# Patient Record
Sex: MALE | Race: WHITE | Employment: OTHER | URBAN - METROPOLITAN AREA
[De-identification: names, ages, dates, MRNs, and addresses within clinical notes are randomized per-mention and may not be internally consistent; named-entity substitution may affect disease eponyms.]

---

## 2019-11-18 ENCOUNTER — APPOINTMENT (OUTPATIENT)
Dept: GENERAL RADIOLOGY | Age: 59
DRG: 291 | End: 2019-11-18

## 2019-11-18 ENCOUNTER — HOSPITAL ENCOUNTER (INPATIENT)
Age: 59
LOS: 4 days | Discharge: HOME | DRG: 291 | End: 2019-11-22
Attending: EMERGENCY MEDICINE | Admitting: INTERNAL MEDICINE

## 2019-11-18 DIAGNOSIS — I50.9 CONGESTIVE HEART FAILURE, UNSPECIFIED HF CHRONICITY, UNSPECIFIED HEART FAILURE TYPE (HCC): Primary | ICD-10-CM

## 2019-11-18 DIAGNOSIS — R06.00 DYSPNEA AND RESPIRATORY ABNORMALITIES: ICD-10-CM

## 2019-11-18 DIAGNOSIS — R06.89 DYSPNEA AND RESPIRATORY ABNORMALITIES: ICD-10-CM

## 2019-11-18 DIAGNOSIS — I48.91 ATRIAL FIBRILLATION WITH RVR (HCC): ICD-10-CM

## 2019-11-18 DIAGNOSIS — I50.21 ACUTE SYSTOLIC CONGESTIVE HEART FAILURE (HCC): ICD-10-CM

## 2019-11-18 LAB
ADENOVIRUS BY PCR: NOT DETECTED
ALBUMIN SERPL-MCNC: 3.9 G/DL (ref 3.5–5.2)
ALP BLD-CCNC: 109 U/L (ref 40–129)
ALT SERPL-CCNC: 28 U/L (ref 0–40)
ANION GAP SERPL CALCULATED.3IONS-SCNC: 12 MMOL/L (ref 7–16)
AST SERPL-CCNC: 18 U/L (ref 0–39)
BASOPHILS ABSOLUTE: 0.06 E9/L (ref 0–0.2)
BASOPHILS RELATIVE PERCENT: 0.5 % (ref 0–2)
BILIRUB SERPL-MCNC: 0.5 MG/DL (ref 0–1.2)
BILIRUBIN DIRECT: <0.2 MG/DL (ref 0–0.3)
BILIRUBIN, INDIRECT: NORMAL MG/DL (ref 0–1)
BORDETELLA PARAPERTUSSIS BY PCR: NOT DETECTED
BORDETELLA PERTUSSIS BY PCR: NOT DETECTED
BUN BLDV-MCNC: 23 MG/DL (ref 8–23)
CALCIUM SERPL-MCNC: 9.4 MG/DL (ref 8.6–10.2)
CHLAMYDOPHILIA PNEUMONIAE BY PCR: NOT DETECTED
CHLORIDE BLD-SCNC: 107 MMOL/L (ref 98–107)
CO2: 26 MMOL/L (ref 22–29)
CORONAVIRUS 229E BY PCR: NOT DETECTED
CORONAVIRUS HKU1 BY PCR: NOT DETECTED
CORONAVIRUS NL63 BY PCR: NOT DETECTED
CORONAVIRUS OC43 BY PCR: NOT DETECTED
CREAT SERPL-MCNC: 1.9 MG/DL (ref 0.7–1.2)
EKG ATRIAL RATE: 144 BPM
EKG Q-T INTERVAL: 358 MS
EKG QRS DURATION: 90 MS
EKG QTC CALCULATION (BAZETT): 552 MS
EKG R AXIS: 71 DEGREES
EKG T AXIS: -19 DEGREES
EKG VENTRICULAR RATE: 143 BPM
EOSINOPHILS ABSOLUTE: 0.14 E9/L (ref 0.05–0.5)
EOSINOPHILS RELATIVE PERCENT: 1.2 % (ref 0–6)
GFR AFRICAN AMERICAN: 44
GFR NON-AFRICAN AMERICAN: 36 ML/MIN/1.73
GLUCOSE BLD-MCNC: 141 MG/DL (ref 74–99)
HCT VFR BLD CALC: 38.3 % (ref 37–54)
HEMOGLOBIN: 12.1 G/DL (ref 12.5–16.5)
HUMAN METAPNEUMOVIRUS BY PCR: NOT DETECTED
HUMAN RHINOVIRUS/ENTEROVIRUS BY PCR: NOT DETECTED
IMMATURE GRANULOCYTES #: 0.04 E9/L
IMMATURE GRANULOCYTES %: 0.4 % (ref 0–5)
INFLUENZA A BY PCR: NOT DETECTED
INFLUENZA B BY PCR: NOT DETECTED
LYMPHOCYTES ABSOLUTE: 1.78 E9/L (ref 1.5–4)
LYMPHOCYTES RELATIVE PERCENT: 15.7 % (ref 20–42)
MCH RBC QN AUTO: 30.7 PG (ref 26–35)
MCHC RBC AUTO-ENTMCNC: 31.6 % (ref 32–34.5)
MCV RBC AUTO: 97.2 FL (ref 80–99.9)
MONOCYTES ABSOLUTE: 0.82 E9/L (ref 0.1–0.95)
MONOCYTES RELATIVE PERCENT: 7.2 % (ref 2–12)
MYCOPLASMA PNEUMONIAE BY PCR: NOT DETECTED
NEUTROPHILS ABSOLUTE: 8.53 E9/L (ref 1.8–7.3)
NEUTROPHILS RELATIVE PERCENT: 75 % (ref 43–80)
PARAINFLUENZA VIRUS 1 BY PCR: NOT DETECTED
PARAINFLUENZA VIRUS 2 BY PCR: NOT DETECTED
PARAINFLUENZA VIRUS 3 BY PCR: NOT DETECTED
PARAINFLUENZA VIRUS 4 BY PCR: NOT DETECTED
PDW BLD-RTO: 14.8 FL (ref 11.5–15)
PLATELET # BLD: 258 E9/L (ref 130–450)
PMV BLD AUTO: 10.9 FL (ref 7–12)
POTASSIUM REFLEX MAGNESIUM: 4.9 MMOL/L (ref 3.5–5)
PRO-BNP: 8054 PG/ML (ref 0–125)
RBC # BLD: 3.94 E12/L (ref 3.8–5.8)
RESPIRATORY SYNCYTIAL VIRUS BY PCR: NOT DETECTED
SODIUM BLD-SCNC: 145 MMOL/L (ref 132–146)
T3 FREE: 2.6 PG/ML (ref 2–4.4)
T4 FREE: 1.27 NG/DL (ref 0.93–1.7)
TOTAL PROTEIN: 6.7 G/DL (ref 6.4–8.3)
TROPONIN: <0.01 NG/ML (ref 0–0.03)
TSH SERPL DL<=0.05 MIU/L-ACNC: 4.46 UIU/ML (ref 0.27–4.2)
WBC # BLD: 11.4 E9/L (ref 4.5–11.5)

## 2019-11-18 PROCEDURE — 84443 ASSAY THYROID STIM HORMONE: CPT

## 2019-11-18 PROCEDURE — 83880 ASSAY OF NATRIURETIC PEPTIDE: CPT

## 2019-11-18 PROCEDURE — 2060000000 HC ICU INTERMEDIATE R&B

## 2019-11-18 PROCEDURE — 84439 ASSAY OF FREE THYROXINE: CPT

## 2019-11-18 PROCEDURE — 94760 N-INVAS EAR/PLS OXIMETRY 1: CPT

## 2019-11-18 PROCEDURE — 2500000003 HC RX 250 WO HCPCS: Performed by: EMERGENCY MEDICINE

## 2019-11-18 PROCEDURE — 71046 X-RAY EXAM CHEST 2 VIEWS: CPT

## 2019-11-18 PROCEDURE — 96365 THER/PROPH/DIAG IV INF INIT: CPT

## 2019-11-18 PROCEDURE — 99222 1ST HOSP IP/OBS MODERATE 55: CPT | Performed by: INTERNAL MEDICINE

## 2019-11-18 PROCEDURE — 6360000002 HC RX W HCPCS: Performed by: EMERGENCY MEDICINE

## 2019-11-18 PROCEDURE — 2580000003 HC RX 258: Performed by: PHYSICIAN ASSISTANT

## 2019-11-18 PROCEDURE — 93005 ELECTROCARDIOGRAM TRACING: CPT | Performed by: EMERGENCY MEDICINE

## 2019-11-18 PROCEDURE — 6360000002 HC RX W HCPCS: Performed by: INTERNAL MEDICINE

## 2019-11-18 PROCEDURE — 96375 TX/PRO/DX INJ NEW DRUG ADDON: CPT

## 2019-11-18 PROCEDURE — 96376 TX/PRO/DX INJ SAME DRUG ADON: CPT

## 2019-11-18 PROCEDURE — 84484 ASSAY OF TROPONIN QUANT: CPT

## 2019-11-18 PROCEDURE — 84481 FREE ASSAY (FT-3): CPT

## 2019-11-18 PROCEDURE — 93010 ELECTROCARDIOGRAM REPORT: CPT | Performed by: INTERNAL MEDICINE

## 2019-11-18 PROCEDURE — 2500000003 HC RX 250 WO HCPCS: Performed by: INTERNAL MEDICINE

## 2019-11-18 PROCEDURE — 6370000000 HC RX 637 (ALT 250 FOR IP): Performed by: INTERNAL MEDICINE

## 2019-11-18 PROCEDURE — 80076 HEPATIC FUNCTION PANEL: CPT

## 2019-11-18 PROCEDURE — 2500000003 HC RX 250 WO HCPCS

## 2019-11-18 PROCEDURE — 2580000003 HC RX 258: Performed by: EMERGENCY MEDICINE

## 2019-11-18 PROCEDURE — 85025 COMPLETE CBC W/AUTO DIFF WBC: CPT

## 2019-11-18 PROCEDURE — 2580000003 HC RX 258: Performed by: INTERNAL MEDICINE

## 2019-11-18 PROCEDURE — APPSS45 APP SPLIT SHARED TIME 31-45 MINUTES: Performed by: PHYSICIAN ASSISTANT

## 2019-11-18 PROCEDURE — 99285 EMERGENCY DEPT VISIT HI MDM: CPT

## 2019-11-18 PROCEDURE — 0100U HC RESPIRPTHGN MULT REV TRANS & AMP PRB TECH 21 TRGT: CPT

## 2019-11-18 PROCEDURE — 80048 BASIC METABOLIC PNL TOTAL CA: CPT

## 2019-11-18 PROCEDURE — 96366 THER/PROPH/DIAG IV INF ADDON: CPT

## 2019-11-18 RX ORDER — SODIUM CHLORIDE 0.9 % (FLUSH) 0.9 %
10 SYRINGE (ML) INJECTION EVERY 12 HOURS SCHEDULED
Status: DISCONTINUED | OUTPATIENT
Start: 2019-11-18 | End: 2019-11-22 | Stop reason: HOSPADM

## 2019-11-18 RX ORDER — ACETAMINOPHEN 325 MG/1
650 TABLET ORAL EVERY 4 HOURS PRN
Status: DISCONTINUED | OUTPATIENT
Start: 2019-11-18 | End: 2019-11-22 | Stop reason: HOSPADM

## 2019-11-18 RX ORDER — METOPROLOL TARTRATE 5 MG/5ML
5 INJECTION INTRAVENOUS ONCE
Status: COMPLETED | OUTPATIENT
Start: 2019-11-18 | End: 2019-11-18

## 2019-11-18 RX ORDER — FUROSEMIDE 10 MG/ML
40 INJECTION INTRAMUSCULAR; INTRAVENOUS ONCE
Status: COMPLETED | OUTPATIENT
Start: 2019-11-18 | End: 2019-11-18

## 2019-11-18 RX ORDER — FUROSEMIDE 10 MG/ML
20 INJECTION INTRAMUSCULAR; INTRAVENOUS 2 TIMES DAILY
Status: DISCONTINUED | OUTPATIENT
Start: 2019-11-19 | End: 2019-11-19

## 2019-11-18 RX ORDER — 0.9 % SODIUM CHLORIDE 0.9 %
500 INTRAVENOUS SOLUTION INTRAVENOUS ONCE
Status: COMPLETED | OUTPATIENT
Start: 2019-11-18 | End: 2019-11-18

## 2019-11-18 RX ORDER — SODIUM CHLORIDE 0.9 % (FLUSH) 0.9 %
10 SYRINGE (ML) INJECTION PRN
Status: DISCONTINUED | OUTPATIENT
Start: 2019-11-18 | End: 2019-11-22 | Stop reason: HOSPADM

## 2019-11-18 RX ORDER — DIGOXIN 0.25 MG/ML
250 INJECTION INTRAMUSCULAR; INTRAVENOUS ONCE
Status: COMPLETED | OUTPATIENT
Start: 2019-11-18 | End: 2019-11-18

## 2019-11-18 RX ORDER — EZETIMIBE 10 MG/1
10 TABLET ORAL
Status: ON HOLD | COMMUNITY
End: 2019-11-21 | Stop reason: HOSPADM

## 2019-11-18 RX ORDER — DILTIAZEM HYDROCHLORIDE 5 MG/ML
25 INJECTION INTRAVENOUS ONCE
Status: COMPLETED | OUTPATIENT
Start: 2019-11-18 | End: 2019-11-18

## 2019-11-18 RX ORDER — ATORVASTATIN CALCIUM 40 MG/1
40 TABLET, FILM COATED ORAL DAILY
Status: DISCONTINUED | OUTPATIENT
Start: 2019-11-18 | End: 2019-11-22 | Stop reason: HOSPADM

## 2019-11-18 RX ORDER — DILTIAZEM HYDROCHLORIDE 5 MG/ML
INJECTION INTRAVENOUS
Status: COMPLETED
Start: 2019-11-18 | End: 2019-11-18

## 2019-11-18 RX ORDER — EZETIMIBE 10 MG/1
10 TABLET ORAL NIGHTLY
Status: DISCONTINUED | OUTPATIENT
Start: 2019-11-18 | End: 2019-11-18 | Stop reason: CLARIF

## 2019-11-18 RX ORDER — GLIPIZIDE 5 MG/1
2.5 TABLET ORAL
Status: ON HOLD | COMMUNITY
End: 2019-11-21 | Stop reason: SDUPTHER

## 2019-11-18 RX ORDER — DOXAZOSIN MESYLATE 1 MG/1
1 TABLET ORAL DAILY
Status: DISCONTINUED | OUTPATIENT
Start: 2019-11-18 | End: 2019-11-22 | Stop reason: HOSPADM

## 2019-11-18 RX ORDER — DIGOXIN 0.25 MG/ML
500 INJECTION INTRAMUSCULAR; INTRAVENOUS ONCE
Status: COMPLETED | OUTPATIENT
Start: 2019-11-18 | End: 2019-11-18

## 2019-11-18 RX ORDER — POTASSIUM CHLORIDE 750 MG/1
10 CAPSULE, EXTENDED RELEASE ORAL
Status: ON HOLD | COMMUNITY
End: 2019-11-21 | Stop reason: HOSPADM

## 2019-11-18 RX ORDER — ONDANSETRON 2 MG/ML
4 INJECTION INTRAMUSCULAR; INTRAVENOUS EVERY 6 HOURS PRN
Status: DISCONTINUED | OUTPATIENT
Start: 2019-11-18 | End: 2019-11-22 | Stop reason: HOSPADM

## 2019-11-18 RX ORDER — 0.9 % SODIUM CHLORIDE 0.9 %
1000 INTRAVENOUS SOLUTION INTRAVENOUS ONCE
Status: DISCONTINUED | OUTPATIENT
Start: 2019-11-18 | End: 2019-11-18

## 2019-11-18 RX ORDER — CARVEDILOL 25 MG/1
25 TABLET ORAL 2 TIMES DAILY WITH MEALS
Status: DISCONTINUED | OUTPATIENT
Start: 2019-11-18 | End: 2019-11-19

## 2019-11-18 RX ADMIN — SODIUM CHLORIDE 500 ML: 9 INJECTION, SOLUTION INTRAVENOUS at 11:03

## 2019-11-18 RX ADMIN — FUROSEMIDE 40 MG: 10 INJECTION, SOLUTION INTRAMUSCULAR; INTRAVENOUS at 12:12

## 2019-11-18 RX ADMIN — DEXTROSE MONOHYDRATE 15 MG/HR: 50 INJECTION, SOLUTION INTRAVENOUS at 19:18

## 2019-11-18 RX ADMIN — Medication 10 ML: at 20:34

## 2019-11-18 RX ADMIN — DIGOXIN 500 MCG: 0.25 INJECTION INTRAMUSCULAR; INTRAVENOUS at 14:50

## 2019-11-18 RX ADMIN — DILTIAZEM HYDROCHLORIDE 25 MG: 5 INJECTION INTRAVENOUS at 13:23

## 2019-11-18 RX ADMIN — APIXABAN 5 MG: 5 TABLET, FILM COATED ORAL at 20:34

## 2019-11-18 RX ADMIN — DIGOXIN 250 MCG: 0.25 INJECTION INTRAMUSCULAR; INTRAVENOUS at 17:33

## 2019-11-18 RX ADMIN — CARVEDILOL 25 MG: 25 TABLET, FILM COATED ORAL at 16:16

## 2019-11-18 RX ADMIN — DEXTROSE MONOHYDRATE 10 MG/HR: 50 INJECTION, SOLUTION INTRAVENOUS at 11:09

## 2019-11-18 RX ADMIN — METOPROLOL TARTRATE 5 MG: 5 INJECTION, SOLUTION INTRAVENOUS at 17:33

## 2019-11-18 RX ADMIN — DILTIAZEM HYDROCHLORIDE 25 MG: 5 INJECTION INTRAVENOUS at 11:04

## 2019-11-18 SDOH — HEALTH STABILITY: MENTAL HEALTH: HOW OFTEN DO YOU HAVE A DRINK CONTAINING ALCOHOL?: NEVER

## 2019-11-18 ASSESSMENT — ENCOUNTER SYMPTOMS
VOMITING: 0
EYE PAIN: 0
SINUS PRESSURE: 0
SORE THROAT: 0
BACK PAIN: 0
ABDOMINAL PAIN: 0
EYE DISCHARGE: 0
SHORTNESS OF BREATH: 1
EYE REDNESS: 0
COUGH: 0
WHEEZING: 0
DIARRHEA: 0
NAUSEA: 0

## 2019-11-18 ASSESSMENT — PAIN SCALES - GENERAL
PAINLEVEL_OUTOF10: 0

## 2019-11-19 LAB
ANION GAP SERPL CALCULATED.3IONS-SCNC: 12 MMOL/L (ref 7–16)
BASOPHILS ABSOLUTE: 0.07 E9/L (ref 0–0.2)
BASOPHILS RELATIVE PERCENT: 0.8 % (ref 0–2)
BUN BLDV-MCNC: 22 MG/DL (ref 8–23)
CALCIUM SERPL-MCNC: 8.6 MG/DL (ref 8.6–10.2)
CHLORIDE BLD-SCNC: 105 MMOL/L (ref 98–107)
CO2: 26 MMOL/L (ref 22–29)
CREAT SERPL-MCNC: 1.7 MG/DL (ref 0.7–1.2)
EOSINOPHILS ABSOLUTE: 0.19 E9/L (ref 0.05–0.5)
EOSINOPHILS RELATIVE PERCENT: 2.1 % (ref 0–6)
GFR AFRICAN AMERICAN: 50
GFR NON-AFRICAN AMERICAN: 41 ML/MIN/1.73
GLUCOSE BLD-MCNC: 233 MG/DL (ref 74–99)
HBA1C MFR BLD: 7.2 % (ref 4–5.6)
HCT VFR BLD CALC: 36.8 % (ref 37–54)
HEMOGLOBIN: 11.5 G/DL (ref 12.5–16.5)
IMMATURE GRANULOCYTES #: 0.03 E9/L
IMMATURE GRANULOCYTES %: 0.3 % (ref 0–5)
INR BLD: 1.7
LV EF: 30 %
LVEF MODALITY: NORMAL
LYMPHOCYTES ABSOLUTE: 1.65 E9/L (ref 1.5–4)
LYMPHOCYTES RELATIVE PERCENT: 18.1 % (ref 20–42)
MCH RBC QN AUTO: 29.9 PG (ref 26–35)
MCHC RBC AUTO-ENTMCNC: 31.3 % (ref 32–34.5)
MCV RBC AUTO: 95.6 FL (ref 80–99.9)
METER GLUCOSE: 152 MG/DL (ref 74–99)
METER GLUCOSE: 192 MG/DL (ref 74–99)
MONOCYTES ABSOLUTE: 0.63 E9/L (ref 0.1–0.95)
MONOCYTES RELATIVE PERCENT: 6.9 % (ref 2–12)
NEUTROPHILS ABSOLUTE: 6.57 E9/L (ref 1.8–7.3)
NEUTROPHILS RELATIVE PERCENT: 71.8 % (ref 43–80)
PDW BLD-RTO: 14.5 FL (ref 11.5–15)
PLATELET # BLD: 239 E9/L (ref 130–450)
PMV BLD AUTO: 11.2 FL (ref 7–12)
POTASSIUM REFLEX MAGNESIUM: 4.9 MMOL/L (ref 3.5–5)
PRO-BNP: 7685 PG/ML (ref 0–125)
PROTHROMBIN TIME: 20.2 SEC (ref 9.3–12.4)
RBC # BLD: 3.85 E12/L (ref 3.8–5.8)
SODIUM BLD-SCNC: 143 MMOL/L (ref 132–146)
WBC # BLD: 9.1 E9/L (ref 4.5–11.5)

## 2019-11-19 PROCEDURE — 6360000002 HC RX W HCPCS: Performed by: PHYSICIAN ASSISTANT

## 2019-11-19 PROCEDURE — 85610 PROTHROMBIN TIME: CPT

## 2019-11-19 PROCEDURE — 80048 BASIC METABOLIC PNL TOTAL CA: CPT

## 2019-11-19 PROCEDURE — 6360000004 HC RX CONTRAST MEDICATION: Performed by: INTERNAL MEDICINE

## 2019-11-19 PROCEDURE — 6360000002 HC RX W HCPCS: Performed by: INTERNAL MEDICINE

## 2019-11-19 PROCEDURE — 36415 COLL VENOUS BLD VENIPUNCTURE: CPT

## 2019-11-19 PROCEDURE — 6370000000 HC RX 637 (ALT 250 FOR IP): Performed by: INTERNAL MEDICINE

## 2019-11-19 PROCEDURE — 83036 HEMOGLOBIN GLYCOSYLATED A1C: CPT

## 2019-11-19 PROCEDURE — 82962 GLUCOSE BLOOD TEST: CPT

## 2019-11-19 PROCEDURE — 2580000003 HC RX 258: Performed by: INTERNAL MEDICINE

## 2019-11-19 PROCEDURE — 2700000000 HC OXYGEN THERAPY PER DAY

## 2019-11-19 PROCEDURE — 94660 CPAP INITIATION&MGMT: CPT

## 2019-11-19 PROCEDURE — 93306 TTE W/DOPPLER COMPLETE: CPT

## 2019-11-19 PROCEDURE — 99254 IP/OBS CNSLTJ NEW/EST MOD 60: CPT | Performed by: INTERNAL MEDICINE

## 2019-11-19 PROCEDURE — 2060000000 HC ICU INTERMEDIATE R&B

## 2019-11-19 PROCEDURE — 6370000000 HC RX 637 (ALT 250 FOR IP): Performed by: PHYSICIAN ASSISTANT

## 2019-11-19 PROCEDURE — 99233 SBSQ HOSP IP/OBS HIGH 50: CPT | Performed by: INTERNAL MEDICINE

## 2019-11-19 PROCEDURE — 2580000003 HC RX 258: Performed by: PHYSICIAN ASSISTANT

## 2019-11-19 PROCEDURE — 85025 COMPLETE CBC W/AUTO DIFF WBC: CPT

## 2019-11-19 PROCEDURE — APPSS30 APP SPLIT SHARED TIME 16-30 MINUTES: Performed by: PHYSICIAN ASSISTANT

## 2019-11-19 PROCEDURE — 83735 ASSAY OF MAGNESIUM: CPT

## 2019-11-19 PROCEDURE — 83880 ASSAY OF NATRIURETIC PEPTIDE: CPT

## 2019-11-19 PROCEDURE — 2500000003 HC RX 250 WO HCPCS: Performed by: EMERGENCY MEDICINE

## 2019-11-19 RX ORDER — METOPROLOL SUCCINATE 50 MG/1
50 TABLET, EXTENDED RELEASE ORAL 2 TIMES DAILY
Status: DISCONTINUED | OUTPATIENT
Start: 2019-11-19 | End: 2019-11-21

## 2019-11-19 RX ORDER — DEXTROSE MONOHYDRATE 50 MG/ML
100 INJECTION, SOLUTION INTRAVENOUS PRN
Status: DISCONTINUED | OUTPATIENT
Start: 2019-11-19 | End: 2019-11-22 | Stop reason: HOSPADM

## 2019-11-19 RX ORDER — NICOTINE POLACRILEX 4 MG
15 LOZENGE BUCCAL PRN
Status: DISCONTINUED | OUTPATIENT
Start: 2019-11-19 | End: 2019-11-22 | Stop reason: HOSPADM

## 2019-11-19 RX ORDER — FUROSEMIDE 10 MG/ML
20 INJECTION INTRAMUSCULAR; INTRAVENOUS ONCE
Status: COMPLETED | OUTPATIENT
Start: 2019-11-19 | End: 2019-11-19

## 2019-11-19 RX ORDER — FUROSEMIDE 10 MG/ML
40 INJECTION INTRAMUSCULAR; INTRAVENOUS 2 TIMES DAILY
Status: DISCONTINUED | OUTPATIENT
Start: 2019-11-20 | End: 2019-11-22 | Stop reason: HOSPADM

## 2019-11-19 RX ORDER — DEXTROSE MONOHYDRATE 25 G/50ML
12.5 INJECTION, SOLUTION INTRAVENOUS PRN
Status: DISCONTINUED | OUTPATIENT
Start: 2019-11-19 | End: 2019-11-22 | Stop reason: HOSPADM

## 2019-11-19 RX ADMIN — APIXABAN 5 MG: 5 TABLET, FILM COATED ORAL at 09:55

## 2019-11-19 RX ADMIN — INSULIN LISPRO 1 UNITS: 100 INJECTION, SOLUTION INTRAVENOUS; SUBCUTANEOUS at 20:21

## 2019-11-19 RX ADMIN — Medication 10 ML: at 09:55

## 2019-11-19 RX ADMIN — Medication 10 ML: at 20:22

## 2019-11-19 RX ADMIN — FUROSEMIDE 20 MG: 10 INJECTION, SOLUTION INTRAMUSCULAR; INTRAVENOUS at 18:48

## 2019-11-19 RX ADMIN — DEXTROSE MONOHYDRATE 7.5 MG/HR: 50 INJECTION, SOLUTION INTRAVENOUS at 18:51

## 2019-11-19 RX ADMIN — FUROSEMIDE 20 MG: 10 INJECTION, SOLUTION INTRAVENOUS at 09:55

## 2019-11-19 RX ADMIN — ATORVASTATIN CALCIUM 40 MG: 40 TABLET, FILM COATED ORAL at 09:55

## 2019-11-19 RX ADMIN — PERFLUTREN 1.65 MG: 6.52 INJECTION, SUSPENSION INTRAVENOUS at 13:37

## 2019-11-19 RX ADMIN — APIXABAN 5 MG: 5 TABLET, FILM COATED ORAL at 20:21

## 2019-11-19 RX ADMIN — METOPROLOL SUCCINATE 50 MG: 50 TABLET, FILM COATED, EXTENDED RELEASE ORAL at 20:21

## 2019-11-19 RX ADMIN — CARVEDILOL 25 MG: 25 TABLET, FILM COATED ORAL at 09:55

## 2019-11-19 RX ADMIN — DOXAZOSIN 1 MG: 1 TABLET ORAL at 09:55

## 2019-11-19 RX ADMIN — METOPROLOL SUCCINATE 50 MG: 50 TABLET, FILM COATED, EXTENDED RELEASE ORAL at 12:10

## 2019-11-19 RX ADMIN — FUROSEMIDE 20 MG: 10 INJECTION, SOLUTION INTRAVENOUS at 17:00

## 2019-11-19 RX ADMIN — DEXTROSE MONOHYDRATE 7.5 MG/HR: 50 INJECTION, SOLUTION INTRAVENOUS at 04:08

## 2019-11-19 ASSESSMENT — PAIN SCALES - GENERAL
PAINLEVEL_OUTOF10: 0

## 2019-11-20 ENCOUNTER — APPOINTMENT (OUTPATIENT)
Dept: INPATIENT UNIT | Age: 59
DRG: 291 | End: 2019-11-20

## 2019-11-20 ENCOUNTER — ANESTHESIA EVENT (OUTPATIENT)
Dept: INPATIENT UNIT | Age: 59
DRG: 291 | End: 2019-11-20

## 2019-11-20 ENCOUNTER — ANESTHESIA (OUTPATIENT)
Dept: INPATIENT UNIT | Age: 59
DRG: 291 | End: 2019-11-20

## 2019-11-20 VITALS
RESPIRATION RATE: 16 BRPM | DIASTOLIC BLOOD PRESSURE: 84 MMHG | SYSTOLIC BLOOD PRESSURE: 119 MMHG | OXYGEN SATURATION: 93 %

## 2019-11-20 LAB
ANION GAP SERPL CALCULATED.3IONS-SCNC: 14 MMOL/L (ref 7–16)
BUN BLDV-MCNC: 23 MG/DL (ref 8–23)
CALCIUM SERPL-MCNC: 8.8 MG/DL (ref 8.6–10.2)
CHLORIDE BLD-SCNC: 103 MMOL/L (ref 98–107)
CO2: 24 MMOL/L (ref 22–29)
CREAT SERPL-MCNC: 1.6 MG/DL (ref 0.7–1.2)
GFR AFRICAN AMERICAN: 53
GFR NON-AFRICAN AMERICAN: 44 ML/MIN/1.73
GLUCOSE BLD-MCNC: 145 MG/DL (ref 74–99)
MAGNESIUM: 2.5 MG/DL (ref 1.6–2.6)
METER GLUCOSE: 124 MG/DL (ref 74–99)
METER GLUCOSE: 135 MG/DL (ref 74–99)
METER GLUCOSE: 143 MG/DL (ref 74–99)
METER GLUCOSE: 157 MG/DL (ref 74–99)
POTASSIUM SERPL-SCNC: 4.2 MMOL/L (ref 3.5–5)
SODIUM BLD-SCNC: 141 MMOL/L (ref 132–146)

## 2019-11-20 PROCEDURE — 2060000000 HC ICU INTERMEDIATE R&B

## 2019-11-20 PROCEDURE — 2580000003 HC RX 258

## 2019-11-20 PROCEDURE — 6360000002 HC RX W HCPCS

## 2019-11-20 PROCEDURE — 7100000000 HC PACU RECOVERY - FIRST 15 MIN

## 2019-11-20 PROCEDURE — APPSS30 APP SPLIT SHARED TIME 16-30 MINUTES: Performed by: NURSE PRACTITIONER

## 2019-11-20 PROCEDURE — 6370000000 HC RX 637 (ALT 250 FOR IP): Performed by: INTERNAL MEDICINE

## 2019-11-20 PROCEDURE — 92960 CARDIOVERSION ELECTRIC EXT: CPT

## 2019-11-20 PROCEDURE — 5A2204Z RESTORATION OF CARDIAC RHYTHM, SINGLE: ICD-10-PCS | Performed by: INTERNAL MEDICINE

## 2019-11-20 PROCEDURE — 2580000003 HC RX 258: Performed by: INTERNAL MEDICINE

## 2019-11-20 PROCEDURE — 3700000000 HC ANESTHESIA ATTENDED CARE

## 2019-11-20 PROCEDURE — 6370000000 HC RX 637 (ALT 250 FOR IP): Performed by: PHYSICIAN ASSISTANT

## 2019-11-20 PROCEDURE — 82962 GLUCOSE BLOOD TEST: CPT

## 2019-11-20 PROCEDURE — 80048 BASIC METABOLIC PNL TOTAL CA: CPT

## 2019-11-20 PROCEDURE — 92960 CARDIOVERSION ELECTRIC EXT: CPT | Performed by: INTERNAL MEDICINE

## 2019-11-20 PROCEDURE — 99233 SBSQ HOSP IP/OBS HIGH 50: CPT | Performed by: INTERNAL MEDICINE

## 2019-11-20 PROCEDURE — 7100000001 HC PACU RECOVERY - ADDTL 15 MIN

## 2019-11-20 PROCEDURE — 94660 CPAP INITIATION&MGMT: CPT

## 2019-11-20 PROCEDURE — 2580000003 HC RX 258: Performed by: PHYSICIAN ASSISTANT

## 2019-11-20 PROCEDURE — 6360000002 HC RX W HCPCS: Performed by: INTERNAL MEDICINE

## 2019-11-20 PROCEDURE — 2500000003 HC RX 250 WO HCPCS: Performed by: INTERNAL MEDICINE

## 2019-11-20 PROCEDURE — 36415 COLL VENOUS BLD VENIPUNCTURE: CPT

## 2019-11-20 RX ORDER — PROPOFOL 10 MG/ML
INJECTION, EMULSION INTRAVENOUS PRN
Status: DISCONTINUED | OUTPATIENT
Start: 2019-11-20 | End: 2019-11-20 | Stop reason: SDUPTHER

## 2019-11-20 RX ORDER — SODIUM CHLORIDE 9 MG/ML
INJECTION, SOLUTION INTRAVENOUS CONTINUOUS PRN
Status: DISCONTINUED | OUTPATIENT
Start: 2019-11-20 | End: 2019-11-20 | Stop reason: SDUPTHER

## 2019-11-20 RX ADMIN — FUROSEMIDE 40 MG: 10 INJECTION, SOLUTION INTRAMUSCULAR; INTRAVENOUS at 09:07

## 2019-11-20 RX ADMIN — DOXAZOSIN 1 MG: 1 TABLET ORAL at 09:07

## 2019-11-20 RX ADMIN — INSULIN LISPRO 1 UNITS: 100 INJECTION, SOLUTION INTRAVENOUS; SUBCUTANEOUS at 20:23

## 2019-11-20 RX ADMIN — METOPROLOL SUCCINATE 50 MG: 50 TABLET, FILM COATED, EXTENDED RELEASE ORAL at 20:21

## 2019-11-20 RX ADMIN — Medication 10 ML: at 20:21

## 2019-11-20 RX ADMIN — SILVER SULFADIAZINE: 10 CREAM TOPICAL at 17:58

## 2019-11-20 RX ADMIN — FUROSEMIDE 40 MG: 10 INJECTION, SOLUTION INTRAMUSCULAR; INTRAVENOUS at 17:31

## 2019-11-20 RX ADMIN — APIXABAN 5 MG: 5 TABLET, FILM COATED ORAL at 09:07

## 2019-11-20 RX ADMIN — Medication 10 ML: at 09:07

## 2019-11-20 RX ADMIN — METOPROLOL SUCCINATE 50 MG: 50 TABLET, FILM COATED, EXTENDED RELEASE ORAL at 09:07

## 2019-11-20 RX ADMIN — APIXABAN 5 MG: 5 TABLET, FILM COATED ORAL at 20:20

## 2019-11-20 RX ADMIN — SODIUM CHLORIDE: 9 INJECTION, SOLUTION INTRAVENOUS at 14:03

## 2019-11-20 RX ADMIN — ATORVASTATIN CALCIUM 40 MG: 40 TABLET, FILM COATED ORAL at 09:07

## 2019-11-20 RX ADMIN — PROPOFOL 150 MG: 10 INJECTION, EMULSION INTRAVENOUS at 14:05

## 2019-11-20 RX ADMIN — Medication 10 ML: at 09:08

## 2019-11-20 ASSESSMENT — LIFESTYLE VARIABLES: SMOKING_STATUS: 0

## 2019-11-20 ASSESSMENT — ENCOUNTER SYMPTOMS: SHORTNESS OF BREATH: 1

## 2019-11-20 ASSESSMENT — PAIN SCALES - GENERAL
PAINLEVEL_OUTOF10: 0

## 2019-11-21 LAB
ANION GAP SERPL CALCULATED.3IONS-SCNC: 12 MMOL/L (ref 7–16)
BUN BLDV-MCNC: 25 MG/DL (ref 8–23)
CALCIUM SERPL-MCNC: 9 MG/DL (ref 8.6–10.2)
CHLORIDE BLD-SCNC: 107 MMOL/L (ref 98–107)
CO2: 26 MMOL/L (ref 22–29)
CREAT SERPL-MCNC: 1.7 MG/DL (ref 0.7–1.2)
GFR AFRICAN AMERICAN: 50
GFR NON-AFRICAN AMERICAN: 41 ML/MIN/1.73
GLUCOSE BLD-MCNC: 171 MG/DL (ref 74–99)
HCT VFR BLD CALC: 38 % (ref 37–54)
HEMOGLOBIN: 12.2 G/DL (ref 12.5–16.5)
MCH RBC QN AUTO: 30.6 PG (ref 26–35)
MCHC RBC AUTO-ENTMCNC: 32.1 % (ref 32–34.5)
MCV RBC AUTO: 95.2 FL (ref 80–99.9)
METER GLUCOSE: 127 MG/DL (ref 74–99)
METER GLUCOSE: 148 MG/DL (ref 74–99)
METER GLUCOSE: 150 MG/DL (ref 74–99)
METER GLUCOSE: 163 MG/DL (ref 74–99)
PDW BLD-RTO: 14.6 FL (ref 11.5–15)
PLATELET # BLD: 229 E9/L (ref 130–450)
PMV BLD AUTO: 11 FL (ref 7–12)
POTASSIUM SERPL-SCNC: 5.1 MMOL/L (ref 3.5–5)
RBC # BLD: 3.99 E12/L (ref 3.8–5.8)
SODIUM BLD-SCNC: 145 MMOL/L (ref 132–146)
WBC # BLD: 8.1 E9/L (ref 4.5–11.5)

## 2019-11-21 PROCEDURE — 6370000000 HC RX 637 (ALT 250 FOR IP): Performed by: INTERNAL MEDICINE

## 2019-11-21 PROCEDURE — 6370000000 HC RX 637 (ALT 250 FOR IP): Performed by: PHYSICIAN ASSISTANT

## 2019-11-21 PROCEDURE — 2060000000 HC ICU INTERMEDIATE R&B

## 2019-11-21 PROCEDURE — 99233 SBSQ HOSP IP/OBS HIGH 50: CPT | Performed by: INTERNAL MEDICINE

## 2019-11-21 PROCEDURE — 2580000003 HC RX 258: Performed by: INTERNAL MEDICINE

## 2019-11-21 PROCEDURE — 80048 BASIC METABOLIC PNL TOTAL CA: CPT

## 2019-11-21 PROCEDURE — 82962 GLUCOSE BLOOD TEST: CPT

## 2019-11-21 PROCEDURE — 6360000002 HC RX W HCPCS: Performed by: INTERNAL MEDICINE

## 2019-11-21 PROCEDURE — 94660 CPAP INITIATION&MGMT: CPT

## 2019-11-21 PROCEDURE — 2700000000 HC OXYGEN THERAPY PER DAY

## 2019-11-21 PROCEDURE — 85027 COMPLETE CBC AUTOMATED: CPT

## 2019-11-21 PROCEDURE — 36415 COLL VENOUS BLD VENIPUNCTURE: CPT

## 2019-11-21 PROCEDURE — 2580000003 HC RX 258: Performed by: PHYSICIAN ASSISTANT

## 2019-11-21 RX ORDER — FUROSEMIDE 40 MG/1
40 TABLET ORAL DAILY
Qty: 30 TABLET | Refills: 0 | Status: ON HOLD | OUTPATIENT
Start: 2019-11-21 | End: 2020-03-01 | Stop reason: HOSPADM

## 2019-11-21 RX ORDER — DIGOXIN 0.25 MG/ML
500 INJECTION INTRAMUSCULAR; INTRAVENOUS DAILY
Status: DISCONTINUED | OUTPATIENT
Start: 2019-11-21 | End: 2019-11-21

## 2019-11-21 RX ORDER — DIGOXIN 125 MCG
125 TABLET ORAL DAILY
Qty: 30 TABLET | Refills: 0 | Status: SHIPPED | OUTPATIENT
Start: 2019-11-22 | End: 2022-06-28

## 2019-11-21 RX ORDER — HYDRALAZINE HYDROCHLORIDE 10 MG/1
10 TABLET, FILM COATED ORAL EVERY 8 HOURS SCHEDULED
Qty: 90 TABLET | Refills: 0 | Status: ON HOLD | OUTPATIENT
Start: 2019-11-21 | End: 2020-03-01 | Stop reason: HOSPADM

## 2019-11-21 RX ORDER — HYDRALAZINE HYDROCHLORIDE 10 MG/1
10 TABLET, FILM COATED ORAL EVERY 8 HOURS SCHEDULED
Status: DISCONTINUED | OUTPATIENT
Start: 2019-11-21 | End: 2019-11-22 | Stop reason: HOSPADM

## 2019-11-21 RX ORDER — GLIPIZIDE 5 MG/1
2.5 TABLET ORAL
Qty: 15 TABLET | Refills: 0 | Status: SHIPPED | OUTPATIENT
Start: 2019-11-21 | End: 2022-06-28

## 2019-11-21 RX ORDER — METOPROLOL SUCCINATE 100 MG/1
100 TABLET, EXTENDED RELEASE ORAL 2 TIMES DAILY
Status: DISCONTINUED | OUTPATIENT
Start: 2019-11-21 | End: 2019-11-22 | Stop reason: HOSPADM

## 2019-11-21 RX ORDER — METOPROLOL SUCCINATE 100 MG/1
100 TABLET, EXTENDED RELEASE ORAL 2 TIMES DAILY
Qty: 60 TABLET | Refills: 0 | Status: SHIPPED | OUTPATIENT
Start: 2019-11-21 | End: 2022-05-26 | Stop reason: SDUPTHER

## 2019-11-21 RX ORDER — ISOSORBIDE MONONITRATE 30 MG/1
30 TABLET, EXTENDED RELEASE ORAL DAILY
Qty: 30 TABLET | Refills: 0 | Status: ON HOLD | OUTPATIENT
Start: 2019-11-22 | End: 2020-03-01 | Stop reason: HOSPADM

## 2019-11-21 RX ORDER — ISOSORBIDE MONONITRATE 30 MG/1
30 TABLET, EXTENDED RELEASE ORAL DAILY
Status: DISCONTINUED | OUTPATIENT
Start: 2019-11-21 | End: 2019-11-22 | Stop reason: HOSPADM

## 2019-11-21 RX ORDER — DIGOXIN 125 MCG
125 TABLET ORAL DAILY
Status: DISCONTINUED | OUTPATIENT
Start: 2019-11-22 | End: 2019-11-22 | Stop reason: HOSPADM

## 2019-11-21 RX ADMIN — ATORVASTATIN CALCIUM 40 MG: 40 TABLET, FILM COATED ORAL at 07:59

## 2019-11-21 RX ADMIN — SILVER SULFADIAZINE: 10 CREAM TOPICAL at 08:00

## 2019-11-21 RX ADMIN — METOPROLOL SUCCINATE 50 MG: 50 TABLET, FILM COATED, EXTENDED RELEASE ORAL at 07:59

## 2019-11-21 RX ADMIN — FUROSEMIDE 40 MG: 10 INJECTION, SOLUTION INTRAMUSCULAR; INTRAVENOUS at 16:51

## 2019-11-21 RX ADMIN — Medication 10 ML: at 08:02

## 2019-11-21 RX ADMIN — Medication 10 ML: at 21:46

## 2019-11-21 RX ADMIN — INSULIN LISPRO 1 UNITS: 100 INJECTION, SOLUTION INTRAVENOUS; SUBCUTANEOUS at 11:33

## 2019-11-21 RX ADMIN — INSULIN LISPRO 1 UNITS: 100 INJECTION, SOLUTION INTRAVENOUS; SUBCUTANEOUS at 21:42

## 2019-11-21 RX ADMIN — APIXABAN 5 MG: 5 TABLET, FILM COATED ORAL at 21:42

## 2019-11-21 RX ADMIN — HYDRALAZINE HYDROCHLORIDE 10 MG: 10 TABLET, FILM COATED ORAL at 15:21

## 2019-11-21 RX ADMIN — DIGOXIN 500 MCG: 0.25 INJECTION INTRAMUSCULAR; INTRAVENOUS at 10:43

## 2019-11-21 RX ADMIN — ISOSORBIDE MONONITRATE 30 MG: 30 TABLET, EXTENDED RELEASE ORAL at 10:43

## 2019-11-21 RX ADMIN — METOPROLOL SUCCINATE 100 MG: 100 TABLET, EXTENDED RELEASE ORAL at 21:47

## 2019-11-21 RX ADMIN — Medication 10 ML: at 08:00

## 2019-11-21 RX ADMIN — FUROSEMIDE 40 MG: 10 INJECTION, SOLUTION INTRAMUSCULAR; INTRAVENOUS at 07:59

## 2019-11-21 RX ADMIN — APIXABAN 5 MG: 5 TABLET, FILM COATED ORAL at 07:59

## 2019-11-21 RX ADMIN — HYDRALAZINE HYDROCHLORIDE 10 MG: 10 TABLET, FILM COATED ORAL at 21:47

## 2019-11-21 RX ADMIN — HYDRALAZINE HYDROCHLORIDE 10 MG: 10 TABLET, FILM COATED ORAL at 10:43

## 2019-11-21 RX ADMIN — INSULIN LISPRO 1 UNITS: 100 INJECTION, SOLUTION INTRAVENOUS; SUBCUTANEOUS at 06:25

## 2019-11-21 RX ADMIN — DOXAZOSIN 1 MG: 1 TABLET ORAL at 07:59

## 2019-11-21 ASSESSMENT — PAIN SCALES - GENERAL
PAINLEVEL_OUTOF10: 0

## 2019-11-22 VITALS
DIASTOLIC BLOOD PRESSURE: 77 MMHG | SYSTOLIC BLOOD PRESSURE: 114 MMHG | WEIGHT: 250.2 LBS | BODY MASS INDEX: 37.06 KG/M2 | OXYGEN SATURATION: 95 % | HEART RATE: 90 BPM | RESPIRATION RATE: 16 BRPM | TEMPERATURE: 98.4 F | HEIGHT: 69 IN

## 2019-11-22 LAB
ANION GAP SERPL CALCULATED.3IONS-SCNC: 13 MMOL/L (ref 7–16)
BUN BLDV-MCNC: 25 MG/DL (ref 8–23)
CALCIUM SERPL-MCNC: 8.7 MG/DL (ref 8.6–10.2)
CHLORIDE BLD-SCNC: 105 MMOL/L (ref 98–107)
CO2: 26 MMOL/L (ref 22–29)
CREAT SERPL-MCNC: 1.9 MG/DL (ref 0.7–1.2)
GFR AFRICAN AMERICAN: 44
GFR NON-AFRICAN AMERICAN: 36 ML/MIN/1.73
GLUCOSE BLD-MCNC: 127 MG/DL (ref 74–99)
METER GLUCOSE: 107 MG/DL (ref 74–99)
METER GLUCOSE: 262 MG/DL (ref 74–99)
POTASSIUM SERPL-SCNC: 4.6 MMOL/L (ref 3.5–5)
SODIUM BLD-SCNC: 144 MMOL/L (ref 132–146)

## 2019-11-22 PROCEDURE — 80048 BASIC METABOLIC PNL TOTAL CA: CPT

## 2019-11-22 PROCEDURE — 99239 HOSP IP/OBS DSCHRG MGMT >30: CPT | Performed by: INTERNAL MEDICINE

## 2019-11-22 PROCEDURE — 94660 CPAP INITIATION&MGMT: CPT

## 2019-11-22 PROCEDURE — 6370000000 HC RX 637 (ALT 250 FOR IP): Performed by: PHYSICIAN ASSISTANT

## 2019-11-22 PROCEDURE — 6370000000 HC RX 637 (ALT 250 FOR IP): Performed by: INTERNAL MEDICINE

## 2019-11-22 PROCEDURE — 6370000000 HC RX 637 (ALT 250 FOR IP): Performed by: NURSE PRACTITIONER

## 2019-11-22 PROCEDURE — 2580000003 HC RX 258: Performed by: INTERNAL MEDICINE

## 2019-11-22 PROCEDURE — 6360000002 HC RX W HCPCS: Performed by: INTERNAL MEDICINE

## 2019-11-22 PROCEDURE — 99232 SBSQ HOSP IP/OBS MODERATE 35: CPT | Performed by: INTERNAL MEDICINE

## 2019-11-22 PROCEDURE — 82962 GLUCOSE BLOOD TEST: CPT

## 2019-11-22 PROCEDURE — 36415 COLL VENOUS BLD VENIPUNCTURE: CPT

## 2019-11-22 RX ADMIN — ISOSORBIDE MONONITRATE 30 MG: 30 TABLET, EXTENDED RELEASE ORAL at 09:01

## 2019-11-22 RX ADMIN — APIXABAN 5 MG: 5 TABLET, FILM COATED ORAL at 09:02

## 2019-11-22 RX ADMIN — ATORVASTATIN CALCIUM 40 MG: 40 TABLET, FILM COATED ORAL at 09:02

## 2019-11-22 RX ADMIN — FUROSEMIDE 40 MG: 10 INJECTION, SOLUTION INTRAMUSCULAR; INTRAVENOUS at 09:01

## 2019-11-22 RX ADMIN — DIGOXIN 125 MCG: 125 TABLET ORAL at 09:02

## 2019-11-22 RX ADMIN — Medication 10 ML: at 09:02

## 2019-11-22 RX ADMIN — HYDRALAZINE HYDROCHLORIDE 10 MG: 10 TABLET, FILM COATED ORAL at 05:38

## 2019-11-22 RX ADMIN — DOXAZOSIN 1 MG: 1 TABLET ORAL at 09:02

## 2019-11-22 RX ADMIN — METOPROLOL SUCCINATE 100 MG: 100 TABLET, EXTENDED RELEASE ORAL at 09:02

## 2019-11-22 RX ADMIN — INSULIN LISPRO 3 UNITS: 100 INJECTION, SOLUTION INTRAVENOUS; SUBCUTANEOUS at 09:03

## 2019-11-22 ASSESSMENT — PAIN SCALES - GENERAL: PAINLEVEL_OUTOF10: 0

## 2020-02-10 ENCOUNTER — TELEPHONE (OUTPATIENT)
Dept: CARDIOLOGY CLINIC | Age: 60
End: 2020-02-10

## 2020-02-22 ENCOUNTER — APPOINTMENT (OUTPATIENT)
Dept: GENERAL RADIOLOGY | Age: 60
DRG: 291 | End: 2020-02-22

## 2020-02-22 ENCOUNTER — HOSPITAL ENCOUNTER (INPATIENT)
Age: 60
LOS: 8 days | Discharge: HOME HEALTH | DRG: 291 | End: 2020-03-01
Attending: EMERGENCY MEDICINE | Admitting: INTERNAL MEDICINE

## 2020-02-22 PROBLEM — N40.0 BENIGN PROSTATIC HYPERPLASIA WITHOUT LOWER URINARY TRACT SYMPTOMS: Chronic | Status: ACTIVE | Noted: 2020-02-22

## 2020-02-22 PROBLEM — I50.9 ACUTE DECOMPENSATED HEART FAILURE (HCC): Status: ACTIVE | Noted: 2020-02-22

## 2020-02-22 PROBLEM — I48.91: Chronic | Status: ACTIVE | Noted: 2019-11-18

## 2020-02-22 PROBLEM — I42.8 NON-ISCHEMIC CARDIOMYOPATHY (HCC): Chronic | Status: ACTIVE | Noted: 2020-02-22

## 2020-02-22 LAB
ALBUMIN SERPL-MCNC: 3.7 G/DL (ref 3.5–5.2)
ALP BLD-CCNC: 113 U/L (ref 40–129)
ALT SERPL-CCNC: 29 U/L (ref 0–40)
ANION GAP SERPL CALCULATED.3IONS-SCNC: 10 MMOL/L (ref 7–16)
AST SERPL-CCNC: 24 U/L (ref 0–39)
BASOPHILS ABSOLUTE: 0.07 E9/L (ref 0–0.2)
BASOPHILS RELATIVE PERCENT: 0.7 % (ref 0–2)
BILIRUB SERPL-MCNC: 0.6 MG/DL (ref 0–1.2)
BUN BLDV-MCNC: 42 MG/DL (ref 8–23)
CALCIUM SERPL-MCNC: 8.9 MG/DL (ref 8.6–10.2)
CHLORIDE BLD-SCNC: 101 MMOL/L (ref 98–107)
CO2: 27 MMOL/L (ref 22–29)
CREAT SERPL-MCNC: 2 MG/DL (ref 0.7–1.2)
EKG ATRIAL RATE: 87 BPM
EKG Q-T INTERVAL: 328 MS
EKG QRS DURATION: 90 MS
EKG QTC CALCULATION (BAZETT): 416 MS
EKG R AXIS: 70 DEGREES
EKG T AXIS: -114 DEGREES
EKG VENTRICULAR RATE: 97 BPM
EOSINOPHILS ABSOLUTE: 0.3 E9/L (ref 0.05–0.5)
EOSINOPHILS RELATIVE PERCENT: 3.2 % (ref 0–6)
GFR AFRICAN AMERICAN: 41
GFR NON-AFRICAN AMERICAN: 34 ML/MIN/1.73
GLUCOSE BLD-MCNC: 136 MG/DL (ref 74–99)
HCT VFR BLD CALC: 38 % (ref 37–54)
HEMOGLOBIN: 11.6 G/DL (ref 12.5–16.5)
IMMATURE GRANULOCYTES #: 0.03 E9/L
IMMATURE GRANULOCYTES %: 0.3 % (ref 0–5)
LYMPHOCYTES ABSOLUTE: 1.72 E9/L (ref 1.5–4)
LYMPHOCYTES RELATIVE PERCENT: 18.1 % (ref 20–42)
MCH RBC QN AUTO: 29.8 PG (ref 26–35)
MCHC RBC AUTO-ENTMCNC: 30.5 % (ref 32–34.5)
MCV RBC AUTO: 97.7 FL (ref 80–99.9)
METER GLUCOSE: 119 MG/DL (ref 74–99)
MONOCYTES ABSOLUTE: 0.5 E9/L (ref 0.1–0.95)
MONOCYTES RELATIVE PERCENT: 5.3 % (ref 2–12)
NEUTROPHILS ABSOLUTE: 6.9 E9/L (ref 1.8–7.3)
NEUTROPHILS RELATIVE PERCENT: 72.4 % (ref 43–80)
PDW BLD-RTO: 15.9 FL (ref 11.5–15)
PLATELET # BLD: 230 E9/L (ref 130–450)
PMV BLD AUTO: 10.9 FL (ref 7–12)
POTASSIUM REFLEX MAGNESIUM: 5 MMOL/L (ref 3.5–5)
PRO-BNP: 7478 PG/ML (ref 0–125)
RBC # BLD: 3.89 E12/L (ref 3.8–5.8)
SODIUM BLD-SCNC: 138 MMOL/L (ref 132–146)
TOTAL PROTEIN: 6.4 G/DL (ref 6.4–8.3)
TROPONIN: <0.01 NG/ML (ref 0–0.03)
WBC # BLD: 9.5 E9/L (ref 4.5–11.5)

## 2020-02-22 PROCEDURE — 99223 1ST HOSP IP/OBS HIGH 75: CPT | Performed by: INTERNAL MEDICINE

## 2020-02-22 PROCEDURE — 2060000000 HC ICU INTERMEDIATE R&B

## 2020-02-22 PROCEDURE — 82962 GLUCOSE BLOOD TEST: CPT

## 2020-02-22 PROCEDURE — 93010 ELECTROCARDIOGRAM REPORT: CPT | Performed by: INTERNAL MEDICINE

## 2020-02-22 PROCEDURE — 93005 ELECTROCARDIOGRAM TRACING: CPT | Performed by: STUDENT IN AN ORGANIZED HEALTH CARE EDUCATION/TRAINING PROGRAM

## 2020-02-22 PROCEDURE — 99285 EMERGENCY DEPT VISIT HI MDM: CPT

## 2020-02-22 PROCEDURE — 83880 ASSAY OF NATRIURETIC PEPTIDE: CPT

## 2020-02-22 PROCEDURE — 6360000002 HC RX W HCPCS: Performed by: STUDENT IN AN ORGANIZED HEALTH CARE EDUCATION/TRAINING PROGRAM

## 2020-02-22 PROCEDURE — 96374 THER/PROPH/DIAG INJ IV PUSH: CPT

## 2020-02-22 PROCEDURE — 6370000000 HC RX 637 (ALT 250 FOR IP): Performed by: INTERNAL MEDICINE

## 2020-02-22 PROCEDURE — 80053 COMPREHEN METABOLIC PANEL: CPT

## 2020-02-22 PROCEDURE — 71045 X-RAY EXAM CHEST 1 VIEW: CPT

## 2020-02-22 PROCEDURE — 85025 COMPLETE CBC W/AUTO DIFF WBC: CPT

## 2020-02-22 PROCEDURE — 84484 ASSAY OF TROPONIN QUANT: CPT

## 2020-02-22 PROCEDURE — 6360000002 HC RX W HCPCS: Performed by: INTERNAL MEDICINE

## 2020-02-22 PROCEDURE — 2580000003 HC RX 258: Performed by: INTERNAL MEDICINE

## 2020-02-22 RX ORDER — MAGNESIUM SULFATE 1 G/100ML
1 INJECTION INTRAVENOUS PRN
Status: DISCONTINUED | OUTPATIENT
Start: 2020-02-22 | End: 2020-03-01 | Stop reason: HOSPADM

## 2020-02-22 RX ORDER — DOXAZOSIN MESYLATE 1 MG/1
1 TABLET ORAL DAILY
Status: DISCONTINUED | OUTPATIENT
Start: 2020-02-22 | End: 2020-02-22

## 2020-02-22 RX ORDER — POTASSIUM CHLORIDE 20 MEQ/1
40 TABLET, EXTENDED RELEASE ORAL PRN
Status: DISCONTINUED | OUTPATIENT
Start: 2020-02-22 | End: 2020-02-23 | Stop reason: SINTOL

## 2020-02-22 RX ORDER — FUROSEMIDE 10 MG/ML
40 INJECTION INTRAMUSCULAR; INTRAVENOUS 2 TIMES DAILY
Status: DISCONTINUED | OUTPATIENT
Start: 2020-02-22 | End: 2020-02-23

## 2020-02-22 RX ORDER — ISOSORBIDE MONONITRATE 30 MG/1
30 TABLET, EXTENDED RELEASE ORAL DAILY
Status: DISCONTINUED | OUTPATIENT
Start: 2020-02-22 | End: 2020-02-23

## 2020-02-22 RX ORDER — POTASSIUM CHLORIDE 7.45 MG/ML
10 INJECTION INTRAVENOUS PRN
Status: DISCONTINUED | OUTPATIENT
Start: 2020-02-22 | End: 2020-02-23 | Stop reason: SINTOL

## 2020-02-22 RX ORDER — DEXTROSE MONOHYDRATE 25 G/50ML
12.5 INJECTION, SOLUTION INTRAVENOUS PRN
Status: DISCONTINUED | OUTPATIENT
Start: 2020-02-22 | End: 2020-03-01 | Stop reason: HOSPADM

## 2020-02-22 RX ORDER — FUROSEMIDE 10 MG/ML
40 INJECTION INTRAMUSCULAR; INTRAVENOUS ONCE
Status: COMPLETED | OUTPATIENT
Start: 2020-02-22 | End: 2020-02-22

## 2020-02-22 RX ORDER — SODIUM CHLORIDE 0.9 % (FLUSH) 0.9 %
10 SYRINGE (ML) INJECTION EVERY 12 HOURS SCHEDULED
Status: DISCONTINUED | OUTPATIENT
Start: 2020-02-22 | End: 2020-03-01 | Stop reason: HOSPADM

## 2020-02-22 RX ORDER — ACETAMINOPHEN 325 MG/1
650 TABLET ORAL EVERY 6 HOURS PRN
Status: DISCONTINUED | OUTPATIENT
Start: 2020-02-22 | End: 2020-03-01 | Stop reason: HOSPADM

## 2020-02-22 RX ORDER — ACETAMINOPHEN 650 MG/1
650 SUPPOSITORY RECTAL EVERY 6 HOURS PRN
Status: DISCONTINUED | OUTPATIENT
Start: 2020-02-22 | End: 2020-03-01 | Stop reason: HOSPADM

## 2020-02-22 RX ORDER — DIGOXIN 125 MCG
125 TABLET ORAL DAILY
Status: DISCONTINUED | OUTPATIENT
Start: 2020-02-23 | End: 2020-03-01 | Stop reason: HOSPADM

## 2020-02-22 RX ORDER — HYDRALAZINE HYDROCHLORIDE 10 MG/1
10 TABLET, FILM COATED ORAL EVERY 8 HOURS SCHEDULED
Status: DISCONTINUED | OUTPATIENT
Start: 2020-02-22 | End: 2020-02-23

## 2020-02-22 RX ORDER — DEXTROSE MONOHYDRATE 50 MG/ML
100 INJECTION, SOLUTION INTRAVENOUS PRN
Status: DISCONTINUED | OUTPATIENT
Start: 2020-02-22 | End: 2020-03-01 | Stop reason: HOSPADM

## 2020-02-22 RX ORDER — ATORVASTATIN CALCIUM 40 MG/1
40 TABLET, FILM COATED ORAL DAILY
Status: DISCONTINUED | OUTPATIENT
Start: 2020-02-23 | End: 2020-03-01 | Stop reason: HOSPADM

## 2020-02-22 RX ORDER — PROMETHAZINE HYDROCHLORIDE 25 MG/1
12.5 TABLET ORAL EVERY 6 HOURS PRN
Status: DISCONTINUED | OUTPATIENT
Start: 2020-02-22 | End: 2020-03-01 | Stop reason: HOSPADM

## 2020-02-22 RX ORDER — SODIUM CHLORIDE 0.9 % (FLUSH) 0.9 %
10 SYRINGE (ML) INJECTION PRN
Status: DISCONTINUED | OUTPATIENT
Start: 2020-02-22 | End: 2020-03-01 | Stop reason: HOSPADM

## 2020-02-22 RX ORDER — ONDANSETRON 2 MG/ML
4 INJECTION INTRAMUSCULAR; INTRAVENOUS EVERY 6 HOURS PRN
Status: DISCONTINUED | OUTPATIENT
Start: 2020-02-22 | End: 2020-03-01 | Stop reason: HOSPADM

## 2020-02-22 RX ORDER — NICOTINE POLACRILEX 4 MG
15 LOZENGE BUCCAL PRN
Status: DISCONTINUED | OUTPATIENT
Start: 2020-02-22 | End: 2020-03-01 | Stop reason: HOSPADM

## 2020-02-22 RX ORDER — METOPROLOL SUCCINATE 100 MG/1
100 TABLET, EXTENDED RELEASE ORAL 2 TIMES DAILY
Status: DISCONTINUED | OUTPATIENT
Start: 2020-02-22 | End: 2020-03-01 | Stop reason: HOSPADM

## 2020-02-22 RX ADMIN — Medication 10 ML: at 21:49

## 2020-02-22 RX ADMIN — METOPROLOL SUCCINATE 100 MG: 100 TABLET, EXTENDED RELEASE ORAL at 21:47

## 2020-02-22 RX ADMIN — HYDRALAZINE HYDROCHLORIDE 10 MG: 10 TABLET, FILM COATED ORAL at 21:48

## 2020-02-22 RX ADMIN — FUROSEMIDE 40 MG: 10 INJECTION, SOLUTION INTRAMUSCULAR; INTRAVENOUS at 21:48

## 2020-02-22 RX ADMIN — APIXABAN 5 MG: 5 TABLET, FILM COATED ORAL at 21:47

## 2020-02-22 RX ADMIN — FUROSEMIDE 40 MG: 10 INJECTION, SOLUTION INTRAMUSCULAR; INTRAVENOUS at 18:18

## 2020-02-22 ASSESSMENT — ENCOUNTER SYMPTOMS
COUGH: 0
VOMITING: 0
BLOOD IN STOOL: 0
WHEEZING: 0
CHEST TIGHTNESS: 1
NAUSEA: 0
SHORTNESS OF BREATH: 1
CONSTIPATION: 0
BACK PAIN: 0
ABDOMINAL PAIN: 0
SORE THROAT: 0
ABDOMINAL DISTENTION: 1
SINUS PAIN: 0
DIARRHEA: 0

## 2020-02-22 ASSESSMENT — PAIN SCALES - GENERAL
PAINLEVEL_OUTOF10: 0
PAINLEVEL_OUTOF10: 0

## 2020-02-22 NOTE — ED PROVIDER NOTES
Rhythm: Normal rate. Rhythm irregular. Heart sounds: Normal heart sounds. Pulmonary:      Effort: Pulmonary effort is normal. No respiratory distress. Breath sounds: Rales (in lower lung bases bilaterally) present. No wheezing. Abdominal:      General: Bowel sounds are normal. There is distension (pitting edema present). Palpations: Abdomen is soft. Tenderness: There is no abdominal tenderness. Musculoskeletal:         General: No tenderness. Right lower leg: Edema (3+) present. Left lower leg: Edema (3+) present. Skin:     General: Skin is warm and dry. Neurological:      Mental Status: He is alert and oriented to person, place, and time. Cranial Nerves: No cranial nerve deficit. Psychiatric:         Mood and Affect: Mood normal.         Behavior: Behavior normal.          Procedures     MDM  Number of Diagnoses or Management Options  Acute on chronic systolic congestive heart failure [facility]):   Diagnosis management comments: 64-year-old male with shortness of breath likely secondary to congestive heart failure  Patient's vitals are stable and his oxygen saturation is appropriate at this time  Heart rate is irregular patient likely in A. fib  Cardiac work-up showed elevated proBNP  Chest x-ray showed developing congestive heart failure  Lab work showed elevated creatinine  Patient was given 40 mg of IV Lasix  Spoke with the hospitalist they said they would come evaluate and admit the patient                    --------------------------------------------- PAST HISTORY ---------------------------------------------  Past Medical History:  has a past medical history of CHF (congestive heart failure) ([facility] [address] DM2 (diabetes mellitus, type 2) ([facility] [address] [facility] HTN (hypertension), benign, Nonischemic cardiomyopathy [facility] and PE (pulmonary embolism). Past Surgical History:  has no past surgical history on file.     Social History:  reports that he quit smoking about 21 months ago. His smoking use included cigarettes. He started smoking about 34 years ago. He smoked 1.50 packs per day. He has never used smokeless tobacco. He reports that he does not drink alcohol or use drugs. Family History: family history includes Cancer in his sister; Diabetes in his mother; Heart Disease in his mother; High Blood Pressure in his father. The patients home medications have been reviewed.     Allergies: Penicillins    -------------------------------------------------- RESULTS -------------------------------------------------    LABS:  Results for orders placed or performed during the hospital encounter of 02/22/20   CBC Auto Differential   Result Value Ref Range    WBC 9.5 4.5 - 11.5 E9/L    RBC 3.89 3.80 - 5.80 E12/L    Hemoglobin 11.6 (L) 12.5 - 16.5 g/dL    Hematocrit 38.0 37.0 - 54.0 %    MCV 97.7 80.0 - 99.9 fL    MCH 29.8 26.0 - 35.0 pg    MCHC 30.5 (L) 32.0 - 34.5 %    RDW 15.9 (H) 11.5 - 15.0 fL    Platelets 982 057 - 034 E9/L    MPV 10.9 7.0 - 12.0 fL    Neutrophils % 72.4 43.0 - 80.0 %    Immature Granulocytes % 0.3 0.0 - 5.0 %    Lymphocytes % 18.1 (L) 20.0 - 42.0 %    Monocytes % 5.3 2.0 - 12.0 %    Eosinophils % 3.2 0.0 - 6.0 %    Basophils % 0.7 0.0 - 2.0 %    Neutrophils Absolute 6.90 1.80 - 7.30 E9/L    Immature Granulocytes # 0.03 E9/L    Lymphocytes Absolute 1.72 1.50 - 4.00 E9/L    Monocytes Absolute 0.50 0.10 - 0.95 E9/L    Eosinophils Absolute 0.30 0.05 - 0.50 E9/L    Basophils Absolute 0.07 0.00 - 0.20 E9/L   Comprehensive Metabolic Panel w/ Reflex to MG   Result Value Ref Range    Sodium 138 132 - 146 mmol/L    Potassium reflex Magnesium 5.0 3.5 - 5.0 mmol/L    Chloride 101 98 - 107 mmol/L    CO2 27 22 - 29 mmol/L    Anion Gap 10 7 - 16 mmol/L    Glucose 136 (H) 74 - 99 mg/dL    BUN 42 (H) 8 - 23 mg/dL    CREATININE 2.0 (H) 0.7 - 1.2 mg/dL    GFR Non-African American 34 >=60 mL/min/1.73    GFR African American 41     Calcium 8.9 8.6 - 10.2 mg/dL personal history and physicial examination and IV medications    This patient has remained hemodynamically stable during their ED course. Diagnosis:  1. Acute on chronic systolic congestive heart failure (HCC)        Disposition:  Patient's disposition: Admit to telemetry  Patient's condition is stable.          Ashish Alfaro DO  Resident  02/22/20 [id number]

## 2020-02-23 LAB
ALBUMIN SERPL-MCNC: 3.7 G/DL (ref 3.5–5.2)
ALP BLD-CCNC: 101 U/L (ref 40–129)
ALT SERPL-CCNC: 31 U/L (ref 0–40)
ANION GAP SERPL CALCULATED.3IONS-SCNC: 11 MMOL/L (ref 7–16)
ANION GAP SERPL CALCULATED.3IONS-SCNC: 12 MMOL/L (ref 7–16)
AST SERPL-CCNC: 29 U/L (ref 0–39)
BASOPHILS ABSOLUTE: 0.06 E9/L (ref 0–0.2)
BASOPHILS RELATIVE PERCENT: 0.6 % (ref 0–2)
BILIRUB SERPL-MCNC: 0.9 MG/DL (ref 0–1.2)
BILIRUBIN DIRECT: <0.2 MG/DL (ref 0–0.3)
BILIRUBIN, INDIRECT: NORMAL MG/DL (ref 0–1)
BUN BLDV-MCNC: 41 MG/DL (ref 8–23)
BUN BLDV-MCNC: 42 MG/DL (ref 8–23)
CALCIUM SERPL-MCNC: 8.6 MG/DL (ref 8.6–10.2)
CALCIUM SERPL-MCNC: 9 MG/DL (ref 8.6–10.2)
CHLORIDE BLD-SCNC: 103 MMOL/L (ref 98–107)
CHLORIDE BLD-SCNC: 104 MMOL/L (ref 98–107)
CO2: 27 MMOL/L (ref 22–29)
CO2: 28 MMOL/L (ref 22–29)
CREAT SERPL-MCNC: 2 MG/DL (ref 0.7–1.2)
CREAT SERPL-MCNC: 2 MG/DL (ref 0.7–1.2)
DIGOXIN LEVEL: 0.9 NG/ML (ref 0.8–2)
EOSINOPHILS ABSOLUTE: 0.29 E9/L (ref 0.05–0.5)
EOSINOPHILS RELATIVE PERCENT: 3.1 % (ref 0–6)
GFR AFRICAN AMERICAN: 41
GFR AFRICAN AMERICAN: 41
GFR NON-AFRICAN AMERICAN: 34 ML/MIN/1.73
GFR NON-AFRICAN AMERICAN: 34 ML/MIN/1.73
GLUCOSE BLD-MCNC: 131 MG/DL (ref 74–99)
GLUCOSE BLD-MCNC: 138 MG/DL (ref 74–99)
HBA1C MFR BLD: 7.6 % (ref 4–5.6)
HCT VFR BLD CALC: 39.6 % (ref 37–54)
HEMOGLOBIN: 12.2 G/DL (ref 12.5–16.5)
IMMATURE GRANULOCYTES #: 0.04 E9/L
IMMATURE GRANULOCYTES %: 0.4 % (ref 0–5)
LYMPHOCYTES ABSOLUTE: 1.71 E9/L (ref 1.5–4)
LYMPHOCYTES RELATIVE PERCENT: 18.2 % (ref 20–42)
MCH RBC QN AUTO: 29.8 PG (ref 26–35)
MCHC RBC AUTO-ENTMCNC: 30.8 % (ref 32–34.5)
MCV RBC AUTO: 96.8 FL (ref 80–99.9)
METER GLUCOSE: 121 MG/DL (ref 74–99)
METER GLUCOSE: 125 MG/DL (ref 74–99)
METER GLUCOSE: 183 MG/DL (ref 74–99)
METER GLUCOSE: 189 MG/DL (ref 74–99)
MONOCYTES ABSOLUTE: 0.63 E9/L (ref 0.1–0.95)
MONOCYTES RELATIVE PERCENT: 6.7 % (ref 2–12)
NEUTROPHILS ABSOLUTE: 6.64 E9/L (ref 1.8–7.3)
NEUTROPHILS RELATIVE PERCENT: 71 % (ref 43–80)
PDW BLD-RTO: 15.8 FL (ref 11.5–15)
PLATELET # BLD: 232 E9/L (ref 130–450)
PMV BLD AUTO: 10.8 FL (ref 7–12)
POTASSIUM REFLEX MAGNESIUM: 5.7 MMOL/L (ref 3.5–5)
POTASSIUM SERPL-SCNC: 4.5 MMOL/L (ref 3.5–5)
RBC # BLD: 4.09 E12/L (ref 3.8–5.8)
SODIUM BLD-SCNC: 141 MMOL/L (ref 132–146)
SODIUM BLD-SCNC: 144 MMOL/L (ref 132–146)
TOTAL PROTEIN: 6.6 G/DL (ref 6.4–8.3)
WBC # BLD: 9.4 E9/L (ref 4.5–11.5)

## 2020-02-23 PROCEDURE — 2060000000 HC ICU INTERMEDIATE R&B

## 2020-02-23 PROCEDURE — APPSS45 APP SPLIT SHARED TIME 31-45 MINUTES: Performed by: PHYSICIAN ASSISTANT

## 2020-02-23 PROCEDURE — 6370000000 HC RX 637 (ALT 250 FOR IP): Performed by: INTERNAL MEDICINE

## 2020-02-23 PROCEDURE — 2580000003 HC RX 258: Performed by: INTERNAL MEDICINE

## 2020-02-23 PROCEDURE — 6360000002 HC RX W HCPCS: Performed by: INTERNAL MEDICINE

## 2020-02-23 PROCEDURE — 85025 COMPLETE CBC W/AUTO DIFF WBC: CPT

## 2020-02-23 PROCEDURE — 82962 GLUCOSE BLOOD TEST: CPT

## 2020-02-23 PROCEDURE — 2500000003 HC RX 250 WO HCPCS: Performed by: INTERNAL MEDICINE

## 2020-02-23 PROCEDURE — 80048 BASIC METABOLIC PNL TOTAL CA: CPT

## 2020-02-23 PROCEDURE — 80076 HEPATIC FUNCTION PANEL: CPT

## 2020-02-23 PROCEDURE — 83036 HEMOGLOBIN GLYCOSYLATED A1C: CPT

## 2020-02-23 PROCEDURE — 36415 COLL VENOUS BLD VENIPUNCTURE: CPT

## 2020-02-23 PROCEDURE — 99254 IP/OBS CNSLTJ NEW/EST MOD 60: CPT | Performed by: INTERNAL MEDICINE

## 2020-02-23 PROCEDURE — 99233 SBSQ HOSP IP/OBS HIGH 50: CPT | Performed by: INTERNAL MEDICINE

## 2020-02-23 PROCEDURE — 80162 ASSAY OF DIGOXIN TOTAL: CPT

## 2020-02-23 RX ORDER — BUMETANIDE 0.25 MG/ML
1 INJECTION, SOLUTION INTRAMUSCULAR; INTRAVENOUS 2 TIMES DAILY
Status: DISCONTINUED | OUTPATIENT
Start: 2020-02-23 | End: 2020-03-01

## 2020-02-23 RX ORDER — SODIUM POLYSTYRENE SULFONATE 15 G/60ML
15 SUSPENSION ORAL; RECTAL ONCE
Status: COMPLETED | OUTPATIENT
Start: 2020-02-23 | End: 2020-02-23

## 2020-02-23 RX ORDER — ISOSORBIDE DINITRATE 10 MG/1
10 TABLET ORAL 3 TIMES DAILY
Status: DISCONTINUED | OUTPATIENT
Start: 2020-02-24 | End: 2020-03-01 | Stop reason: HOSPADM

## 2020-02-23 RX ORDER — HYDRALAZINE HYDROCHLORIDE 25 MG/1
25 TABLET, FILM COATED ORAL EVERY 8 HOURS SCHEDULED
Status: DISCONTINUED | OUTPATIENT
Start: 2020-02-23 | End: 2020-03-01 | Stop reason: HOSPADM

## 2020-02-23 RX ADMIN — ATORVASTATIN CALCIUM 40 MG: 40 TABLET, FILM COATED ORAL at 07:44

## 2020-02-23 RX ADMIN — HYDRALAZINE HYDROCHLORIDE 25 MG: 25 TABLET, FILM COATED ORAL at 14:16

## 2020-02-23 RX ADMIN — APIXABAN 5 MG: 5 TABLET, FILM COATED ORAL at 20:48

## 2020-02-23 RX ADMIN — DIGOXIN 125 MCG: 125 TABLET ORAL at 07:44

## 2020-02-23 RX ADMIN — INSULIN LISPRO 2 UNITS: 100 INJECTION, SOLUTION INTRAVENOUS; SUBCUTANEOUS at 11:27

## 2020-02-23 RX ADMIN — SODIUM POLYSTYRENE SULFONATE 15 G: 15 SUSPENSION ORAL; RECTAL at 08:45

## 2020-02-23 RX ADMIN — Medication 10 ML: at 20:49

## 2020-02-23 RX ADMIN — HYDRALAZINE HYDROCHLORIDE 10 MG: 10 TABLET, FILM COATED ORAL at 06:06

## 2020-02-23 RX ADMIN — APIXABAN 5 MG: 5 TABLET, FILM COATED ORAL at 07:44

## 2020-02-23 RX ADMIN — Medication 10 ML: at 07:45

## 2020-02-23 RX ADMIN — HYDRALAZINE HYDROCHLORIDE 25 MG: 25 TABLET, FILM COATED ORAL at 20:48

## 2020-02-23 RX ADMIN — METOPROLOL SUCCINATE 100 MG: 100 TABLET, EXTENDED RELEASE ORAL at 07:44

## 2020-02-23 RX ADMIN — FUROSEMIDE 40 MG: 10 INJECTION, SOLUTION INTRAMUSCULAR; INTRAVENOUS at 07:44

## 2020-02-23 RX ADMIN — ISOSORBIDE MONONITRATE 30 MG: 30 TABLET, EXTENDED RELEASE ORAL at 07:44

## 2020-02-23 RX ADMIN — INSULIN LISPRO 1 UNITS: 100 INJECTION, SOLUTION INTRAVENOUS; SUBCUTANEOUS at 20:53

## 2020-02-23 RX ADMIN — METOPROLOL SUCCINATE 100 MG: 100 TABLET, EXTENDED RELEASE ORAL at 20:48

## 2020-02-23 RX ADMIN — BUMETANIDE 1 MG: 0.25 INJECTION INTRAMUSCULAR; INTRAVENOUS at 16:41

## 2020-02-23 ASSESSMENT — PAIN SCALES - GENERAL
PAINLEVEL_OUTOF10: 0

## 2020-02-23 NOTE — PROGRESS NOTES
Sugey Squires Hospitalist   Progress Note    Admitting Date and Time: 2/22/2020  5:36 PM  Admit Dx: Acute decompensated heart failure ([facility] [address] [I50.9]  Acute decompensated heart failure ([facility] [address] [I50.9]    Subjective: Patient admitted in the evening of 22nd, came with shortness of breath, known history of A. fib, EF of 30%, patient with known diabetes, known hypertension, as well as prior DVT and PE. Patient seen by cardiology, remains on IV Bumex, hydralazine is increased. Patient was admitted with Acute decompensated heart failure ([facility] [address] [I50.9]  Acute decompensated heart failure ([facility] [address] [I50.9]. Patient feels comfortable, at this time awake, alert, resting in bed, does communicate well, denies any complaints. Does inform that he was diagnosed with diabetes in 2008. Per RN: No new complaints. ROS: denies fever, chills, cp, sob, n/v, HA unless stated above.      bumetanide  1 mg Intravenous BID    hydrALAZINE  25 mg Oral 3 times per day    [START ON 2/24/2020] isosorbide dinitrate  10 mg Oral TID    atorvastatin  40 mg Oral Daily    digoxin  125 mcg Oral Daily    metoprolol succinate  100 mg Oral BID    sodium chloride flush  10 mL Intravenous 2 times per day    apixaban  5 mg Oral BID    insulin lispro  0-12 Units Subcutaneous TID WC    insulin lispro  0-6 Units Subcutaneous Nightly     sodium chloride flush, 10 mL, PRN  magnesium sulfate, 1 g, PRN  acetaminophen, 650 mg, Q6H PRN  acetaminophen, 650 mg, Q6H PRN  promethazine, 12.5 mg, Q6H PRN  ondansetron, 4 mg, Q6H PRN  glucose, 15 g, PRN  dextrose, 12.5 g, PRN  glucagon (rDNA), 1 mg, PRN  dextrose, 100 mL/hr, PRN         Objective:    /84   Pulse 79   Temp 97.4 °F (36.3 °C) (Oral)   Resp 20   Ht 5' 9\" (1.753 m)   Wt 258 lb (117 kg)   SpO2 98%   BMI 38.10 kg/m²   General Appearance: alert and oriented to person, place and time, well-developed and well-nourished, in no acute distress  Skin: warm and dry, no rash or erythema  Head: normocephalic and atraumatic  Eyes: pupils equal, round, and reactive to light, extraocular eye movements intact, conjunctivae normal  ENT: tympanic membrane, external ear and ear canal normal bilaterally, oropharynx clear and moist with normal mucous membranes  Neck: neck supple and non tender without mass, no thyromegaly or thyroid nodules, no cervical lymphadenopathy   Pulmonary/Chest: clear to auscultation bilaterally- no wheezes, rales or rhonchi, normal air movement, no respiratory distress  Cardiovascular: normal rate, normal S1 and S2, no gallops, intact distal pulses and no carotid bruits  Abdomen: soft, non-tender, non-distended, normal bowel sounds, no masses or organomegaly  Edema 3+    Recent Labs     02/22/20  1801 02/23/20  0345 02/23/20  1458    141 144   K 5.0 5.7* 4.5    103 104   CO2 27 27 28   BUN 42* 41* 42*   CREATININE 2.0* 2.0* 2.0*   GLUCOSE 136* 138* 131*   CALCIUM 8.9 9.0 8.6       Recent Labs     02/22/20  1801 02/23/20  0345   WBC 9.5 9.4   RBC 3.89 4.09   HGB 11.6* 12.2*   HCT 38.0 39.6   MCV 97.7 96.8   MCH 29.8 29.8   MCHC 30.5* 30.8*   RDW 15.9* 15.8*    232   MPV 10.9 10.8     Creatinine 2.0  BNP 7478  Hemoglobin A1c 7.6    Radiology:   XR CHEST PORTABLE   Final Result      Cardiomegaly with mild early interstitial pulmonary edema             Assessment:    Principal Problem:    Acute decompensated heart failure (HCC)  Active Problems:    Diabetes mellitus type 2 in obese (HCC)    Hyperlipidemia    Chronic anticoagulation    Rate controlled atrial fibrillation (HCC)    Congestive heart failure (HCC)    Essential hypertension    DERIC (obstructive sleep apnea)    Non-ischemic cardiomyopathy (HCC)    Benign prostatic hyperplasia without lower urinary tract symptoms  Resolved Problems:    * No resolved hospital problems. *      Plan:  1.  Patient with decompensated systolic heart failure, acute on chronic, seen by cardiology, remains on IV

## 2020-02-23 NOTE — H&P
[address]: had concerns including Shortness of Breath (x 4-5 days; hx of CHF/COPD - patient feels he may be in Afib; denies cough ); Leg Swelling (bilateral ); and Irregular Heart Beat (hx afib, on eliquis). History of Present Illness:    Informant(s) for H&P:Pt and chart   Qian Pino is a 62 y.o. male with PMH of (HFrEF and AF) presented to the ER in [facility] with 1 week history of worsening SOB, leg swelling and weight gain, he is very compliant on his salt intake and lasix. Left side chest pain, started yetsreday, feels in the muscles , over the heart area, dull few seconds goes away then comes back after 15 minutes , stays in the same spot,    Denied any fever, nausea, vomiting, diarrhea, abdominal pain, blood in stool or black stool.  Denied any no recent lightheadedness or syncope   Echo Nov 2019  Left Ventricle  Ejection fraction is visually estimated at 30%. Overall ejection fraction   moderate-to-severely decreased. Moderate left ventricular concentric  hypertrophy noted. Left ventricle size is normal. Indeterminate diastolic function.     CXR  Impression:     Cardiomegaly with mild early interstitial pulmonary edema     In ER:    Vitals /89   Pulse 91   Temp 97.6 °F (36.4 °C) (Oral)   Resp 16   Ht 5' 9\" (1.753 m)   Wt 250 lb (113.4 kg)   SpO2 99%   BMI 36.92 kg/m²   WBC, neutrophil %, neutrophil absolute count   Lab Results   Component Value Date    WBC 9.5 02/22/2020    NEUTOPHILPCT 72.4 02/22/2020    NEUTROABS 6.90 02/22/2020     Lactic acid No results found for: LACTSEPSIS  Cr   Lab Results   Component Value Date    CREATININE 2.0 (H) 02/22/2020       REVIEW OF SYSTEMS:  A comprehensive review of systems was negative except for: what is in the HPI    PMH:  Past Medical History:   Diagnosis Date    CHF (congestive heart failure) ([facility] [address]     DM2 (diabetes mellitus, type 2) ([facility] [address]     DVT (deep venous thrombosis) ([facility] [address] 2008    HTN BMI 36.92 kg/m²   General Appearance: AOx3 and NAD  Skin: Warm and dry  Head: Normocephalic and atraumatic, mucous membranes well hydrated   Eyes: Pupils equal, round, and reactive to light  Neck: Supple and non tender without mass   Pulmonary/Chest: no wheezes, + bibasilar wet crackle, not in respiratory distress  Cardiovascular: AF, normal S1 and S2 and no carotid bruits +S3 gallop   Abdomen: Soft, non-tender, non-distended, no rebound, no rigidity, + bowel sounds  Extremities: No cyanosis, no clubbing and +3 pitting edema in both legs  Neurologic: No neurologic focal deficits, speech is normal, coherent     LABS:  CBC  Recent Labs     02/22/20  1801   WBC 9.5   HGB 11.6*   HCT 38.0   MCV 97.7        BMP  Recent Labs     02/22/20  1801      K 5.0      CO2 27   BUN 42*   CREATININE 2.0*   LABGLOM 34   GLUCOSE 136*   CALCIUM 8.9     Lab Results   Component Value Date    MG 2.5 11/19/2019     LFT  Recent Labs     02/22/20  1801   ALT 29   AST 24   ALKPHOS 113   BILITOT 0.6     Albumin  Lab Results   Component Value Date    LABALBU 3.7 02/22/2020    LABALBU 3.9 11/18/2019    LABALBU 3.9 12/14/2015     Lactic acid   No results for input(s): LACTSEPSIS in the last 72 hours.   Cardiac  Troponin   Lab Results   Component Value Date    TROPONINI <0.01 02/22/2020    TROPONINI <0.01 11/18/2019     Pro-BNP   Lab Results   Component Value Date    PROBNP 7,478 (H) 02/22/2020    PROBNP 7,685 (H) 11/19/2019    PROBNP 8,054 (H) 11/18/2019     Iron studies  No results found for: FERRITIN, IRON, TIBC    EKG:  Atrial Fib, rate of 80s , ST and T wave abnormalities are chronic    ASSESSMENT and PLAN:      Problem   Acute Decompensated Heart Failure (Hcc)   Non-Ischemic Cardiomyopathy (Hcc)   Rate Controlled Atrial Fibrillation (Hcc)   Congestive Heart Failure (Hcc)   Mo (Obstructive Sleep Apnea)   Chronic Anticoagulation   Benign Prostatic Hyperplasia Without Lower Urinary Tract Symptoms   Essential Hypertension Diabetes Mellitus Type 2 in Obese (Hcc)   Hyperlipidemia     Acute decompensated heart failure  Nonischemic cardiomyopathy  · Patient never had any heart attack his heart failure is likely due to chronic A. fib as per his last echo in November, LVEF was estimated 30%, but his not on complete GDMT, thus we will consult cardiology, to make sure he is getting GDMT for 6 months a repeat another echo. His chronic kidney disease is likely limiting from putting him on ACEIs, or Entresto, or aldosterone antagonists,   · Continue with Lasix IV 40 mg twice daily,   · Continue hydralazine 10 mg every 8 hours  · Continue Imdur 30 mg daily  · Continue Toprol- mg twice daily (extremities are warm)  · Salt restriction  · Fluid restriction    Musculoskeletal chest pain  · Will observe this is likely musculoskeletal    Rate control atrial fibrillation  · Continue Toprol-XL  · Continue digoxin  · Continue apixaban    Diabetes type 2  · Hold oral anti-diabetes medications  · Start medium sliding scale  · Check A1c in the morning    Chronic kidney disease  · His GFR and creatinine around baseline    Obstructive sleep apnea  · Continue CPAP at night    BPH: Continue terazosin     Code Status: Full   DVT prophylaxis: Apixaban   Diet: Cardiac diabetic       PLEASE NOTE:    This report was transcribed using typing and voice recognition software. Every effort was made to ensure accuracy; however, inadvertent computerized transcription errors may be present.  More than 50 minutes have been spent in evaluating the patient, reviewing records and results, discussing with staff / family and other treating physicians.     Deyvi De La Torre MD, MSc   [facility]

## 2020-02-23 NOTE — CONSULTS
Daily  digoxin (LANOXIN) tablet 125 mcg, 125 mcg, Oral, Daily  hydrALAZINE (APRESOLINE) tablet 10 mg, 10 mg, Oral, 3 times per day  isosorbide mononitrate (IMDUR) extended release tablet 30 mg, 30 mg, Oral, Daily  metoprolol succinate (TOPROL XL) extended release tablet 100 mg, 100 mg, Oral, BID  sodium chloride flush 0.9 % injection 10 mL, 10 mL, Intravenous, 2 times per day  sodium chloride flush 0.9 % injection 10 mL, 10 mL, Intravenous, PRN  magnesium sulfate 1 g in dextrose 5% 100 mL IVPB, 1 g, Intravenous, PRN  acetaminophen (TYLENOL) tablet 650 mg, 650 mg, Oral, Q6H PRN  acetaminophen (TYLENOL) suppository 650 mg, 650 mg, Rectal, Q6H PRN  promethazine (PHENERGAN) tablet 12.5 mg, 12.5 mg, Oral, Q6H PRN  ondansetron (ZOFRAN) injection 4 mg, 4 mg, Intravenous, Q6H PRN  apixaban (ELIQUIS) tablet 5 mg, 5 mg, Oral, BID  furosemide (LASIX) injection 40 mg, 40 mg, Intravenous, BID  insulin lispro (HUMALOG) injection vial 0-12 Units, 0-12 Units, Subcutaneous, TID WC  insulin lispro (HUMALOG) injection vial 0-6 Units, 0-6 Units, Subcutaneous, Nightly  glucose (GLUTOSE) 40 % oral gel 15 g, 15 g, Oral, PRN  dextrose 50 % IV solution, 12.5 g, Intravenous, PRN  glucagon (rDNA) injection 1 mg, 1 mg, Intramuscular, PRN  dextrose 5 % solution, 100 mL/hr, Intravenous, PRN    Allergies:  Penicillins    Social History:    Social History     Socioeconomic History    Marital status:      Spouse name: Not on file    Number of children: Not on file    Years of education: Not on file    Highest education level: Not on file   Occupational History    Not on file   Social Needs    Financial resource strain: Not on file    Food insecurity:     Worry: Not on file     Inability: Not on file    Transportation needs:     Medical: Not on file     Non-medical: Not on file   Tobacco Use    Smoking status: Former Smoker     Packs/day: 1.50     Types: Cigarettes     Start date: 11/18/1985     Last attempt to quit: 5/1/2018 Years since quittin.8    Smokeless tobacco: Never Used   Substance and Sexual Activity    Alcohol use: Never     Alcohol/week: 0.0 standard drinks     Frequency: Never    Drug use: Never    Sexual activity: Not on file   Lifestyle    Physical activity:     Days per week: Not on file     Minutes per session: Not on file    Stress: Not on file   Relationships    Social connections:     Talks on phone: Not on file     Gets together: Not on file     Attends Faith service: Not on file     Active member of club or organization: Not on file     Attends meetings of clubs or organizations: Not on file     Relationship status: Not on file    Intimate partner violence:     Fear of current or ex partner: Not on file     Emotionally abused: Not on file     Physically abused: Not on file     Forced sexual activity: Not on file   Other Topics Concern    Not on file   Social History Narrative    Not on file       Family History:   Family History   Problem Relation Age of Onset    Diabetes Mother     Heart Disease Mother     High Blood Pressure Father     Cancer Sister    Mother- CAD (76s)    REVIEW OF SYSTEMS:     · Constitutional: +  Fatigue, denies fevers, chills or night sweats  · Eyes: Denies visual changes or drainage  · ENT: Denies headaches or hearing loss. No mouth sores or sore throat. No epistaxis   · Cardiovascular: Denies chest pain, pressure + palpitations. + lower extremity swelling. · Respiratory:+ LOBO, cough, orthopnea and PND. No hemoptysis   · Gastrointestinal: Denies hematemesis or anorexia. No hematochezia or melena    · Genitourinary: Denies urgency, dysuria or hematuria. · Musculoskeletal: Denies gait disturbance, + weakness and joint complaints  · Integumentary: Denies rash, hives or pruritis   · Neurological: Denies dizziness, headaches or seizures. No numbness or tingling  · Psychiatric: Denies anxiety or depression. · Endocrine: Denies temperature intolerance.  +recent weight seen. There  is no evidence of airspace consolidation. The bony thorax demonstrate  no gross abnormality.      Impression:         Cardiomegaly with mild early interstitial pulmonary edema             Intake/Output Summary (Last 24 hours) at 2/23/2020 1034  Last data filed at 2/23/2020 0858  Gross per 24 hour   Intake 0 ml   Output 575 ml   Net -575 ml       Labs:   CBC:   Recent Labs     02/22/20  1801 02/23/20  0345   WBC 9.5 9.4   HGB 11.6* 12.2*   HCT 38.0 39.6    232     BMP:   Recent Labs     02/22/20  1801 02/23/20  0345    141   K 5.0 5.7*   CO2 27 27   BUN 42* 41*   CREATININE 2.0* 2.0*   LABGLOM 34 34   CALCIUM 8.9 9.0     Mag: No results for input(s): MG in the last 72 hours. Phos: No results for input(s): PHOS in the last 72 hours. TSH: No results for input(s): TSH in the last 72 hours. HgA1c:   Lab Results   Component Value Date    LABA1C 7.6 (H) 02/23/2020     No results found for: EAG  proBNP:   Recent Labs     02/22/20  1801   PROBNP 7,478*     PT/INR: No results for input(s): PROTIME, INR in the last 72 hours. APTT:No results for input(s): APTT in the last 72 hours. CARDIAC ENZYMES:  Recent Labs     02/22/20  1801   TROPONINI <0.01     FASTING LIPID PANEL:  Lab Results   Component Value Date    CHOL 198 04/06/2016    HDL 33 04/06/2016    LDLCALC - 04/06/2016    TRIG 433 04/06/2016     LIVER PROFILE:  Recent Labs     02/22/20  1801 02/23/20  0345   AST 24 29   ALT 29 31   LABALBU 3.7 3.7     A & P are to follow as per [facility]    Electronically signed by ANN Thomas on 2/23/2020 at 10:34 AM

## 2020-02-24 LAB
ALBUMIN SERPL-MCNC: 3.5 G/DL (ref 3.5–5.2)
ALP BLD-CCNC: 95 U/L (ref 40–129)
ALT SERPL-CCNC: 26 U/L (ref 0–40)
ANION GAP SERPL CALCULATED.3IONS-SCNC: 12 MMOL/L (ref 7–16)
AST SERPL-CCNC: 20 U/L (ref 0–39)
BASOPHILS ABSOLUTE: 0.06 E9/L (ref 0–0.2)
BASOPHILS RELATIVE PERCENT: 0.7 % (ref 0–2)
BILIRUB SERPL-MCNC: 0.8 MG/DL (ref 0–1.2)
BILIRUBIN DIRECT: 0.3 MG/DL (ref 0–0.3)
BILIRUBIN, INDIRECT: 0.5 MG/DL (ref 0–1)
BUN BLDV-MCNC: 38 MG/DL (ref 8–23)
CALCIUM SERPL-MCNC: 8.4 MG/DL (ref 8.6–10.2)
CHLORIDE BLD-SCNC: 105 MMOL/L (ref 98–107)
CO2: 25 MMOL/L (ref 22–29)
CREAT SERPL-MCNC: 1.9 MG/DL (ref 0.7–1.2)
EOSINOPHILS ABSOLUTE: 0.29 E9/L (ref 0.05–0.5)
EOSINOPHILS RELATIVE PERCENT: 3.5 % (ref 0–6)
GFR AFRICAN AMERICAN: 44
GFR NON-AFRICAN AMERICAN: 36 ML/MIN/1.73
GLUCOSE BLD-MCNC: 125 MG/DL (ref 74–99)
HCT VFR BLD CALC: 38.6 % (ref 37–54)
HEMOGLOBIN: 11.8 G/DL (ref 12.5–16.5)
IMMATURE GRANULOCYTES #: 0.04 E9/L
IMMATURE GRANULOCYTES %: 0.5 % (ref 0–5)
LYMPHOCYTES ABSOLUTE: 1.86 E9/L (ref 1.5–4)
LYMPHOCYTES RELATIVE PERCENT: 22.5 % (ref 20–42)
MCH RBC QN AUTO: 29.6 PG (ref 26–35)
MCHC RBC AUTO-ENTMCNC: 30.6 % (ref 32–34.5)
MCV RBC AUTO: 96.7 FL (ref 80–99.9)
METER GLUCOSE: 117 MG/DL (ref 74–99)
METER GLUCOSE: 126 MG/DL (ref 74–99)
METER GLUCOSE: 159 MG/DL (ref 74–99)
METER GLUCOSE: 187 MG/DL (ref 74–99)
MONOCYTES ABSOLUTE: 0.53 E9/L (ref 0.1–0.95)
MONOCYTES RELATIVE PERCENT: 6.4 % (ref 2–12)
NEUTROPHILS ABSOLUTE: 5.47 E9/L (ref 1.8–7.3)
NEUTROPHILS RELATIVE PERCENT: 66.4 % (ref 43–80)
PDW BLD-RTO: 15.8 FL (ref 11.5–15)
PLATELET # BLD: 216 E9/L (ref 130–450)
PMV BLD AUTO: 10.9 FL (ref 7–12)
POTASSIUM REFLEX MAGNESIUM: 3.6 MMOL/L (ref 3.5–5)
POTASSIUM SERPL-SCNC: 3.6 MMOL/L (ref 3.5–5)
RBC # BLD: 3.99 E12/L (ref 3.8–5.8)
SODIUM BLD-SCNC: 142 MMOL/L (ref 132–146)
TOTAL PROTEIN: 6.2 G/DL (ref 6.4–8.3)
WBC # BLD: 8.3 E9/L (ref 4.5–11.5)

## 2020-02-24 PROCEDURE — 85025 COMPLETE CBC W/AUTO DIFF WBC: CPT

## 2020-02-24 PROCEDURE — 99232 SBSQ HOSP IP/OBS MODERATE 35: CPT | Performed by: INTERNAL MEDICINE

## 2020-02-24 PROCEDURE — 2060000000 HC ICU INTERMEDIATE R&B

## 2020-02-24 PROCEDURE — 80048 BASIC METABOLIC PNL TOTAL CA: CPT

## 2020-02-24 PROCEDURE — 99233 SBSQ HOSP IP/OBS HIGH 50: CPT | Performed by: INTERNAL MEDICINE

## 2020-02-24 PROCEDURE — 87070 CULTURE OTHR SPECIMN AEROBIC: CPT

## 2020-02-24 PROCEDURE — 36415 COLL VENOUS BLD VENIPUNCTURE: CPT

## 2020-02-24 PROCEDURE — 2580000003 HC RX 258: Performed by: INTERNAL MEDICINE

## 2020-02-24 PROCEDURE — 2500000003 HC RX 250 WO HCPCS: Performed by: INTERNAL MEDICINE

## 2020-02-24 PROCEDURE — 87147 CULTURE TYPE IMMUNOLOGIC: CPT

## 2020-02-24 PROCEDURE — 6370000000 HC RX 637 (ALT 250 FOR IP): Performed by: INTERNAL MEDICINE

## 2020-02-24 PROCEDURE — 82962 GLUCOSE BLOOD TEST: CPT

## 2020-02-24 PROCEDURE — 80076 HEPATIC FUNCTION PANEL: CPT

## 2020-02-24 PROCEDURE — 6360000002 HC RX W HCPCS: Performed by: INTERNAL MEDICINE

## 2020-02-24 RX ADMIN — ISOSORBIDE DINITRATE 10 MG: 10 TABLET ORAL at 13:09

## 2020-02-24 RX ADMIN — HYDRALAZINE HYDROCHLORIDE 25 MG: 25 TABLET, FILM COATED ORAL at 13:09

## 2020-02-24 RX ADMIN — Medication 10 ML: at 20:10

## 2020-02-24 RX ADMIN — APIXABAN 5 MG: 5 TABLET, FILM COATED ORAL at 20:09

## 2020-02-24 RX ADMIN — CEFAZOLIN 1 G: 1 INJECTION, POWDER, FOR SOLUTION INTRAMUSCULAR; INTRAVENOUS at 11:38

## 2020-02-24 RX ADMIN — HYDRALAZINE HYDROCHLORIDE 25 MG: 25 TABLET, FILM COATED ORAL at 22:50

## 2020-02-24 RX ADMIN — BUMETANIDE 1 MG: 0.25 INJECTION INTRAMUSCULAR; INTRAVENOUS at 20:09

## 2020-02-24 RX ADMIN — ISOSORBIDE DINITRATE 10 MG: 10 TABLET ORAL at 08:28

## 2020-02-24 RX ADMIN — METOPROLOL SUCCINATE 100 MG: 100 TABLET, EXTENDED RELEASE ORAL at 20:09

## 2020-02-24 RX ADMIN — ATORVASTATIN CALCIUM 40 MG: 40 TABLET, FILM COATED ORAL at 08:28

## 2020-02-24 RX ADMIN — CEFAZOLIN 1 G: 1 INJECTION, POWDER, FOR SOLUTION INTRAMUSCULAR; INTRAVENOUS at 20:09

## 2020-02-24 RX ADMIN — BUMETANIDE 1 MG: 0.25 INJECTION INTRAMUSCULAR; INTRAVENOUS at 08:28

## 2020-02-24 RX ADMIN — APIXABAN 5 MG: 5 TABLET, FILM COATED ORAL at 08:30

## 2020-02-24 RX ADMIN — DIGOXIN 125 MCG: 125 TABLET ORAL at 08:28

## 2020-02-24 RX ADMIN — Medication 10 ML: at 08:28

## 2020-02-24 RX ADMIN — METOPROLOL SUCCINATE 100 MG: 100 TABLET, EXTENDED RELEASE ORAL at 08:28

## 2020-02-24 RX ADMIN — INSULIN LISPRO 2 UNITS: 100 INJECTION, SOLUTION INTRAVENOUS; SUBCUTANEOUS at 11:38

## 2020-02-24 RX ADMIN — ISOSORBIDE DINITRATE 10 MG: 10 TABLET ORAL at 20:09

## 2020-02-24 RX ADMIN — HYDRALAZINE HYDROCHLORIDE 25 MG: 25 TABLET, FILM COATED ORAL at 06:04

## 2020-02-24 ASSESSMENT — PAIN SCALES - GENERAL
PAINLEVEL_OUTOF10: 0

## 2020-02-24 NOTE — PROGRESS NOTES
mg  650 mg Oral Q6H PRN Baron Amadeo MD        acetaminophen (TYLENOL) suppository 650 mg  650 mg Rectal Q6H PRN Baron Amadeo MD        promethazine (PHENERGAN) tablet 12.5 mg  12.5 mg Oral Q6H PRN Bri Gregory MD        ondansetron (ZOFRAN) injection 4 mg  4 mg Intravenous Q6H PRN Baron Amadeo MD        apixaban (ELIQUIS) tablet 5 mg  5 mg Oral BID Baron Amadeo MD   5 mg at 02/23/20 2048    insulin lispro (HUMALOG) injection vial 0-12 Units  0-12 Units Subcutaneous TID WC Baron Amadeo MD   2 Units at 02/23/20 1127    insulin lispro (HUMALOG) injection vial 0-6 Units  0-6 Units Subcutaneous Nightly Bri Gregory MD   1 Units at 02/23/20 2053    glucose (GLUTOSE) 40 % oral gel 15 g  15 g Oral PRN Bri Gregory MD        dextrose 50 % IV solution  12.5 g Intravenous PRN Baron Amadeo MD        glucagon (rDNA) injection 1 mg  1 mg Intramuscular PRN Baron Amadeo MD        dextrose 5 % solution  100 mL/hr Intravenous PRN Baron Amadeo MD           Review of systems:   Heart: as above   Lungs: as above   Eyes: denies changes in vision or discharge. Ears: denies changes in hearing or pain. Nose: denies epistaxis or masses   Throat: denies sore throat or trouble swallowing. Neuro: denies numbness, tingling, tremors. Skin: denies rashes or itching. : denies hematuria, dysuria   GI: denies vomiting, diarrhea   Psych: denies mood changed, anxiety, depression. Physical Exam   /69   Pulse 84   Temp 97.3 °F (36.3 °C) (Oral)   Resp 16   Ht 5' 9\" (1.753 m)   Wt 250 lb 3.2 oz (113.5 kg)   SpO2 99%   BMI 36.95 kg/m²   Constitutional: Oriented to person, place, and time. No distress. Well developed. Head: Normocephalic and atraumatic. Neck: Neck supple. No hepatojugular reflux. No JVD present. Carotid bruit is not present. No tracheal deviation present. No thyromegaly present. Cardiovascular: Normal rate, regular rhythm, normal heart sounds. intact distal pulses.   No gallop and no friction rub.  No murmur heard. Pulmonary: Breath sounds normal. No respiratory distress. No wheezes. No rales. Chest: Effort normal. No tenderness. Abdominal: Soft. Bowel sounds are normal. No distension or mass. No tenderness, rebound or guarding. Musculoskeletal: . No tenderness. No clubbing or cyanosis. Extremitites: Intact distal pulses. No edema  Neurological: Alert and oriented to person, place, and time. Skin: Skin is warm and dry. No rash noted. Not diaphoretic. No erythema. Psychiatric: Normal mood and affect. Behavior is normal.   Lymphadenopathy: No cervical adenopathy. No groin adenopathy. CBC:   Lab Results   Component Value Date    WBC 8.3 02/24/2020    RBC 3.99 02/24/2020    HGB 11.8 02/24/2020    HCT 38.6 02/24/2020    MCV 96.7 02/24/2020    MCH 29.6 02/24/2020    MCHC 30.6 02/24/2020    RDW 15.8 02/24/2020     02/24/2020    MPV 10.9 02/24/2020     BMP:   Lab Results   Component Value Date     02/24/2020    K 3.6 02/24/2020    K 3.6 02/24/2020     02/24/2020    CO2 25 02/24/2020    BUN 38 02/24/2020    LABALBU 3.5 02/24/2020    CREATININE 1.9 02/24/2020    CALCIUM 8.4 02/24/2020    GFRAA 44 02/24/2020    LABGLOM 36 02/24/2020     Magnesium:    Lab Results   Component Value Date    MG 2.5 11/19/2019     Cardiac Enzymes:   Lab Results   Component Value Date    TROPONINI <0.01 02/22/2020    TROPONINI <0.01 11/18/2019      PT/INR:    Lab Results   Component Value Date    PROTIME 20.2 11/19/2019    PROTIME 19.6 04/21/2016    INR 1.7 11/19/2019     TSH:    Lab Results   Component Value Date    TSH 4.460 11/18/2019     Rhythm Strip: atrial fibrillation. TTE-11/18/2019: LVEF 30%, moderate to severely decreased LV systolic function, moderate concentric hypertrophy, moderate MR, unable to estimate PA systolic pressure.      ASSESSMENT & PLAN:    Patient Active Problem List   Diagnosis    HTN (hypertension), benign    History of DVT (deep vein thrombosis)    History of pulmonary

## 2020-02-24 NOTE — CARE COORDINATION
2/24/2020  Social Work Discharge Planning: This worker met with Pt to discuss  role and transition of care/discharge planning. Pt was sitting on the couch,room air and independent with no needs at discharge. Pharmacy is [facility]. Electronically signed by ELIAZAR Lipscomb on 2/24/2020 at 10:56 AM

## 2020-02-24 NOTE — CARE COORDINATION
Met w/ patient. Explained role of  and plan of care. Lives w/ fiancee. Independent PTA. Does not have insurance. Has CPAP from an agency in [place]. PCP is Dr. Kb Burns and pharmacy is [facility]. Heart failure diagnosis discussed. Explained importance compliance w/  diet, fluid restriction, medication, daily weights.  Currently on iv diuretic and iv abx.- new wound on L lower leg- states does not want [address] - Will continue to follow for discharge needs Anali Hernandez     Per Vani Ek @ [facility], pt is not HCAP eligible and not eligible for help with meds on discharge, over income for Medicaid Anali Hernandez

## 2020-02-24 NOTE — PROGRESS NOTES
Sugey Squires Hospitalist   Progress Note    Admitting Date and Time: 2/22/2020  5:36 PM  Admit Dx: Acute decompensated heart failure ([facility] [I50.9]  Acute decompensated [facility] failure ([facility] [I50.9]    Subjective: Patient admitted in the evening of 22nd, came with shortness of breath, known history of A. fib, EF of 30%, patient with known diabetes, known hypertension, as well as prior DVT and PE. Patient seen by cardiology, remains on IV Bumex, hydralazine is increased. Cardiology plans to switch continue with current diuresis. Patient was admitted with Acute decompensated heart failure ([facility] [I50.9]  Acute decompensated heart failure ([facility] [I50.9]. Patient feels comfortable, at this time awake, alert, resting in bed, does communicate well, denies any complaints. Do have left leg area of erythema, new, also at this time with some drainage, not serous but pus like. Per RN: The I and O's measured accurately. ROS: denies fever, chills, cp, sob, n/v, HA unless stated above.      bumetanide  1 mg Intravenous BID    hydrALAZINE  25 mg Oral 3 times per day    isosorbide dinitrate  10 mg Oral TID    atorvastatin  40 mg Oral Daily    digoxin  125 mcg Oral Daily    metoprolol succinate  100 mg Oral BID    sodium chloride flush  10 mL Intravenous 2 times per day    apixaban  5 mg Oral BID    insulin lispro  0-12 Units Subcutaneous TID WC    insulin lispro  0-6 Units Subcutaneous Nightly     sodium chloride flush, 10 mL, PRN  magnesium sulfate, 1 g, PRN  acetaminophen, 650 mg, Q6H PRN  acetaminophen, 650 mg, Q6H PRN  promethazine, 12.5 mg, Q6H PRN  ondansetron, 4 mg, Q6H PRN  glucose, 15 g, PRN  dextrose, 12.5 g, PRN  glucagon (rDNA), 1 mg, PRN  dextrose, 100 mL/hr, PRN         Objective:    BP (!) 146/83   Pulse 95   Temp 98 °F (36.7 °C) (Oral)   Resp 16   Ht 5' 9\" (1.753 m)   Wt 250 lb 3.2 oz (113.5 kg)   SpO2 94%   BMI 36.95 kg/m²   General Appearance: alert and oriented to person, place and time, well-developed and well-nourished, in no acute distress  Skin: warm and dry, no rash or erythema  Head: normocephalic and atraumatic  Eyes: pupils equal, round, and reactive to light, extraocular eye movements intact, conjunctivae normal  ENT: tympanic membrane, external ear and ear canal normal bilaterally, oropharynx clear and moist with normal mucous membranes  Neck: neck supple and non tender without mass, no thyromegaly or thyroid nodules, no cervical lymphadenopathy   Pulmonary/Chest: clear to auscultation bilaterally- no wheezes, rales or rhonchi, normal air movement, no respiratory distress  Cardiovascular: normal rate, normal S1 and S2, no gallops, intact distal pulses and no carotid bruits  Abdomen: soft, non-tender, non-distended, normal bowel sounds, no masses or organomegaly  Edema 3+    Recent Labs     02/23/20  0345 02/23/20  1458 02/24/20  0301    144 142   K 5.7* 4.5 3.6  3.6    104 105   CO2 27 28 25   BUN 41* 42* 38*   CREATININE 2.0* 2.0* 1.9*   GLUCOSE 138* 131* 125*   CALCIUM 9.0 8.6 8.4*       Recent Labs     02/22/20  1801 02/23/20  0345 02/24/20  0301   WBC 9.5 9.4 8.3   RBC 3.89 4.09 3.99   HGB 11.6* 12.2* 11.8*   HCT 38.0 39.6 38.6   MCV 97.7 96.8 96.7   MCH 29.8 29.8 29.6   MCHC 30.5* 30.8* 30.6*   RDW 15.9* 15.8* 15.8*    232 216   MPV 10.9 10.8 10.9     Creatinine 1.9  BNP 7478  Hemoglobin A1c 7.6  In negative balance of 795    Radiology:   XR CHEST PORTABLE   Final Result      Cardiomegaly with mild early interstitial pulmonary edema             Assessment:    Principal Problem:    Acute decompensated heart failure (HCC)  Active Problems:    Diabetes mellitus type 2 in obese (HCC)    Hyperlipidemia    Chronic anticoagulation    Rate controlled atrial fibrillation (HCC)    Congestive heart failure (HCC)    Essential hypertension    DERIC (obstructive sleep apnea)    Non-ischemic cardiomyopathy (HCC)    Benign prostatic hyperplasia without lower urinary

## 2020-02-24 NOTE — PLAN OF CARE
Problem: Falls - Risk of:  Goal: Will remain free from falls  Description  Will remain free from falls  Outcome: Met This Shift  Goal: Absence of physical injury  Description  Absence of physical injury  Outcome: Met This Shift     Problem: Pain:  Goal: Pain level will decrease  Description  Pain level will decrease  Outcome: Met This Shift  Goal: Control of acute pain  Description  Control of acute pain  Outcome: Met This Shift  Goal: Control of chronic pain  Description  Control of chronic pain  Outcome: Ongoing

## 2020-02-25 ENCOUNTER — APPOINTMENT (OUTPATIENT)
Dept: NUCLEAR MEDICINE | Age: 60
DRG: 291 | End: 2020-02-25

## 2020-02-25 ENCOUNTER — APPOINTMENT (OUTPATIENT)
Dept: NON INVASIVE DIAGNOSTICS | Age: 60
DRG: 291 | End: 2020-02-25

## 2020-02-25 LAB
ALBUMIN SERPL-MCNC: 3.2 G/DL (ref 3.5–5.2)
ALP BLD-CCNC: 92 U/L (ref 40–129)
ALT SERPL-CCNC: 24 U/L (ref 0–40)
ANION GAP SERPL CALCULATED.3IONS-SCNC: 12 MMOL/L (ref 7–16)
AST SERPL-CCNC: 21 U/L (ref 0–39)
BASOPHILS ABSOLUTE: 0.06 E9/L (ref 0–0.2)
BASOPHILS RELATIVE PERCENT: 0.8 % (ref 0–2)
BILIRUB SERPL-MCNC: 0.8 MG/DL (ref 0–1.2)
BILIRUBIN DIRECT: 0.2 MG/DL (ref 0–0.3)
BILIRUBIN, INDIRECT: 0.6 MG/DL (ref 0–1)
BUN BLDV-MCNC: 36 MG/DL (ref 8–23)
CALCIUM SERPL-MCNC: 8.4 MG/DL (ref 8.6–10.2)
CHLORIDE BLD-SCNC: 104 MMOL/L (ref 98–107)
CO2: 26 MMOL/L (ref 22–29)
CREAT SERPL-MCNC: 1.9 MG/DL (ref 0.7–1.2)
EOSINOPHILS ABSOLUTE: 0.3 E9/L (ref 0.05–0.5)
EOSINOPHILS RELATIVE PERCENT: 3.8 % (ref 0–6)
GFR AFRICAN AMERICAN: 44
GFR NON-AFRICAN AMERICAN: 36 ML/MIN/1.73
GLUCOSE BLD-MCNC: 139 MG/DL (ref 74–99)
HCT VFR BLD CALC: 37.9 % (ref 37–54)
HEMOGLOBIN: 11.8 G/DL (ref 12.5–16.5)
IMMATURE GRANULOCYTES #: 0.02 E9/L
IMMATURE GRANULOCYTES %: 0.3 % (ref 0–5)
LV EF: 26 %
LVEF MODALITY: NORMAL
LYMPHOCYTES ABSOLUTE: 1.84 E9/L (ref 1.5–4)
LYMPHOCYTES RELATIVE PERCENT: 23 % (ref 20–42)
MCH RBC QN AUTO: 29.9 PG (ref 26–35)
MCHC RBC AUTO-ENTMCNC: 31.1 % (ref 32–34.5)
MCV RBC AUTO: 96.2 FL (ref 80–99.9)
METER GLUCOSE: 130 MG/DL (ref 74–99)
METER GLUCOSE: 186 MG/DL (ref 74–99)
METER GLUCOSE: 225 MG/DL (ref 74–99)
METER GLUCOSE: 226 MG/DL (ref 74–99)
MONOCYTES ABSOLUTE: 0.54 E9/L (ref 0.1–0.95)
MONOCYTES RELATIVE PERCENT: 6.8 % (ref 2–12)
NEUTROPHILS ABSOLUTE: 5.23 E9/L (ref 1.8–7.3)
NEUTROPHILS RELATIVE PERCENT: 65.3 % (ref 43–80)
PDW BLD-RTO: 15.8 FL (ref 11.5–15)
PLATELET # BLD: 208 E9/L (ref 130–450)
PMV BLD AUTO: 11 FL (ref 7–12)
POTASSIUM REFLEX MAGNESIUM: 3.6 MMOL/L (ref 3.5–5)
RBC # BLD: 3.94 E12/L (ref 3.8–5.8)
SODIUM BLD-SCNC: 142 MMOL/L (ref 132–146)
TOTAL PROTEIN: 6 G/DL (ref 6.4–8.3)
WBC # BLD: 8 E9/L (ref 4.5–11.5)

## 2020-02-25 PROCEDURE — 93018 CV STRESS TEST I&R ONLY: CPT | Performed by: INTERNAL MEDICINE

## 2020-02-25 PROCEDURE — 82962 GLUCOSE BLOOD TEST: CPT

## 2020-02-25 PROCEDURE — 6360000002 HC RX W HCPCS: Performed by: INTERNAL MEDICINE

## 2020-02-25 PROCEDURE — A9500 TC99M SESTAMIBI: HCPCS | Performed by: RADIOLOGY

## 2020-02-25 PROCEDURE — 85025 COMPLETE CBC W/AUTO DIFF WBC: CPT

## 2020-02-25 PROCEDURE — 3430000000 HC RX DIAGNOSTIC RADIOPHARMACEUTICAL: Performed by: RADIOLOGY

## 2020-02-25 PROCEDURE — 2580000003 HC RX 258: Performed by: INTERNAL MEDICINE

## 2020-02-25 PROCEDURE — 2060000000 HC ICU INTERMEDIATE R&B

## 2020-02-25 PROCEDURE — 80048 BASIC METABOLIC PNL TOTAL CA: CPT

## 2020-02-25 PROCEDURE — 99233 SBSQ HOSP IP/OBS HIGH 50: CPT | Performed by: INTERNAL MEDICINE

## 2020-02-25 PROCEDURE — 6370000000 HC RX 637 (ALT 250 FOR IP): Performed by: INTERNAL MEDICINE

## 2020-02-25 PROCEDURE — 78452 HT MUSCLE IMAGE SPECT MULT: CPT

## 2020-02-25 PROCEDURE — 80076 HEPATIC FUNCTION PANEL: CPT

## 2020-02-25 PROCEDURE — 93016 CV STRESS TEST SUPVJ ONLY: CPT | Performed by: INTERNAL MEDICINE

## 2020-02-25 PROCEDURE — 36415 COLL VENOUS BLD VENIPUNCTURE: CPT

## 2020-02-25 PROCEDURE — 2500000003 HC RX 250 WO HCPCS: Performed by: INTERNAL MEDICINE

## 2020-02-25 PROCEDURE — 93017 CV STRESS TEST TRACING ONLY: CPT

## 2020-02-25 RX ADMIN — Medication 10 ML: at 10:13

## 2020-02-25 RX ADMIN — ISOSORBIDE DINITRATE 10 MG: 10 TABLET ORAL at 10:54

## 2020-02-25 RX ADMIN — INSULIN LISPRO 4 UNITS: 100 INJECTION, SOLUTION INTRAVENOUS; SUBCUTANEOUS at 16:28

## 2020-02-25 RX ADMIN — ATORVASTATIN CALCIUM 40 MG: 40 TABLET, FILM COATED ORAL at 10:50

## 2020-02-25 RX ADMIN — Medication 30 MILLICURIE: at 07:53

## 2020-02-25 RX ADMIN — REGADENOSON 0.4 MG: 0.08 INJECTION, SOLUTION INTRAVENOUS at 08:33

## 2020-02-25 RX ADMIN — APIXABAN 5 MG: 5 TABLET, FILM COATED ORAL at 20:09

## 2020-02-25 RX ADMIN — BUMETANIDE 1 MG: 0.25 INJECTION INTRAMUSCULAR; INTRAVENOUS at 10:13

## 2020-02-25 RX ADMIN — ATORVASTATIN CALCIUM 40 MG: 40 TABLET, FILM COATED ORAL at 20:09

## 2020-02-25 RX ADMIN — INSULIN LISPRO 2 UNITS: 100 INJECTION, SOLUTION INTRAVENOUS; SUBCUTANEOUS at 10:48

## 2020-02-25 RX ADMIN — CEFAZOLIN 1 G: 1 INJECTION, POWDER, FOR SOLUTION INTRAMUSCULAR; INTRAVENOUS at 20:10

## 2020-02-25 RX ADMIN — HYDRALAZINE HYDROCHLORIDE 25 MG: 25 TABLET, FILM COATED ORAL at 06:26

## 2020-02-25 RX ADMIN — METOPROLOL SUCCINATE 100 MG: 100 TABLET, EXTENDED RELEASE ORAL at 20:10

## 2020-02-25 RX ADMIN — ISOSORBIDE DINITRATE 10 MG: 10 TABLET ORAL at 23:51

## 2020-02-25 RX ADMIN — Medication 10 MILLICURIE: at 07:53

## 2020-02-25 RX ADMIN — METOPROLOL SUCCINATE 100 MG: 100 TABLET, EXTENDED RELEASE ORAL at 10:51

## 2020-02-25 RX ADMIN — CEFAZOLIN 1 G: 1 INJECTION, POWDER, FOR SOLUTION INTRAMUSCULAR; INTRAVENOUS at 04:02

## 2020-02-25 RX ADMIN — DIGOXIN 125 MCG: 125 TABLET ORAL at 10:51

## 2020-02-25 RX ADMIN — INSULIN LISPRO 2 UNITS: 100 INJECTION, SOLUTION INTRAVENOUS; SUBCUTANEOUS at 23:51

## 2020-02-25 RX ADMIN — CEFAZOLIN 1 G: 1 INJECTION, POWDER, FOR SOLUTION INTRAMUSCULAR; INTRAVENOUS at 11:30

## 2020-02-25 RX ADMIN — BUMETANIDE 1 MG: 0.25 INJECTION INTRAMUSCULAR; INTRAVENOUS at 20:10

## 2020-02-25 RX ADMIN — Medication 10 ML: at 21:00

## 2020-02-25 RX ADMIN — HYDRALAZINE HYDROCHLORIDE 25 MG: 25 TABLET, FILM COATED ORAL at 14:09

## 2020-02-25 RX ADMIN — APIXABAN 5 MG: 5 TABLET, FILM COATED ORAL at 10:51

## 2020-02-25 RX ADMIN — HYDRALAZINE HYDROCHLORIDE 25 MG: 25 TABLET, FILM COATED ORAL at 20:10

## 2020-02-25 ASSESSMENT — PAIN SCALES - GENERAL
PAINLEVEL_OUTOF10: 0
PAINLEVEL_OUTOF10: 0

## 2020-02-25 NOTE — PROCEDURES
Lexiscan Stress EKG Report:    Dx CP    Baseline EKG: nonspecific ST and T waves changes, atrial fibrillation. Stress EKG: No ST-T changes. Arrhythmias: None. Symptoms: None. Summary:  Unremarkable lexiscan stress EKG. See separate report for stress perfusion results. Cindy Solis D.O.   Cardiologist  Cardiology, [id number]

## 2020-02-25 NOTE — PROGRESS NOTES
°F (36 °C) (Oral)   Resp 16   Ht 5' 9\" (1.753 m)   Wt 252 lb 14.4 oz (114.7 kg)   SpO2 98%   BMI 37.35 kg/m²   General Appearance: alert and oriented to person, place and time, well-developed and well-nourished, in no acute distress  Skin: warm and dry, no rash or erythema  Head: normocephalic and atraumatic  Eyes: pupils equal, round, and reactive to light, extraocular eye movements intact, conjunctivae normal  ENT: tympanic membrane, external ear and ear canal normal bilaterally, oropharynx clear and moist with normal mucous membranes  Neck: neck supple and non tender without mass, no thyromegaly or thyroid nodules, no cervical lymphadenopathy   Pulmonary/Chest: clear to auscultation bilaterally- no wheezes, rales or rhonchi, normal air movement, no respiratory distress  Cardiovascular: normal rate, normal S1 and S2, no gallops, intact distal pulses and no carotid bruits  Abdomen: soft, non-tender, non-distended, normal bowel sounds, no masses or organomegaly  Edema 3+    Recent Labs     02/23/20  1458 02/24/20  0301 02/25/20  0325    142 142   K 4.5 3.6  3.6 3.6    105 104   CO2 28 25 26   BUN 42* 38* 36*   CREATININE 2.0* 1.9* 1.9*   GLUCOSE 131* 125* 139*   CALCIUM 8.6 8.4* 8.4*       Recent Labs     02/23/20  0345 02/24/20  0301 02/25/20  0325   WBC 9.4 8.3 8.0   RBC 4.09 3.99 3.94   HGB 12.2* 11.8* 11.8*   HCT 39.6 38.6 37.9   MCV 96.8 96.7 96.2   MCH 29.8 29.6 29.9   MCHC 30.8* 30.6* 31.1*   RDW 15.8* 15.8* 15.8*    216 208   MPV 10.8 10.9 11.0     Creatinine 1.9  BNP 7478  Hemoglobin A1c 7.6  In negative balance of 2.9 L  Wound culture with gram-positive organism, sensitivity pending. Radiology:   NM Cardiac Stress Test Nuclear Imaging   Final Result   1. No reversible perfusion defect   2. Ejection fraction is 26 %. 3. Global hypokinesis. LV is dilated.          XR CHEST PORTABLE   Final Result      Cardiomegaly with mild early interstitial pulmonary edema Assessment:    Principal Problem:    Acute decompensated heart failure (HCC)  Active Problems:    Diabetes mellitus type 2 in obese (HCC)    Hyperlipidemia    Chronic anticoagulation    Rate controlled atrial fibrillation (HCC)    Congestive heart failure (HCC)    Essential hypertension    DERIC (obstructive sleep apnea)    Non-ischemic cardiomyopathy (HCC)    Benign prostatic hyperplasia without lower urinary tract symptoms  Resolved Problems:    * No resolved hospital problems. *      Plan:  1. Patient with decompensated systolic heart failure, acute on chronic, seen by cardiology, remains on IV diuresis. Responding well  2. Diabetes, on oral hypoglycemics, A1c 7.6, no change  3. Hypertension, at this time controlled. 4. Obesity, close watch on weight explained especially with heart failure. 5. Known atrial fibrillation, patient is on anticoagulation as well as rate control. 6. Known CKD, creatinine is stable. 7. New wound, left leg, erythema, drainage, did speak to nursing, culture sensitivity sent, will start cefazolin, patient improving. 8. DC planning, patient need still somewhat more symptomatic improvement, cardiology on board, sensitivity pending, likely in a day or 2 on the oral dose of diuretics adjusted from cardiology side as well as p.o. antibiotics.         Electronically signed by Amarilys Oleary MD on 2/25/2020 at 12:45 PM

## 2020-02-25 NOTE — PROGRESS NOTES
[facility] Quality Flow/Interdisciplinary Rounds Progress Note        Quality Flow Rounds held on February 25, 2020    Disciplines Attending:  Bedside Nurse, ,  and Nursing Unit Leadership    Denzel Leigh was admitted on 2/22/2020  5:36 PM    Anticipated Discharge Date:  Expected Discharge Date: 02/24/20    Disposition:    Rahul Score:  Rahul Scale Score: 21    Readmission Risk              Risk of Unplanned Readmission:        17           Discussed patient goal for the day, patient clinical progression, and barriers to discharge.   The following Goal(s) of the Day/Commitment(s) have been identified:  Stress      Elin Eduardo  February 25, 2020

## 2020-02-25 NOTE — PROGRESS NOTES
mcg at 02/24/20 1755    metoprolol succinate (TOPROL XL) extended release tablet 100 mg  100 mg Oral BID Deyvi De La Torre MD   100 mg at 02/24/20 2009    sodium chloride flush 0.9 % injection 10 mL  10 mL Intravenous 2 times per day Deyvi De La Torre MD   10 mL at 02/24/20 2010    sodium chloride flush 0.9 % injection 10 mL  10 mL Intravenous PRN Deyvi De La Torre MD        magnesium sulfate 1 g in dextrose 5% 100 mL IVPB  1 g Intravenous PRN Deyvi De La Torre MD        acetaminophen (TYLENOL) tablet 650 mg  650 mg Oral Q6H PRN Deyvi De La Torre MD        acetaminophen (TYLENOL) suppository 650 mg  650 mg Rectal Q6H PRN Deyvi De La Torre MD        promethazine (PHENERGAN) tablet 12.5 mg  12.5 mg Oral Q6H PRN Bri Gregory MD        ondansetron (ZOFRAN) injection 4 mg  4 mg Intravenous Q6H PRN Deyvi De La Torre MD        apixaban (ELIQUIS) tablet 5 mg  5 mg Oral BID Deyvi De La Torre MD   5 mg at 02/24/20 2009    insulin lispro (HUMALOG) injection vial 0-12 Units  0-12 Units Subcutaneous TID WC Deyvi De La Torre MD   2 Units at 02/24/20 1138    insulin lispro (HUMALOG) injection vial 0-6 Units  0-6 Units Subcutaneous Nightly Bri Gregory MD   1 Units at 02/23/20 2053    glucose (GLUTOSE) 40 % oral gel 15 g  15 g Oral PRN Bri Gregory MD        dextrose 50 % IV solution  12.5 g Intravenous PRN Deyvi De La Torre MD        glucagon (rDNA) injection 1 mg  1 mg Intramuscular PRN Deyvi De La Torre MD        dextrose 5 % solution  100 mL/hr Intravenous PRN Deyvi De La Torre MD           Review of systems:   Heart: as above   Lungs: as above   Eyes: denies changes in vision or discharge. Ears: denies changes in hearing or pain. Nose: denies epistaxis or masses   Throat: denies sore throat or trouble swallowing. Neuro: denies numbness, tingling, tremors. Skin: denies rashes or itching. : denies hematuria, dysuria   GI: denies vomiting, diarrhea   Psych: denies mood changed, anxiety, depression.     Physical Exam   BP (!) 146/87   Pulse 90   Temp 97.7 °F 19.6 04/21/2016    INR 1.7 11/19/2019     TSH:    Lab Results   Component Value Date    TSH 4.460 11/18/2019     Rhythm Strip: atrial fibrillation. TTE-11/18/2019: LVEF 30%, moderate to severely decreased LV systolic function, moderate concentric hypertrophy, moderate MR, unable to estimate PA systolic pressure. ASSESSMENT & PLAN:    Patient Active Problem List   Diagnosis    HTN (hypertension), benign    History of DVT (deep vein thrombosis)    History of pulmonary embolism    Diabetes mellitus type 2 in obese ([address]    Hyperlipidemia    Non morbid obesity due to excess calories    Chronic anticoagulation    Rate controlled atrial fibrillation (HCC)    Congestive heart failure (HCC)    Dyspnea and respiratory abnormalities    Essential hypertension    DERIC (obstructive sleep apnea)    Acute decompensated heart failure (HCC)    Non-ischemic cardiomyopathy (HCC)    Benign prostatic hyperplasia without lower urinary tract symptoms     1. Acute on chronic systolic CHF/Hx of CMP:     Echo with severe LV dysfunction.      Toprol/hydralazine/nitrates/Bumex. No ACEI/ARB/ARNI/aldactone due to renal issues for now.      Diurese. -3 liters. Ischemia evaluation today by pharm stress.     2. Afib: Rate control. Eliquis.       3. Hx of PE/DVT: Eliquis.      4. HTN: Observe.     5. Lipids: Statin.     6. DM: Per primary service. 7. CKD: Follow labs.      Shirin Garcia D.O.   Cardiologist  Cardiology, [facility]

## 2020-02-26 LAB
ANION GAP SERPL CALCULATED.3IONS-SCNC: 10 MMOL/L (ref 7–16)
BUN BLDV-MCNC: 31 MG/DL (ref 8–23)
CALCIUM SERPL-MCNC: 8.5 MG/DL (ref 8.6–10.2)
CHLORIDE BLD-SCNC: 101 MMOL/L (ref 98–107)
CO2: 27 MMOL/L (ref 22–29)
CREAT SERPL-MCNC: 1.9 MG/DL (ref 0.7–1.2)
GFR AFRICAN AMERICAN: 44
GFR NON-AFRICAN AMERICAN: 36 ML/MIN/1.73
GLUCOSE BLD-MCNC: 261 MG/DL (ref 74–99)
METER GLUCOSE: 122 MG/DL (ref 74–99)
METER GLUCOSE: 135 MG/DL (ref 74–99)
METER GLUCOSE: 152 MG/DL (ref 74–99)
METER GLUCOSE: 228 MG/DL (ref 74–99)
POTASSIUM SERPL-SCNC: 3.5 MMOL/L (ref 3.5–5)
SODIUM BLD-SCNC: 138 MMOL/L (ref 132–146)
WOUND/ABSCESS: NORMAL

## 2020-02-26 PROCEDURE — 2580000003 HC RX 258: Performed by: INTERNAL MEDICINE

## 2020-02-26 PROCEDURE — 6360000002 HC RX W HCPCS: Performed by: INTERNAL MEDICINE

## 2020-02-26 PROCEDURE — 2500000003 HC RX 250 WO HCPCS: Performed by: INTERNAL MEDICINE

## 2020-02-26 PROCEDURE — 99232 SBSQ HOSP IP/OBS MODERATE 35: CPT | Performed by: INTERNAL MEDICINE

## 2020-02-26 PROCEDURE — 36415 COLL VENOUS BLD VENIPUNCTURE: CPT

## 2020-02-26 PROCEDURE — 6370000000 HC RX 637 (ALT 250 FOR IP): Performed by: INTERNAL MEDICINE

## 2020-02-26 PROCEDURE — 82962 GLUCOSE BLOOD TEST: CPT

## 2020-02-26 PROCEDURE — 80048 BASIC METABOLIC PNL TOTAL CA: CPT

## 2020-02-26 PROCEDURE — 99233 SBSQ HOSP IP/OBS HIGH 50: CPT | Performed by: INTERNAL MEDICINE

## 2020-02-26 PROCEDURE — APPSS30 APP SPLIT SHARED TIME 16-30 MINUTES: Performed by: CLINICAL NURSE SPECIALIST

## 2020-02-26 PROCEDURE — 2060000000 HC ICU INTERMEDIATE R&B

## 2020-02-26 RX ADMIN — Medication 10 ML: at 08:02

## 2020-02-26 RX ADMIN — ISOSORBIDE DINITRATE 10 MG: 10 TABLET ORAL at 14:18

## 2020-02-26 RX ADMIN — BUMETANIDE 1 MG: 0.25 INJECTION INTRAMUSCULAR; INTRAVENOUS at 08:02

## 2020-02-26 RX ADMIN — ISOSORBIDE DINITRATE 10 MG: 10 TABLET ORAL at 08:02

## 2020-02-26 RX ADMIN — ATORVASTATIN CALCIUM 40 MG: 40 TABLET, FILM COATED ORAL at 08:02

## 2020-02-26 RX ADMIN — APIXABAN 5 MG: 5 TABLET, FILM COATED ORAL at 21:23

## 2020-02-26 RX ADMIN — BUMETANIDE 1 MG: 0.25 INJECTION INTRAMUSCULAR; INTRAVENOUS at 21:24

## 2020-02-26 RX ADMIN — CEFAZOLIN 1 G: 1 INJECTION, POWDER, FOR SOLUTION INTRAMUSCULAR; INTRAVENOUS at 21:24

## 2020-02-26 RX ADMIN — CEFAZOLIN 1 G: 1 INJECTION, POWDER, FOR SOLUTION INTRAMUSCULAR; INTRAVENOUS at 11:43

## 2020-02-26 RX ADMIN — APIXABAN 5 MG: 5 TABLET, FILM COATED ORAL at 08:02

## 2020-02-26 RX ADMIN — HYDRALAZINE HYDROCHLORIDE 25 MG: 25 TABLET, FILM COATED ORAL at 21:24

## 2020-02-26 RX ADMIN — DIGOXIN 125 MCG: 125 TABLET ORAL at 08:02

## 2020-02-26 RX ADMIN — METOPROLOL SUCCINATE 100 MG: 100 TABLET, EXTENDED RELEASE ORAL at 08:02

## 2020-02-26 RX ADMIN — Medication 10 ML: at 22:00

## 2020-02-26 RX ADMIN — ISOSORBIDE DINITRATE 10 MG: 10 TABLET ORAL at 21:24

## 2020-02-26 RX ADMIN — METOPROLOL SUCCINATE 100 MG: 100 TABLET, EXTENDED RELEASE ORAL at 21:23

## 2020-02-26 RX ADMIN — INSULIN LISPRO 1 UNITS: 100 INJECTION, SOLUTION INTRAVENOUS; SUBCUTANEOUS at 21:24

## 2020-02-26 RX ADMIN — HYDRALAZINE HYDROCHLORIDE 25 MG: 25 TABLET, FILM COATED ORAL at 06:11

## 2020-02-26 RX ADMIN — HYDRALAZINE HYDROCHLORIDE 25 MG: 25 TABLET, FILM COATED ORAL at 14:19

## 2020-02-26 RX ADMIN — CEFAZOLIN 1 G: 1 INJECTION, POWDER, FOR SOLUTION INTRAMUSCULAR; INTRAVENOUS at 06:10

## 2020-02-26 RX ADMIN — INSULIN LISPRO 4 UNITS: 100 INJECTION, SOLUTION INTRAVENOUS; SUBCUTANEOUS at 11:43

## 2020-02-26 ASSESSMENT — PAIN SCALES - GENERAL
PAINLEVEL_OUTOF10: 0

## 2020-02-26 NOTE — CARE COORDINATION
2/26/2020  Social Work Discharge Planning:  Pt is not eligible for help with meds-has no ins. and is over income for medicaid. Pt is declining [facility] needs.  Electronically signed by ELIAZAR Cramer on 2/26/2020 at 9:55 AM

## 2020-02-26 NOTE — PROGRESS NOTES
[facility] Progress Note    Admitting Date and Time: 2/22/2020  5:36 PM  Admit Dx: Acute decompensated heart failure ([facility] [address] [I50.9]  Acute decompensated heart failure ([facility] [facility] [I50.9]    Subjective:  Patient is being followed for Acute decompensated heart failure ([facility] [address] [I50.9]  Acute decompensated heart failure ([facility] [I50.9]   Pt feels weak and tired he stated. Little bit sleepy now. No noted nausea, vomiting or diarrhea. No shortness of breath. Room air at this point. Sitting in the bed comfortably. Per RN: Reported no adverse reactions    ROS: denies fever, chills, cp, sob, n/v, HA unless stated above.  ceFAZolin  1 g Intravenous Q8H    bumetanide  1 mg Intravenous BID    hydrALAZINE  25 mg Oral 3 times per day    isosorbide dinitrate  10 mg Oral TID    atorvastatin  40 mg Oral Daily    digoxin  125 mcg Oral Daily    metoprolol succinate  100 mg Oral BID    sodium chloride flush  10 mL Intravenous 2 times per day    apixaban  5 mg Oral BID    insulin lispro  0-12 Units Subcutaneous TID WC    insulin lispro  0-6 Units Subcutaneous Nightly     sodium chloride flush, 10 mL, PRN  magnesium sulfate, 1 g, PRN  acetaminophen, 650 mg, Q6H PRN  acetaminophen, 650 mg, Q6H PRN  promethazine, 12.5 mg, Q6H PRN  ondansetron, 4 mg, Q6H PRN  glucose, 15 g, PRN  dextrose, 12.5 g, PRN  glucagon (rDNA), 1 mg, PRN  dextrose, 100 mL/hr, PRN         Objective:    /82   Pulse 86   Temp 97.8 °F (36.6 °C) (Oral)   Resp 16   Ht 5' 9\" (1.753 m)   Wt 250 lb 8 oz (113.6 kg)   SpO2 98%   BMI 36.99 kg/m²     General Appearance: alert and oriented to person, place and time and in no acute distress. Resting in bed no distress. Skin: warm and dry. Left leg anterior shin venous stasis discoloration and mild erythema. Broken skin and abrasion.   Head: normocephalic and atraumatic  Eyes: pupils equal, round, and reactive to light, extraocular eye movements intact, conjunctivae normal  Neck: neck supple and non tender without mass   Pulmonary/Chest: clear to auscultation bilaterally- no wheezes, rales or rhonchi, normal air movement, no respiratory distress  Cardiovascular: normal rate, normal S1 and S2 and no carotid bruits  Abdomen: soft, non-tender, non-distended, normal bowel sounds, no masses or organomegaly  Extremities: no cyanosis, no clubbing. 3+ bilateral lower extremity lymphedema. Neurologic: no cranial nerve deficit and speech normal        Recent Labs     02/24/20  0301 02/25/20  0325 02/26/20  1034    142 138   K 3.6  3.6 3.6 3.5    104 101   CO2 25 26 27   BUN 38* 36* 31*   CREATININE 1.9* 1.9* 1.9*   GLUCOSE 125* 139* 261*   CALCIUM 8.4* 8.4* 8.5*       Recent Labs     02/24/20  0301 02/25/20  0325   WBC 8.3 8.0   RBC 3.99 3.94   HGB 11.8* 11.8*   HCT 38.6 37.9   MCV 96.7 96.2   MCH 29.6 29.9   MCHC 30.6* 31.1*   RDW 15.8* 15.8*    208   MPV 10.9 11.0     Specimen: Leg Updated: 02/26/20 1055      WOUND/ABSCESS --    Mixed nick isolated. Further workup and sensitivity testing   is not routinely indicated and will not be performed. Mixed nick isolated includes:   Group B Beta hemolytic Strep species   Mixed Coagulase negative Staph species   Corynebacteria   Gram positive cocci       Stress test   FINDINGS:  The LV is dilated. Perfusion images demonstrate no reversible perfusion defect. Wall motion is globally hypokinetic. The end diastolic volume is 610 ml. The end systolic volume is 509 ml. The estimated ejection fraction is 26 %.        Impression:       1. No reversible perfusion defect  2. Ejection fraction is 26 %. 3. Global hypokinesis. LV is dilated.        cxr 2/22    Cardiomegaly with mild early interstitial pulmonary edema  Radiology:     Assessment:    Principal Problem:    Acute decompensated heart failure (HCC)  Active Problems:    Diabetes mellitus type 2 in obese (HCC)    Hyperlipidemia    Chronic anticoagulation    Rate controlled atrial

## 2020-02-26 NOTE — PROGRESS NOTES
[facility] Cardiology Inpatient Progress Note    Patient is a 62 y.o. male of Ava Harmon DO seen in hospital follow up. Chief complaint: CHF    HPI: Improving SOB. No CP.      Patient Active Problem List   Diagnosis    HTN (hypertension), benign    History of DVT (deep vein thrombosis)    History of pulmonary embolism    Diabetes mellitus type 2 in obese ([facility]    Hyperlipidemia    Non morbid obesity due to excess calories    Chronic anticoagulation    Rate controlled atrial fibrillation (HCC)    Congestive heart failure (HCC)    Dyspnea and respiratory abnormalities    Essential hypertension    DERIC (obstructive sleep apnea)    Acute decompensated heart failure ([facility]    Non-ischemic cardiomyopathy (HCC)    Benign prostatic hyperplasia without lower urinary tract symptoms       Allergies   Allergen Reactions    Penicillins Rash       Current Facility-Administered Medications   Medication Dose Route Frequency Provider Last Rate Last Dose    ceFAZolin (ANCEF) 1 g in sterile water 10 mL IV syringe  1 g Intravenous Q8H Mary Cooley MD   1 g at 02/26/20 0610    bumetanide (BUMEX) injection 1 mg  1 mg Intravenous BID Susie Pathak MD   1 mg at 02/26/20 0802    hydrALAZINE (APRESOLINE) tablet 25 mg  25 mg Oral 3 times per day Susie Pathak MD   25 mg at 02/26/20 6654    isosorbide dinitrate (ISORDIL) tablet 10 mg  10 mg Oral TID Susie Pathak MD   10 mg at 02/26/20 0802    atorvastatin (LIPITOR) tablet 40 mg  40 mg Oral Daily Bri Gregory MD   40 mg at 02/26/20 0802    digoxin (LANOXIN) tablet 125 mcg  125 mcg Oral Daily Bri Gregory MD   125 mcg at 02/26/20 0802    metoprolol succinate (TOPROL XL) extended release tablet 100 mg  100 mg Oral BID Baron Amadeo MD   100 mg at 02/26/20 0802    sodium chloride flush 0.9 % injection 10 mL  10 mL Intravenous 2 times per day Baron Amadeo MD   10 mL at 02/26/20 0802    sodium chloride flush 0.9 % injection 10 mL  10 mL tracheal deviation present. No thyromegaly present. Cardiovascular: Normal rate, regular rhythm, normal heart sounds. intact distal pulses. No gallop and no friction rub. No murmur heard. Pulmonary: Breath sounds normal. No respiratory distress. No wheezes. No rales. Chest: Effort normal. No tenderness. Abdominal: Soft. Bowel sounds are normal. No distension or mass. No tenderness, rebound or guarding. Musculoskeletal: . No tenderness. No clubbing or cyanosis. Extremitites: Intact distal pulses. No edema  Neurological: Alert and oriented to person, place, and time. Skin: Skin is warm and dry. No rash noted. Not diaphoretic. No erythema. Psychiatric: Normal mood and affect. Behavior is normal.   Lymphadenopathy: No cervical adenopathy. No groin adenopathy. CBC:   Lab Results   Component Value Date    WBC 8.0 02/25/2020    RBC 3.94 02/25/2020    HGB 11.8 02/25/2020    HCT 37.9 02/25/2020    MCV 96.2 02/25/2020    MCH 29.9 02/25/2020    MCHC 31.1 02/25/2020    RDW 15.8 02/25/2020     02/25/2020    MPV 11.0 02/25/2020     BMP:   Lab Results   Component Value Date     02/25/2020    K 3.6 02/25/2020     02/25/2020    CO2 26 02/25/2020    BUN 36 02/25/2020    LABALBU 3.2 02/25/2020    CREATININE 1.9 02/25/2020    CALCIUM 8.4 02/25/2020    GFRAA 44 02/25/2020    LABGLOM 36 02/25/2020     Magnesium:    Lab Results   Component Value Date    MG 2.5 11/19/2019     Cardiac Enzymes:   Lab Results   Component Value Date    TROPONINI <0.01 02/22/2020    TROPONINI <0.01 11/18/2019      PT/INR:    Lab Results   Component Value Date    PROTIME 20.2 11/19/2019    PROTIME 19.6 04/21/2016    INR 1.7 11/19/2019     TSH:    Lab Results   Component Value Date    TSH 4.460 11/18/2019     Rhythm Strip: atrial fibrillation. TTE-11/18/2019: LVEF 30%, moderate to severely decreased LV systolic function, moderate concentric hypertrophy, moderate MR, unable to estimate PA systolic pressure.      ASSESSMENT &

## 2020-02-27 LAB
ANION GAP SERPL CALCULATED.3IONS-SCNC: 14 MMOL/L (ref 7–16)
BUN BLDV-MCNC: 29 MG/DL (ref 8–23)
CALCIUM SERPL-MCNC: 8.9 MG/DL (ref 8.6–10.2)
CHLORIDE BLD-SCNC: 105 MMOL/L (ref 98–107)
CO2: 26 MMOL/L (ref 22–29)
CREAT SERPL-MCNC: 1.6 MG/DL (ref 0.7–1.2)
GFR AFRICAN AMERICAN: 53
GFR NON-AFRICAN AMERICAN: 44 ML/MIN/1.73
GLUCOSE BLD-MCNC: 150 MG/DL (ref 74–99)
METER GLUCOSE: 111 MG/DL (ref 74–99)
METER GLUCOSE: 140 MG/DL (ref 74–99)
METER GLUCOSE: 141 MG/DL (ref 74–99)
METER GLUCOSE: 153 MG/DL (ref 74–99)
POTASSIUM SERPL-SCNC: 4 MMOL/L (ref 3.5–5)
SODIUM BLD-SCNC: 145 MMOL/L (ref 132–146)

## 2020-02-27 PROCEDURE — 2060000000 HC ICU INTERMEDIATE R&B

## 2020-02-27 PROCEDURE — 36415 COLL VENOUS BLD VENIPUNCTURE: CPT

## 2020-02-27 PROCEDURE — 82962 GLUCOSE BLOOD TEST: CPT

## 2020-02-27 PROCEDURE — 6370000000 HC RX 637 (ALT 250 FOR IP): Performed by: INTERNAL MEDICINE

## 2020-02-27 PROCEDURE — 80048 BASIC METABOLIC PNL TOTAL CA: CPT

## 2020-02-27 PROCEDURE — 99232 SBSQ HOSP IP/OBS MODERATE 35: CPT | Performed by: INTERNAL MEDICINE

## 2020-02-27 PROCEDURE — 99233 SBSQ HOSP IP/OBS HIGH 50: CPT | Performed by: INTERNAL MEDICINE

## 2020-02-27 PROCEDURE — 2580000003 HC RX 258: Performed by: INTERNAL MEDICINE

## 2020-02-27 PROCEDURE — 6360000002 HC RX W HCPCS: Performed by: INTERNAL MEDICINE

## 2020-02-27 PROCEDURE — APPSS30 APP SPLIT SHARED TIME 16-30 MINUTES: Performed by: CLINICAL NURSE SPECIALIST

## 2020-02-27 PROCEDURE — 2500000003 HC RX 250 WO HCPCS: Performed by: INTERNAL MEDICINE

## 2020-02-27 RX ADMIN — DIGOXIN 125 MCG: 125 TABLET ORAL at 08:35

## 2020-02-27 RX ADMIN — INSULIN LISPRO 2 UNITS: 100 INJECTION, SOLUTION INTRAVENOUS; SUBCUTANEOUS at 16:03

## 2020-02-27 RX ADMIN — CEFAZOLIN 1 G: 1 INJECTION, POWDER, FOR SOLUTION INTRAMUSCULAR; INTRAVENOUS at 06:20

## 2020-02-27 RX ADMIN — HYDRALAZINE HYDROCHLORIDE 25 MG: 25 TABLET, FILM COATED ORAL at 06:20

## 2020-02-27 RX ADMIN — Medication 10 ML: at 20:39

## 2020-02-27 RX ADMIN — APIXABAN 5 MG: 5 TABLET, FILM COATED ORAL at 08:35

## 2020-02-27 RX ADMIN — ISOSORBIDE DINITRATE 10 MG: 10 TABLET ORAL at 08:35

## 2020-02-27 RX ADMIN — ISOSORBIDE DINITRATE 10 MG: 10 TABLET ORAL at 20:36

## 2020-02-27 RX ADMIN — INSULIN LISPRO 1 UNITS: 100 INJECTION, SOLUTION INTRAVENOUS; SUBCUTANEOUS at 20:42

## 2020-02-27 RX ADMIN — CEFAZOLIN 1 G: 1 INJECTION, POWDER, FOR SOLUTION INTRAMUSCULAR; INTRAVENOUS at 20:36

## 2020-02-27 RX ADMIN — ISOSORBIDE DINITRATE 10 MG: 10 TABLET ORAL at 13:00

## 2020-02-27 RX ADMIN — ATORVASTATIN CALCIUM 40 MG: 40 TABLET, FILM COATED ORAL at 08:35

## 2020-02-27 RX ADMIN — APIXABAN 5 MG: 5 TABLET, FILM COATED ORAL at 20:36

## 2020-02-27 RX ADMIN — INSULIN LISPRO 2 UNITS: 100 INJECTION, SOLUTION INTRAVENOUS; SUBCUTANEOUS at 11:30

## 2020-02-27 RX ADMIN — Medication 10 ML: at 08:35

## 2020-02-27 RX ADMIN — BUMETANIDE 1 MG: 0.25 INJECTION INTRAMUSCULAR; INTRAVENOUS at 08:35

## 2020-02-27 RX ADMIN — METOPROLOL SUCCINATE 100 MG: 100 TABLET, EXTENDED RELEASE ORAL at 20:36

## 2020-02-27 RX ADMIN — METOPROLOL SUCCINATE 100 MG: 100 TABLET, EXTENDED RELEASE ORAL at 08:35

## 2020-02-27 RX ADMIN — BUMETANIDE 1 MG: 0.25 INJECTION INTRAMUSCULAR; INTRAVENOUS at 20:36

## 2020-02-27 RX ADMIN — HYDRALAZINE HYDROCHLORIDE 25 MG: 25 TABLET, FILM COATED ORAL at 13:00

## 2020-02-27 RX ADMIN — CEFAZOLIN 1 G: 1 INJECTION, POWDER, FOR SOLUTION INTRAMUSCULAR; INTRAVENOUS at 11:30

## 2020-02-27 RX ADMIN — HYDRALAZINE HYDROCHLORIDE 25 MG: 25 TABLET, FILM COATED ORAL at 20:35

## 2020-02-27 ASSESSMENT — PAIN SCALES - GENERAL
PAINLEVEL_OUTOF10: 0

## 2020-02-27 NOTE — PROGRESS NOTES
P Quality Flow/Interdisciplinary Rounds Progress Note        Quality Flow Rounds held on February 27, 2020    Disciplines Attending:  Bedside Nurse, ,  and Nursing Unit Leadership    Qian Pino was admitted on 2/22/2020  5:36 PM    Anticipated Discharge Date:  Expected Discharge Date: 02/24/20    Disposition:    Rahul Score:  Rahul Scale Score: 22    Readmission Risk              Risk of Unplanned Readmission:        17           Discussed patient goal for the day, patient clinical progression, and barriers to discharge.   The following Goal(s) of the Day/Commitment(s) have been identified:  Other  dc planning       Charles Vazquez  February 27, 2020

## 2020-02-27 NOTE — PROGRESS NOTES
pressure. ASSESSMENT & PLAN:    Patient Active Problem List   Diagnosis    HTN (hypertension), benign    History of DVT (deep vein thrombosis)    History of pulmonary embolism    Diabetes mellitus type 2 in obese ([address]    Hyperlipidemia    Non morbid obesity due to excess calories    Chronic anticoagulation    Rate controlled atrial fibrillation (HCC)    Congestive heart failure (HCC)    Dyspnea and respiratory abnormalities    Essential hypertension    DERIC (obstructive sleep apnea)    Acute decompensated heart failure (HCC)    Non-ischemic cardiomyopathy (HCC)    Benign prostatic hyperplasia without lower urinary tract symptoms     1. Acute on chronic systolic CHF/Hx of CMP:     Echo with severe LV dysfunction.      Toprol/hydralazine/nitrates/Bumex. No ACEI/ARB/ARNI/aldactone due to renal issues for now. BMP.     Diurese. -5.8 liters. Pharm stress negative for ischemia.     2. Afib: Rate control. Eliquis.       3. Hx of PE/DVT: Eliquis.      4. HTN: Observe.     5. Lipids: Statin.     6. DM: Per primary service. 7. CKD: Follow labs.      Brandi Astorga D.O.   Cardiologist  Cardiology, [facility]

## 2020-02-27 NOTE — PROGRESS NOTES
[facility] Progress Note    Admitting Date and Time: 2/22/2020  5:36 PM  Admit Dx: Acute decompensated heart failure ([facility] [address] [I50.9]  Acute decompensated heart failure ([facility] [address] [I50.9]    Subjective:  Patient is being followed for Acute decompensated heart failure ([facility] [address] [I50.9]  Acute decompensated heart failure ([facility] [address] [I50.9]   Patient feels a little bit better today seen at the bedside sitting up. Awaiting medications, antibiotics. No nausea vomiting diarrhea. Per RN: Reported no adverse reactions    ROS: denies fever, chills, cp, sob, n/v, HA unless stated above.  ceFAZolin  1 g Intravenous Q8H    bumetanide  1 mg Intravenous BID    hydrALAZINE  25 mg Oral 3 times per day    isosorbide dinitrate  10 mg Oral TID    atorvastatin  40 mg Oral Daily    digoxin  125 mcg Oral Daily    metoprolol succinate  100 mg Oral BID    sodium chloride flush  10 mL Intravenous 2 times per day    apixaban  5 mg Oral BID    insulin lispro  0-12 Units Subcutaneous TID WC    insulin lispro  0-6 Units Subcutaneous Nightly     sodium chloride flush, 10 mL, PRN  magnesium sulfate, 1 g, PRN  acetaminophen, 650 mg, Q6H PRN  acetaminophen, 650 mg, Q6H PRN  promethazine, 12.5 mg, Q6H PRN  ondansetron, 4 mg, Q6H PRN  glucose, 15 g, PRN  dextrose, 12.5 g, PRN  glucagon (rDNA), 1 mg, PRN  dextrose, 100 mL/hr, PRN         Objective:    BP (!) 146/77   Pulse 78   Temp 98.2 °F (36.8 °C) (Oral)   Resp 18   Ht 5' 9\" (1.753 m)   Wt 250 lb 8 oz (113.6 kg)   SpO2 95%   BMI 36.99 kg/m²     General Appearance: alert and oriented to person, place and time and in no acute distress. Resting in bed no distress. Skin: warm and dry. Left leg anterior shin venous stasis discoloration and mild erythema. Broken skin and abrasion.   Head: normocephalic and atraumatic  Eyes: pupils equal, round, and reactive to light, extraocular eye movements intact, conjunctivae normal  Neck: neck supple and non tender without mass Pulmonary/Chest: clear to auscultation bilaterally- no wheezes, rales or rhonchi, normal air movement, no respiratory distress  Cardiovascular: normal rate, normal S1 and S2 and no carotid bruits  Abdomen: soft, non-tender, non-distended, normal bowel sounds, no masses or organomegaly  Extremities: no cyanosis, no clubbing. 3+ bilateral lower extremity lymphedema. Little better Tubigrip on  Neurologic: no cranial nerve deficit and speech normal        Recent Labs     02/25/20  0325 02/26/20  1034    138   K 3.6 3.5    101   CO2 26 27   BUN 36* 31*   CREATININE 1.9* 1.9*   GLUCOSE 139* 261*   CALCIUM 8.4* 8.5*       Recent Labs     02/25/20  0325   WBC 8.0   RBC 3.94   HGB 11.8*   HCT 37.9   MCV 96.2   MCH 29.9   MCHC 31.1*   RDW 15.8*      MPV 11.0     Specimen: Leg Updated: 02/26/20 1055      WOUND/ABSCESS --    Mixed nick isolated. Further workup and sensitivity testing   is not routinely indicated and will not be performed. Mixed nick isolated includes:   Group B Beta hemolytic Strep species   Mixed Coagulase negative Staph species   Corynebacteria   Gram positive cocci       Stress test   FINDINGS:  The LV is dilated. Perfusion images demonstrate no reversible perfusion defect. Wall motion is globally hypokinetic. The end diastolic volume is 617 ml. The end systolic volume is 518 ml. The estimated ejection fraction is 26 %.        Impression:       1. No reversible perfusion defect  2. Ejection fraction is 26 %. 3. Global hypokinesis. LV is dilated.        cxr 2/22    Cardiomegaly with mild early interstitial pulmonary edema  Radiology:     Assessment:    Principal Problem:    Acute decompensated heart failure (HCC)  Active Problems:    Diabetes mellitus type 2 in obese [facility])    Hyperlipidemia    Chronic anticoagulation    Rate controlled atrial fibrillation (HCC)    Congestive heart failure (HCC)    Essential hypertension    DERIC (obstructive sleep apnea)    Non-ischemic cardiomyopathy ([facility] [address]    Benign prostatic hyperplasia without lower urinary tract symptoms  Resolved Problems:    * No resolved hospital problems. *      Plan:  1. Acute on chronic systolic heart failure, decompensated. cardiology input noted. Toprolol, hydralazine, bumex, isordil, nitrate for now  -We will continue on with IV diuresis. -Nuclear medicine stress test showing ejection fraction of 26%, no reversible perfusion defect. Defer back to cardiology    2. Diabetes mellitus type 2. A1c previously 7.6. Continue oral hypoglycemics.  -Sliding scale    3. Hypertension. Controlled will monitor. Atrial fibrillation. Continue Toprol, Digoxon and Eliquis. 4.  Morbid obesity. Monitor weight, also monitor fluid status in the setting of heart failure. 5.  Hyperlipidemia continue with statin. 6.  Known chronic kidney disease. Creatinine 1.9 range will monitor. 7.  Left leg wound concern for cellulitis. Continue ancef and day 4, monitor white blood cell count. Culture sent and pending-mixed organisms. --Possibly switch to oral antibiotic Keflex today or tomorrow. 8.  Bilateral lower leg lymphedema secondary to systolic heart failure. Volume.  + lower leg compression. Continue Bumex BID      Follow along with cardiology and medical team for discharge planning. NOTE: This report was transcribed using voice recognition software. Every effort was made to ensure accuracy; however, inadvertent computerized transcription errors may be present.   Electronically signed by ARMAND Rodriguez on 2/27/2020 at 10:13 AM

## 2020-02-28 LAB
ALBUMIN SERPL-MCNC: 3.7 G/DL (ref 3.5–5.2)
ALP BLD-CCNC: 90 U/L (ref 40–129)
ALT SERPL-CCNC: 12 U/L (ref 0–40)
ANION GAP SERPL CALCULATED.3IONS-SCNC: 11 MMOL/L (ref 7–16)
ANION GAP SERPL CALCULATED.3IONS-SCNC: 12 MMOL/L (ref 7–16)
AST SERPL-CCNC: 31 U/L (ref 0–39)
BASOPHILS ABSOLUTE: 0.09 E9/L (ref 0–0.2)
BASOPHILS RELATIVE PERCENT: 1.1 % (ref 0–2)
BILIRUB SERPL-MCNC: 0.7 MG/DL (ref 0–1.2)
BUN BLDV-MCNC: 27 MG/DL (ref 8–23)
BUN BLDV-MCNC: 29 MG/DL (ref 8–23)
CALCIUM SERPL-MCNC: 8.8 MG/DL (ref 8.6–10.2)
CALCIUM SERPL-MCNC: 8.9 MG/DL (ref 8.6–10.2)
CHLORIDE BLD-SCNC: 100 MMOL/L (ref 98–107)
CHLORIDE BLD-SCNC: 102 MMOL/L (ref 98–107)
CO2: 28 MMOL/L (ref 22–29)
CO2: 29 MMOL/L (ref 22–29)
CREAT SERPL-MCNC: 1.7 MG/DL (ref 0.7–1.2)
CREAT SERPL-MCNC: 1.8 MG/DL (ref 0.7–1.2)
EOSINOPHILS ABSOLUTE: 0.36 E9/L (ref 0.05–0.5)
EOSINOPHILS RELATIVE PERCENT: 4.3 % (ref 0–6)
GFR AFRICAN AMERICAN: 47
GFR AFRICAN AMERICAN: 50
GFR NON-AFRICAN AMERICAN: 39 ML/MIN/1.73
GFR NON-AFRICAN AMERICAN: 41 ML/MIN/1.73
GLUCOSE BLD-MCNC: 118 MG/DL (ref 74–99)
GLUCOSE BLD-MCNC: 174 MG/DL (ref 74–99)
HCT VFR BLD CALC: 40.5 % (ref 37–54)
HEMOGLOBIN: 12.4 G/DL (ref 12.5–16.5)
IMMATURE GRANULOCYTES #: 0.03 E9/L
IMMATURE GRANULOCYTES %: 0.4 % (ref 0–5)
LYMPHOCYTES ABSOLUTE: 1.81 E9/L (ref 1.5–4)
LYMPHOCYTES RELATIVE PERCENT: 21.4 % (ref 20–42)
MCH RBC QN AUTO: 29.7 PG (ref 26–35)
MCHC RBC AUTO-ENTMCNC: 30.6 % (ref 32–34.5)
MCV RBC AUTO: 97.1 FL (ref 80–99.9)
METER GLUCOSE: 145 MG/DL (ref 74–99)
METER GLUCOSE: 150 MG/DL (ref 74–99)
METER GLUCOSE: 160 MG/DL (ref 74–99)
METER GLUCOSE: 281 MG/DL (ref 74–99)
MONOCYTES ABSOLUTE: 0.65 E9/L (ref 0.1–0.95)
MONOCYTES RELATIVE PERCENT: 7.7 % (ref 2–12)
NEUTROPHILS ABSOLUTE: 5.5 E9/L (ref 1.8–7.3)
NEUTROPHILS RELATIVE PERCENT: 65.1 % (ref 43–80)
PDW BLD-RTO: 16.2 FL (ref 11.5–15)
PLATELET # BLD: 203 E9/L (ref 130–450)
PMV BLD AUTO: 11.2 FL (ref 7–12)
POTASSIUM SERPL-SCNC: 3.6 MMOL/L (ref 3.5–5)
POTASSIUM SERPL-SCNC: 4 MMOL/L (ref 3.5–5)
RBC # BLD: 4.17 E12/L (ref 3.8–5.8)
SODIUM BLD-SCNC: 140 MMOL/L (ref 132–146)
SODIUM BLD-SCNC: 142 MMOL/L (ref 132–146)
TOTAL PROTEIN: 6.6 G/DL (ref 6.4–8.3)
WBC # BLD: 8.4 E9/L (ref 4.5–11.5)

## 2020-02-28 PROCEDURE — 2500000003 HC RX 250 WO HCPCS: Performed by: INTERNAL MEDICINE

## 2020-02-28 PROCEDURE — 6370000000 HC RX 637 (ALT 250 FOR IP): Performed by: CLINICAL NURSE SPECIALIST

## 2020-02-28 PROCEDURE — 80053 COMPREHEN METABOLIC PANEL: CPT

## 2020-02-28 PROCEDURE — 6370000000 HC RX 637 (ALT 250 FOR IP): Performed by: INTERNAL MEDICINE

## 2020-02-28 PROCEDURE — 99233 SBSQ HOSP IP/OBS HIGH 50: CPT | Performed by: INTERNAL MEDICINE

## 2020-02-28 PROCEDURE — 6360000002 HC RX W HCPCS: Performed by: INTERNAL MEDICINE

## 2020-02-28 PROCEDURE — 85025 COMPLETE CBC W/AUTO DIFF WBC: CPT

## 2020-02-28 PROCEDURE — 2580000003 HC RX 258: Performed by: INTERNAL MEDICINE

## 2020-02-28 PROCEDURE — 80048 BASIC METABOLIC PNL TOTAL CA: CPT

## 2020-02-28 PROCEDURE — 36415 COLL VENOUS BLD VENIPUNCTURE: CPT

## 2020-02-28 PROCEDURE — 2060000000 HC ICU INTERMEDIATE R&B

## 2020-02-28 PROCEDURE — 82962 GLUCOSE BLOOD TEST: CPT

## 2020-02-28 PROCEDURE — APPSS30 APP SPLIT SHARED TIME 16-30 MINUTES: Performed by: CLINICAL NURSE SPECIALIST

## 2020-02-28 RX ORDER — BUMETANIDE 0.25 MG/ML
1 INJECTION, SOLUTION INTRAMUSCULAR; INTRAVENOUS ONCE
Status: COMPLETED | OUTPATIENT
Start: 2020-02-28 | End: 2020-02-28

## 2020-02-28 RX ORDER — CEPHALEXIN 500 MG/1
500 CAPSULE ORAL EVERY 12 HOURS SCHEDULED
Status: DISCONTINUED | OUTPATIENT
Start: 2020-02-28 | End: 2020-03-01 | Stop reason: HOSPADM

## 2020-02-28 RX ORDER — CEPHALEXIN 500 MG/1
500 CAPSULE ORAL EVERY 8 HOURS SCHEDULED
Status: DISCONTINUED | OUTPATIENT
Start: 2020-02-28 | End: 2020-02-28

## 2020-02-28 RX ADMIN — CEPHALEXIN 500 MG: 500 CAPSULE ORAL at 21:25

## 2020-02-28 RX ADMIN — BUMETANIDE 1 MG: 0.25 INJECTION INTRAMUSCULAR; INTRAVENOUS at 21:25

## 2020-02-28 RX ADMIN — ISOSORBIDE DINITRATE 10 MG: 10 TABLET ORAL at 08:18

## 2020-02-28 RX ADMIN — HYDRALAZINE HYDROCHLORIDE 25 MG: 25 TABLET, FILM COATED ORAL at 21:24

## 2020-02-28 RX ADMIN — APIXABAN 5 MG: 5 TABLET, FILM COATED ORAL at 21:25

## 2020-02-28 RX ADMIN — INSULIN LISPRO 2 UNITS: 100 INJECTION, SOLUTION INTRAVENOUS; SUBCUTANEOUS at 08:26

## 2020-02-28 RX ADMIN — ISOSORBIDE DINITRATE 10 MG: 10 TABLET ORAL at 14:41

## 2020-02-28 RX ADMIN — Medication 10 ML: at 21:31

## 2020-02-28 RX ADMIN — METOPROLOL SUCCINATE 100 MG: 100 TABLET, EXTENDED RELEASE ORAL at 21:24

## 2020-02-28 RX ADMIN — CEFAZOLIN 1 G: 1 INJECTION, POWDER, FOR SOLUTION INTRAMUSCULAR; INTRAVENOUS at 04:03

## 2020-02-28 RX ADMIN — Medication 10 ML: at 08:19

## 2020-02-28 RX ADMIN — APIXABAN 5 MG: 5 TABLET, FILM COATED ORAL at 08:19

## 2020-02-28 RX ADMIN — ATORVASTATIN CALCIUM 40 MG: 40 TABLET, FILM COATED ORAL at 08:18

## 2020-02-28 RX ADMIN — DIGOXIN 125 MCG: 125 TABLET ORAL at 08:18

## 2020-02-28 RX ADMIN — INSULIN LISPRO 1 UNITS: 100 INJECTION, SOLUTION INTRAVENOUS; SUBCUTANEOUS at 21:26

## 2020-02-28 RX ADMIN — HYDRALAZINE HYDROCHLORIDE 25 MG: 25 TABLET, FILM COATED ORAL at 05:52

## 2020-02-28 RX ADMIN — INSULIN LISPRO 6 UNITS: 100 INJECTION, SOLUTION INTRAVENOUS; SUBCUTANEOUS at 12:58

## 2020-02-28 RX ADMIN — BUMETANIDE 1 MG: 0.25 INJECTION, SOLUTION INTRAMUSCULAR; INTRAVENOUS at 13:00

## 2020-02-28 RX ADMIN — ISOSORBIDE DINITRATE 10 MG: 10 TABLET ORAL at 23:59

## 2020-02-28 RX ADMIN — METOPROLOL SUCCINATE 100 MG: 100 TABLET, EXTENDED RELEASE ORAL at 08:19

## 2020-02-28 RX ADMIN — BUMETANIDE 1 MG: 0.25 INJECTION INTRAMUSCULAR; INTRAVENOUS at 08:18

## 2020-02-28 RX ADMIN — HYDRALAZINE HYDROCHLORIDE 25 MG: 25 TABLET, FILM COATED ORAL at 14:41

## 2020-02-28 RX ADMIN — INSULIN LISPRO 2 UNITS: 100 INJECTION, SOLUTION INTRAVENOUS; SUBCUTANEOUS at 17:41

## 2020-02-28 RX ADMIN — CEPHALEXIN 500 MG: 500 CAPSULE ORAL at 14:41

## 2020-02-28 ASSESSMENT — PAIN SCALES - GENERAL
PAINLEVEL_OUTOF10: 0
PAINLEVEL_OUTOF10: 0

## 2020-02-28 NOTE — CARE COORDINATION
Pt continues to require IV diuresis, cardiology managing. Plan remains for pt to transition home upon discharge. Pt does not have insurance and Per TUNDE Daniels public , pt is not HCAP eligible and not eligible for help with meds on discharge. Pt is over income for Medicaid.

## 2020-02-28 NOTE — PROGRESS NOTES
[facility] Inpatient Progress Note    Patient is a 62 y.o. male of Florencia Bella DO seen in hospital follow up. Chief complaint: CHF    HPI: Improving SOB. No CP.      Patient Active Problem List   Diagnosis    HTN (hypertension), benign    History of DVT (deep vein thrombosis)    History of pulmonary embolism    Diabetes mellitus type 2 in obese ([facility] [address]    Hyperlipidemia    Non morbid obesity due to excess calories    Chronic anticoagulation    Rate controlled atrial fibrillation (HCC)    Congestive heart failure (HCC)    Dyspnea and respiratory abnormalities    Essential hypertension    DERIC (obstructive sleep apnea)    Acute decompensated heart failure ([facility] [address]    Non-ischemic cardiomyopathy (HCC)    Benign prostatic hyperplasia without lower urinary tract symptoms       Allergies   Allergen Reactions    Penicillins Rash       Current Facility-Administered Medications   Medication Dose Route Frequency Provider Last Rate Last Dose    ceFAZolin (ANCEF) 1 g in sterile water 10 mL IV syringe  1 g Intravenous Q8H Mary Russell MD   1 g at 02/28/20 0403    bumetanide (BUMEX) injection 1 mg  1 mg Intravenous BID Danette Olivas MD   1 mg at 02/28/20 0818    hydrALAZINE (APRESOLINE) tablet 25 mg  25 mg Oral 3 times per day Danette Olivas MD   25 mg at 02/28/20 0552    isosorbide dinitrate (ISORDIL) tablet 10 mg  10 mg Oral TID Danette Olivas MD   10 mg at 02/28/20 0818    atorvastatin (LIPITOR) tablet 40 mg  40 mg Oral Daily Bri Gregory MD   40 mg at 02/28/20 0818    digoxin (LANOXIN) tablet 125 mcg  125 mcg Oral Daily Bri Gregory MD   125 mcg at 02/28/20 0818    metoprolol succinate (TOPROL XL) extended release tablet 100 mg  100 mg Oral BID Alda Gordon MD   100 mg at 02/28/20 0819    sodium chloride flush 0.9 % injection 10 mL  10 mL Intravenous 2 times per day Alda Gordon MD   10 mL at 02/28/20 0819    sodium chloride flush 0.9 % injection 10 mL  10 mL Intravenous PRN Kristen Kaur MD        magnesium sulfate 1 g in dextrose 5% 100 mL IVPB  1 g Intravenous PRN Kristen Kaur MD        acetaminophen (TYLENOL) tablet 650 mg  650 mg Oral Q6H PRN Kristen Kaur MD        acetaminophen (TYLENOL) suppository 650 mg  650 mg Rectal Q6H PRN Kristen Kaur MD        promethazine (PHENERGAN) tablet 12.5 mg  12.5 mg Oral Q6H PRN Bri Gregory MD        ondansetron (ZOFRAN) injection 4 mg  4 mg Intravenous Q6H PRN Kristen Kaur MD        apixaban (ELIQUIS) tablet 5 mg  5 mg Oral BID Kristen Kaur MD   5 mg at 02/28/20 0819    insulin lispro (HUMALOG) injection vial 0-12 Units  0-12 Units Subcutaneous TID WC Kristen Kaur MD   2 Units at 02/27/20 1603    insulin lispro (HUMALOG) injection vial 0-6 Units  0-6 Units Subcutaneous Nightly Kristen Kaur MD   1 Units at 02/27/20 2042    glucose (GLUTOSE) 40 % oral gel 15 g  15 g Oral PRN Bri Gregory MD        dextrose 50 % IV solution  12.5 g Intravenous PRN Kristen Kaur MD        glucagon (rDNA) injection 1 mg  1 mg Intramuscular PRN Kristen Kaur MD        dextrose 5 % solution  100 mL/hr Intravenous PRN Kristen Kaur MD           Review of systems:   Heart: as above   Lungs: as above   Eyes: denies changes in vision or discharge. Ears: denies changes in hearing or pain. Nose: denies epistaxis or masses   Throat: denies sore throat or trouble swallowing. Neuro: denies numbness, tingling, tremors. Skin: denies rashes or itching. : denies hematuria, dysuria   GI: denies vomiting, diarrhea   Psych: denies mood changed, anxiety, depression. Physical Exam   /81   Pulse 67   Temp 97.9 °F (36.6 °C) (Oral)   Resp 18   Ht 5' 9\" (1.753 m)   Wt 245 lb 1.6 oz (111.2 kg)   SpO2 96%   BMI 36.19 kg/m²   Constitutional: Oriented to person, place, and time. No distress. Well developed. Head: Normocephalic and atraumatic. Neck: Neck supple. No hepatojugular reflux. No JVD present. Carotid bruit is not present.  No

## 2020-02-28 NOTE — CARE COORDINATION
2/28/2020  Social Work Discharge Planning:Pt is not eligible for help with meds-has no ins. and is over income for medicaid. Pt is declining [facility] needs.  Electronically signed by ELIAZAR Farooq on 2/28/2020 at 9:40 AM

## 2020-02-28 NOTE — PROGRESS NOTES
to light, extraocular eye movements intact, conjunctivae normal  Neck: neck supple and non tender without mass   Pulmonary/Chest: clear to auscultation bilaterally- no wheezes, rales or rhonchi, normal air movement, no respiratory distress  Cardiovascular: normal rate, normal S1 and S2 and no carotid bruits  Abdomen: soft, non-tender, non-distended, normal bowel sounds, no masses or organomegaly  Extremities: no cyanosis, no clubbing. 3+ bilateral lower extremity lymphedema. Little better Tubigrip on. No cellulitis barrier dressing over left shin abrasion. Neurologic: no cranial nerve deficit and speech normal  H/L        Recent Labs     02/26/20  1034 02/27/20  1129 02/28/20  0325    145 140   K 3.5 4.0 4.0    105 100   CO2 27 26 29   BUN 31* 29* 29*   CREATININE 1.9* 1.6* 1.8*   GLUCOSE 261* 150* 118*   CALCIUM 8.5* 8.9 8.8       No results for input(s): WBC, RBC, HGB, HCT, MCV, MCH, MCHC, RDW, PLT, MPV in the last 72 hours. Specimen: Leg Updated: 02/26/20 1055      WOUND/ABSCESS --    Mixed nick isolated. Further workup and sensitivity testing   is not routinely indicated and will not be performed. Mixed nick isolated includes:   Group B Beta hemolytic Strep species   Mixed Coagulase negative Staph species   Corynebacteria   Gram positive cocci       Stress test   FINDINGS:  The LV is dilated. Perfusion images demonstrate no reversible perfusion defect. Wall motion is globally hypokinetic. The end diastolic volume is 462 ml. The end systolic volume is 833 ml. The estimated ejection fraction is 26 %.        Impression:       1. No reversible perfusion defect  2. Ejection fraction is 26 %. 3. Global hypokinesis. LV is dilated.        cxr 2/22    Cardiomegaly with mild early interstitial pulmonary edema  Radiology:     Assessment:    Principal Problem:    Acute decompensated heart failure ([address]  Active Problems:    Diabetes mellitus type 2 in obese (HCC)    Hyperlipidemia    Chronic

## 2020-02-29 LAB
ANION GAP SERPL CALCULATED.3IONS-SCNC: 14 MMOL/L (ref 7–16)
BUN BLDV-MCNC: 26 MG/DL (ref 8–23)
CALCIUM SERPL-MCNC: 9 MG/DL (ref 8.6–10.2)
CHLORIDE BLD-SCNC: 103 MMOL/L (ref 98–107)
CO2: 27 MMOL/L (ref 22–29)
CREAT SERPL-MCNC: 1.6 MG/DL (ref 0.7–1.2)
GFR AFRICAN AMERICAN: 53
GFR NON-AFRICAN AMERICAN: 44 ML/MIN/1.73
GLUCOSE BLD-MCNC: 113 MG/DL (ref 74–99)
METER GLUCOSE: 123 MG/DL (ref 74–99)
METER GLUCOSE: 135 MG/DL (ref 74–99)
METER GLUCOSE: 162 MG/DL (ref 74–99)
METER GLUCOSE: 171 MG/DL (ref 74–99)
POTASSIUM SERPL-SCNC: 3.4 MMOL/L (ref 3.5–5)
SODIUM BLD-SCNC: 144 MMOL/L (ref 132–146)

## 2020-02-29 PROCEDURE — 2060000000 HC ICU INTERMEDIATE R&B

## 2020-02-29 PROCEDURE — 6370000000 HC RX 637 (ALT 250 FOR IP): Performed by: INTERNAL MEDICINE

## 2020-02-29 PROCEDURE — 99232 SBSQ HOSP IP/OBS MODERATE 35: CPT | Performed by: INTERNAL MEDICINE

## 2020-02-29 PROCEDURE — APPSS30 APP SPLIT SHARED TIME 16-30 MINUTES: Performed by: CLINICAL NURSE SPECIALIST

## 2020-02-29 PROCEDURE — 2580000003 HC RX 258: Performed by: INTERNAL MEDICINE

## 2020-02-29 PROCEDURE — 36415 COLL VENOUS BLD VENIPUNCTURE: CPT

## 2020-02-29 PROCEDURE — 99233 SBSQ HOSP IP/OBS HIGH 50: CPT | Performed by: INTERNAL MEDICINE

## 2020-02-29 PROCEDURE — 2500000003 HC RX 250 WO HCPCS: Performed by: INTERNAL MEDICINE

## 2020-02-29 PROCEDURE — 82962 GLUCOSE BLOOD TEST: CPT

## 2020-02-29 PROCEDURE — 6370000000 HC RX 637 (ALT 250 FOR IP): Performed by: CLINICAL NURSE SPECIALIST

## 2020-02-29 PROCEDURE — 80048 BASIC METABOLIC PNL TOTAL CA: CPT

## 2020-02-29 RX ORDER — POTASSIUM CHLORIDE 20 MEQ/1
40 TABLET, EXTENDED RELEASE ORAL ONCE
Status: COMPLETED | OUTPATIENT
Start: 2020-02-29 | End: 2020-02-29

## 2020-02-29 RX ORDER — BUMETANIDE 0.25 MG/ML
1 INJECTION, SOLUTION INTRAMUSCULAR; INTRAVENOUS ONCE
Status: COMPLETED | OUTPATIENT
Start: 2020-02-29 | End: 2020-02-29

## 2020-02-29 RX ADMIN — BUMETANIDE 1 MG: 0.25 INJECTION INTRAMUSCULAR; INTRAVENOUS at 13:11

## 2020-02-29 RX ADMIN — HYDRALAZINE HYDROCHLORIDE 25 MG: 25 TABLET, FILM COATED ORAL at 20:53

## 2020-02-29 RX ADMIN — APIXABAN 5 MG: 5 TABLET, FILM COATED ORAL at 20:53

## 2020-02-29 RX ADMIN — ISOSORBIDE DINITRATE 10 MG: 10 TABLET ORAL at 13:10

## 2020-02-29 RX ADMIN — HYDRALAZINE HYDROCHLORIDE 25 MG: 25 TABLET, FILM COATED ORAL at 13:10

## 2020-02-29 RX ADMIN — SODIUM CHLORIDE, PRESERVATIVE FREE 10 ML: 5 INJECTION INTRAVENOUS at 10:01

## 2020-02-29 RX ADMIN — ISOSORBIDE DINITRATE 10 MG: 10 TABLET ORAL at 22:00

## 2020-02-29 RX ADMIN — ATORVASTATIN CALCIUM 40 MG: 40 TABLET, FILM COATED ORAL at 08:17

## 2020-02-29 RX ADMIN — HYDRALAZINE HYDROCHLORIDE 25 MG: 25 TABLET, FILM COATED ORAL at 05:28

## 2020-02-29 RX ADMIN — METOPROLOL SUCCINATE 100 MG: 100 TABLET, EXTENDED RELEASE ORAL at 08:17

## 2020-02-29 RX ADMIN — METOPROLOL SUCCINATE 100 MG: 100 TABLET, EXTENDED RELEASE ORAL at 20:53

## 2020-02-29 RX ADMIN — INSULIN LISPRO 2 UNITS: 100 INJECTION, SOLUTION INTRAVENOUS; SUBCUTANEOUS at 16:41

## 2020-02-29 RX ADMIN — Medication 10 ML: at 08:18

## 2020-02-29 RX ADMIN — POTASSIUM CHLORIDE 40 MEQ: 20 TABLET, EXTENDED RELEASE ORAL at 13:10

## 2020-02-29 RX ADMIN — ISOSORBIDE DINITRATE 10 MG: 10 TABLET ORAL at 08:17

## 2020-02-29 RX ADMIN — BUMETANIDE 1 MG: 0.25 INJECTION INTRAMUSCULAR; INTRAVENOUS at 08:17

## 2020-02-29 RX ADMIN — CEPHALEXIN 500 MG: 500 CAPSULE ORAL at 08:17

## 2020-02-29 RX ADMIN — DIGOXIN 125 MCG: 125 TABLET ORAL at 08:17

## 2020-02-29 RX ADMIN — Medication 10 ML: at 21:00

## 2020-02-29 RX ADMIN — CEPHALEXIN 500 MG: 500 CAPSULE ORAL at 20:53

## 2020-02-29 RX ADMIN — APIXABAN 5 MG: 5 TABLET, FILM COATED ORAL at 08:17

## 2020-02-29 RX ADMIN — INSULIN LISPRO 1 UNITS: 100 INJECTION, SOLUTION INTRAVENOUS; SUBCUTANEOUS at 20:53

## 2020-02-29 RX ADMIN — BUMETANIDE 1 MG: 0.25 INJECTION INTRAMUSCULAR; INTRAVENOUS at 20:53

## 2020-02-29 ASSESSMENT — PAIN SCALES - GENERAL
PAINLEVEL_OUTOF10: 0
PAINLEVEL_OUTOF10: 0

## 2020-02-29 NOTE — PROGRESS NOTES
[facility] Progress Note    Admitting Date and Time: 2/22/2020  5:36 PM  Admit Dx: Acute decompensated heart failure ([facility] [address] [I50.9]  Acute decompensated heart failure ([facility] [address] [I50.9]    Subjective:  Patient is being followed for Acute decompensated heart failure ([facility] [address] [I50.9]  Acute decompensated heart failure ([facility] [address] [I50.9]   Again patient feeling a little bit better. No nausea vomiting or diarrhea. He sitting on the couch wearing low compression tolerating well. No significant pain, no shortness of breath. He spoke with cardiology earlier. Continue with diuresis. Per RN: Reported no adverse reactions    ROS: denies fever, chills, cp, sob, n/v, HA unless stated above.  cephALEXin  500 mg Oral 2 times per day    bumetanide  1 mg Intravenous BID    hydrALAZINE  25 mg Oral 3 times per day    isosorbide dinitrate  10 mg Oral TID    atorvastatin  40 mg Oral Daily    digoxin  125 mcg Oral Daily    metoprolol succinate  100 mg Oral BID    sodium chloride flush  10 mL Intravenous 2 times per day    apixaban  5 mg Oral BID    insulin lispro  0-12 Units Subcutaneous TID WC    insulin lispro  0-6 Units Subcutaneous Nightly     sodium chloride flush, 10 mL, PRN  magnesium sulfate, 1 g, PRN  acetaminophen, 650 mg, Q6H PRN  acetaminophen, 650 mg, Q6H PRN  promethazine, 12.5 mg, Q6H PRN  ondansetron, 4 mg, Q6H PRN  glucose, 15 g, PRN  dextrose, 12.5 g, PRN  glucagon (rDNA), 1 mg, PRN  dextrose, 100 mL/hr, PRN         Objective:    /84   Pulse 97   Temp 98.3 °F (36.8 °C) (Oral)   Resp 18   Ht 5' 9\" (1.753 m)   Wt 245 lb 1.6 oz (111.2 kg)   SpO2 98%   BMI 36.19 kg/m²     General Appearance: alert and oriented to person, place and time and in no acute distress. Resting in bed no distress. Skin: warm and dry. Left leg anterior shin venous stasis discoloration and mild erythema- better. Broken skin and abrasion.   Head: normocephalic and atraumatic  Eyes: pupils equal, round, and reactive to light, extraocular eye movements intact, conjunctivae normal  Neck: neck supple and non tender without mass   Pulmonary/Chest: clear to auscultation bilaterally- no wheezes, rales or rhonchi, normal air movement, no respiratory distress  Cardiovascular: normal rate, normal S1 and S2 and no carotid bruits  Abdomen: soft, non-tender, non-distended, normal bowel sounds, no masses or organomegaly  Extremities: no cyanosis, no clubbing. 3+ bilateral lower extremity lymphedema. Little better Tubigrip on. No cellulitis barrier dressing over left shin abrasion. Neurologic: no cranial nerve deficit and speech normal  H/L        Recent Labs     02/27/20  1129 02/28/20  0325 02/28/20  1225    140 142   K 4.0 4.0 3.6    100 102   CO2 26 29 28   BUN 29* 29* 27*   CREATININE 1.6* 1.8* 1.7*   GLUCOSE 150* 118* 174*   CALCIUM 8.9 8.8 8.9       Recent Labs     02/28/20  1225   WBC 8.4   RBC 4.17   HGB 12.4*   HCT 40.5   MCV 97.1   MCH 29.7   MCHC 30.6*   RDW 16.2*      MPV 11.2     Specimen: Leg Updated: 02/26/20 1055      WOUND/ABSCESS --    Mixed nick isolated. Further workup and sensitivity testing   is not routinely indicated and will not be performed. Mixed nick isolated includes:   Group B Beta hemolytic Strep species   Mixed Coagulase negative Staph species   Corynebacteria   Gram positive cocci       Stress test   FINDINGS:  The LV is dilated. Perfusion images demonstrate no reversible perfusion defect. Wall motion is globally hypokinetic. The end diastolic volume is 324 ml. The end systolic volume is 776 ml. The estimated ejection fraction is 26 %.        Impression:       1. No reversible perfusion defect  2. Ejection fraction is 26 %. 3. Global hypokinesis. LV is dilated.        cxr 2/22    Cardiomegaly with mild early interstitial pulmonary edema  Radiology:     Assessment:    Principal Problem:    Acute decompensated heart failure ([address]  Active Problems:    Diabetes mellitus type 2 in obese (HCC)    Hyperlipidemia    Chronic anticoagulation    Rate controlled atrial fibrillation (HCC)    Congestive heart failure (HCC)    Essential hypertension    DERIC (obstructive sleep apnea)    Non-ischemic cardiomyopathy (HCC)    Benign prostatic hyperplasia without lower urinary tract symptoms  Resolved Problems:    * No resolved hospital problems. *      Plan:  1. Acute on chronic systolic heart failure, decompensated. cardiology input noted. Toprolol, hydralazine, bumex, isordil, nitrate for now. NO ACE for now DT renal function   -We will continue on with IV diuresis. -Nuclear medicine stress test showing ejection fraction of 26%, no reversible perfusion defect. Defer back to cardiology    2. Diabetes mellitus type 2. A1c previously 7.6. Continue oral hypoglycemics.  -Sliding scale    3. Hypertension. Controlled will monitor. Atrial fibrillation. Continue Toprol, Digoxon and Eliquis. 4.  Morbid obesity. Monitor weight, also monitor fluid status in the setting of heart failure. 5.  Hyperlipidemia continue with statin. 6.  Known chronic kidney disease stage 3. Creatinine 1.6 range will monitor. Little bit better    7. Left leg wound concern for cellulitis. Ancef day 4 we will switch over to oral Keflex. Overall improved. Wound culture mixed. 8.  Bilateral lower leg lymphedema secondary to systolic heart failure. Volume.  + lower leg compression. Continue Bumex IB BID- appreciate cardiology input    9. Hypokalemia we will supplement today with potassium 40 mEq    Discussed cardiology they would like to diurese him another day. We will follow   I told the patient and wife that I will be away from herbal remedies and supplements in regards to reducing swelling. NOTE: This report was transcribed using voice recognition software. Every effort was made to ensure accuracy; however, inadvertent computerized transcription errors may be present.   Electronically

## 2020-02-29 NOTE — PROGRESS NOTES
Avtar Hurtado MD   10 mL at 02/29/20 1001    magnesium sulfate 1 g in dextrose 5% 100 mL IVPB  1 g Intravenous PRN Zane Arteaga MD        acetaminophen (TYLENOL) tablet 650 mg  650 mg Oral Q6H PRN Zane Arteaga MD        acetaminophen (TYLENOL) suppository 650 mg  650 mg Rectal Q6H PRN Zane Arteaga MD        promethazine (PHENERGAN) tablet 12.5 mg  12.5 mg Oral Q6H PRN Bri Gregory MD        ondansetron (ZOFRAN) injection 4 mg  4 mg Intravenous Q6H PRN Zane Arteaga MD        apixaban (ELIQUIS) tablet 5 mg  5 mg Oral BID Zane Arteaga MD   5 mg at 02/29/20 0817    insulin lispro (HUMALOG) injection vial 0-12 Units  0-12 Units Subcutaneous TID WC Zane Arteaga MD   2 Units at 02/28/20 1741    insulin lispro (HUMALOG) injection vial 0-6 Units  0-6 Units Subcutaneous Nightly Zane Arteaga MD   1 Units at 02/28/20 2126    glucose (GLUTOSE) 40 % oral gel 15 g  15 g Oral PRN Bri Gregory MD        dextrose 50 % IV solution  12.5 g Intravenous PRN Zane Arteaga MD        glucagon (rDNA) injection 1 mg  1 mg Intramuscular PRN Zane Arteaga MD        dextrose 5 % solution  100 mL/hr Intravenous PRN Zane Arteaga MD           Review of systems:   Heart: as above   Lungs: as above   Eyes: denies changes in vision or discharge. Ears: denies changes in hearing or pain. Nose: denies epistaxis or masses   Throat: denies sore throat or trouble swallowing. Neuro: denies numbness, tingling, tremors. Skin: denies rashes or itching. : denies hematuria, dysuria   GI: denies vomiting, diarrhea   Psych: denies mood changed, anxiety, depression. Physical Exam   /84   Pulse 97   Temp 98.3 °F (36.8 °C) (Oral)   Resp 18   Ht 5' 9\" (1.753 m)   Wt 245 lb 1.6 oz (111.2 kg)   SpO2 98%   BMI 36.19 kg/m²   Constitutional: Oriented to person, place, and time. No distress. Well developed. Head: Normocephalic and atraumatic. Neck: Neck supple. No hepatojugular reflux. No JVD present. Carotid bruit is not present.  No pressure. ASSESSMENT & PLAN:    Patient Active Problem List   Diagnosis    HTN (hypertension), benign    History of DVT (deep vein thrombosis)    History of pulmonary embolism    Diabetes mellitus type 2 in obese ([address]    Hyperlipidemia    Non morbid obesity due to excess calories    Chronic anticoagulation    Rate controlled atrial fibrillation (HCC)    Congestive heart failure (HCC)    Dyspnea and respiratory abnormalities    Essential hypertension    DERIC (obstructive sleep apnea)    Acute decompensated heart failure (HCC)    Non-ischemic cardiomyopathy (HCC)    Benign prostatic hyperplasia without lower urinary tract symptoms     1. Acute on chronic systolic CHF/Hx of CMP:     Echo with severe LV dysfunction. Pharm stress negative for ischemia.      Toprol/hydralazine/nitrates/Bumex. No ACEI/ARB/ARNI/aldactone due to renal issues for now. BMP.     Diurese. -9 liters. Improving edema but still significant. Extra dose of bumex this am again. 2. Afib: Rate control. Eliquis.       3. Hx of PE/DVT: Eliquis.      4. HTN: Observe.     5. Lipids: Statin.     6. DM: Per primary service. 7. CKD: Follow labs.      Cony Young D.O.   Cardiologist  Cardiology, [facility]

## 2020-03-01 VITALS
RESPIRATION RATE: 18 BRPM | SYSTOLIC BLOOD PRESSURE: 135 MMHG | TEMPERATURE: 97.8 F | BODY MASS INDEX: 35.63 KG/M2 | OXYGEN SATURATION: 96 % | HEART RATE: 84 BPM | DIASTOLIC BLOOD PRESSURE: 78 MMHG | WEIGHT: 240.6 LBS | HEIGHT: 69 IN

## 2020-03-01 LAB
ALBUMIN SERPL-MCNC: 3.6 G/DL (ref 3.5–5.2)
ALP BLD-CCNC: 85 U/L (ref 40–129)
ALT SERPL-CCNC: 14 U/L (ref 0–40)
ANION GAP SERPL CALCULATED.3IONS-SCNC: 12 MMOL/L (ref 7–16)
AST SERPL-CCNC: 23 U/L (ref 0–39)
BILIRUB SERPL-MCNC: 0.8 MG/DL (ref 0–1.2)
BUN BLDV-MCNC: 25 MG/DL (ref 8–23)
CALCIUM SERPL-MCNC: 8.8 MG/DL (ref 8.6–10.2)
CHLORIDE BLD-SCNC: 105 MMOL/L (ref 98–107)
CO2: 28 MMOL/L (ref 22–29)
CREAT SERPL-MCNC: 1.6 MG/DL (ref 0.7–1.2)
GFR AFRICAN AMERICAN: 53
GFR NON-AFRICAN AMERICAN: 44 ML/MIN/1.73
GLUCOSE BLD-MCNC: 127 MG/DL (ref 74–99)
MAGNESIUM: 2.5 MG/DL (ref 1.6–2.6)
METER GLUCOSE: 130 MG/DL (ref 74–99)
METER GLUCOSE: 163 MG/DL (ref 74–99)
POTASSIUM SERPL-SCNC: 3.7 MMOL/L (ref 3.5–5)
SODIUM BLD-SCNC: 145 MMOL/L (ref 132–146)
TOTAL PROTEIN: 6.4 G/DL (ref 6.4–8.3)

## 2020-03-01 PROCEDURE — 6370000000 HC RX 637 (ALT 250 FOR IP): Performed by: CLINICAL NURSE SPECIALIST

## 2020-03-01 PROCEDURE — 36415 COLL VENOUS BLD VENIPUNCTURE: CPT

## 2020-03-01 PROCEDURE — APPSS30 APP SPLIT SHARED TIME 16-30 MINUTES: Performed by: CLINICAL NURSE SPECIALIST

## 2020-03-01 PROCEDURE — 99239 HOSP IP/OBS DSCHRG MGMT >30: CPT | Performed by: INTERNAL MEDICINE

## 2020-03-01 PROCEDURE — 99233 SBSQ HOSP IP/OBS HIGH 50: CPT | Performed by: INTERNAL MEDICINE

## 2020-03-01 PROCEDURE — 6370000000 HC RX 637 (ALT 250 FOR IP): Performed by: INTERNAL MEDICINE

## 2020-03-01 PROCEDURE — 82962 GLUCOSE BLOOD TEST: CPT

## 2020-03-01 PROCEDURE — 2500000003 HC RX 250 WO HCPCS: Performed by: INTERNAL MEDICINE

## 2020-03-01 PROCEDURE — 80053 COMPREHEN METABOLIC PANEL: CPT

## 2020-03-01 PROCEDURE — 83735 ASSAY OF MAGNESIUM: CPT

## 2020-03-01 PROCEDURE — 2580000003 HC RX 258: Performed by: INTERNAL MEDICINE

## 2020-03-01 RX ORDER — BUMETANIDE 1 MG/1
1 TABLET ORAL DAILY
Status: DISCONTINUED | OUTPATIENT
Start: 2020-03-02 | End: 2020-03-01 | Stop reason: HOSPADM

## 2020-03-01 RX ORDER — ATORVASTATIN CALCIUM 40 MG/1
40 TABLET, FILM COATED ORAL DAILY
Qty: 90 TABLET | Refills: 1 | Status: SHIPPED | OUTPATIENT
Start: 2020-03-01 | End: 2020-12-02 | Stop reason: SDUPTHER

## 2020-03-01 RX ORDER — HYDRALAZINE HYDROCHLORIDE 25 MG/1
25 TABLET, FILM COATED ORAL EVERY 8 HOURS SCHEDULED
Qty: 90 TABLET | Refills: 3 | Status: SHIPPED | OUTPATIENT
Start: 2020-03-01 | End: 2020-03-12 | Stop reason: ALTCHOICE

## 2020-03-01 RX ORDER — CEPHALEXIN 500 MG/1
500 CAPSULE ORAL EVERY 12 HOURS SCHEDULED
Qty: 12 CAPSULE | Refills: 0 | Status: SHIPPED | OUTPATIENT
Start: 2020-03-01 | End: 2020-03-07

## 2020-03-01 RX ORDER — BUMETANIDE 1 MG/1
1 TABLET ORAL 2 TIMES DAILY
Qty: 30 TABLET | Refills: 3 | Status: SHIPPED | OUTPATIENT
Start: 2020-03-01 | End: 2020-03-05 | Stop reason: SDUPTHER

## 2020-03-01 RX ORDER — ISOSORBIDE DINITRATE 10 MG/1
10 TABLET ORAL 3 TIMES DAILY
Qty: 90 TABLET | Refills: 3 | Status: SHIPPED | OUTPATIENT
Start: 2020-03-01 | End: 2020-03-12 | Stop reason: ALTCHOICE

## 2020-03-01 RX ORDER — BUMETANIDE 0.25 MG/ML
1 INJECTION, SOLUTION INTRAMUSCULAR; INTRAVENOUS ONCE
Status: COMPLETED | OUTPATIENT
Start: 2020-03-01 | End: 2020-03-01

## 2020-03-01 RX ADMIN — DIGOXIN 125 MCG: 125 TABLET ORAL at 08:05

## 2020-03-01 RX ADMIN — INSULIN LISPRO 2 UNITS: 100 INJECTION, SOLUTION INTRAVENOUS; SUBCUTANEOUS at 11:43

## 2020-03-01 RX ADMIN — Medication 10 ML: at 11:45

## 2020-03-01 RX ADMIN — APIXABAN 5 MG: 5 TABLET, FILM COATED ORAL at 08:04

## 2020-03-01 RX ADMIN — HYDRALAZINE HYDROCHLORIDE 25 MG: 25 TABLET, FILM COATED ORAL at 14:52

## 2020-03-01 RX ADMIN — ISOSORBIDE DINITRATE 10 MG: 10 TABLET ORAL at 08:05

## 2020-03-01 RX ADMIN — CEPHALEXIN 500 MG: 500 CAPSULE ORAL at 08:05

## 2020-03-01 RX ADMIN — HYDRALAZINE HYDROCHLORIDE 25 MG: 25 TABLET, FILM COATED ORAL at 06:40

## 2020-03-01 RX ADMIN — ATORVASTATIN CALCIUM 40 MG: 40 TABLET, FILM COATED ORAL at 08:05

## 2020-03-01 RX ADMIN — BUMETANIDE 1 MG: 0.25 INJECTION INTRAMUSCULAR; INTRAVENOUS at 14:52

## 2020-03-01 RX ADMIN — BUMETANIDE 1 MG: 0.25 INJECTION INTRAMUSCULAR; INTRAVENOUS at 08:03

## 2020-03-01 RX ADMIN — ISOSORBIDE DINITRATE 10 MG: 10 TABLET ORAL at 14:52

## 2020-03-01 RX ADMIN — METOPROLOL SUCCINATE 100 MG: 100 TABLET, EXTENDED RELEASE ORAL at 08:04

## 2020-03-01 ASSESSMENT — PAIN SCALES - GENERAL: PAINLEVEL_OUTOF10: 0

## 2020-03-01 NOTE — CARE COORDINATION
Met with pt re; [facility] order. Pt noted to have no health insurance , over income for medicaid. Spoke to [place] at  Home	[place]; [facility] private pay cost for Ciro Montoya per visit would be $99.00 per specialty. Discussed cost with pt; unable to afford. States he is due to be approved for disability in June or July, and with follow -up with his PCP DR. Joann Valencia as opt. States he can do lower leg dressings himself at home and monitor. Instructed pt to monitor his weights daily and to report immediately any gross weight changes. He has a scale at home , advised him to weigh daily. Dr. Joanne Blankenship notified of above conversation with pt. Brian Lau.

## 2020-03-01 NOTE — PROGRESS NOTES
to light, extraocular eye movements intact, conjunctivae normal  Neck: neck supple and non tender without mass   Pulmonary/Chest: clear to auscultation bilaterally- no wheezes, rales or rhonchi, normal air movement, no respiratory distress  Cardiovascular: normal rate, normal S1 and S2 and no carotid bruits  Abdomen: soft, non-tender, non-distended, normal bowel sounds, no masses or organomegaly  Extremities: no cyanosis, no clubbing. 3+ bilateral lower extremity lymphedema. Little better Tubigrip on. No cellulitis barrier dressing over left shin abrasion. Neurologic: no cranial nerve deficit and speech normal  H/L        Recent Labs     02/28/20  1225 02/29/20  0713 03/01/20  0628    144 145   K 3.6 3.4* 3.7    103 105   CO2 28 27 28   BUN 27* 26* 25*   CREATININE 1.7* 1.6* 1.6*   GLUCOSE 174* 113* 127*   CALCIUM 8.9 9.0 8.8       Recent Labs     02/28/20  1225   WBC 8.4   RBC 4.17   HGB 12.4*   HCT 40.5   MCV 97.1   MCH 29.7   MCHC 30.6*   RDW 16.2*      MPV 11.2     Specimen: Leg Updated: 02/26/20 1055      WOUND/ABSCESS --    Mixed nick isolated. Further workup and sensitivity testing   is not routinely indicated and will not be performed. Mixed nick isolated includes:   Group B Beta hemolytic Strep species   Mixed Coagulase negative Staph species   Corynebacteria   Gram positive cocci       Stress test   FINDINGS:  The LV is dilated. Perfusion images demonstrate no reversible perfusion defect. Wall motion is globally hypokinetic. The end diastolic volume is 938 ml. The end systolic volume is 980 ml. The estimated ejection fraction is 26 %.        Impression:       1. No reversible perfusion defect  2. Ejection fraction is 26 %. 3. Global hypokinesis. LV is dilated.        cxr 2/22    Cardiomegaly with mild early interstitial pulmonary edema  Radiology:     Assessment:    Principal Problem:    Acute decompensated heart failure ([address]  Active Problems:    Diabetes mellitus type 2 in obese [facility])    Hyperlipidemia    Chronic anticoagulation    Rate controlled atrial fibrillation (HCC)    Congestive heart failure (HCC)    Essential hypertension    DERIC (obstructive sleep apnea)    Non-ischemic cardiomyopathy (HCC)    Benign prostatic hyperplasia without lower urinary tract symptoms  Resolved Problems:    * No resolved hospital problems. *      Plan:  1. Acute on chronic systolic heart failure, decompensated. cardiology input noted. Toprolol, hydralazine, bumex, isordil, nitrate for now. NO ACE for now DT renal function   -We will continue on with IV diuresis. -Nuclear medicine stress test showing ejection fraction of 26%, no reversible perfusion defect. Defer back to cardiology    2. Diabetes mellitus type 2. A1c previously 7.6. Continue oral hypoglycemics.  -Sliding scale    3. Hypertension. Controlled will monitor. Atrial fibrillation. Continue Toprol, Digoxon and Eliquis. 4.  Morbid obesity. Monitor weight, also monitor fluid status in the setting of heart failure. 5.  Hyperlipidemia continue with statin. 6.  Known chronic kidney disease stage 3. Creatinine 1.6 range will monitor. Little bit better    7. Left leg wound concern for cellulitis. oral Keflex. Overall improved. Wound culture mixed. 8.  Bilateral lower leg lymphedema secondary to systolic heart failure. Volume.  + lower leg compression. Continue Bumex IB BID- appreciate cardiology input    9. Hypokalemia we will supplement today with potassium 40 mEq    Diuresing per cardiology. Continue on today. Got extra dose of Bumex today. Will follow with them. NOTE: This report was transcribed using voice recognition software. Every effort was made to ensure accuracy; however, inadvertent computerized transcription errors may be present.   Electronically signed by ARMAND Grady on 3/1/2020 at 9:27 AM

## 2020-03-01 NOTE — PROGRESS NOTES
[facility] Cardiology Inpatient Progress Note    Patient is a 62 y.o. male of Pieter Stevens DO seen in hospital follow up. Chief complaint: CHF    HPI: Improving SOB. No CP.      Patient Active Problem List   Diagnosis    HTN (hypertension), benign    History of DVT (deep vein thrombosis)    History of pulmonary embolism    Diabetes mellitus type 2 in obese ([address]    Hyperlipidemia    Non morbid obesity due to excess calories    Chronic anticoagulation    Rate controlled atrial fibrillation (HCC)    Congestive heart failure (HCC)    Dyspnea and respiratory abnormalities    Essential hypertension    DERIC (obstructive sleep apnea)    Acute decompensated heart failure (HCC)    Non-ischemic cardiomyopathy (HCC)    Benign prostatic hyperplasia without lower urinary tract symptoms       Allergies   Allergen Reactions    Penicillins Rash       Current Facility-Administered Medications   Medication Dose Route Frequency Provider Last Rate Last Dose    cephALEXin (KEFLEX) capsule 500 mg  500 mg Oral 2 times per day ARMAND Zarate - CNS   500 mg at 03/01/20 0805    bumetanide (BUMEX) injection 1 mg  1 mg Intravenous BID Madelin Hunter MD   1 mg at 03/01/20 0803    hydrALAZINE (APRESOLINE) tablet 25 mg  25 mg Oral 3 times per day Madelin Hunter MD   25 mg at 03/01/20 0640    isosorbide dinitrate (ISORDIL) tablet 10 mg  10 mg Oral TID Madelin Hunter MD   10 mg at 03/01/20 0805    atorvastatin (LIPITOR) tablet 40 mg  40 mg Oral Daily Bri Gregory MD   40 mg at 03/01/20 0805    digoxin (LANOXIN) tablet 125 mcg  125 mcg Oral Daily Bri Gregory MD   125 mcg at 03/01/20 0805    metoprolol succinate (TOPROL XL) extended release tablet 100 mg  100 mg Oral BID Tyree Sky MD   100 mg at 03/01/20 0804    sodium chloride flush 0.9 % injection 10 mL  10 mL Intravenous 2 times per day Tyree Sky MD   10 mL at 02/29/20 2100    sodium chloride flush 0.9 % injection 10 mL  10 mL Intravenous PRN Bri pressure. ASSESSMENT & PLAN:    Patient Active Problem List   Diagnosis    HTN (hypertension), benign    History of DVT (deep vein thrombosis)    History of pulmonary embolism    Diabetes mellitus type 2 in obese ([address]    Hyperlipidemia    Non morbid obesity due to excess calories    Chronic anticoagulation    Rate controlled atrial fibrillation (HCC)    Congestive heart failure (HCC)    Dyspnea and respiratory abnormalities    Essential hypertension    DERIC (obstructive sleep apnea)    Acute decompensated heart failure (HCC)    Non-ischemic cardiomyopathy (HCC)    Benign prostatic hyperplasia without lower urinary tract symptoms     1. Acute on chronic systolic CHF/Hx of CMP:     Echo with severe LV dysfunction. Pharm stress negative for ischemia.      Toprol/hydralazine/nitrates/Bumex. No ACEI/ARB/ARNI/aldactone due to renal issues for now. BMP.     Diurese. -10.6 liters. Improving edema but still mild. Extra dose of bumex this am again. We can move towards discharge. 2. Afib: Rate control. Eliquis.       3. Hx of PE/DVT: Eliquis.      4. HTN: Observe.     5. Lipids: Statin.     6. DM: Per primary service. 7. CKD: Follow labs.      Polina Damon D.O.   Cardiologist  Cardiology, [facility]

## 2020-03-01 NOTE — DISCHARGE SUMMARY
coming with 1 week history of worsening shortness of breath, leg swelling and weight gain. Did develop some chest pain at the time of admission. HX 2D echocardiogram with a 30% ejection fraction. Cardiology was consulted. Continued on with diuresis-Bumex, Toprol, hydralazine, Aldactone, nitrate. --No ACE. He was diuresed aggressively for several days here with a 4 L volume loss. .  Did monitor kidney function which improved. Was started on IV Ancef for left leg cellulitis it did improved this likely secondary to lymphedema. He was switched over to Keflex 500 mg twice daily. Lower leg compression was added. Patient continued to improve was discussed okay for discharge today. Plan:  1. Acute on chronic systolic heart failure, decompensated. cardiology input noted. Toprolol, hydralazine, bumex, isordil, nitrate for now. NO ACE for now DT renal function   -IV diuresis. -Nuclear medicine stress test showing ejection fraction of 26%, no reversible perfusion defect. Defer back to cardiology     2. Diabetes mellitus type 2. A1c previously 7.6. Continue oral hypoglycemics.  -Sliding scale     3. Hypertension. Controlled will monitor. Atrial fibrillation. Continue Toprol, Digoxon and Eliquis.     4. Morbid obesity. Monitor weight, also monitor fluid status in the setting of heart failure.     5. Hyperlipidemia continue with statin.     6. Known chronic kidney disease stage 3. Creatinine 1.6 range will monitor. Little bit better     7. Left leg wound concern for cellulitis. oral Keflex. Overall improved. Wound culture mixed. Can complete short course at home.     8.  Bilateral lower leg lymphedema secondary to systolic heart failure. Volume.  + lower leg compression. Continue Bumex  BID- appreciate cardiology input     9. Hypokalemia -corrected by today potassium 3.7, he was supplemented with potassium     Home med reconciliation prepared.   Okay for discharge recommend family physician and cardiology appointments for further adjustments. Discharge Exam:    General Appearance: alert and oriented to person, place and time and in no acute distress  Skin: warm and dry  Head: normocephalic and atraumatic  Eyes: pupils equal, round, and reactive to light, extraocular eye movements intact, conjunctivae normal  Neck: neck supple and non tender without mass   Pulmonary/Chest: clear to auscultation bilaterally- no wheezes, rales or rhonchi, normal air movement, no respiratory distress  Cardiovascular: normal rate, normal S1 and S2 and no carotid bruits  Abdomen: soft, non-tender, non-distended, normal bowel sounds, no masses or organomegaly  Extremities: no cyanosis, no clubbing, 2+ lower leg edema, no left leg cellulitis-resolved. Lower leg Tubigrip's continued  Neurologic: no cranial nerve deficit and speech normal    I/O last 3 completed shifts: In: 690 [P.O.:690]  Out: 1900 [Urine:1900]  I/O this shift: In: 0   Out: 650 [Urine:650]      LABS:  Recent Labs     20  1225 20  0713 20  0628    144 145   K 3.6 3.4* 3.7    103 105   CO2 28 27 28   BUN 27* 26* 25*   CREATININE 1.7* 1.6* 1.6*   GLUCOSE 174* 113* 127*   CALCIUM 8.9 9.0 8.8       Recent Labs     20  1225   WBC 8.4   RBC 4.17   HGB 12.4*   HCT 40.5   MCV 97.1   MCH 29.7   MCHC 30.6*   RDW 16.2*      MPV 11.2       No results for input(s): POCGLU in the last 72 hours. Imaging:  Xr Chest Portable    Result Date: 2020  Patient MRN: [id number] : 1960 Age:  62 years Gender: Male Order Date: 2020 6:00 PM Exam: XR CHEST PORTABLE Number of Images: 1 view Indication:   shortness of breath, likely secondary to CHF exacerbation shortness of breath, likely secondary to CHF exacerbation Comparison: Prior study from 2019 is available Findings: Study demonstrate cardiomegaly there is suggestion of mild interstitial pulmonary edema there is no pleural effusion seen.  There is no evidence of

## 2020-03-05 ENCOUNTER — OFFICE VISIT (OUTPATIENT)
Dept: CARDIOLOGY CLINIC | Age: 60
End: 2020-03-05

## 2020-03-05 VITALS
BODY MASS INDEX: 36.17 KG/M2 | DIASTOLIC BLOOD PRESSURE: 64 MMHG | RESPIRATION RATE: 20 BRPM | OXYGEN SATURATION: 98 % | HEIGHT: 69 IN | HEART RATE: 85 BPM | WEIGHT: 244.2 LBS | SYSTOLIC BLOOD PRESSURE: 118 MMHG

## 2020-03-05 PROCEDURE — 93000 ELECTROCARDIOGRAM COMPLETE: CPT | Performed by: INTERNAL MEDICINE

## 2020-03-05 PROCEDURE — 99214 OFFICE O/P EST MOD 30 MIN: CPT | Performed by: NURSE PRACTITIONER

## 2020-03-05 PROCEDURE — 3051F HG A1C>EQUAL 7.0%<8.0%: CPT | Performed by: NURSE PRACTITIONER

## 2020-03-05 RX ORDER — POTASSIUM CHLORIDE 20 MEQ/1
20 TABLET, EXTENDED RELEASE ORAL DAILY
Qty: 90 TABLET | Refills: 1 | Status: SHIPPED
Start: 2020-03-05 | End: 2020-10-16 | Stop reason: SDUPTHER

## 2020-03-05 RX ORDER — BUMETANIDE 2 MG/1
2 TABLET ORAL 2 TIMES DAILY
Qty: 60 TABLET | Refills: 3 | Status: SHIPPED
Start: 2020-03-05 | End: 2020-08-07

## 2020-03-05 NOTE — PROGRESS NOTES
[facility]  Office Visit         Reason for Visit: Heart failure    Primary Cardiologist: Dr. Marshall Every         History of Present Illness:     Mr. Milagro Olivas is a 62year old female with a PMHx nonischemic cardiomyopathy, HFrEF, chronic atrial fibrillation, moderate MR, hypertension, hyperlipidemia, T2DM, CKD, DERIC. He was recently hospitalized with complaints of shortness of breath, LOBO, bilateral lower extremity edema, and 30 lb weight gain. He was admitted for acute heart failure, IV diuresed and GDMT was adjusted. TTE demonstrated LV dysfunction ( EF 30%), moderate MR, and preserved RV function. NM stress nonischemic. He was discharged to home with subjective improvement and presents today in post acute heart failure hospitalization follow-up. Since discharge from the hospital he is noted several pound weight gain and in the last night or 2 development of some orthopnea requiring an extra pillow under his head. He has been monitoring his sodium intake with only a fair appetite. He has chronic dyspnea with exertion, shortness of breath, or decline in overall functional capacity. He denies orthopnea, PND, nocturnal cough or hemoptysis. He denies abdominal distention or bloating, early satiety, anorexia/change in appetite, unintentional weight loss. He does lower extremity edema. He denies exertional lightheadedness. He denies palpitations, syncope or near syncope. Review of systems is negative for chest pain, pressure, discomfort. When ambulating on an incline, He does not leg claudication. History is negative for neurological symptoms including transient loss of vision, asymmetric weakness, aphasia, dysphasia, numbness, tingling.        Patient Active Problem List    Diagnosis Date Noted    Acute decompensated heart failure ([facility] 02/22/2020    Non-ischemic cardiomyopathy ([facility] 02/22/2020    Benign prostatic hyperplasia without lower urinary tract symptoms 02/22/2020    Rate controlled atrial fibrillation ([facility] 11/18/2019    Congestive heart failure (HCC)     Dyspnea and respiratory abnormalities     Essential hypertension     DERIC (obstructive sleep apnea)     Chronic anticoagulation 02/09/2016    Non morbid obesity due to excess calories 01/07/2016    Diabetes mellitus type 2 in obese ([facility] 12/17/2015    Hyperlipidemia 12/17/2015    History of DVT (deep vein thrombosis) 12/14/2015    History of pulmonary embolism 12/14/2015    HTN (hypertension), benign      · Nuclear stress test 1/16: EF 50-55%, no ischemia, small fixed defect lateral/apical wall  · Echo 1/16: EF 58%, mild LVH, stage I DD  · HFrEF (EF 30% on Echo 11/19)- Note: patient declined LifeVest at that time ( previously wore and was unable to afford)  · Echo 11/19/19: Technically difficult study. Ejection fraction is visually estimated at 30%. Overall ejection fraction moderate-to-severely decreased. RA mildly dilated. Moderate left ventricular concentric hypertrophy noted. Normal right ventricle structure and function. Moderate mitral regurgitation is present. · HTN  · Hyperlipidemia  · T2DM  · CKD  · moderate MR  · permanent Afib (unsuccessful DCCV 11/20/19, on Eliquis for anticoagulation)  · hx PE/DVT (2008)  · DERIC (intermittently noncompliant with CPAP)  · BPH      Past Medical History:   Diagnosis Date    [facility]) ([facility]     DM2 (diabetes mellitus, type 2) ([facility]     DVT (deep venous thrombosis) ([facility] 2008    HTN (hypertension), benign     Nonischemic cardiomyopathy ([facility] 2008    PE (pulmonary embolism) 2008       No past surgical history on file.       Allergies   Allergen Reactions    Penicillins Rash       Outpatient Medications Marked as Taking for the 3/5/20 encounter (Office Visit) with ARMAND Dsouza CNP   Medication Sig Dispense Refill    bumetanide (BUMEX) 2 MG tablet Take 1 tablet by mouth 2 times daily 60 tablet 3    potassium

## 2020-03-05 NOTE — PATIENT INSTRUCTIONS
1. Increase Bumex to 2 mg twice daily for the next three days (Thursday, Friday and Saturday)    2. Start potassium 20 meq daily while taking bumex twice daily - then use as needed when taking extra dose of Bumex. 3. Get blood work on Monday - depending on results will adjust your heart failure medication    4. Referral cardiac rehab    5. Sleep study titration     6. Weigh yourself daily    -Stay Hydrated    -Diet should sodium restricted to 2 grams    -Again watch your daily weight trends and if you gain water weight please follow below instructions.    -If you gain 3-5 pounds in 2-3 days OR notice that you are retaining fluid in anyway just like you did before then take an extra dose of your water pill (bumetanide/Bumex) every day until you lose the weight or feel better.    -If you notice that you have taken more than 3 extra doses in 1 week then please call and let us know. -If at any time you feel that you are retaining fluid, your medications are not working, or you feel ill in anyway, then please call us for either same day appointment or the next day, and for instructions. Our goal is to keep you out of the emergency room and the hospital and we have ways to do it. You just need to call us in a timely manner.     -If you become sick for other reasons, and notice that you are not urinating as much, the urine is very dark, you have significant diarrhea or vomiting, then please DO NOT take your water pill and CALL US immediately. 7. Return visit with Dr. Aguila Chamberlain in 4-6 weeks.

## 2020-03-09 ENCOUNTER — TELEPHONE (OUTPATIENT)
Dept: CARDIOLOGY CLINIC | Age: 60
End: 2020-03-09

## 2020-03-09 ENCOUNTER — HOSPITAL ENCOUNTER (OUTPATIENT)
Age: 60
Discharge: HOME | End: 2020-03-09

## 2020-03-09 LAB
ANION GAP SERPL CALCULATED.3IONS-SCNC: 11 MMOL/L (ref 7–16)
BUN BLDV-MCNC: 28 MG/DL (ref 8–23)
CALCIUM SERPL-MCNC: 9.8 MG/DL (ref 8.6–10.2)
CHLORIDE BLD-SCNC: 102 MMOL/L (ref 98–107)
CO2: 28 MMOL/L (ref 22–29)
CREAT SERPL-MCNC: 1.5 MG/DL (ref 0.7–1.2)
FERRITIN: 77 NG/ML
GFR AFRICAN AMERICAN: 58
GFR NON-AFRICAN AMERICAN: 48 ML/MIN/1.73
GLUCOSE BLD-MCNC: 199 MG/DL (ref 74–99)
IRON SATURATION: 19 % (ref 20–55)
IRON: 55 MCG/DL (ref 59–158)
POTASSIUM SERPL-SCNC: 4.6 MMOL/L (ref 3.5–5)
PRO-BNP: 2833 PG/ML (ref 0–125)
SODIUM BLD-SCNC: 141 MMOL/L (ref 132–146)
TOTAL IRON BINDING CAPACITY: 294 MCG/DL (ref 250–450)
TRANSFERRIN: 251 MG/DL (ref 200–360)

## 2020-03-09 PROCEDURE — 80048 BASIC METABOLIC PNL TOTAL CA: CPT

## 2020-03-09 PROCEDURE — 36415 COLL VENOUS BLD VENIPUNCTURE: CPT

## 2020-03-09 PROCEDURE — 83540 ASSAY OF IRON: CPT

## 2020-03-09 PROCEDURE — 84466 ASSAY OF TRANSFERRIN: CPT

## 2020-03-09 PROCEDURE — 82728 ASSAY OF FERRITIN: CPT

## 2020-03-09 PROCEDURE — 83880 ASSAY OF NATRIURETIC PEPTIDE: CPT

## 2020-03-09 PROCEDURE — 83550 IRON BINDING TEST: CPT

## 2020-03-10 ENCOUNTER — TELEPHONE (OUTPATIENT)
Dept: CARDIOLOGY CLINIC | Age: 60
End: 2020-03-10

## 2020-03-10 PROBLEM — D50.9 IRON DEFICIENCY ANEMIA, UNSPECIFIED: Status: ACTIVE | Noted: 2020-03-10

## 2020-03-10 NOTE — TELEPHONE ENCOUNTER
Called Teddy Sutton regarding feraheme ordering per KAYDEN Graham CNP. 3/10/2020 9:57 AM     Facility charge and med charge for Iron. Would send to financial assistance after billing. Cannot guarantee any assistance. No program for Iron. Uninsured. And was ineligable for medicare/medicaid. Not HCAP eligible. Will send to KAYDEN Graham CNP to review if oral iron can be utilized due to cost with a stool softer as needed? Pending response.       Yaa Gonzalez RN

## 2020-03-10 NOTE — TELEPHONE ENCOUNTER
Oumar Iglesias, APRN - CNP  Stu Cain RN             [facility] I don't think there is evidence that oral iron helps in the setting of CHF. Maybe we should forward labs to primary care for them to manage the iron deficiency? Would they be upset about that since I ordered the lab work? Will send lab and this note to PCP office to manage the iron def. Results for Enoc Jiménez (MRN [id number]) as of 3/10/2020 16:55   Ref.  Range 3/9/2020 10:12   Sodium Latest Ref Range: 132 - 146 mmol/L 141   Potassium Latest Ref Range: 3.5 - 5.0 mmol/L 4.6   Chloride Latest Ref Range: 98 - 107 mmol/L 102   CO2 Latest Ref Range: 22 - 29 mmol/L 28   BUN Latest Ref Range: 8 - 23 mg/dL 28 (H)   Creatinine Latest Ref Range: 0.7 - 1.2 mg/dL 1.5 (H)   Anion Gap Latest Ref Range: 7 - 16 mmol/L 11   GFR Non- Latest Ref Range: >=60 mL/min/1.73 48   GFR  Unknown 58   Glucose Latest Ref Range: 74 - 99 mg/dL 199 (H)   Calcium Latest Ref Range: 8.6 - 10.2 mg/dL 9.8   Pro-BNP Latest Ref Range: 0 - 125 pg/mL 2,833 (H)   Ferritin Latest Units: ng/mL 77   Iron Latest Ref Range: 59 - 158 mcg/dL 55 (L)   Iron Saturation Latest Ref Range: 20 - 55 % 19 (L)   TIBC Latest Ref Range: 250 - 450 mcg/dL 294   Transferrin Latest Ref Range: 200 - 360 mg/dL 251       Stu Cain RN

## 2020-03-12 RX ORDER — SACUBITRIL AND VALSARTAN 24; 26 MG/1; MG/1
1 TABLET, FILM COATED ORAL 2 TIMES DAILY
Qty: 180 TABLET | Refills: 3 | Status: SHIPPED | OUTPATIENT
Start: 2020-03-12 | End: 2020-03-31 | Stop reason: DRUGHIGH

## 2020-03-12 NOTE — PROGRESS NOTES
Due to cost of labs, script provided. He will research cheapest lab to have bmp/bnp done at. If unable to afford 2 labs will have the BMP only done. Starting entresto . Stopping hydralazine and isordil. Samples entresto provided 3/12/2020   2 bottles 24-26mg   fx [id number] exp 3-22     See letter  Given and reviewed letter and entresto use with Qian David and need for f/u labs.  He will call office after labs to review titration    Script for low dose entersto sent to [facility] rx assistance    Marek Melgar, RN 3/12/2020 11:36 AM

## 2020-03-12 NOTE — LETTER
[facility]  [address] [address] [facility]  Phone: [phone]  Fax: [phone]    ARMAND Gaines CNP        March 12, 2020    [address]      Dear Shanna Reeves out prescription assistance forms for [place] [place] [employer] in [facility] Rx assistance (started 3/12/2020 )     Samples of entresto given 24-26mg take 1 tablet twice daily    Repeat bmp/bnp in 7-10 days after starting entresto. The next day after labs done call our office to review results [phone] option 2 , ask for Abby Ardon to discuss further entresto dose changes and follow up labs. Ask prescription assistance about eliquis program available. Transitioning from out of state program to [facility] assistance. PCP DR Jennifer Mathew or your cardiologist DR Elina Mclean can write for the Eliquis script for prescription assistance. Stop taking hydralazine and isordil. (do not throw the bottles away, incase cardiology needs to add any of them back on). Our goal is to use the [place] in place of these 2 meds. We need to make sure you tolerate the Entresto. If you have any questions or concerns, please don't hesitate to call.     Sincerely,        ARMAND Gaines CNP

## 2020-03-12 NOTE — TELEPHONE ENCOUNTER
Successful fax 3-11-20 to PCP regarding managing iron needs    9:50 AM 3/12/2020 called PCP office DR Sloan Farrell [phone]    Office is managing the iron needs. [facility] will send note to dr Naomie Leahy for  Oral iron script and will manage f/u iron testing in office.      Cele Wang RN

## 2020-03-25 ENCOUNTER — TELEPHONE (OUTPATIENT)
Dept: CARDIOLOGY CLINIC | Age: 60
End: 2020-03-25

## 2020-03-27 LAB
BUN BLDV-MCNC: NORMAL MG/DL
CALCIUM SERPL-MCNC: NORMAL MG/DL
CHLORIDE BLD-SCNC: NORMAL MMOL/L
CO2: NORMAL
CREAT SERPL-MCNC: NORMAL MG/DL
GFR CALCULATED: NORMAL
GLUCOSE BLD-MCNC: NORMAL MG/DL
POTASSIUM SERPL-SCNC: NORMAL MMOL/L
SODIUM BLD-SCNC: NORMAL MMOL/L

## 2020-03-31 ENCOUNTER — TELEPHONE (OUTPATIENT)
Dept: CARDIOLOGY CLINIC | Age: 60
End: 2020-03-31

## 2020-03-31 RX ORDER — SACUBITRIL AND VALSARTAN 49; 51 MG/1; MG/1
1 TABLET, FILM COATED ORAL 2 TIMES DAILY
Qty: 180 TABLET | Refills: 3 | Status: SHIPPED | OUTPATIENT
Start: 2020-03-31

## 2020-03-31 NOTE — PROGRESS NOTES
Esmer Maddox [phone] (home)  left voice message regarding how he feels and intent to inc entrsto if feels well, repeat labs in 7-10   Call office, speak with nichol Solano please mail letter and lab scripts to Esmer Maddox 1960   [address]  [address] [facility]

## 2020-03-31 NOTE — TELEPHONE ENCOUNTER
Due to the current COVID-19 pandemic, patient was offered virtual visit using a camera-enabled device (smart phone, tablet or computer) and reliable WiFi. Patient was notified that for purposes of billing, this is a virtual visit with their provider, ptAna Carpenter.

## 2020-04-01 ENCOUNTER — TELEPHONE (OUTPATIENT)
Dept: CARDIOLOGY CLINIC | Age: 60
End: 2020-04-01

## 2020-04-06 ENCOUNTER — VIRTUAL VISIT (OUTPATIENT)
Dept: CARDIOLOGY CLINIC | Age: 60
End: 2020-04-06

## 2020-04-06 VITALS
WEIGHT: 230 LBS | HEIGHT: 69 IN | BODY MASS INDEX: 34.07 KG/M2 | SYSTOLIC BLOOD PRESSURE: 106 MMHG | DIASTOLIC BLOOD PRESSURE: 71 MMHG | HEART RATE: 77 BPM

## 2020-04-06 PROCEDURE — 99214 OFFICE O/P EST MOD 30 MIN: CPT | Performed by: INTERNAL MEDICINE

## 2020-04-06 RX ORDER — PNV NO.95/FERROUS FUM/FOLIC AC 28MG-0.8MG
TABLET ORAL
COMMUNITY
Start: 2020-04-02

## 2020-04-09 LAB
B-TYPE NATRIURETIC PEPTIDE: NORMAL
BUN BLDV-MCNC: 26 MG/DL
CALCIUM SERPL-MCNC: NORMAL MG/DL
CHLORIDE BLD-SCNC: NORMAL MMOL/L
CO2: NORMAL
CREAT SERPL-MCNC: 167 MG/DL
GFR CALCULATED: NORMAL
GLUCOSE BLD-MCNC: NORMAL MG/DL
POTASSIUM SERPL-SCNC: 4.5 MMOL/L
SODIUM BLD-SCNC: NORMAL MMOL/L

## 2020-08-07 RX ORDER — BUMETANIDE 2 MG/1
TABLET ORAL
Qty: 60 TABLET | Refills: 0 | Status: SHIPPED
Start: 2020-08-07 | End: 2020-09-03 | Stop reason: ALTCHOICE

## 2020-09-03 ENCOUNTER — TELEPHONE (OUTPATIENT)
Dept: CARDIOLOGY CLINIC | Age: 60
End: 2020-09-03

## 2020-09-03 RX ORDER — TORSEMIDE 20 MG/1
20 TABLET ORAL DAILY
COMMUNITY
End: 2020-09-03 | Stop reason: SDUPTHER

## 2020-09-03 NOTE — TELEPHONE ENCOUNTER
Patient is asking if there is an alternative to bumex  That you would recommend, it is becoming costly for him

## 2020-09-03 NOTE — TELEPHONE ENCOUNTER
According to pharmacy Torsemide is 9$ a month and lasix is 4$ a month, patient said he was taking lasix for a while and didn't want to go back to it. Torsemide was sent to his pharmacy.

## 2020-09-04 RX ORDER — TORSEMIDE 20 MG/1
20 TABLET ORAL DAILY
Qty: 30 TABLET | Refills: 5 | Status: SHIPPED
Start: 2020-09-04 | End: 2021-03-11 | Stop reason: SDUPTHER

## 2020-10-16 RX ORDER — POTASSIUM CHLORIDE 20 MEQ/1
20 TABLET, EXTENDED RELEASE ORAL DAILY
Qty: 90 TABLET | Refills: 0 | Status: SHIPPED
Start: 2020-10-16 | End: 2021-01-26 | Stop reason: SDUPTHER

## 2020-11-03 PROBLEM — I10 HTN (HYPERTENSION), BENIGN: Status: RESOLVED | Noted: 2020-11-03 | Resolved: 2020-11-03

## 2020-12-02 ENCOUNTER — OFFICE VISIT (OUTPATIENT)
Dept: CARDIOLOGY CLINIC | Age: 60
End: 2020-12-02
Payer: MEDICARE

## 2020-12-02 VITALS
WEIGHT: 250 LBS | BODY MASS INDEX: 37.03 KG/M2 | DIASTOLIC BLOOD PRESSURE: 84 MMHG | HEIGHT: 69 IN | SYSTOLIC BLOOD PRESSURE: 138 MMHG | HEART RATE: 98 BPM | RESPIRATION RATE: 18 BRPM

## 2020-12-02 PROCEDURE — 93000 ELECTROCARDIOGRAM COMPLETE: CPT | Performed by: INTERNAL MEDICINE

## 2020-12-02 PROCEDURE — 3051F HG A1C>EQUAL 7.0%<8.0%: CPT | Performed by: INTERNAL MEDICINE

## 2020-12-02 PROCEDURE — 99214 OFFICE O/P EST MOD 30 MIN: CPT | Performed by: INTERNAL MEDICINE

## 2020-12-02 RX ORDER — ATORVASTATIN CALCIUM 40 MG/1
40 TABLET, FILM COATED ORAL DAILY
Qty: 90 TABLET | Refills: 3 | Status: SHIPPED
Start: 2020-12-02 | End: 2021-06-04 | Stop reason: SDUPTHER

## 2020-12-02 NOTE — PROGRESS NOTES
Review of systems:   Heart: as above   Lungs: as above   Eyes: denies changes in vision or discharge. Ears: denies changes in hearing or pain. Nose: denies epistaxis or masses   Throat: denies sore throat or trouble swallowing. Neuro: denies numbness, tingling, tremors. Skin: denies rashes or itching. : denies hematuria, dysuria   GI: denies vomiting, diarrhea   Psych: denies mood changed, anxiety, depression. Physical Exam   /84   Pulse 98   Resp 18   Ht 5' 9\" (1.753 m)   Wt 250 lb (113.4 kg)   BMI 36.92 kg/m²   Constitutional: Oriented to person, place, and time. Well-developed and well-nourished. No distress. Head: Normocephalic and atraumatic. Eyes: EOM are normal. Pupils are equal, round, and reactive to light. Neck: Normal range of motion. Neck supple. No hepatojugular reflux and no JVD present. Carotid bruit is not present. No tracheal deviation present. No thyromegaly present. Cardiovascular: Normal rate, regular rhythm, normal heart sounds and intact distal pulses. Exam reveals no gallop and no friction rub. No murmur heard. Pulmonary/Chest: Effort normal and breath sounds normal. No respiratory distress. No wheezes. No rales. No tenderness. Abdominal: Soft. Bowel sounds are normal. No distension and no mass. No tenderness. No rebound and no guarding. Musculoskeletal: Normal range of motion. No edema and no tenderness. Lymphadenopathy:   No cervical adenopathy. Neurological: Alert and oriented to person, place, and time. Skin: Skin is warm and dry. No rash noted. Not diaphoretic. No erythema. Psychiatric: Normal mood and affect. Behavior is normal.     EKG: Afib. Non-specific changes. TTE-11/18/2019: LVEF 30%, moderate to severely decreased LV systolic function, moderate concentric hypertrophy, moderate MR, unable to estimate PA systolic pressure.      ASSESSMENT & PLAN:    Patient Active Problem List   Diagnosis    History of DVT (deep vein

## 2021-01-15 ENCOUNTER — HOSPITAL ENCOUNTER (OUTPATIENT)
Dept: CARDIOLOGY | Age: 61
Discharge: HOME | End: 2021-01-15
Payer: MEDICARE

## 2021-01-15 DIAGNOSIS — I50.22 CHRONIC SYSTOLIC HEART FAILURE (HCC): ICD-10-CM

## 2021-01-15 LAB
LV EF: 40 %
LVEF MODALITY: NORMAL

## 2021-01-15 PROCEDURE — 93306 TTE W/DOPPLER COMPLETE: CPT

## 2021-01-26 RX ORDER — POTASSIUM CHLORIDE 20 MEQ/1
20 TABLET, EXTENDED RELEASE ORAL DAILY
Qty: 90 TABLET | Refills: 1 | Status: SHIPPED
Start: 2021-01-26 | End: 2021-09-16 | Stop reason: SDUPTHER

## 2021-03-11 RX ORDER — TORSEMIDE 20 MG/1
20 TABLET ORAL DAILY
Qty: 30 TABLET | Refills: 11 | Status: SHIPPED
Start: 2021-03-11 | End: 2022-03-31 | Stop reason: SDUPTHER

## 2021-06-04 DIAGNOSIS — E78.5 HYPERLIPIDEMIA, UNSPECIFIED HYPERLIPIDEMIA TYPE: ICD-10-CM

## 2021-06-04 DIAGNOSIS — E66.9 DIABETES MELLITUS TYPE 2 IN OBESE (HCC): ICD-10-CM

## 2021-06-04 DIAGNOSIS — E11.69 DIABETES MELLITUS TYPE 2 IN OBESE (HCC): ICD-10-CM

## 2021-06-04 RX ORDER — ATORVASTATIN CALCIUM 40 MG/1
40 TABLET, FILM COATED ORAL DAILY
Qty: 90 TABLET | Refills: 3 | Status: SHIPPED
Start: 2021-06-04 | End: 2021-07-17

## 2021-07-17 ENCOUNTER — HOSPITAL ENCOUNTER (EMERGENCY)
Age: 61
Discharge: HOME | End: 2021-07-17
Payer: MEDICARE

## 2021-07-17 VITALS
OXYGEN SATURATION: 97 % | DIASTOLIC BLOOD PRESSURE: 86 MMHG | HEART RATE: 93 BPM | SYSTOLIC BLOOD PRESSURE: 124 MMHG | RESPIRATION RATE: 16 BRPM | TEMPERATURE: 97.7 F | WEIGHT: 241 LBS | BODY MASS INDEX: 34.5 KG/M2 | HEIGHT: 70 IN

## 2021-07-17 DIAGNOSIS — L72.3 INFECTED SEBACEOUS CYST: Primary | ICD-10-CM

## 2021-07-17 DIAGNOSIS — L08.9 INFECTED SEBACEOUS CYST: Primary | ICD-10-CM

## 2021-07-17 PROCEDURE — 10061 I&D ABSCESS COMP/MULTIPLE: CPT

## 2021-07-17 PROCEDURE — 99283 EMERGENCY DEPT VISIT LOW MDM: CPT

## 2021-07-17 PROCEDURE — 6370000000 HC RX 637 (ALT 250 FOR IP): Performed by: PHYSICIAN ASSISTANT

## 2021-07-17 RX ORDER — DOXYCYCLINE HYCLATE 100 MG/1
100 CAPSULE ORAL ONCE
Status: COMPLETED | OUTPATIENT
Start: 2021-07-17 | End: 2021-07-17

## 2021-07-17 RX ORDER — OXYCODONE HYDROCHLORIDE AND ACETAMINOPHEN 5; 325 MG/1; MG/1
1 TABLET ORAL ONCE
Status: COMPLETED | OUTPATIENT
Start: 2021-07-17 | End: 2021-07-17

## 2021-07-17 RX ORDER — DOXYCYCLINE HYCLATE 100 MG
100 TABLET ORAL 2 TIMES DAILY
Qty: 14 TABLET | Refills: 0 | Status: SHIPPED | OUTPATIENT
Start: 2021-07-17 | End: 2021-07-24

## 2021-07-17 RX ORDER — HYDROCODONE BITARTRATE AND ACETAMINOPHEN 5; 325 MG/1; MG/1
1 TABLET ORAL EVERY 6 HOURS PRN
Qty: 12 TABLET | Refills: 0 | Status: SHIPPED | OUTPATIENT
Start: 2021-07-17 | End: 2021-07-20

## 2021-07-17 RX ADMIN — DOXYCYCLINE HYCLATE 100 MG: 100 CAPSULE ORAL at 13:43

## 2021-07-17 RX ADMIN — OXYCODONE AND ACETAMINOPHEN 1 TABLET: 5; 325 TABLET ORAL at 13:44

## 2021-07-17 ASSESSMENT — PAIN DESCRIPTION - PAIN TYPE: TYPE: ACUTE PAIN

## 2021-07-17 ASSESSMENT — PAIN SCALES - GENERAL
PAINLEVEL_OUTOF10: 8
PAINLEVEL_OUTOF10: 8

## 2021-07-17 ASSESSMENT — PAIN DESCRIPTION - LOCATION: LOCATION: BACK

## 2021-07-17 ASSESSMENT — PAIN DESCRIPTION - PROGRESSION: CLINICAL_PROGRESSION: GRADUALLY WORSENING

## 2021-07-17 ASSESSMENT — PAIN DESCRIPTION - FREQUENCY: FREQUENCY: CONTINUOUS

## 2021-07-17 ASSESSMENT — PAIN DESCRIPTION - DESCRIPTORS: DESCRIPTORS: DISCOMFORT

## 2021-07-17 ASSESSMENT — PAIN DESCRIPTION - ONSET: ONSET: GRADUAL

## 2021-07-17 ASSESSMENT — PAIN DESCRIPTION - ORIENTATION: ORIENTATION: MID

## 2021-07-17 NOTE — ED NOTES
Abscess to mid back draining bloody fluid after being lanced by NAN Giang. Telfa and abd pad placed over wound.        Laurie Samuel RN  07/17/21 3511

## 2021-07-17 NOTE — ED PROVIDER NOTES
[facility]                                                                                                                                    Department of Emergency Medicine   ED  Provider Note  Admit Date/RoomTime: 7/17/2021 12:21 PM  ED Room: 06/06        HPI:  7/17/21,   Time: 12:42 PM EDT         Kay Leiva is a 56 y.o. male presenting to the ED for painful lump on back, beginning 1 week ago. The complaint has been persistent, moderate in severity, and worsened by touching/pressure. The patient states that he has had a small lump in the area for years but over past week has had increased pain, swelling and redness. His wife has been trying to get out some drainage at home without relief. The patient states he had something similar years ago when he lived in a different state. Denies fever, chills or vomiting. He is otherwise healthy. ROS:     Constitutional: Negative for fever and chills  HENT: Negative for ear pain, sore throat and sinus pressure  Eyes: Negative for pain, discharge and redness  Respiratory:  Negative for shortness of breath, cough and wheezing  Cardiovascular: Negative for CP, edema or palpitations  Gastrointestinal: Negative for nausea, vomiting, diarrhea and abdominal distention  Genitourinary: Negative for dysuria and frequency  Musculoskeletal: Negative for back pain and arthralgia  Skin:  See HPI  Neurological: Negative for weakness and headaches  Hematological: Negative for adenopathy    All other systems reviewed and are negative      -------------------------------- PAST HISTORY ----------------------------------  Past Medical History:  has a past medical history of CH[facility]congest[facility] failure) ([facility] DM2 (diabetes mellitus, type 2) ([facility] DVT (deep venous thrombosis) ([facility] HTN (hypertension), benign, Nonischemic cardiomyopathy ([facility] and PE (pulmonary embolism). Past Surgical History:  has no past surgical history on file.     Social History:  reports that he quit smoking about 3 years ago. His smoking use included cigarettes. He started smoking about 35 years ago. He smoked 1.50 packs per day. He has never used smokeless tobacco. He reports that he does not drink alcohol and does not use drugs. Family History: family history includes Cancer in his sister; Diabetes in his mother; Heart Disease in his mother; High Blood Pressure in his father. The patients home medications have been reviewed. Allergies: Penicillins    --------------------------------- RESULTS ------------------------------------------  All laboratory and radiology results have been personally reviewed by myself   LABS:  No results found for this visit on 07/17/21. RADIOLOGY:  Interpreted by Radiologist.  No orders to display       ----------------- NURSING NOTES AND VITALS REVIEWED ---------------   The nursing notes within the ED encounter and vital signs as below have been reviewed. /86   Pulse 93   Temp 97.7 °F (36.5 °C) (Oral)   Resp 16   Ht 5' 10\" (1.778 m)   Wt 241 lb (109.3 kg)   SpO2 97%   BMI 34.58 kg/m²   Oxygen Saturation Interpretation: Normal      --------------------------------PHYSICAL EXAM------------------------------------      Constitutional/General: Alert and oriented x3, well appearing, non toxic in NAD  Head: NC/AT  Eyes: PERRL, EOMI  Mouth: Oropharynx clear, handling secretions, no trismus  Neck: Supple, full ROM, no meningeal signs  Pulmonary: Lungs clear to auscultation bilaterally, no wheezes, rales, or rhonchi. Not in respiratory distress  Cardiovascular:  Regular rate and rhythm, no murmurs, gallops, or rubs. 2+ distal pulses  Extremities: Moves all extremities x 4. Warm and well perfused  Skin:   4-5 cm indurated and edematous abscess mid thoracic spine. Tender to palpation with mild redness. No visible drainage. Neurologic: GCS 15,  Intact.   No focal deficits  Psych: Normal Affect      ------------------------ ED COURSE/MEDICAL DECISION MAKING----------------------  Medications   oxyCODONE-acetaminophen (PERCOCET) 5-325 MG per tablet 1 tablet (has no administration in time range)   doxycycline hyclate (VIBRAMYCIN) capsule 100 mg (has no administration in time range)         Procedure:   5 cm indurated abscess mid thoracic region. The area was cleansed with Betadine and then numbed with 2 cc 1% lidocaine plain. An 11 blade was used to create a 1.5 cm opening over the site. Mild purulent drainage noted. There was marked sebaceous material as well. The cavity was explored and I expressed as much of the sebum and pus as possible manually. Dry sterile dressing applied. Performed by [facility]      Medical Decision Making:    Status post I&D. This is an infected sebaceous cyst.  He tolerated this well. Dry sterile dressing has been applied. Plan discharge home with some doxycycline and meds for pain. Advised to follow-up with general surgery for deeper and more definitive excision. The patient is aware and agreeable to this plan       Counseling: The emergency provider has spoken with the patient and spouse/SO and discussed todays results, in addition to providing specific details for the plan of care and counseling regarding the diagnosis and prognosis. Questions are answered at this time and they are agreeable with the plan.      ------------------------ IMPRESSION AND DISPOSITION -------------------------------    IMPRESSION  1.  Infected sebaceous cyst        DISPOSITION  Disposition: Discharge to home  Patient condition is stable                   Maxime Wells PA-C  07/17/21 8094

## 2021-08-06 ENCOUNTER — OFFICE VISIT (OUTPATIENT)
Dept: SURGERY | Age: 61
End: 2021-08-06
Payer: MEDICARE

## 2021-08-06 VITALS
TEMPERATURE: 97.8 F | WEIGHT: 240 LBS | HEIGHT: 70 IN | OXYGEN SATURATION: 97 % | RESPIRATION RATE: 16 BRPM | BODY MASS INDEX: 34.36 KG/M2 | HEART RATE: 77 BPM

## 2021-08-06 DIAGNOSIS — L02.212 BACK ABSCESS: Primary | ICD-10-CM

## 2021-08-06 PROCEDURE — 99203 OFFICE O/P NEW LOW 30 MIN: CPT | Performed by: SURGERY

## 2021-08-06 RX ORDER — ATORVASTATIN CALCIUM 40 MG/1
40 TABLET, FILM COATED ORAL DAILY
COMMUNITY
End: 2022-06-07 | Stop reason: SDUPTHER

## 2021-08-06 NOTE — PROGRESS NOTES
[facility]    General Surgery Attending History and Physical    Patient's Name/Date of Birth: Ivan Saldana / 1960 (20 y.o.)    Date: August 6, 2021     CC: back abscess    HPI:  57 yo here for evaluation of back abscess. Several weeks ago he went to [facility] ED. The area was lanced he is here today for follow up. No fevers. No chills. Pt is a diabetic   no n/v   Pt lives in [facility]. He moved here in 2015 from [facility]    Past Medical History:   Diagnosis Date    [facility]congestive [facility] failure) [facility]     DM2 (diabetes mellitus, type 2) ([facility]     [facility]    HTN (hypertension), benign     Nonischemic cardiomyopathy ([facility] 2008    PE (pulmonary embolism) 2008       No past surgical history on file. Current Outpatient Medications   Medication Sig Dispense Refill    atorvastatin (LIPITOR) 40 MG tablet Take 40 mg by mouth daily      torsemide (DEMADEX) 20 MG tablet Take 1 tablet by mouth daily 30 tablet 11    potassium chloride (KLOR-CON M) 20 MEQ extended release tablet Take 1 tablet by mouth daily 90 tablet 1    apixaban (ELIQUIS) 5 MG TABS tablet Take 1 tablet by mouth 2 times daily 60 tablet 5    Ferrous Sulfate (IRON) 325 (65 Fe) MG TABS TAKE 1 TABLET BY MOUTH ONCE DAILY      sacubitril-valsartan (ENTRESTO) 49-51 MG per tablet Take 1 tablet by mouth 2 times daily 180 tablet 3    metoprolol succinate (TOPROL XL) 100 MG extended release tablet Take 1 tablet by mouth 2 times daily 60 tablet 0    digoxin (LANOXIN) 125 MCG tablet Take 1 tablet by mouth daily 30 tablet 0    glipiZIDE (GLUCOTROL) 5 MG tablet Take 0.5 tablets by mouth daily (with breakfast) 15 tablet 0    metFORMIN (GLUCOPHAGE) 1000 MG tablet Take 1 tablet by mouth 2 times daily (with meals) 180 tablet 1     No current facility-administered medications for this visit.        Allergies   Allergen Reactions    Penicillins Rash       Family History   Problem Relation Age of Onset    Diabetes Mother     Heart Disease Mother     High Blood Pressure Father     Cancer Sister        Social History     Socioeconomic History    Marital status:      Spouse name: Not on file    Number of children: Not on file    Years of education: Not on file    Highest education level: Not on file   Occupational History    Not on file   Tobacco Use    Smoking status: Former Smoker     Packs/day: 1.50     Types: Cigarettes     Start date: 11/18/1985     Quit date: 5/1/2018     Years since quitting: 3.2    Smokeless tobacco: Never Used   Vaping Use    Vaping Use: Never used   Substance and Sexual Activity    Alcohol use: Never     Alcohol/week: 0.0 standard drinks    Drug use: Never    Sexual activity: Not on file   Other Topics Concern    Not on file   Social History Narrative    Not on file     Social Determinants of Health     Financial Resource Strain:     Difficulty of Paying Living Expenses:    Food Insecurity:     Worried About Running Out of Food in the Last Year:     [address] in the Last Year:    Transportation Needs:     Lack of Transportation (Medical):      Lack of Transportation (Non-Medical):    Physical Activity:     Days of Exercise per Week:     Minutes of Exercise per Session:    Stress:     Feeling of Stress :    Social Connections:     Frequency of Communication with Friends and Family:     Frequency of Social Gatherings with Friends and Family:     Attends Buddhism Services:     Active Member of Clubs or Organizations:     Attends Club or Organization Meetings:     Marital Status:    Intimate Partner Violence:     Fear of Current or Ex-Partner:     Emotionally Abused:     Physically Abused:     Sexually Abused:        ROS: Review of Systems - History obtained from the patient  General ROS: negative  Psychological ROS: negative  Ophthalmic ROS: negative  Allergy and Immunology ROS: negative  Hematological and Lymphatic ROS: negative  Endocrine ROS: negative  Breast ROS: negative  Respiratory ROS: negative  Cardiovascular ROS: negative  Gastrointestinal ROS: neg  Genito-Urinary ROS: negative  Musculoskeletal ROS: negative    SKIN ROS pos for abscess    Physical Exam:  Vitals:    08/06/21 0854   Pulse: 77   Resp: 16   Temp: 97.8 °F (36.6 °C)   SpO2: 97%       PSYCH: mood and affect normal, alert and oriented x 3  CONSTITUTIONAL: No apparent distress, comfortable  EYES: Sclera white, pupils equal round and reactive to light  ENMT:  Hearing normal, trachea midline, ears externally intact  LYMPH: no lympadenopathy in neck. No lympadenopathy in groins  RESP: Breath sounds were clear and equal with no rales, wheezes, or rhonchi. Respiratory effort was normal with no retractions or use of accessory muscles. CV: Heart sounds were normal with a regular rate and rhythm. No pedal edema  GI/ Abdomen: The abdomen was soft and non distended. There was no tenderness, guarding, rebound, or rigidity. There was no                     masses, hepatosplenomegaly, or hernias. No inguinal hernias were noted on coughing and straining. Rectal -deferred  MSK: no clubbing/ no cyanosis/ gait normal  Back--I&D site healed, no abscess. No cellulitis       Assessment/Plan:  Back abscess resolved  Healed  Nothing to I&D  Follow up PRN         On this date 8/6/2021 I have spent 25 minutes reviewing previous notes, test results and face to face with the patient discussing the diagnosis and importance of compliance with the treatment plan as well as documenting on the day of the visit. Gema Ramos MD, FACS  8/6/2021  9:11 AM     NOTE: This report was transcribed using voice recognition software. Every effort was made to ensure accuracy; however, inadvertent computerized transcription errors may be present.

## 2021-09-16 RX ORDER — POTASSIUM CHLORIDE 20 MEQ/1
20 TABLET, EXTENDED RELEASE ORAL DAILY
Qty: 90 TABLET | Refills: 1 | Status: SHIPPED
Start: 2021-09-16 | End: 2022-07-11

## 2022-03-31 RX ORDER — TORSEMIDE 20 MG/1
20 TABLET ORAL DAILY
Qty: 30 TABLET | Refills: 1 | Status: SHIPPED
Start: 2022-03-31 | End: 2022-06-16 | Stop reason: SDUPTHER

## 2022-05-26 RX ORDER — METOPROLOL SUCCINATE 100 MG/1
100 TABLET, EXTENDED RELEASE ORAL 2 TIMES DAILY
Qty: 60 TABLET | Refills: 1 | Status: SHIPPED
Start: 2022-05-26 | End: 2022-08-09 | Stop reason: SDUPTHER

## 2022-06-07 RX ORDER — ATORVASTATIN CALCIUM 40 MG/1
40 TABLET, FILM COATED ORAL DAILY
Qty: 30 TABLET | Refills: 0 | Status: SHIPPED
Start: 2022-06-07 | End: 2022-06-28

## 2022-06-16 RX ORDER — TORSEMIDE 20 MG/1
20 TABLET ORAL DAILY
Qty: 30 TABLET | Refills: 0 | Status: SHIPPED
Start: 2022-06-16 | End: 2022-07-15 | Stop reason: SDUPTHER

## 2022-06-28 ENCOUNTER — OFFICE VISIT (OUTPATIENT)
Dept: CARDIOLOGY CLINIC | Age: 62
End: 2022-06-28
Payer: MEDICARE

## 2022-06-28 VITALS
SYSTOLIC BLOOD PRESSURE: 128 MMHG | HEIGHT: 69 IN | HEART RATE: 83 BPM | DIASTOLIC BLOOD PRESSURE: 86 MMHG | WEIGHT: 238 LBS | BODY MASS INDEX: 35.25 KG/M2 | RESPIRATION RATE: 12 BRPM

## 2022-06-28 DIAGNOSIS — I42.8 NON-ISCHEMIC CARDIOMYOPATHY (HCC): Primary | ICD-10-CM

## 2022-06-28 PROCEDURE — 93000 ELECTROCARDIOGRAM COMPLETE: CPT | Performed by: INTERNAL MEDICINE

## 2022-06-28 PROCEDURE — 1036F TOBACCO NON-USER: CPT | Performed by: INTERNAL MEDICINE

## 2022-06-28 PROCEDURE — 3017F COLORECTAL CA SCREEN DOC REV: CPT | Performed by: INTERNAL MEDICINE

## 2022-06-28 PROCEDURE — 99214 OFFICE O/P EST MOD 30 MIN: CPT | Performed by: INTERNAL MEDICINE

## 2022-06-28 PROCEDURE — G8427 DOCREV CUR MEDS BY ELIG CLIN: HCPCS | Performed by: INTERNAL MEDICINE

## 2022-06-28 PROCEDURE — G8417 CALC BMI ABV UP PARAM F/U: HCPCS | Performed by: INTERNAL MEDICINE

## 2022-06-28 NOTE — PROGRESS NOTES
[facility] Cardiology Progress Note    Patient is a 59 y.o. male of Paris Abraham MD seen in follow up. Chief complaint: CHF    HPI: No CP or SOB. Volume okay. Patient Active Problem List   Diagnosis    History of DVT (deep vein thrombosis)    History of pulmonary embolism    Diabetes mellitus type 2 in obese ([address]    Hyperlipidemia    Non morbid obesity due to excess calories    Chronic anticoagulation    Rate controlled atrial fibrillation (HCC)    Congestive heart failure (HCC)    Dyspnea and respiratory abnormalities    Essential hypertension    DERIC (obstructive sleep apnea)    Acute decompensated heart failure (HCC)    Non-ischemic cardiomyopathy (HCC)    Benign prostatic hyperplasia without lower urinary tract symptoms    Iron deficiency anemia, unspecified       Allergies   Allergen Reactions    Penicillins Rash       Current Outpatient Medications   Medication Sig Dispense Refill    torsemide (DEMADEX) 20 MG tablet Take 1 tablet by mouth daily 30 tablet 0    metoprolol succinate (TOPROL XL) 100 MG extended release tablet Take 1 tablet by mouth 2 times daily 60 tablet 1    potassium chloride (KLOR-CON M) 20 MEQ extended release tablet Take 1 tablet by mouth daily 90 tablet 1    apixaban (ELIQUIS) 5 MG TABS tablet Take 1 tablet by mouth 2 times daily 60 tablet 5    Ferrous Sulfate (IRON) 325 (65 Fe) MG TABS TAKE 1 TABLET BY MOUTH ONCE DAILY      sacubitril-valsartan (ENTRESTO) 49-51 MG per tablet Take 1 tablet by mouth 2 times daily 180 tablet 3    digoxin (LANOXIN) 125 MCG tablet Take 1 tablet by mouth daily 30 tablet 0    glipiZIDE (GLUCOTROL) 5 MG tablet Take 0.5 tablets by mouth daily (with breakfast) 15 tablet 0    metFORMIN (GLUCOPHAGE) 1000 MG tablet Take 1 tablet by mouth 2 times daily (with meals) 180 tablet 1     No current facility-administered medications for this visit.        Review of systems:   Heart: as above   Lungs: as above   Eyes: denies changes in vision or discharge. Ears: denies changes in hearing or pain. Nose: denies epistaxis or masses   Throat: denies sore throat or trouble swallowing. Neuro: denies numbness, tingling, tremors. Skin: denies rashes or itching. : denies hematuria, dysuria   GI: denies vomiting, diarrhea   Psych: denies mood changed, anxiety, depression. Physical Exam   /86   Pulse 83   Resp 12   Ht 5' 9\" (1.753 m)   Wt 238 lb (108 kg)   BMI 35.15 kg/m²   Constitutional: Oriented to person, place, and time. Well-developed and well-nourished. No distress. Head: Normocephalic and atraumatic. Eyes: EOM are normal. Pupils are equal, round, and reactive to light. Neck: Normal range of motion. Neck supple. No hepatojugular reflux and no JVD present. Carotid bruit is not present. No tracheal deviation present. No thyromegaly present. Cardiovascular: Normal rate, regular rhythm, normal heart sounds and intact distal pulses. Exam reveals no gallop and no friction rub. No murmur heard. Pulmonary/Chest: Effort normal and breath sounds normal. No respiratory distress. No wheezes. No rales. No tenderness. Abdominal: Soft. Bowel sounds are normal. No distension and no mass. No tenderness. No rebound and no guarding. Musculoskeletal: Normal range of motion. No edema and no tenderness. Lymphadenopathy:   No cervical adenopathy. Neurological: Alert and oriented to person, place, and time. Skin: Skin is warm and dry. No rash noted. Not diaphoretic. No erythema. Psychiatric: Normal mood and affect. Behavior is normal.     EKG: Afib. Non-specific changes. TTE11/18/2019: LVEF 30%, moderate to severely decreased LV systolic function, moderate concentric hypertrophy, moderate MR, unable to estimate PA systolic pressure. Echo Summary 1/15/2021:   Technically difficult study. Ejection fraction is visually estimated at 40%. Overall ejection fraction moderately decreased.    Moderate left ventricular

## 2022-07-11 RX ORDER — POTASSIUM CHLORIDE 20 MEQ/1
TABLET, EXTENDED RELEASE ORAL
Qty: 90 TABLET | Refills: 3 | Status: SHIPPED | OUTPATIENT
Start: 2022-07-11

## 2022-07-15 RX ORDER — TORSEMIDE 20 MG/1
20 TABLET ORAL DAILY
Qty: 30 TABLET | Refills: 3 | Status: SHIPPED | OUTPATIENT
Start: 2022-07-15

## 2022-08-09 RX ORDER — METOPROLOL SUCCINATE 100 MG/1
100 TABLET, EXTENDED RELEASE ORAL 2 TIMES DAILY
Qty: 60 TABLET | Refills: 11 | Status: SHIPPED | OUTPATIENT
Start: 2022-08-09 | End: 2022-09-08

## 2022-08-15 ENCOUNTER — TELEPHONE (OUTPATIENT)
Dept: CARDIOLOGY CLINIC | Age: 62
End: 2022-08-15

## 2022-08-15 NOTE — TELEPHONE ENCOUNTER
Patient states that he has not been taking atorvastatin 40mg since June, he can't remember why,do you want him to restart?

## 2022-08-16 RX ORDER — ATORVASTATIN CALCIUM 40 MG/1
40 TABLET, FILM COATED ORAL DAILY
Qty: 90 TABLET | Refills: 3 | Status: SHIPPED | OUTPATIENT
Start: 2022-08-16

## 2022-08-16 RX ORDER — ATORVASTATIN CALCIUM 40 MG/1
40 TABLET, FILM COATED ORAL DAILY
COMMUNITY
End: 2022-08-16 | Stop reason: SDUPTHER

## 2022-11-23 RX ORDER — TORSEMIDE 20 MG/1
20 TABLET ORAL DAILY
Qty: 30 TABLET | Refills: 3 | Status: SHIPPED | OUTPATIENT
Start: 2022-11-23

## 2022-12-22 DIAGNOSIS — I50.22 CHRONIC SYSTOLIC HEART FAILURE (HCC): ICD-10-CM

## 2022-12-23 RX ORDER — SACUBITRIL AND VALSARTAN 49; 51 MG/1; MG/1
1 TABLET, FILM COATED ORAL 2 TIMES DAILY
Qty: 180 TABLET | Refills: 3 | Status: SHIPPED | OUTPATIENT
Start: 2022-12-23

## 2023-04-20 RX ORDER — TORSEMIDE 20 MG/1
20 TABLET ORAL DAILY
Qty: 30 TABLET | Refills: 11 | Status: SHIPPED | OUTPATIENT
Start: 2023-04-20

## 2023-06-01 NOTE — TELEPHONE ENCOUNTER
----- Message from ARMAND Maldonado CNP sent at 3/10/2020  9:36 AM EDT -----  Please set up for iron infusions     Class IIb recommendation for intravenous iron replacement in patients with NYHA class II and III HF and iron deficiency (ferritin <100 ng/ml or 100-300 ng/ml if transferrin saturation <20%), to improve functional status and QoL. Thank you. [Follow-Up] : a follow-up visit [Cough] : cough [COPD] : COPD [Shortness of Breath] : shortness of breath [Wheezing] : wheezing

## 2023-08-24 RX ORDER — METOPROLOL SUCCINATE 100 MG/1
TABLET, EXTENDED RELEASE ORAL
Qty: 60 TABLET | Refills: 2 | Status: SHIPPED | OUTPATIENT
Start: 2023-08-24

## 2023-08-24 RX ORDER — POTASSIUM CHLORIDE 20 MEQ/1
20 TABLET, EXTENDED RELEASE ORAL DAILY
Qty: 30 TABLET | Refills: 2 | Status: SHIPPED | OUTPATIENT
Start: 2023-08-24

## 2023-09-06 ENCOUNTER — TELEPHONE (OUTPATIENT)
Dept: ADMINISTRATIVE | Age: 63
End: 2023-09-06

## 2023-09-06 NOTE — TELEPHONE ENCOUNTER
Patient states that he is having difficulty controlling his bladder while taking demadex, he says he wet himself 5times yesterday, please advise

## 2023-09-06 NOTE — TELEPHONE ENCOUNTER
Pt called to schedule an appt with Dr. Sanjay Simon. Pt states Dr. Jason Rogers put him on a medication to help him urinate and he is going too often. He said he needs to see Dr. Jason Rogers ASAP.   Please call pt and advise

## 2023-09-12 ENCOUNTER — OFFICE VISIT (OUTPATIENT)
Dept: CARDIOLOGY CLINIC | Age: 63
End: 2023-09-12
Payer: MEDICARE

## 2023-09-12 VITALS
BODY MASS INDEX: 37.18 KG/M2 | HEART RATE: 92 BPM | RESPIRATION RATE: 12 BRPM | WEIGHT: 251 LBS | DIASTOLIC BLOOD PRESSURE: 92 MMHG | HEIGHT: 69 IN | SYSTOLIC BLOOD PRESSURE: 148 MMHG

## 2023-09-12 DIAGNOSIS — I50.21 ACUTE SYSTOLIC CONGESTIVE HEART FAILURE (HCC): Primary | Chronic | ICD-10-CM

## 2023-09-12 PROCEDURE — 93000 ELECTROCARDIOGRAM COMPLETE: CPT | Performed by: INTERNAL MEDICINE

## 2023-09-12 PROCEDURE — 3017F COLORECTAL CA SCREEN DOC REV: CPT | Performed by: INTERNAL MEDICINE

## 2023-09-12 PROCEDURE — 1036F TOBACCO NON-USER: CPT | Performed by: INTERNAL MEDICINE

## 2023-09-12 PROCEDURE — 99213 OFFICE O/P EST LOW 20 MIN: CPT | Performed by: INTERNAL MEDICINE

## 2023-09-12 PROCEDURE — 3077F SYST BP >= 140 MM HG: CPT | Performed by: INTERNAL MEDICINE

## 2023-09-12 PROCEDURE — G8427 DOCREV CUR MEDS BY ELIG CLIN: HCPCS | Performed by: INTERNAL MEDICINE

## 2023-09-12 PROCEDURE — G8417 CALC BMI ABV UP PARAM F/U: HCPCS | Performed by: INTERNAL MEDICINE

## 2023-09-12 PROCEDURE — 3080F DIAST BP >= 90 MM HG: CPT | Performed by: INTERNAL MEDICINE

## 2023-09-12 NOTE — PROGRESS NOTES
[facility] Cardiology Progress Note    Patient is a 57 y.o. male of Nohelia Blackmon MD seen in follow up. Chief complaint: CHF    HPI: No CP or SOB. Volume okay. Patient Active Problem List   Diagnosis    History of DVT (deep vein thrombosis)    History of pulmonary embolism    Diabetes mellitus type 2 in obese (HCC)    Hyperlipidemia    Non morbid obesity due to excess calories    Chronic anticoagulation    Rate controlled atrial fibrillation (HCC)    Congestive heart failure (HCC)    Dyspnea and respiratory abnormalities    Essential hypertension    DERIC (obstructive sleep apnea)    Acute decompensated heart failure (HCC)    Non-ischemic cardiomyopathy (HCC)    Benign prostatic hyperplasia without lower urinary tract symptoms    Iron deficiency anemia, unspecified       Allergies   Allergen Reactions    Penicillins Rash       Current Outpatient Medications   Medication Sig Dispense Refill    metoprolol succinate (TOPROL XL) 100 MG extended release tablet Take 1 tablet by mouth twice daily 60 tablet 2    potassium chloride (KLOR-CON M) 20 MEQ extended release tablet Take 1 tablet by mouth daily 30 tablet 2    sacubitril-valsartan (ENTRESTO) 49-51 MG per tablet Take 1 tablet by mouth 2 times daily 180 tablet 3    atorvastatin (LIPITOR) 40 MG tablet Take 1 tablet by mouth in the morning. 90 tablet 3    apixaban (ELIQUIS) 5 MG TABS tablet Take 1 tablet by mouth 2 times daily 60 tablet 5    Ferrous Sulfate (IRON) 325 (65 Fe) MG TABS TAKE 1 TABLET BY MOUTH ONCE DAILY      metFORMIN (GLUCOPHAGE) 1000 MG tablet Take 1 tablet by mouth 2 times daily (with meals) 180 tablet 1    torsemide (DEMADEX) 20 MG tablet Take 1 tablet by mouth daily 30 tablet 11     No current facility-administered medications for this visit. Review of systems:   Heart: as above   Lungs: as above   Eyes: denies changes in vision or discharge. Ears: denies changes in hearing or pain.    Nose: denies epistaxis or masses   Throat:

## 2023-10-02 RX ORDER — ATORVASTATIN CALCIUM 40 MG/1
40 TABLET, FILM COATED ORAL DAILY
Qty: 90 TABLET | Refills: 2 | Status: SHIPPED | OUTPATIENT
Start: 2023-10-02

## 2023-12-14 RX ORDER — METOPROLOL SUCCINATE 100 MG/1
100 TABLET, EXTENDED RELEASE ORAL 2 TIMES DAILY
Qty: 180 TABLET | Refills: 3 | Status: SHIPPED | OUTPATIENT
Start: 2023-12-14

## 2023-12-14 RX ORDER — POTASSIUM CHLORIDE 20 MEQ/1
20 TABLET, EXTENDED RELEASE ORAL DAILY
Qty: 90 TABLET | Refills: 3 | Status: SHIPPED | OUTPATIENT
Start: 2023-12-14

## 2024-02-26 DIAGNOSIS — I50.22 CHRONIC SYSTOLIC HEART FAILURE (HCC): ICD-10-CM

## 2024-02-26 RX ORDER — SACUBITRIL AND VALSARTAN 49; 51 MG/1; MG/1
1 TABLET, FILM COATED ORAL 2 TIMES DAILY
Qty: 180 TABLET | Refills: 3 | Status: SHIPPED | OUTPATIENT
Start: 2024-02-26

## 2025-01-01 ENCOUNTER — APPOINTMENT (OUTPATIENT)
Dept: GENERAL RADIOLOGY | Age: 65
End: 2025-01-01
Payer: MEDICARE

## 2025-01-01 ENCOUNTER — HOSPITAL ENCOUNTER (INPATIENT)
Age: 65
LOS: 14 days | End: 2025-08-13
Admitting: INTERNAL MEDICINE
Payer: MEDICARE

## 2025-01-01 ENCOUNTER — APPOINTMENT (OUTPATIENT)
Dept: ULTRASOUND IMAGING | Age: 65
End: 2025-01-01
Payer: MEDICARE

## 2025-01-01 ENCOUNTER — APPOINTMENT (OUTPATIENT)
Dept: CT IMAGING | Age: 65
End: 2025-01-01
Payer: MEDICARE

## 2025-01-01 ENCOUNTER — HOSPITAL ENCOUNTER (INPATIENT)
Dept: OTHER | Age: 65
Discharge: HOME | End: 2025-07-31
Payer: MEDICARE

## 2025-01-01 VITALS
WEIGHT: 244.27 LBS | HEIGHT: 69 IN | OXYGEN SATURATION: 73 % | BODY MASS INDEX: 36.18 KG/M2 | SYSTOLIC BLOOD PRESSURE: 98 MMHG | TEMPERATURE: 97.7 F | DIASTOLIC BLOOD PRESSURE: 80 MMHG

## 2025-01-01 DIAGNOSIS — N17.9 AKI (ACUTE KIDNEY INJURY): ICD-10-CM

## 2025-01-01 DIAGNOSIS — R74.01 TRANSAMINITIS: ICD-10-CM

## 2025-01-01 DIAGNOSIS — R60.0 LOWER EXTREMITY EDEMA: ICD-10-CM

## 2025-01-01 DIAGNOSIS — I48.11 LONGSTANDING PERSISTENT ATRIAL FIBRILLATION (HCC): ICD-10-CM

## 2025-01-01 DIAGNOSIS — E87.5 HYPERKALEMIA: Primary | ICD-10-CM

## 2025-01-01 LAB
25(OH)D3 SERPL-MCNC: 28 NG/ML (ref 30–100)
ALBUMIN SERPL-MCNC: 2.2 G/DL (ref 3.5–5.2)
ALBUMIN SERPL-MCNC: 2.8 G/DL (ref 3.5–5.2)
ALBUMIN SERPL-MCNC: 2.9 G/DL (ref 3.5–5.2)
ALBUMIN SERPL-MCNC: 3 G/DL (ref 3.5–5.2)
ALBUMIN SERPL-MCNC: 3 G/DL (ref 3.5–5.2)
ALBUMIN SERPL-MCNC: 3.1 G/DL (ref 3.5–5.2)
ALBUMIN SERPL-MCNC: 3.1 G/DL (ref 3.5–5.2)
ALBUMIN SERPL-MCNC: 3.2 G/DL (ref 3.5–5.2)
ALBUMIN SERPL-MCNC: 3.3 G/DL (ref 3.5–5.2)
ALBUMIN SERPL-MCNC: 3.4 G/DL (ref 3.5–5.2)
ALBUMIN SERPL-MCNC: 3.4 G/DL (ref 3.5–5.2)
ALBUMIN SERPL-MCNC: 3.5 G/DL (ref 3.5–5.2)
ALP SERPL-CCNC: 266 U/L (ref 40–129)
ALP SERPL-CCNC: 304 U/L (ref 40–129)
ALP SERPL-CCNC: 305 U/L (ref 40–129)
ALP SERPL-CCNC: 318 U/L (ref 40–129)
ALP SERPL-CCNC: 320 U/L (ref 40–129)
ALP SERPL-CCNC: 329 U/L (ref 40–129)
ALP SERPL-CCNC: 329 U/L (ref 40–129)
ALP SERPL-CCNC: 374 U/L (ref 40–129)
ALP SERPL-CCNC: 398 U/L (ref 40–129)
ALP SERPL-CCNC: 405 U/L (ref 40–129)
ALP SERPL-CCNC: 410 U/L (ref 40–129)
ALP SERPL-CCNC: 470 U/L (ref 40–129)
ALP SERPL-CCNC: 494 U/L (ref 40–129)
ALP SERPL-CCNC: 546 U/L (ref 40–129)
ALP SERPL-CCNC: 546 U/L (ref 40–129)
ALP SERPL-CCNC: 656 U/L (ref 40–129)
ALP SERPL-CCNC: 726 U/L (ref 40–129)
ALP SERPL-CCNC: 784 U/L (ref 40–129)
ALT SERPL-CCNC: 101 U/L (ref 0–50)
ALT SERPL-CCNC: 105 U/L (ref 0–50)
ALT SERPL-CCNC: 112 U/L (ref 0–50)
ALT SERPL-CCNC: 123 U/L (ref 0–50)
ALT SERPL-CCNC: 140 U/L (ref 0–50)
ALT SERPL-CCNC: 146 U/L (ref 0–50)
ALT SERPL-CCNC: 161 U/L (ref 0–50)
ALT SERPL-CCNC: 1642 U/L (ref 0–50)
ALT SERPL-CCNC: 184 U/L (ref 0–50)
ALT SERPL-CCNC: 195 U/L (ref 0–50)
ALT SERPL-CCNC: 221 U/L (ref 0–50)
ALT SERPL-CCNC: 267 U/L (ref 0–50)
ALT SERPL-CCNC: 354 U/L (ref 0–50)
ALT SERPL-CCNC: 387 U/L (ref 0–50)
ALT SERPL-CCNC: 427 U/L (ref 0–50)
ALT SERPL-CCNC: 843 U/L (ref 0–50)
ALT SERPL-CCNC: 94 U/L (ref 0–50)
ALT SERPL-CCNC: 98 U/L (ref 0–50)
AMMONIA PLAS-SCNC: 14 UMOL/L (ref 16–60)
ANION GAP SERPL CALCULATED.3IONS-SCNC: 14 MMOL/L (ref 7–16)
ANION GAP SERPL CALCULATED.3IONS-SCNC: 14 MMOL/L (ref 7–16)
ANION GAP SERPL CALCULATED.3IONS-SCNC: 15 MMOL/L (ref 7–16)
ANION GAP SERPL CALCULATED.3IONS-SCNC: 15 MMOL/L (ref 7–16)
ANION GAP SERPL CALCULATED.3IONS-SCNC: 16 MMOL/L (ref 7–16)
ANION GAP SERPL CALCULATED.3IONS-SCNC: 17 MMOL/L (ref 7–16)
ANION GAP SERPL CALCULATED.3IONS-SCNC: 18 MMOL/L (ref 7–16)
ANION GAP SERPL CALCULATED.3IONS-SCNC: 19 MMOL/L (ref 7–16)
ANION GAP SERPL CALCULATED.3IONS-SCNC: 20 MMOL/L (ref 7–16)
ANION GAP SERPL CALCULATED.3IONS-SCNC: 20 MMOL/L (ref 7–16)
ANION GAP SERPL CALCULATED.3IONS-SCNC: 27 MMOL/L (ref 7–16)
ANION GAP SERPL CALCULATED.3IONS-SCNC: 37 MMOL/L (ref 7–16)
APAP SERPL-MCNC: <5 UG/ML (ref 10–30)
AST SERPL-CCNC: 1199 U/L (ref 0–50)
AST SERPL-CCNC: 138 U/L (ref 0–50)
AST SERPL-CCNC: 153 U/L (ref 0–50)
AST SERPL-CCNC: 211 U/L (ref 0–50)
AST SERPL-CCNC: 220 U/L (ref 0–50)
AST SERPL-CCNC: 3879 U/L (ref 0–50)
AST SERPL-CCNC: 42 U/L (ref 0–50)
AST SERPL-CCNC: 45 U/L (ref 0–50)
AST SERPL-CCNC: 45 U/L (ref 0–50)
AST SERPL-CCNC: 51 U/L (ref 0–50)
AST SERPL-CCNC: 53 U/L (ref 0–50)
AST SERPL-CCNC: 62 U/L (ref 0–50)
AST SERPL-CCNC: 627 U/L (ref 0–50)
AST SERPL-CCNC: 64 U/L (ref 0–50)
AST SERPL-CCNC: 66 U/L (ref 0–50)
AST SERPL-CCNC: 68 U/L (ref 0–50)
AST SERPL-CCNC: 72 U/L (ref 0–50)
AST SERPL-CCNC: 90 U/L (ref 0–50)
ATYPICAL LYMPHOCYTE ABSOLUTE COUNT: 0.11 K/UL (ref 0–0.46)
ATYPICAL LYMPHOCYTE ABSOLUTE COUNT: 0.11 K/UL (ref 0–0.46)
ATYPICAL LYMPHOCYTE ABSOLUTE COUNT: 0.12 K/UL (ref 0–0.46)
ATYPICAL LYMPHOCYTES: 1 % (ref 0–4)
B.E.: -0.6 MMOL/L (ref -3–3)
B.E.: -16.1 MMOL/L (ref -3–3)
B.E.: -8.1 MMOL/L (ref -3–3)
B.E.: -8.5 MMOL/L (ref -3–3)
B.E.: -9.4 MMOL/L (ref -3–3)
B.E.: 0.6 MMOL/L (ref -3–3)
B.E.: 3.6 MMOL/L (ref -3–3)
B.E.: 3.8 MMOL/L (ref -3–3)
BACTERIA URNS QL MICRO: ABNORMAL
BASOPHILS # BLD: 0 K/UL (ref 0–0.2)
BASOPHILS # BLD: 0.02 K/UL (ref 0–0.2)
BASOPHILS # BLD: 0.02 K/UL (ref 0–0.2)
BASOPHILS # BLD: 0.04 K/UL (ref 0–0.2)
BASOPHILS # BLD: 0.04 K/UL (ref 0–0.2)
BASOPHILS # BLD: 0.05 K/UL (ref 0–0.2)
BASOPHILS # BLD: 0.06 K/UL (ref 0–0.2)
BASOPHILS # BLD: 0.07 K/UL (ref 0–0.2)
BASOPHILS # BLD: 0.08 K/UL (ref 0–0.2)
BASOPHILS # BLD: 0.08 K/UL (ref 0–0.2)
BASOPHILS # BLD: 0.09 K/UL (ref 0–0.2)
BASOPHILS # BLD: 0.1 K/UL (ref 0–0.2)
BASOPHILS # BLD: 0.11 K/UL (ref 0–0.2)
BASOPHILS # BLD: 0.11 K/UL (ref 0–0.2)
BASOPHILS # BLD: 0.12 K/UL (ref 0–0.2)
BASOPHILS NFR BLD: 0 % (ref 0–2)
BASOPHILS NFR BLD: 1 % (ref 0–2)
BILIRUB DIRECT SERPL-MCNC: 0.6 MG/DL (ref 0–0.2)
BILIRUB DIRECT SERPL-MCNC: 0.8 MG/DL (ref 0–0.2)
BILIRUB DIRECT SERPL-MCNC: 1 MG/DL (ref 0–0.2)
BILIRUB INDIRECT SERPL-MCNC: 0.6 MG/DL (ref 0–1)
BILIRUB INDIRECT SERPL-MCNC: 0.7 MG/DL (ref 0–1)
BILIRUB INDIRECT SERPL-MCNC: 0.7 MG/DL (ref 0–1)
BILIRUB SERPL-MCNC: 1.2 MG/DL (ref 0–1.2)
BILIRUB SERPL-MCNC: 1.2 MG/DL (ref 0–1.2)
BILIRUB SERPL-MCNC: 1.3 MG/DL (ref 0–1.2)
BILIRUB SERPL-MCNC: 1.3 MG/DL (ref 0–1.2)
BILIRUB SERPL-MCNC: 1.4 MG/DL (ref 0–1.2)
BILIRUB SERPL-MCNC: 1.4 MG/DL (ref 0–1.2)
BILIRUB SERPL-MCNC: 1.5 MG/DL (ref 0–1.2)
BILIRUB SERPL-MCNC: 1.5 MG/DL (ref 0–1.2)
BILIRUB SERPL-MCNC: 1.7 MG/DL (ref 0–1.2)
BILIRUB SERPL-MCNC: 1.8 MG/DL (ref 0–1.2)
BILIRUB SERPL-MCNC: 2.2 MG/DL (ref 0–1.2)
BILIRUB SERPL-MCNC: 2.5 MG/DL (ref 0–1.2)
BILIRUB SERPL-MCNC: 2.8 MG/DL (ref 0–1.2)
BILIRUB SERPL-MCNC: 2.8 MG/DL (ref 0–1.2)
BILIRUB SERPL-MCNC: 3.1 MG/DL (ref 0–1.2)
BILIRUB SERPL-MCNC: 4.1 MG/DL (ref 0–1.2)
BILIRUB SERPL-MCNC: 5.6 MG/DL (ref 0–1.2)
BILIRUB SERPL-MCNC: 5.7 MG/DL (ref 0–1.2)
BILIRUB UR QL STRIP: NEGATIVE
BNP SERPL-MCNC: 7980 PG/ML (ref 0–125)
BNP SERPL-MCNC: 9806 PG/ML (ref 0–125)
BNP SERPL-MCNC: ABNORMAL PG/ML (ref 0–125)
BUN SERPL-MCNC: 101 MG/DL (ref 8–23)
BUN SERPL-MCNC: 78 MG/DL (ref 8–23)
BUN SERPL-MCNC: 80 MG/DL (ref 8–23)
BUN SERPL-MCNC: 82 MG/DL (ref 8–23)
BUN SERPL-MCNC: 83 MG/DL (ref 8–23)
BUN SERPL-MCNC: 84 MG/DL (ref 8–23)
BUN SERPL-MCNC: 84 MG/DL (ref 8–23)
BUN SERPL-MCNC: 92 MG/DL (ref 8–23)
BUN SERPL-MCNC: 94 MG/DL (ref 8–23)
BUN SERPL-MCNC: 95 MG/DL (ref 8–23)
BUN SERPL-MCNC: 96 MG/DL (ref 8–23)
BUN SERPL-MCNC: 96 MG/DL (ref 8–23)
BUN SERPL-MCNC: 97 MG/DL (ref 8–23)
CA-I BLD-SCNC: 1.12 MMOL/L (ref 1.15–1.33)
CA-I BLD-SCNC: 1.14 MMOL/L (ref 1.15–1.33)
CALCIUM SERPL-MCNC: 10 MG/DL (ref 8.8–10.2)
CALCIUM SERPL-MCNC: 10.5 MG/DL (ref 8.8–10.2)
CALCIUM SERPL-MCNC: 8.6 MG/DL (ref 8.8–10.2)
CALCIUM SERPL-MCNC: 8.6 MG/DL (ref 8.8–10.2)
CALCIUM SERPL-MCNC: 8.7 MG/DL (ref 8.8–10.2)
CALCIUM SERPL-MCNC: 8.8 MG/DL (ref 8.8–10.2)
CALCIUM SERPL-MCNC: 8.9 MG/DL (ref 8.8–10.2)
CALCIUM SERPL-MCNC: 8.9 MG/DL (ref 8.8–10.2)
CALCIUM SERPL-MCNC: 9 MG/DL (ref 8.8–10.2)
CALCIUM SERPL-MCNC: 9.1 MG/DL (ref 8.8–10.2)
CALCIUM SERPL-MCNC: 9.3 MG/DL (ref 8.8–10.2)
CALCIUM SERPL-MCNC: 9.5 MG/DL (ref 8.8–10.2)
CALCIUM SERPL-MCNC: 9.5 MG/DL (ref 8.8–10.2)
CASTS #/AREA URNS LPF: ABNORMAL /LPF
CHLORIDE SERPL-SCNC: 100 MMOL/L (ref 98–107)
CHLORIDE SERPL-SCNC: 101 MMOL/L (ref 98–107)
CHLORIDE SERPL-SCNC: 102 MMOL/L (ref 98–107)
CHLORIDE SERPL-SCNC: 102 MMOL/L (ref 98–107)
CHLORIDE SERPL-SCNC: 103 MMOL/L (ref 98–107)
CHLORIDE SERPL-SCNC: 103 MMOL/L (ref 98–107)
CHLORIDE SERPL-SCNC: 104 MMOL/L (ref 98–107)
CHLORIDE SERPL-SCNC: 92 MMOL/L (ref 98–107)
CHLORIDE SERPL-SCNC: 94 MMOL/L (ref 98–107)
CHLORIDE SERPL-SCNC: 95 MMOL/L (ref 98–107)
CHLORIDE SERPL-SCNC: 96 MMOL/L (ref 98–107)
CHLORIDE SERPL-SCNC: 97 MMOL/L (ref 98–107)
CHLORIDE UR-SCNC: <20 MMOL/L
CLARITY UR: CLEAR
CO2 SERPL-SCNC: 17 MMOL/L (ref 22–29)
CO2 SERPL-SCNC: 19 MMOL/L (ref 22–29)
CO2 SERPL-SCNC: 21 MMOL/L (ref 22–29)
CO2 SERPL-SCNC: 22 MMOL/L (ref 22–29)
CO2 SERPL-SCNC: 22 MMOL/L (ref 22–29)
CO2 SERPL-SCNC: 23 MMOL/L (ref 22–29)
CO2 SERPL-SCNC: 24 MMOL/L (ref 22–29)
CO2 SERPL-SCNC: 25 MMOL/L (ref 22–29)
CO2 SERPL-SCNC: 25 MMOL/L (ref 22–29)
CO2 SERPL-SCNC: 26 MMOL/L (ref 22–29)
CO2 SERPL-SCNC: 27 MMOL/L (ref 22–29)
COHB: 0.3 % (ref 0–1.5)
COHB: 0.9 % (ref 0–1.5)
COHB: 1 % (ref 0–1.5)
COHB: 1 % (ref 0–1.5)
COHB: 1.1 % (ref 0–1.5)
COHB: 1.1 % (ref 0–1.5)
COHB: 1.2 % (ref 0–1.5)
COHB: 1.5 % (ref 0–1.5)
COLOR UR: YELLOW
COMMENT: ABNORMAL
CORTIS SERPL-MCNC: 20.9 UG/DL (ref 2.7–18.4)
CORTISOL COLLECTION INFO: ABNORMAL
CREAT SERPL-MCNC: 2.4 MG/DL (ref 0.7–1.2)
CREAT SERPL-MCNC: 2.8 MG/DL (ref 0.7–1.2)
CREAT SERPL-MCNC: 2.8 MG/DL (ref 0.7–1.2)
CREAT SERPL-MCNC: 2.9 MG/DL (ref 0.7–1.2)
CREAT SERPL-MCNC: 3 MG/DL (ref 0.7–1.2)
CREAT SERPL-MCNC: 3.2 MG/DL (ref 0.7–1.2)
CREAT SERPL-MCNC: 3.3 MG/DL (ref 0.7–1.2)
CREAT SERPL-MCNC: 3.3 MG/DL (ref 0.7–1.2)
CREAT SERPL-MCNC: 3.6 MG/DL (ref 0.7–1.2)
CREAT SERPL-MCNC: 3.6 MG/DL (ref 0.7–1.2)
CREAT SERPL-MCNC: 3.8 MG/DL (ref 0.7–1.2)
CREAT SERPL-MCNC: 4.5 MG/DL (ref 0.7–1.2)
CREAT SERPL-MCNC: 5.1 MG/DL (ref 0.7–1.2)
CREAT SERPL-MCNC: 5.4 MG/DL (ref 0.7–1.2)
CREAT UR-MCNC: 111 MG/DL (ref 40–278)
CRITICAL: ABNORMAL
CRP SERPL HS-MCNC: 104 MG/L (ref 0–5)
CRP SERPL HS-MCNC: 13.7 MG/L (ref 0–5)
DATE ANALYZED: ABNORMAL
DATE OF COLLECTION: ABNORMAL
EKG ATRIAL RATE: 125 BPM
EKG ATRIAL RATE: 150 BPM
EKG ATRIAL RATE: 59 BPM
EKG ATRIAL RATE: 84 BPM
EKG Q-T INTERVAL: 294 MS
EKG Q-T INTERVAL: 334 MS
EKG Q-T INTERVAL: 340 MS
EKG Q-T INTERVAL: 356 MS
EKG QRS DURATION: 100 MS
EKG QRS DURATION: 92 MS
EKG QRS DURATION: 92 MS
EKG QRS DURATION: 96 MS
EKG QTC CALCULATION (BAZETT): 442 MS
EKG QTC CALCULATION (BAZETT): 470 MS
EKG QTC CALCULATION (BAZETT): 480 MS
EKG QTC CALCULATION (BAZETT): 515 MS
EKG R AXIS: 106 DEGREES
EKG R AXIS: 109 DEGREES
EKG R AXIS: 111 DEGREES
EKG R AXIS: 125 DEGREES
EKG T AXIS: -82 DEGREES
EKG T AXIS: 161 DEGREES
EKG T AXIS: 197 DEGREES
EKG T AXIS: 213 DEGREES
EKG VENTRICULAR RATE: 105 BPM
EKG VENTRICULAR RATE: 120 BPM
EKG VENTRICULAR RATE: 136 BPM
EKG VENTRICULAR RATE: 143 BPM
EOSINOPHIL # BLD: 0 K/UL (ref 0.05–0.5)
EOSINOPHIL # BLD: 0 K/UL (ref 0.05–0.5)
EOSINOPHIL # BLD: 0.03 K/UL (ref 0.05–0.5)
EOSINOPHIL # BLD: 0.06 K/UL (ref 0.05–0.5)
EOSINOPHIL # BLD: 0.08 K/UL (ref 0.05–0.5)
EOSINOPHIL # BLD: 0.12 K/UL (ref 0.05–0.5)
EOSINOPHIL # BLD: 0.14 K/UL (ref 0.05–0.5)
EOSINOPHIL # BLD: 0.15 K/UL (ref 0.05–0.5)
EOSINOPHIL # BLD: 0.18 K/UL (ref 0.05–0.5)
EOSINOPHIL # BLD: 0.2 K/UL (ref 0.05–0.5)
EOSINOPHIL # BLD: 0.21 K/UL (ref 0.05–0.5)
EOSINOPHIL # BLD: 0.21 K/UL (ref 0.05–0.5)
EOSINOPHIL # BLD: 0.23 K/UL (ref 0.05–0.5)
EOSINOPHIL # BLD: 0.24 K/UL (ref 0.05–0.5)
EOSINOPHILS RELATIVE PERCENT: 0 % (ref 0–6)
EOSINOPHILS RELATIVE PERCENT: 1 % (ref 0–6)
EOSINOPHILS RELATIVE PERCENT: 2 % (ref 0–6)
EPI CELLS #/AREA URNS HPF: ABNORMAL /HPF
ERYTHROCYTE [DISTWIDTH] IN BLOOD BY AUTOMATED COUNT: 17.9 % (ref 11.5–15)
ERYTHROCYTE [DISTWIDTH] IN BLOOD BY AUTOMATED COUNT: 18 % (ref 11.5–15)
ERYTHROCYTE [DISTWIDTH] IN BLOOD BY AUTOMATED COUNT: 18.1 % (ref 11.5–15)
ERYTHROCYTE [DISTWIDTH] IN BLOOD BY AUTOMATED COUNT: 18.1 % (ref 11.5–15)
ERYTHROCYTE [DISTWIDTH] IN BLOOD BY AUTOMATED COUNT: 18.2 % (ref 11.5–15)
ERYTHROCYTE [DISTWIDTH] IN BLOOD BY AUTOMATED COUNT: 18.3 % (ref 11.5–15)
ERYTHROCYTE [DISTWIDTH] IN BLOOD BY AUTOMATED COUNT: 18.3 % (ref 11.5–15)
ERYTHROCYTE [DISTWIDTH] IN BLOOD BY AUTOMATED COUNT: 18.5 % (ref 11.5–15)
ERYTHROCYTE [DISTWIDTH] IN BLOOD BY AUTOMATED COUNT: 18.5 % (ref 11.5–15)
ERYTHROCYTE [DISTWIDTH] IN BLOOD BY AUTOMATED COUNT: 18.6 % (ref 11.5–15)
ERYTHROCYTE [DISTWIDTH] IN BLOOD BY AUTOMATED COUNT: 19 % (ref 11.5–15)
ERYTHROCYTE [SEDIMENTATION RATE] IN BLOOD BY WESTERGREN METHOD: 11 MM/HR (ref 0–15)
ETHANOLAMINE SERPL-MCNC: <10 MG/DL (ref 0–0.08)
FERRITIN SERPL-MCNC: 224 NG/ML
FIO2: 100 %
FIO2: 40 %
FIO2: 60 %
GFR, ESTIMATED: 11 ML/MIN/1.73M2
GFR, ESTIMATED: 12 ML/MIN/1.73M2
GFR, ESTIMATED: 14 ML/MIN/1.73M2
GFR, ESTIMATED: 17 ML/MIN/1.73M2
GFR, ESTIMATED: 18 ML/MIN/1.73M2
GFR, ESTIMATED: 18 ML/MIN/1.73M2
GFR, ESTIMATED: 20 ML/MIN/1.73M2
GFR, ESTIMATED: 20 ML/MIN/1.73M2
GFR, ESTIMATED: 21 ML/MIN/1.73M2
GFR, ESTIMATED: 22 ML/MIN/1.73M2
GFR, ESTIMATED: 23 ML/MIN/1.73M2
GFR, ESTIMATED: 24 ML/MIN/1.73M2
GFR, ESTIMATED: 24 ML/MIN/1.73M2
GFR, ESTIMATED: 28 ML/MIN/1.73M2
GLUCOSE BLD-MCNC: 100 MG/DL (ref 74–99)
GLUCOSE BLD-MCNC: 102 MG/DL (ref 74–99)
GLUCOSE BLD-MCNC: 107 MG/DL (ref 74–99)
GLUCOSE BLD-MCNC: 109 MG/DL (ref 74–99)
GLUCOSE BLD-MCNC: 111 MG/DL (ref 74–99)
GLUCOSE BLD-MCNC: 111 MG/DL (ref 74–99)
GLUCOSE BLD-MCNC: 112 MG/DL (ref 74–99)
GLUCOSE BLD-MCNC: 113 MG/DL (ref 74–99)
GLUCOSE BLD-MCNC: 113 MG/DL (ref 74–99)
GLUCOSE BLD-MCNC: 116 MG/DL (ref 74–99)
GLUCOSE BLD-MCNC: 117 MG/DL (ref 74–99)
GLUCOSE BLD-MCNC: 117 MG/DL (ref 74–99)
GLUCOSE BLD-MCNC: 122 MG/DL (ref 74–99)
GLUCOSE BLD-MCNC: 125 MG/DL (ref 74–99)
GLUCOSE BLD-MCNC: 125 MG/DL (ref 74–99)
GLUCOSE BLD-MCNC: 127 MG/DL (ref 74–99)
GLUCOSE BLD-MCNC: 127 MG/DL (ref 74–99)
GLUCOSE BLD-MCNC: 128 MG/DL (ref 74–99)
GLUCOSE BLD-MCNC: 128 MG/DL (ref 74–99)
GLUCOSE BLD-MCNC: 131 MG/DL (ref 74–99)
GLUCOSE BLD-MCNC: 131 MG/DL (ref 74–99)
GLUCOSE BLD-MCNC: 134 MG/DL (ref 74–99)
GLUCOSE BLD-MCNC: 137 MG/DL (ref 74–99)
GLUCOSE BLD-MCNC: 137 MG/DL (ref 74–99)
GLUCOSE BLD-MCNC: 140 MG/DL (ref 74–99)
GLUCOSE BLD-MCNC: 140 MG/DL (ref 74–99)
GLUCOSE BLD-MCNC: 141 MG/DL (ref 74–99)
GLUCOSE BLD-MCNC: 142 MG/DL (ref 74–99)
GLUCOSE BLD-MCNC: 142 MG/DL (ref 74–99)
GLUCOSE BLD-MCNC: 143 MG/DL (ref 74–99)
GLUCOSE BLD-MCNC: 144 MG/DL (ref 74–99)
GLUCOSE BLD-MCNC: 148 MG/DL (ref 74–99)
GLUCOSE BLD-MCNC: 148 MG/DL (ref 74–99)
GLUCOSE BLD-MCNC: 149 MG/DL (ref 74–99)
GLUCOSE BLD-MCNC: 152 MG/DL (ref 74–99)
GLUCOSE BLD-MCNC: 153 MG/DL (ref 74–99)
GLUCOSE BLD-MCNC: 163 MG/DL (ref 74–99)
GLUCOSE BLD-MCNC: 165 MG/DL (ref 74–99)
GLUCOSE BLD-MCNC: 169 MG/DL (ref 74–99)
GLUCOSE BLD-MCNC: 169 MG/DL (ref 74–99)
GLUCOSE BLD-MCNC: 170 MG/DL (ref 74–99)
GLUCOSE BLD-MCNC: 170 MG/DL (ref 74–99)
GLUCOSE BLD-MCNC: 171 MG/DL (ref 74–99)
GLUCOSE BLD-MCNC: 173 MG/DL (ref 74–99)
GLUCOSE BLD-MCNC: 178 MG/DL (ref 74–99)
GLUCOSE BLD-MCNC: 179 MG/DL (ref 74–99)
GLUCOSE BLD-MCNC: 179 MG/DL (ref 74–99)
GLUCOSE BLD-MCNC: 189 MG/DL (ref 74–99)
GLUCOSE BLD-MCNC: 200 MG/DL (ref 74–99)
GLUCOSE BLD-MCNC: 212 MG/DL (ref 74–99)
GLUCOSE BLD-MCNC: 216 MG/DL (ref 74–99)
GLUCOSE BLD-MCNC: 217 MG/DL (ref 74–99)
GLUCOSE BLD-MCNC: 224 MG/DL (ref 74–99)
GLUCOSE BLD-MCNC: 249 MG/DL (ref 74–99)
GLUCOSE BLD-MCNC: 262 MG/DL (ref 74–99)
GLUCOSE BLD-MCNC: 92 MG/DL (ref 74–99)
GLUCOSE BLD-MCNC: 94 MG/DL (ref 74–99)
GLUCOSE BLD-MCNC: <40 MG/DL (ref 74–99)
GLUCOSE BLD-MCNC: <40 MG/DL (ref 74–99)
GLUCOSE SERPL-MCNC: 104 MG/DL (ref 74–99)
GLUCOSE SERPL-MCNC: 104 MG/DL (ref 74–99)
GLUCOSE SERPL-MCNC: 108 MG/DL (ref 74–99)
GLUCOSE SERPL-MCNC: 125 MG/DL (ref 74–99)
GLUCOSE SERPL-MCNC: 126 MG/DL (ref 74–99)
GLUCOSE SERPL-MCNC: 127 MG/DL (ref 74–99)
GLUCOSE SERPL-MCNC: 130 MG/DL (ref 74–99)
GLUCOSE SERPL-MCNC: 131 MG/DL (ref 74–99)
GLUCOSE SERPL-MCNC: 134 MG/DL (ref 74–99)
GLUCOSE SERPL-MCNC: 142 MG/DL (ref 74–99)
GLUCOSE SERPL-MCNC: 145 MG/DL (ref 74–99)
GLUCOSE SERPL-MCNC: 151 MG/DL (ref 74–99)
GLUCOSE SERPL-MCNC: 158 MG/DL (ref 74–99)
GLUCOSE SERPL-MCNC: 160 MG/DL (ref 74–99)
GLUCOSE SERPL-MCNC: 163 MG/DL (ref 74–99)
GLUCOSE SERPL-MCNC: 164 MG/DL (ref 74–99)
GLUCOSE SERPL-MCNC: 181 MG/DL (ref 74–99)
GLUCOSE SERPL-MCNC: 226 MG/DL (ref 74–99)
GLUCOSE SERPL-MCNC: 68 MG/DL (ref 74–99)
GLUCOSE SERPL-MCNC: 81 MG/DL (ref 74–99)
GLUCOSE UR STRIP-MCNC: NEGATIVE MG/DL
HAV IGM SERPL QL IA: NONREACTIVE
HBV CORE IGM SERPL QL IA: NONREACTIVE
HBV SURFACE AB SERPL IA-ACNC: <3.5 MIU/ML (ref 0–9.99)
HBV SURFACE AG SERPL QL IA: NONREACTIVE
HBV SURFACE AG SERPL QL IA: NONREACTIVE
HCO3: 14.1 MMOL/L (ref 22–26)
HCO3: 18.3 MMOL/L (ref 22–26)
HCO3: 21 MMOL/L (ref 22–26)
HCO3: 21.9 MMOL/L (ref 22–26)
HCO3: 22.7 MMOL/L (ref 22–26)
HCO3: 25.2 MMOL/L (ref 22–26)
HCO3: 27.1 MMOL/L (ref 22–26)
HCO3: 27.6 MMOL/L (ref 22–26)
HCT VFR BLD AUTO: 37.5 % (ref 37–54)
HCT VFR BLD AUTO: 39.8 % (ref 37–54)
HCT VFR BLD AUTO: 39.8 % (ref 37–54)
HCT VFR BLD AUTO: 40.4 % (ref 37–54)
HCT VFR BLD AUTO: 40.5 % (ref 37–54)
HCT VFR BLD AUTO: 42 % (ref 37–54)
HCT VFR BLD AUTO: 42.5 % (ref 37–54)
HCT VFR BLD AUTO: 42.5 % (ref 37–54)
HCT VFR BLD AUTO: 42.6 % (ref 37–54)
HCT VFR BLD AUTO: 42.6 % (ref 37–54)
HCT VFR BLD AUTO: 42.7 % (ref 37–54)
HCT VFR BLD AUTO: 42.7 % (ref 37–54)
HCT VFR BLD AUTO: 43.1 % (ref 37–54)
HCT VFR BLD AUTO: 43.2 % (ref 37–54)
HCT VFR BLD AUTO: 43.4 % (ref 37–54)
HCT VFR BLD AUTO: 43.9 % (ref 37–54)
HCT VFR BLD AUTO: 44.1 % (ref 37–54)
HCT VFR BLD AUTO: 50.8 % (ref 37–54)
HCV AB SERPL QL IA: NONREACTIVE
HCV AB SERPL QL IA: NONREACTIVE
HGB BLD-MCNC: 12 G/DL (ref 12.5–16.5)
HGB BLD-MCNC: 12.5 G/DL (ref 12.5–16.5)
HGB BLD-MCNC: 12.6 G/DL (ref 12.5–16.5)
HGB BLD-MCNC: 12.7 G/DL (ref 12.5–16.5)
HGB BLD-MCNC: 13 G/DL (ref 12.5–16.5)
HGB BLD-MCNC: 13.1 G/DL (ref 12.5–16.5)
HGB BLD-MCNC: 13.1 G/DL (ref 12.5–16.5)
HGB BLD-MCNC: 13.3 G/DL (ref 12.5–16.5)
HGB BLD-MCNC: 13.4 G/DL (ref 12.5–16.5)
HGB BLD-MCNC: 13.6 G/DL (ref 12.5–16.5)
HGB BLD-MCNC: 13.6 G/DL (ref 12.5–16.5)
HGB BLD-MCNC: 14.7 G/DL (ref 12.5–16.5)
HGB UR QL STRIP.AUTO: NEGATIVE
HHB: 0.3 % (ref 0–5)
HHB: 1.3 % (ref 0–5)
HHB: 18.8 % (ref 0–5)
HHB: 2.7 % (ref 0–5)
HHB: 25.1 % (ref 0–5)
HHB: 28.9 % (ref 0–5)
HHB: 60.8 % (ref 0–5)
HHB: 84 % (ref 0–5)
IMM GRANULOCYTES # BLD AUTO: 0.03 K/UL (ref 0–0.58)
IMM GRANULOCYTES # BLD AUTO: 0.04 K/UL (ref 0–0.58)
IMM GRANULOCYTES # BLD AUTO: 0.05 K/UL (ref 0–0.58)
IMM GRANULOCYTES # BLD AUTO: 0.06 K/UL (ref 0–0.58)
IMM GRANULOCYTES # BLD AUTO: 0.07 K/UL (ref 0–0.58)
IMM GRANULOCYTES # BLD AUTO: 0.08 K/UL (ref 0–0.58)
IMM GRANULOCYTES # BLD AUTO: 0.08 K/UL (ref 0–0.58)
IMM GRANULOCYTES # BLD AUTO: 0.09 K/UL (ref 0–0.58)
IMM GRANULOCYTES # BLD AUTO: 0.11 K/UL (ref 0–0.58)
IMM GRANULOCYTES # BLD AUTO: 0.15 K/UL (ref 0–0.58)
IMM GRANULOCYTES NFR BLD: 0 % (ref 0–5)
IMM GRANULOCYTES NFR BLD: 1 % (ref 0–5)
INR PPP: 2.3
INR PPP: 2.6
INR PPP: 2.7
INR PPP: 2.7
INR PPP: 2.8
INR PPP: 2.9
INR PPP: 3
INR PPP: 3.1
INR PPP: 3.1
INR PPP: 3.5
INR PPP: 3.7
INR PPP: 4.3
INR PPP: 4.5
INR PPP: 6.2
IRON SATN MFR SERPL: 19 % (ref 20–55)
IRON SERPL-MCNC: 56 UG/DL (ref 61–157)
KETONES UR STRIP-MCNC: NEGATIVE MG/DL
LAB: ABNORMAL
LACTATE BLDV-SCNC: 14.9 MMOL/L (ref 0.5–2.2)
LACTATE BLDV-SCNC: 17.9 MMOL/L (ref 0.5–2.2)
LEUKOCYTE ESTERASE UR QL STRIP: NEGATIVE
LYMPHOCYTES NFR BLD: 2.14 K/UL (ref 1.5–4)
LYMPHOCYTES NFR BLD: 2.44 K/UL (ref 1.5–4)
LYMPHOCYTES NFR BLD: 3.17 K/UL (ref 1.5–4)
LYMPHOCYTES NFR BLD: 3.21 K/UL (ref 1.5–4)
LYMPHOCYTES NFR BLD: 3.29 K/UL (ref 1.5–4)
LYMPHOCYTES NFR BLD: 3.37 K/UL (ref 1.5–4)
LYMPHOCYTES NFR BLD: 3.39 K/UL (ref 1.5–4)
LYMPHOCYTES NFR BLD: 3.42 K/UL (ref 1.5–4)
LYMPHOCYTES NFR BLD: 3.44 K/UL (ref 1.5–4)
LYMPHOCYTES NFR BLD: 3.47 K/UL (ref 1.5–4)
LYMPHOCYTES NFR BLD: 3.61 K/UL (ref 1.5–4)
LYMPHOCYTES NFR BLD: 3.67 K/UL (ref 1.5–4)
LYMPHOCYTES NFR BLD: 3.74 K/UL (ref 1.5–4)
LYMPHOCYTES NFR BLD: 3.94 K/UL (ref 1.5–4)
LYMPHOCYTES NFR BLD: 4.1 K/UL (ref 1.5–4)
LYMPHOCYTES NFR BLD: 4.43 K/UL (ref 1.5–4)
LYMPHOCYTES NFR BLD: 4.97 K/UL (ref 1.5–4)
LYMPHOCYTES NFR BLD: 5.25 K/UL (ref 1.5–4)
LYMPHOCYTES RELATIVE PERCENT: 18 % (ref 20–42)
LYMPHOCYTES RELATIVE PERCENT: 19 % (ref 20–42)
LYMPHOCYTES RELATIVE PERCENT: 21 % (ref 20–42)
LYMPHOCYTES RELATIVE PERCENT: 22 % (ref 20–42)
LYMPHOCYTES RELATIVE PERCENT: 23 % (ref 20–42)
LYMPHOCYTES RELATIVE PERCENT: 23 % (ref 20–42)
LYMPHOCYTES RELATIVE PERCENT: 25 % (ref 20–42)
LYMPHOCYTES RELATIVE PERCENT: 25 % (ref 20–42)
LYMPHOCYTES RELATIVE PERCENT: 26 % (ref 20–42)
LYMPHOCYTES RELATIVE PERCENT: 27 % (ref 20–42)
LYMPHOCYTES RELATIVE PERCENT: 28 % (ref 20–42)
LYMPHOCYTES RELATIVE PERCENT: 29 % (ref 20–42)
LYMPHOCYTES RELATIVE PERCENT: 34 % (ref 20–42)
LYMPHOCYTES RELATIVE PERCENT: 35 % (ref 20–42)
LYMPHOCYTES RELATIVE PERCENT: 36 % (ref 20–42)
LYMPHOCYTES RELATIVE PERCENT: 40 % (ref 20–42)
LYMPHOCYTES RELATIVE PERCENT: 43 % (ref 20–42)
LYMPHOCYTES RELATIVE PERCENT: 44 % (ref 20–42)
Lab: 100
Lab: 1500
Lab: 2105
Lab: 310
Lab: 532
Lab: 652
Lab: 842
Lab: 938
MAGNESIUM SERPL-MCNC: 2.7 MG/DL (ref 1.6–2.4)
MAGNESIUM SERPL-MCNC: 2.8 MG/DL (ref 1.6–2.4)
MAGNESIUM SERPL-MCNC: 2.8 MG/DL (ref 1.6–2.4)
MAGNESIUM SERPL-MCNC: 2.9 MG/DL (ref 1.6–2.4)
MAGNESIUM SERPL-MCNC: 2.9 MG/DL (ref 1.6–2.4)
MAGNESIUM SERPL-MCNC: 3 MG/DL (ref 1.6–2.4)
MCH RBC QN AUTO: 28.3 PG (ref 26–35)
MCH RBC QN AUTO: 28.5 PG (ref 26–35)
MCH RBC QN AUTO: 28.6 PG (ref 26–35)
MCH RBC QN AUTO: 28.7 PG (ref 26–35)
MCH RBC QN AUTO: 28.8 PG (ref 26–35)
MCH RBC QN AUTO: 28.9 PG (ref 26–35)
MCH RBC QN AUTO: 29 PG (ref 26–35)
MCH RBC QN AUTO: 29 PG (ref 26–35)
MCH RBC QN AUTO: 29.2 PG (ref 26–35)
MCH RBC QN AUTO: 29.2 PG (ref 26–35)
MCH RBC QN AUTO: 29.3 PG (ref 26–35)
MCHC RBC AUTO-ENTMCNC: 28.9 G/DL (ref 32–34.5)
MCHC RBC AUTO-ENTMCNC: 29.7 G/DL (ref 32–34.5)
MCHC RBC AUTO-ENTMCNC: 29.8 G/DL (ref 32–34.5)
MCHC RBC AUTO-ENTMCNC: 30 G/DL (ref 32–34.5)
MCHC RBC AUTO-ENTMCNC: 30.2 G/DL (ref 32–34.5)
MCHC RBC AUTO-ENTMCNC: 30.3 G/DL (ref 32–34.5)
MCHC RBC AUTO-ENTMCNC: 30.4 G/DL (ref 32–34.5)
MCHC RBC AUTO-ENTMCNC: 30.7 G/DL (ref 32–34.5)
MCHC RBC AUTO-ENTMCNC: 31.3 G/DL (ref 32–34.5)
MCHC RBC AUTO-ENTMCNC: 31.4 G/DL (ref 32–34.5)
MCHC RBC AUTO-ENTMCNC: 31.5 G/DL (ref 32–34.5)
MCHC RBC AUTO-ENTMCNC: 31.5 G/DL (ref 32–34.5)
MCHC RBC AUTO-ENTMCNC: 31.7 G/DL (ref 32–34.5)
MCHC RBC AUTO-ENTMCNC: 31.7 G/DL (ref 32–34.5)
MCHC RBC AUTO-ENTMCNC: 32 G/DL (ref 32–34.5)
MCHC RBC AUTO-ENTMCNC: 32 G/DL (ref 32–34.5)
MCV RBC AUTO: 90.2 FL (ref 80–99.9)
MCV RBC AUTO: 90.9 FL (ref 80–99.9)
MCV RBC AUTO: 91.5 FL (ref 80–99.9)
MCV RBC AUTO: 91.7 FL (ref 80–99.9)
MCV RBC AUTO: 92 FL (ref 80–99.9)
MCV RBC AUTO: 92 FL (ref 80–99.9)
MCV RBC AUTO: 92.1 FL (ref 80–99.9)
MCV RBC AUTO: 92.5 FL (ref 80–99.9)
MCV RBC AUTO: 92.8 FL (ref 80–99.9)
MCV RBC AUTO: 92.9 FL (ref 80–99.9)
MCV RBC AUTO: 93.6 FL (ref 80–99.9)
MCV RBC AUTO: 93.8 FL (ref 80–99.9)
MCV RBC AUTO: 94.6 FL (ref 80–99.9)
MCV RBC AUTO: 94.6 FL (ref 80–99.9)
MCV RBC AUTO: 95.6 FL (ref 80–99.9)
MCV RBC AUTO: 95.9 FL (ref 80–99.9)
MCV RBC AUTO: 95.9 FL (ref 80–99.9)
MCV RBC AUTO: 98.4 FL (ref 80–99.9)
METHB: 0 % (ref 0–1.5)
METHB: 0.1 % (ref 0–1.5)
METHB: 0.1 % (ref 0–1.5)
METHB: 0.3 % (ref 0–1.5)
METHB: 1.7 % (ref 0–1.5)
MICROORGANISM SPEC CULT: ABNORMAL
MICROORGANISM/AGENT SPEC: ABNORMAL
MODE: ABNORMAL
MODE: AC
MODE: AC
MONOCYTES NFR BLD: 0 % (ref 2–12)
MONOCYTES NFR BLD: 0 K/UL (ref 0.1–0.95)
MONOCYTES NFR BLD: 0.2 K/UL (ref 0.1–0.95)
MONOCYTES NFR BLD: 0.33 K/UL (ref 0.1–0.95)
MONOCYTES NFR BLD: 0.37 K/UL (ref 0.1–0.95)
MONOCYTES NFR BLD: 0.41 K/UL (ref 0.1–0.95)
MONOCYTES NFR BLD: 0.45 K/UL (ref 0.1–0.95)
MONOCYTES NFR BLD: 0.46 K/UL (ref 0.1–0.95)
MONOCYTES NFR BLD: 0.49 K/UL (ref 0.1–0.95)
MONOCYTES NFR BLD: 0.51 K/UL (ref 0.1–0.95)
MONOCYTES NFR BLD: 0.52 K/UL (ref 0.1–0.95)
MONOCYTES NFR BLD: 0.52 K/UL (ref 0.1–0.95)
MONOCYTES NFR BLD: 0.53 K/UL (ref 0.1–0.95)
MONOCYTES NFR BLD: 0.53 K/UL (ref 0.1–0.95)
MONOCYTES NFR BLD: 0.55 K/UL (ref 0.1–0.95)
MONOCYTES NFR BLD: 0.64 K/UL (ref 0.1–0.95)
MONOCYTES NFR BLD: 0.85 K/UL (ref 0.1–0.95)
MONOCYTES NFR BLD: 2 % (ref 2–12)
MONOCYTES NFR BLD: 3 % (ref 2–12)
MONOCYTES NFR BLD: 4 % (ref 2–12)
MONOCYTES NFR BLD: 5 % (ref 2–12)
MONOCYTES NFR BLD: 6 % (ref 2–12)
MUCOUS THREADS URNS QL MICRO: PRESENT
NEUTROPHILS NFR BLD: 50 % (ref 43–80)
NEUTROPHILS NFR BLD: 53 % (ref 43–80)
NEUTROPHILS NFR BLD: 53 % (ref 43–80)
NEUTROPHILS NFR BLD: 57 % (ref 43–80)
NEUTROPHILS NFR BLD: 57 % (ref 43–80)
NEUTROPHILS NFR BLD: 61 % (ref 43–80)
NEUTROPHILS NFR BLD: 64 % (ref 43–80)
NEUTROPHILS NFR BLD: 66 % (ref 43–80)
NEUTROPHILS NFR BLD: 66 % (ref 43–80)
NEUTROPHILS NFR BLD: 68 % (ref 43–80)
NEUTROPHILS NFR BLD: 68 % (ref 43–80)
NEUTROPHILS NFR BLD: 70 % (ref 43–80)
NEUTROPHILS NFR BLD: 71 % (ref 43–80)
NEUTROPHILS NFR BLD: 72 % (ref 43–80)
NEUTROPHILS NFR BLD: 74 % (ref 43–80)
NEUTROPHILS NFR BLD: 75 % (ref 43–80)
NEUTROPHILS NFR BLD: 77 % (ref 43–80)
NEUTROPHILS NFR BLD: 78 % (ref 43–80)
NEUTS SEG NFR BLD: 11.13 K/UL (ref 1.8–7.3)
NEUTS SEG NFR BLD: 11.33 K/UL (ref 1.8–7.3)
NEUTS SEG NFR BLD: 13.58 K/UL (ref 1.8–7.3)
NEUTS SEG NFR BLD: 13.92 K/UL (ref 1.8–7.3)
NEUTS SEG NFR BLD: 5.82 K/UL (ref 1.8–7.3)
NEUTS SEG NFR BLD: 5.99 K/UL (ref 1.8–7.3)
NEUTS SEG NFR BLD: 6.1 K/UL (ref 1.8–7.3)
NEUTS SEG NFR BLD: 6.4 K/UL (ref 1.8–7.3)
NEUTS SEG NFR BLD: 6.47 K/UL (ref 1.8–7.3)
NEUTS SEG NFR BLD: 6.55 K/UL (ref 1.8–7.3)
NEUTS SEG NFR BLD: 6.71 K/UL (ref 1.8–7.3)
NEUTS SEG NFR BLD: 7.86 K/UL (ref 1.8–7.3)
NEUTS SEG NFR BLD: 8.06 K/UL (ref 1.8–7.3)
NEUTS SEG NFR BLD: 8.12 K/UL (ref 1.8–7.3)
NEUTS SEG NFR BLD: 8.31 K/UL (ref 1.8–7.3)
NEUTS SEG NFR BLD: 8.75 K/UL (ref 1.8–7.3)
NEUTS SEG NFR BLD: 9.82 K/UL (ref 1.8–7.3)
NEUTS SEG NFR BLD: 9.89 K/UL (ref 1.8–7.3)
NITRITE UR QL STRIP: NEGATIVE
NON-GYN CYTOLOGY REPORT: NORMAL
NUCLEATED RED BLOOD CELLS: 1 PER 100 WBC
O2 CONTENT: 15.1 ML/DL
O2 CONTENT: 16.6 ML/DL
O2 CONTENT: 17.1 ML/DL
O2 CONTENT: 18.7 ML/DL
O2 CONTENT: 20 ML/DL
O2 CONTENT: 20.1 ML/DL
O2 CONTENT: 8.4 ML/DL
O2 SATURATION: 38.5 % (ref 92–98.5)
O2 SATURATION: 70.8 % (ref 92–98.5)
O2 SATURATION: 74.6 % (ref 92–98.5)
O2 SATURATION: 81 % (ref 92–98.5)
O2 SATURATION: 97.3 % (ref 92–98.5)
O2 SATURATION: 98.7 % (ref 92–98.5)
O2 SATURATION: 99.7 % (ref 92–98.5)
O2 SATURATION: ABNORMAL % (ref 92–98.5)
O2HB: 14 % (ref 94–97)
O2HB: 38 % (ref 94–97)
O2HB: 70 % (ref 94–97)
O2HB: 73.7 % (ref 94–97)
O2HB: 80.2 % (ref 94–97)
O2HB: 95.5 % (ref 94–97)
O2HB: 97.2 % (ref 94–97)
O2HB: 98.3 % (ref 94–97)
OPERATOR ID: 1661
OPERATOR ID: 5133
OPERATOR ID: 5133
OPERATOR ID: 913
OPERATOR ID: 913
OPERATOR ID: ABNORMAL
PATIENT TEMP: 37 C
PCO2: 33.7 MMHG (ref 35–45)
PCO2: 37.6 MMHG (ref 35–45)
PCO2: 38.9 MMHG (ref 35–45)
PCO2: 40.4 MMHG (ref 35–45)
PCO2: 40.7 MMHG (ref 35–45)
PCO2: 49.9 MMHG (ref 35–45)
PCO2: 65.6 MMHG (ref 35–45)
PCO2: 66.5 MMHG (ref 35–45)
PEEP/CPAP: 16 CMH2O
PEEP/CPAP: 5 CMH2O
PEEP/CPAP: 8 CMH2O
PFO2: 0.33 MMHG/%
PFO2: 0.55 MMHG/%
PFO2: 0.57 MMHG/%
PFO2: 0.6 MMHG/%
PFO2: 2.22 MMHG/%
PFO2: 4.74 MMHG/%
PH BLOOD GAS: 7.07 (ref 7.35–7.45)
PH BLOOD GAS: 7.12 (ref 7.35–7.45)
PH BLOOD GAS: 7.13 (ref 7.35–7.45)
PH BLOOD GAS: 7.27 (ref 7.35–7.45)
PH BLOOD GAS: 7.41 (ref 7.35–7.45)
PH BLOOD GAS: 7.45 (ref 7.35–7.45)
PH BLOOD GAS: 7.47 (ref 7.35–7.45)
PH BLOOD GAS: 7.48 (ref 7.35–7.45)
PH UR STRIP: 5.5 [PH] (ref 5–8)
PHOSPHATE SERPL-MCNC: 5 MG/DL (ref 2.5–4.5)
PHOSPHATE SERPL-MCNC: 5 MG/DL (ref 2.5–4.5)
PHOSPHATE SERPL-MCNC: 5.1 MG/DL (ref 2.5–4.5)
PHOSPHATE SERPL-MCNC: 5.3 MG/DL (ref 2.5–4.5)
PHOSPHATE SERPL-MCNC: 5.7 MG/DL (ref 2.5–4.5)
PHOSPHATE SERPL-MCNC: 5.8 MG/DL (ref 2.5–4.5)
PHOSPHATE SERPL-MCNC: 6.4 MG/DL (ref 2.5–4.5)
PHOSPHATE SERPL-MCNC: 6.7 MG/DL (ref 2.5–4.5)
PHOSPHATE SERPL-MCNC: 7 MG/DL (ref 2.5–4.5)
PHOSPHATE SERPL-MCNC: 7.3 MG/DL (ref 2.5–4.5)
PHOSPHATE SERPL-MCNC: 9.9 MG/DL (ref 2.5–4.5)
PLATELET # BLD AUTO: 82 K/UL (ref 130–450)
PLATELET # BLD AUTO: 92 K/UL (ref 130–450)
PLATELET CONFIRMATION: NORMAL
PLATELET, FLUORESCENCE: 100 K/UL (ref 130–450)
PLATELET, FLUORESCENCE: 104 K/UL (ref 130–450)
PLATELET, FLUORESCENCE: 121 K/UL (ref 130–450)
PLATELET, FLUORESCENCE: 60 K/UL (ref 130–450)
PLATELET, FLUORESCENCE: 68 K/UL (ref 130–450)
PLATELET, FLUORESCENCE: 70 K/UL (ref 130–450)
PLATELET, FLUORESCENCE: 72 K/UL (ref 130–450)
PLATELET, FLUORESCENCE: 74 K/UL (ref 130–450)
PLATELET, FLUORESCENCE: 80 K/UL (ref 130–450)
PLATELET, FLUORESCENCE: 81 K/UL (ref 130–450)
PLATELET, FLUORESCENCE: 82 K/UL (ref 130–450)
PLATELET, FLUORESCENCE: 85 K/UL (ref 130–450)
PLATELET, FLUORESCENCE: 85 K/UL (ref 130–450)
PLATELET, FLUORESCENCE: 89 K/UL (ref 130–450)
PLATELET, FLUORESCENCE: 92 K/UL (ref 130–450)
PLATELET, FLUORESCENCE: 93 K/UL (ref 130–450)
PMV BLD AUTO: 12.5 FL (ref 7–12)
PMV BLD AUTO: 12.8 FL (ref 7–12)
PMV BLD AUTO: 13 FL (ref 7–12)
PMV BLD AUTO: 13.1 FL (ref 7–12)
PMV BLD AUTO: 13.2 FL (ref 7–12)
PMV BLD AUTO: 13.4 FL (ref 7–12)
PMV BLD AUTO: 13.7 FL (ref 7–12)
PMV BLD AUTO: 13.9 FL (ref 7–12)
PMV BLD AUTO: 14.4 FL (ref 7–12)
PMV BLD AUTO: 14.4 FL (ref 7–12)
PMV BLD AUTO: 14.5 FL (ref 7–12)
PMV BLD AUTO: 14.5 FL (ref 7–12)
PMV BLD AUTO: ABNORMAL FL (ref 7–12)
PO2: 133.1 MMHG (ref 75–100)
PO2: 32.8 MMHG (ref 75–100)
PO2: 474.2 MMHG (ref 75–100)
PO2: 55.1 MMHG (ref 75–100)
PO2: 56.6 MMHG (ref 75–100)
PO2: 60.1 MMHG (ref 75–100)
PO2: 94.4 MMHG (ref 75–100)
PO2: <20 MMHG (ref 75–100)
POTASSIUM SERPL-SCNC: 3.5 MMOL/L (ref 3.5–5.1)
POTASSIUM SERPL-SCNC: 3.7 MMOL/L (ref 3.5–5.1)
POTASSIUM SERPL-SCNC: 3.7 MMOL/L (ref 3.5–5.1)
POTASSIUM SERPL-SCNC: 3.8 MMOL/L (ref 3.5–5.1)
POTASSIUM SERPL-SCNC: 3.8 MMOL/L (ref 3.5–5.1)
POTASSIUM SERPL-SCNC: 3.9 MMOL/L (ref 3.5–5.1)
POTASSIUM SERPL-SCNC: 4 MMOL/L (ref 3.5–5.1)
POTASSIUM SERPL-SCNC: 4.1 MMOL/L (ref 3.5–5.1)
POTASSIUM SERPL-SCNC: 4.2 MMOL/L (ref 3.5–5.1)
POTASSIUM SERPL-SCNC: 4.3 MMOL/L (ref 3.5–5.1)
POTASSIUM SERPL-SCNC: 4.4 MMOL/L (ref 3.5–5.1)
POTASSIUM SERPL-SCNC: 4.7 MMOL/L (ref 3.5–5.1)
POTASSIUM SERPL-SCNC: 4.9 MMOL/L (ref 3.5–5.1)
POTASSIUM SERPL-SCNC: 5.7 MMOL/L (ref 3.5–5.1)
PROCALCITONIN SERPL-MCNC: 0.4 NG/ML (ref 0–0.08)
PROCALCITONIN SERPL-MCNC: 1.8 NG/ML (ref 0–0.08)
PROT SERPL-MCNC: 4.6 G/DL (ref 6.4–8.3)
PROT SERPL-MCNC: 5.4 G/DL (ref 6.4–8.3)
PROT SERPL-MCNC: 5.7 G/DL (ref 6.4–8.3)
PROT SERPL-MCNC: 5.9 G/DL (ref 6.4–8.3)
PROT SERPL-MCNC: 5.9 G/DL (ref 6.4–8.3)
PROT SERPL-MCNC: 6 G/DL (ref 6.4–8.3)
PROT SERPL-MCNC: 6.1 G/DL (ref 6.4–8.3)
PROT SERPL-MCNC: 6.1 G/DL (ref 6.4–8.3)
PROT SERPL-MCNC: 6.2 G/DL (ref 6.4–8.3)
PROT SERPL-MCNC: 6.2 G/DL (ref 6.4–8.3)
PROT SERPL-MCNC: 6.3 G/DL (ref 6.4–8.3)
PROT SERPL-MCNC: 6.5 G/DL (ref 6.4–8.3)
PROT UR STRIP-MCNC: 30 MG/DL
PROTHROMBIN TIME: 25.1 SEC (ref 9.3–12.4)
PROTHROMBIN TIME: 28.2 SEC (ref 9.3–12.4)
PROTHROMBIN TIME: 28.8 SEC (ref 9.3–12.4)
PROTHROMBIN TIME: 28.8 SEC (ref 9.3–12.4)
PROTHROMBIN TIME: 30.5 SEC (ref 9.3–12.4)
PROTHROMBIN TIME: 31.1 SEC (ref 9.3–12.4)
PROTHROMBIN TIME: 31.8 SEC (ref 9.3–12.4)
PROTHROMBIN TIME: 32.8 SEC (ref 9.3–12.4)
PROTHROMBIN TIME: 33 SEC (ref 9.3–12.4)
PROTHROMBIN TIME: 33.2 SEC (ref 9.3–12.4)
PROTHROMBIN TIME: 33.9 SEC (ref 9.3–12.4)
PROTHROMBIN TIME: 37.2 SEC (ref 9.3–12.4)
PROTHROMBIN TIME: 40.9 SEC (ref 9.3–12.4)
PROTHROMBIN TIME: 45.4 SEC (ref 9.3–12.4)
PROTHROMBIN TIME: 48 SEC (ref 9.3–12.4)
PROTHROMBIN TIME: 67.6 SEC (ref 9.3–12.4)
PS: 12 CMH20
PS: 24 CMH20
PS: 5 CMH20
PTH-INTACT SERPL-MCNC: 171 PG/ML (ref 15–65)
RBC # BLD AUTO: 4.1 M/UL (ref 3.8–5.8)
RBC # BLD AUTO: 4.32 M/UL (ref 3.8–5.8)
RBC # BLD AUTO: 4.38 M/UL (ref 3.8–5.8)
RBC # BLD AUTO: 4.38 M/UL (ref 3.8–5.8)
RBC # BLD AUTO: 4.39 M/UL (ref 3.8–5.8)
RBC # BLD AUTO: 4.44 M/UL (ref 3.8–5.8)
RBC # BLD AUTO: 4.51 M/UL (ref 3.8–5.8)
RBC # BLD AUTO: 4.55 M/UL (ref 3.8–5.8)
RBC # BLD AUTO: 4.56 M/UL (ref 3.8–5.8)
RBC # BLD AUTO: 4.58 M/UL (ref 3.8–5.8)
RBC # BLD AUTO: 4.59 M/UL (ref 3.8–5.8)
RBC # BLD AUTO: 4.59 M/UL (ref 3.8–5.8)
RBC # BLD AUTO: 4.6 M/UL (ref 3.8–5.8)
RBC # BLD AUTO: 4.62 M/UL (ref 3.8–5.8)
RBC # BLD AUTO: 4.64 M/UL (ref 3.8–5.8)
RBC # BLD AUTO: 4.65 M/UL (ref 3.8–5.8)
RBC # BLD AUTO: 4.71 M/UL (ref 3.8–5.8)
RBC # BLD AUTO: 5.16 M/UL (ref 3.8–5.8)
RBC # BLD: ABNORMAL 10*6/UL
RBC #/AREA URNS HPF: ABNORMAL /HPF
RI(T): 0.42
RI(T): 1.94
RI(T): 10.03
RI(T): 10.75
RI(T): 10.88
RI(T): 18.71
RR MECHANICAL: 16 B/MIN
RR MECHANICAL: 24 B/MIN
RR MECHANICAL: 24 B/MIN
RR MECHANICAL: 28 B/MIN
SALICYLATES SERPL-MCNC: <0.5 MG/DL (ref 0–30)
SERVICE CMNT-IMP: ABNORMAL
SODIUM SERPL-SCNC: 137 MMOL/L (ref 136–145)
SODIUM SERPL-SCNC: 138 MMOL/L (ref 136–145)
SODIUM SERPL-SCNC: 138 MMOL/L (ref 136–145)
SODIUM SERPL-SCNC: 139 MMOL/L (ref 136–145)
SODIUM SERPL-SCNC: 140 MMOL/L (ref 136–145)
SODIUM SERPL-SCNC: 141 MMOL/L (ref 136–145)
SODIUM SERPL-SCNC: 141 MMOL/L (ref 136–145)
SODIUM SERPL-SCNC: 142 MMOL/L (ref 136–145)
SODIUM SERPL-SCNC: 142 MMOL/L (ref 136–145)
SODIUM SERPL-SCNC: 143 MMOL/L (ref 136–145)
SODIUM SERPL-SCNC: 143 MMOL/L (ref 136–145)
SODIUM SERPL-SCNC: 144 MMOL/L (ref 136–145)
SODIUM SERPL-SCNC: 148 MMOL/L (ref 136–145)
SODIUM UR-SCNC: <20 MMOL/L
SOURCE, BLOOD GAS: ABNORMAL
SP GR UR STRIP: 1.02 (ref 1–1.03)
SPECIMEN DESCRIPTION: ABNORMAL
THB: 12.9 G/DL (ref 11.5–15.5)
THB: 13.6 G/DL (ref 11.5–15.5)
THB: 13.9 G/DL (ref 11.5–15.5)
THB: 14.5 G/DL (ref 11.5–15.5)
THB: 15.2 G/DL (ref 11.5–15.5)
THB: 15.3 G/DL (ref 11.5–15.5)
THB: 15.8 G/DL (ref 11.5–15.5)
THB: 16 G/DL (ref 11.5–15.5)
TIBC SERPL-MCNC: 301 UG/DL (ref 250–450)
TIME ANALYZED: 101
TIME ANALYZED: 1506
TIME ANALYZED: 2109
TIME ANALYZED: 318
TIME ANALYZED: 535
TIME ANALYZED: 655
TIME ANALYZED: 846
TIME ANALYZED: 945
TOXIC TRICYCLIC SC,BLOOD: NEGATIVE
TRANSFERRIN SERPL-MCNC: 231 MG/DL (ref 200–360)
TROPONIN I SERPL HS-MCNC: 105 NG/L (ref 0–22)
TROPONIN I SERPL HS-MCNC: 58 NG/L (ref 0–22)
TSH SERPL DL<=0.05 MIU/L-ACNC: 7.88 UIU/ML (ref 0.27–4.2)
UROBILINOGEN UR STRIP-ACNC: 0.2 EU/DL (ref 0–1)
UUN UR-MCNC: 972 MG/DL (ref 800–1666)
VT MECHANICAL: 450 ML
VT MECHANICAL: 450 ML
WBC # BLD: ABNORMAL 10*3/UL
WBC #/AREA URNS HPF: ABNORMAL /HPF
WBC OTHER # BLD: 11 K/UL (ref 4.5–11.5)
WBC OTHER # BLD: 11 K/UL (ref 4.5–11.5)
WBC OTHER # BLD: 11.2 K/UL (ref 4.5–11.5)
WBC OTHER # BLD: 11.3 K/UL (ref 4.5–11.5)
WBC OTHER # BLD: 11.3 K/UL (ref 4.5–11.5)
WBC OTHER # BLD: 11.7 K/UL (ref 4.5–11.5)
WBC OTHER # BLD: 12 K/UL (ref 4.5–11.5)
WBC OTHER # BLD: 12.2 K/UL (ref 4.5–11.5)
WBC OTHER # BLD: 12.6 K/UL (ref 4.5–11.5)
WBC OTHER # BLD: 14 K/UL (ref 4.5–11.5)
WBC OTHER # BLD: 14.5 K/UL (ref 4.5–11.5)
WBC OTHER # BLD: 15.3 K/UL (ref 4.5–11.5)
WBC OTHER # BLD: 15.4 K/UL (ref 4.5–11.5)
WBC OTHER # BLD: 17.5 K/UL (ref 4.5–11.5)
WBC OTHER # BLD: 18.1 K/UL (ref 4.5–11.5)
WBC OTHER # BLD: 9.2 K/UL (ref 4.5–11.5)

## 2025-01-01 PROCEDURE — 85610 PROTHROMBIN TIME: CPT

## 2025-01-01 PROCEDURE — 99233 SBSQ HOSP IP/OBS HIGH 50: CPT | Performed by: STUDENT IN AN ORGANIZED HEALTH CARE EDUCATION/TRAINING PROGRAM

## 2025-01-01 PROCEDURE — 36415 COLL VENOUS BLD VENIPUNCTURE: CPT

## 2025-01-01 PROCEDURE — 83735 ASSAY OF MAGNESIUM: CPT

## 2025-01-01 PROCEDURE — 83880 ASSAY OF NATRIURETIC PEPTIDE: CPT

## 2025-01-01 PROCEDURE — APPSS180 APP SPLIT SHARED TIME > 60 MINUTES: Performed by: NURSE PRACTITIONER

## 2025-01-01 PROCEDURE — 6370000000 HC RX 637 (ALT 250 FOR IP)

## 2025-01-01 PROCEDURE — 80053 COMPREHEN METABOLIC PANEL: CPT

## 2025-01-01 PROCEDURE — 84100 ASSAY OF PHOSPHORUS: CPT

## 2025-01-01 PROCEDURE — 97530 THERAPEUTIC ACTIVITIES: CPT

## 2025-01-01 PROCEDURE — 2060000000 HC ICU INTERMEDIATE R&B

## 2025-01-01 PROCEDURE — 6370000000 HC RX 637 (ALT 250 FOR IP): Performed by: STUDENT IN AN ORGANIZED HEALTH CARE EDUCATION/TRAINING PROGRAM

## 2025-01-01 PROCEDURE — 87070 CULTURE OTHR SPECIMN AEROBIC: CPT

## 2025-01-01 PROCEDURE — 99232 SBSQ HOSP IP/OBS MODERATE 35: CPT | Performed by: INTERNAL MEDICINE

## 2025-01-01 PROCEDURE — 94660 CPAP INITIATION&MGMT: CPT

## 2025-01-01 PROCEDURE — 93010 ELECTROCARDIOGRAM REPORT: CPT | Performed by: INTERNAL MEDICINE

## 2025-01-01 PROCEDURE — 2700000000 HC OXYGEN THERAPY PER DAY

## 2025-01-01 PROCEDURE — 2500000003 HC RX 250 WO HCPCS

## 2025-01-01 PROCEDURE — 82962 GLUCOSE BLOOD TEST: CPT

## 2025-01-01 PROCEDURE — 84300 ASSAY OF URINE SODIUM: CPT

## 2025-01-01 PROCEDURE — 87340 HEPATITIS B SURFACE AG IA: CPT

## 2025-01-01 PROCEDURE — 71045 X-RAY EXAM CHEST 1 VIEW: CPT

## 2025-01-01 PROCEDURE — 80307 DRUG TEST PRSMV CHEM ANLYZR: CPT

## 2025-01-01 PROCEDURE — 6370000000 HC RX 637 (ALT 250 FOR IP): Performed by: INTERNAL MEDICINE

## 2025-01-01 PROCEDURE — 6360000002 HC RX W HCPCS: Performed by: INTERNAL MEDICINE

## 2025-01-01 PROCEDURE — 36556 INSERT NON-TUNNEL CV CATH: CPT

## 2025-01-01 PROCEDURE — 93005 ELECTROCARDIOGRAM TRACING: CPT | Performed by: INTERNAL MEDICINE

## 2025-01-01 PROCEDURE — 99232 SBSQ HOSP IP/OBS MODERATE 35: CPT | Performed by: STUDENT IN AN ORGANIZED HEALTH CARE EDUCATION/TRAINING PROGRAM

## 2025-01-01 PROCEDURE — 96375 TX/PRO/DX INJ NEW DRUG ADDON: CPT

## 2025-01-01 PROCEDURE — 86140 C-REACTIVE PROTEIN: CPT

## 2025-01-01 PROCEDURE — 82248 BILIRUBIN DIRECT: CPT

## 2025-01-01 PROCEDURE — 96361 HYDRATE IV INFUSION ADD-ON: CPT

## 2025-01-01 PROCEDURE — 80048 BASIC METABOLIC PNL TOTAL CA: CPT

## 2025-01-01 PROCEDURE — 84443 ASSAY THYROID STIM HORMONE: CPT

## 2025-01-01 PROCEDURE — 0BH17EZ INSERTION OF ENDOTRACHEAL AIRWAY INTO TRACHEA, VIA NATURAL OR ARTIFICIAL OPENING: ICD-10-PCS | Performed by: INTERNAL MEDICINE

## 2025-01-01 PROCEDURE — 74018 RADEX ABDOMEN 1 VIEW: CPT

## 2025-01-01 PROCEDURE — 99233 SBSQ HOSP IP/OBS HIGH 50: CPT | Performed by: INTERNAL MEDICINE

## 2025-01-01 PROCEDURE — 6360000002 HC RX W HCPCS: Performed by: NURSE PRACTITIONER

## 2025-01-01 PROCEDURE — 85025 COMPLETE CBC W/AUTO DIFF WBC: CPT

## 2025-01-01 PROCEDURE — 93005 ELECTROCARDIOGRAM TRACING: CPT

## 2025-01-01 PROCEDURE — 4A133J1 MONITORING OF ARTERIAL PULSE, PERIPHERAL, PERCUTANEOUS APPROACH: ICD-10-PCS

## 2025-01-01 PROCEDURE — 84484 ASSAY OF TROPONIN QUANT: CPT

## 2025-01-01 PROCEDURE — 2580000003 HC RX 258: Performed by: INTERNAL MEDICINE

## 2025-01-01 PROCEDURE — 87077 CULTURE AEROBIC IDENTIFY: CPT

## 2025-01-01 PROCEDURE — 37799 UNLISTED PX VASCULAR SURGERY: CPT

## 2025-01-01 PROCEDURE — 6370000000 HC RX 637 (ALT 250 FOR IP): Performed by: NURSE PRACTITIONER

## 2025-01-01 PROCEDURE — 83970 ASSAY OF PARATHORMONE: CPT

## 2025-01-01 PROCEDURE — 99291 CRITICAL CARE FIRST HOUR: CPT | Performed by: STUDENT IN AN ORGANIZED HEALTH CARE EDUCATION/TRAINING PROGRAM

## 2025-01-01 PROCEDURE — 93970 EXTREMITY STUDY: CPT

## 2025-01-01 PROCEDURE — 99239 HOSP IP/OBS DSCHRG MGMT >30: CPT | Performed by: INTERNAL MEDICINE

## 2025-01-01 PROCEDURE — 76705 ECHO EXAM OF ABDOMEN: CPT

## 2025-01-01 PROCEDURE — 86803 HEPATITIS C AB TEST: CPT

## 2025-01-01 PROCEDURE — 80179 DRUG ASSAY SALICYLATE: CPT

## 2025-01-01 PROCEDURE — 92950 HEART/LUNG RESUSCITATION CPR: CPT

## 2025-01-01 PROCEDURE — G0480 DRUG TEST DEF 1-7 CLASSES: HCPCS

## 2025-01-01 PROCEDURE — 88112 CYTOPATH CELL ENHANCE TECH: CPT

## 2025-01-01 PROCEDURE — 82728 ASSAY OF FERRITIN: CPT

## 2025-01-01 PROCEDURE — 85652 RBC SED RATE AUTOMATED: CPT

## 2025-01-01 PROCEDURE — 80143 DRUG ASSAY ACETAMINOPHEN: CPT

## 2025-01-01 PROCEDURE — 06HY33Z INSERTION OF INFUSION DEVICE INTO LOWER VEIN, PERCUTANEOUS APPROACH: ICD-10-PCS

## 2025-01-01 PROCEDURE — P9047 ALBUMIN (HUMAN), 25%, 50ML: HCPCS | Performed by: NURSE PRACTITIONER

## 2025-01-01 PROCEDURE — 6360000002 HC RX W HCPCS

## 2025-01-01 PROCEDURE — 86317 IMMUNOASSAY INFECTIOUS AGENT: CPT

## 2025-01-01 PROCEDURE — 71250 CT THORAX DX C-: CPT

## 2025-01-01 PROCEDURE — 2580000003 HC RX 258

## 2025-01-01 PROCEDURE — 51702 INSERT TEMP BLADDER CATH: CPT

## 2025-01-01 PROCEDURE — 96374 THER/PROPH/DIAG INJ IV PUSH: CPT

## 2025-01-01 PROCEDURE — 5A1935Z RESPIRATORY VENTILATION, LESS THAN 24 CONSECUTIVE HOURS: ICD-10-PCS | Performed by: INTERNAL MEDICINE

## 2025-01-01 PROCEDURE — 94664 DEMO&/EVAL PT USE INHALER: CPT

## 2025-01-01 PROCEDURE — 2500000003 HC RX 250 WO HCPCS: Performed by: INTERNAL MEDICINE

## 2025-01-01 PROCEDURE — 82533 TOTAL CORTISOL: CPT

## 2025-01-01 PROCEDURE — 5A12012 PERFORMANCE OF CARDIAC OUTPUT, SINGLE, MANUAL: ICD-10-PCS | Performed by: INTERNAL MEDICINE

## 2025-01-01 PROCEDURE — 36600 WITHDRAWAL OF ARTERIAL BLOOD: CPT

## 2025-01-01 PROCEDURE — 87205 SMEAR GRAM STAIN: CPT

## 2025-01-01 PROCEDURE — 84466 ASSAY OF TRANSFERRIN: CPT

## 2025-01-01 PROCEDURE — 93005 ELECTROCARDIOGRAM TRACING: CPT | Performed by: NURSE PRACTITIONER

## 2025-01-01 PROCEDURE — 2500000003 HC RX 250 WO HCPCS: Performed by: HOSPITALIST

## 2025-01-01 PROCEDURE — 99223 1ST HOSP IP/OBS HIGH 75: CPT | Performed by: INTERNAL MEDICINE

## 2025-01-01 PROCEDURE — 82805 BLOOD GASES W/O2 SATURATION: CPT

## 2025-01-01 PROCEDURE — 97165 OT EVAL LOW COMPLEX 30 MIN: CPT

## 2025-01-01 PROCEDURE — 80074 ACUTE HEPATITIS PANEL: CPT

## 2025-01-01 PROCEDURE — 83605 ASSAY OF LACTIC ACID: CPT

## 2025-01-01 PROCEDURE — 99222 1ST HOSP IP/OBS MODERATE 55: CPT | Performed by: NURSE PRACTITIONER

## 2025-01-01 PROCEDURE — 36620 INSERTION CATHETER ARTERY: CPT

## 2025-01-01 PROCEDURE — 82436 ASSAY OF URINE CHLORIDE: CPT

## 2025-01-01 PROCEDURE — 31500 INSERT EMERGENCY AIRWAY: CPT

## 2025-01-01 PROCEDURE — 2580000003 HC RX 258: Performed by: STUDENT IN AN ORGANIZED HEALTH CARE EDUCATION/TRAINING PROGRAM

## 2025-01-01 PROCEDURE — 99231 SBSQ HOSP IP/OBS SF/LOW 25: CPT | Performed by: NURSE PRACTITIONER

## 2025-01-01 PROCEDURE — 83540 ASSAY OF IRON: CPT

## 2025-01-01 PROCEDURE — 2580000003 HC RX 258: Performed by: NURSE PRACTITIONER

## 2025-01-01 PROCEDURE — 94002 VENT MGMT INPAT INIT DAY: CPT

## 2025-01-01 PROCEDURE — 05HN33Z INSERTION OF INFUSION DEVICE INTO LEFT INTERNAL JUGULAR VEIN, PERCUTANEOUS APPROACH: ICD-10-PCS | Performed by: INTERNAL MEDICINE

## 2025-01-01 PROCEDURE — 84145 PROCALCITONIN (PCT): CPT

## 2025-01-01 PROCEDURE — 82140 ASSAY OF AMMONIA: CPT

## 2025-01-01 PROCEDURE — 5A09457 ASSISTANCE WITH RESPIRATORY VENTILATION, 24-96 CONSECUTIVE HOURS, CONTINUOUS POSITIVE AIRWAY PRESSURE: ICD-10-PCS | Performed by: INTERNAL MEDICINE

## 2025-01-01 PROCEDURE — 81001 URINALYSIS AUTO W/SCOPE: CPT

## 2025-01-01 PROCEDURE — 82330 ASSAY OF CALCIUM: CPT

## 2025-01-01 PROCEDURE — 51798 US URINE CAPACITY MEASURE: CPT

## 2025-01-01 PROCEDURE — 84540 ASSAY OF URINE/UREA-N: CPT

## 2025-01-01 PROCEDURE — 82306 VITAMIN D 25 HYDROXY: CPT

## 2025-01-01 PROCEDURE — 51700 IRRIGATION OF BLADDER: CPT

## 2025-01-01 PROCEDURE — 4A133B1 MONITORING OF ARTERIAL PRESSURE, PERIPHERAL, PERCUTANEOUS APPROACH: ICD-10-PCS

## 2025-01-01 PROCEDURE — 99285 EMERGENCY DEPT VISIT HI MDM: CPT

## 2025-01-01 PROCEDURE — 82570 ASSAY OF URINE CREATININE: CPT

## 2025-01-01 PROCEDURE — 97161 PT EVAL LOW COMPLEX 20 MIN: CPT

## 2025-01-01 RX ORDER — FENTANYL CITRATE-0.9 % NACL/PF 10 MCG/ML
25-200 PLASTIC BAG, INJECTION (ML) INTRAVENOUS CONTINUOUS
Refills: 0 | Status: DISCONTINUED | OUTPATIENT
Start: 2025-01-01 | End: 2025-01-01 | Stop reason: HOSPADM

## 2025-01-01 RX ORDER — ALBUTEROL SULFATE 0.63 MG/3ML
0.63 SOLUTION RESPIRATORY (INHALATION)
Status: DISCONTINUED | OUTPATIENT
Start: 2025-01-01 | End: 2025-01-01 | Stop reason: HOSPADM

## 2025-01-01 RX ORDER — SODIUM CHLORIDE 0.9 % (FLUSH) 0.9 %
5-40 SYRINGE (ML) INJECTION EVERY 12 HOURS SCHEDULED
Status: DISCONTINUED | OUTPATIENT
Start: 2025-01-01 | End: 2025-01-01 | Stop reason: HOSPADM

## 2025-01-01 RX ORDER — INDOMETHACIN 25 MG/1
CAPSULE ORAL
Status: COMPLETED | OUTPATIENT
Start: 2025-01-01 | End: 2025-01-01

## 2025-01-01 RX ORDER — HEPARIN SODIUM 10000 [USP'U]/ML
10000 INJECTION, SOLUTION INTRAVENOUS; SUBCUTANEOUS ONCE
Status: DISCONTINUED | OUTPATIENT
Start: 2025-01-01 | End: 2025-01-01

## 2025-01-01 RX ORDER — CHOLECALCIFEROL (VITAMIN D3) 50 MCG
2000 TABLET ORAL DAILY
Status: DISCONTINUED | OUTPATIENT
Start: 2025-01-01 | End: 2025-01-01

## 2025-01-01 RX ORDER — EPINEPHRINE IN SOD CHLOR,ISO 1 MG/10 ML
SYRINGE (ML) INTRAVENOUS
Status: COMPLETED | OUTPATIENT
Start: 2025-01-01 | End: 2025-01-01

## 2025-01-01 RX ORDER — BUMETANIDE 0.25 MG/ML
2 INJECTION, SOLUTION INTRAMUSCULAR; INTRAVENOUS ONCE
Status: COMPLETED | OUTPATIENT
Start: 2025-01-01 | End: 2025-01-01

## 2025-01-01 RX ORDER — WARFARIN SODIUM 5 MG/1
5 TABLET ORAL
Status: COMPLETED | OUTPATIENT
Start: 2025-01-01 | End: 2025-01-01

## 2025-01-01 RX ORDER — MIDODRINE HYDROCHLORIDE 5 MG/1
5 TABLET ORAL ONCE
Status: COMPLETED | OUTPATIENT
Start: 2025-01-01 | End: 2025-01-01

## 2025-01-01 RX ORDER — WARFARIN SODIUM 4 MG/1
4 TABLET ORAL
Status: COMPLETED | OUTPATIENT
Start: 2025-01-01 | End: 2025-01-01

## 2025-01-01 RX ORDER — HEPARIN SODIUM 1000 [USP'U]/ML
2800 INJECTION, SOLUTION INTRAVENOUS; SUBCUTANEOUS PRN
Status: DISCONTINUED | OUTPATIENT
Start: 2025-01-01 | End: 2025-01-01 | Stop reason: HOSPADM

## 2025-01-01 RX ORDER — CALCIUM GLUCONATE 98 MG/ML
1000 INJECTION, SOLUTION INTRAVENOUS ONCE
Status: COMPLETED | OUTPATIENT
Start: 2025-01-01 | End: 2025-01-01

## 2025-01-01 RX ORDER — BUMETANIDE 0.25 MG/ML
1 INJECTION, SOLUTION INTRAMUSCULAR; INTRAVENOUS ONCE
Status: COMPLETED | OUTPATIENT
Start: 2025-01-01 | End: 2025-01-01

## 2025-01-01 RX ORDER — SODIUM CHLORIDE 9 MG/ML
INJECTION, SOLUTION INTRAVENOUS ONCE
Status: COMPLETED | OUTPATIENT
Start: 2025-01-01 | End: 2025-01-01

## 2025-01-01 RX ORDER — HYDROCODONE BITARTRATE AND ACETAMINOPHEN 5; 325 MG/1; MG/1
1 TABLET ORAL EVERY 6 HOURS PRN
Status: COMPLETED | OUTPATIENT
Start: 2025-01-01 | End: 2025-01-01

## 2025-01-01 RX ORDER — 0.9 % SODIUM CHLORIDE 0.9 %
500 INTRAVENOUS SOLUTION INTRAVENOUS ONCE
Status: COMPLETED | OUTPATIENT
Start: 2025-01-01 | End: 2025-01-01

## 2025-01-01 RX ORDER — WARFARIN SODIUM 2.5 MG/1
2.5 TABLET ORAL
Status: COMPLETED | OUTPATIENT
Start: 2025-01-01 | End: 2025-01-01

## 2025-01-01 RX ORDER — ROCURONIUM BROMIDE 10 MG/ML
50 INJECTION, SOLUTION INTRAVENOUS ONCE
Status: COMPLETED | OUTPATIENT
Start: 2025-01-01 | End: 2025-01-01

## 2025-01-01 RX ORDER — ONDANSETRON 4 MG/1
4 TABLET, ORALLY DISINTEGRATING ORAL EVERY 8 HOURS PRN
Status: DISCONTINUED | OUTPATIENT
Start: 2025-01-01 | End: 2025-01-01 | Stop reason: HOSPADM

## 2025-01-01 RX ORDER — GLUCAGON 1 MG/ML
1 KIT INJECTION PRN
Status: DISCONTINUED | OUTPATIENT
Start: 2025-01-01 | End: 2025-01-01 | Stop reason: HOSPADM

## 2025-01-01 RX ORDER — ATORVASTATIN CALCIUM 40 MG/1
40 TABLET, FILM COATED ORAL DAILY
Status: DISCONTINUED | OUTPATIENT
Start: 2025-01-01 | End: 2025-01-01

## 2025-01-01 RX ORDER — ACETAMINOPHEN 650 MG/1
650 SUPPOSITORY RECTAL EVERY 6 HOURS PRN
Status: DISCONTINUED | OUTPATIENT
Start: 2025-01-01 | End: 2025-01-01 | Stop reason: HOSPADM

## 2025-01-01 RX ORDER — METOPROLOL SUCCINATE 50 MG/1
50 TABLET, EXTENDED RELEASE ORAL 2 TIMES DAILY
Status: DISCONTINUED | OUTPATIENT
Start: 2025-01-01 | End: 2025-01-01

## 2025-01-01 RX ORDER — INDOMETHACIN 25 MG/1
50 CAPSULE ORAL ONCE
Status: COMPLETED | OUTPATIENT
Start: 2025-01-01 | End: 2025-01-01

## 2025-01-01 RX ORDER — SODIUM CHLORIDE 9 MG/ML
INJECTION, SOLUTION INTRAVENOUS CONTINUOUS
Status: DISCONTINUED | OUTPATIENT
Start: 2025-01-01 | End: 2025-01-01

## 2025-01-01 RX ORDER — HYDROCORTISONE SODIUM SUCCINATE 100 MG/2ML
100 INJECTION INTRAMUSCULAR; INTRAVENOUS EVERY 8 HOURS
Status: DISCONTINUED | OUTPATIENT
Start: 2025-01-01 | End: 2025-01-01 | Stop reason: HOSPADM

## 2025-01-01 RX ORDER — ALBUMIN (HUMAN) 12.5 G/50ML
25 SOLUTION INTRAVENOUS ONCE
Status: COMPLETED | OUTPATIENT
Start: 2025-01-01 | End: 2025-01-01

## 2025-01-01 RX ORDER — SODIUM CHLORIDE 9 MG/ML
INJECTION, SOLUTION INTRAVENOUS PRN
Status: DISCONTINUED | OUTPATIENT
Start: 2025-01-01 | End: 2025-01-01

## 2025-01-01 RX ORDER — SODIUM CHLORIDE 0.9 % (FLUSH) 0.9 %
5-40 SYRINGE (ML) INJECTION PRN
Status: DISCONTINUED | OUTPATIENT
Start: 2025-01-01 | End: 2025-01-01 | Stop reason: HOSPADM

## 2025-01-01 RX ORDER — SODIUM CHLORIDE 9 MG/ML
INJECTION, SOLUTION INTRAVENOUS PRN
Status: DISCONTINUED | OUTPATIENT
Start: 2025-01-01 | End: 2025-01-01 | Stop reason: HOSPADM

## 2025-01-01 RX ORDER — AMIODARONE HYDROCHLORIDE 50 MG/ML
INJECTION, SOLUTION INTRAVENOUS
Status: COMPLETED | OUTPATIENT
Start: 2025-01-01 | End: 2025-01-01

## 2025-01-01 RX ORDER — MIDAZOLAM HYDROCHLORIDE 2 MG/2ML
1 INJECTION, SOLUTION INTRAMUSCULAR; INTRAVENOUS EVERY 30 MIN PRN
Status: DISCONTINUED | OUTPATIENT
Start: 2025-01-01 | End: 2025-01-01 | Stop reason: HOSPADM

## 2025-01-01 RX ORDER — MORPHINE SULFATE 2 MG/ML
2 INJECTION, SOLUTION INTRAMUSCULAR; INTRAVENOUS ONCE
Status: DISCONTINUED | OUTPATIENT
Start: 2025-01-01 | End: 2025-01-01

## 2025-01-01 RX ORDER — BUMETANIDE 1 MG/1
1 TABLET ORAL 2 TIMES DAILY
Status: DISCONTINUED | OUTPATIENT
Start: 2025-01-01 | End: 2025-01-01

## 2025-01-01 RX ORDER — ACETAMINOPHEN 325 MG/1
650 TABLET ORAL EVERY 6 HOURS PRN
Status: DISCONTINUED | OUTPATIENT
Start: 2025-01-01 | End: 2025-01-01 | Stop reason: HOSPADM

## 2025-01-01 RX ORDER — MIDAZOLAM HYDROCHLORIDE 1 MG/ML
1-10 INJECTION, SOLUTION INTRAVENOUS CONTINUOUS
Status: DISCONTINUED | OUTPATIENT
Start: 2025-01-01 | End: 2025-01-01 | Stop reason: HOSPADM

## 2025-01-01 RX ORDER — BUMETANIDE 0.25 MG/ML
1 INJECTION, SOLUTION INTRAMUSCULAR; INTRAVENOUS 2 TIMES DAILY
Status: DISCONTINUED | OUTPATIENT
Start: 2025-01-01 | End: 2025-01-01

## 2025-01-01 RX ORDER — ONDANSETRON 2 MG/ML
4 INJECTION INTRAMUSCULAR; INTRAVENOUS EVERY 6 HOURS PRN
Status: DISCONTINUED | OUTPATIENT
Start: 2025-01-01 | End: 2025-01-01 | Stop reason: HOSPADM

## 2025-01-01 RX ORDER — INSULIN LISPRO 100 [IU]/ML
0-4 INJECTION, SOLUTION INTRAVENOUS; SUBCUTANEOUS
Status: DISCONTINUED | OUTPATIENT
Start: 2025-01-01 | End: 2025-01-01 | Stop reason: HOSPADM

## 2025-01-01 RX ORDER — MIDODRINE HYDROCHLORIDE 5 MG/1
10 TABLET ORAL 3 TIMES DAILY
Status: DISCONTINUED | OUTPATIENT
Start: 2025-01-01 | End: 2025-01-01

## 2025-01-01 RX ORDER — MIDODRINE HYDROCHLORIDE 5 MG/1
15 TABLET ORAL 3 TIMES DAILY
Status: DISCONTINUED | OUTPATIENT
Start: 2025-01-01 | End: 2025-01-01

## 2025-01-01 RX ORDER — SODIUM BICARBONATE 650 MG/1
1300 TABLET ORAL 3 TIMES DAILY
Status: DISCONTINUED | OUTPATIENT
Start: 2025-01-01 | End: 2025-01-01

## 2025-01-01 RX ORDER — CALCIUM ACETATE 667 MG/1
667 CAPSULE ORAL
Status: DISCONTINUED | OUTPATIENT
Start: 2025-01-01 | End: 2025-01-01

## 2025-01-01 RX ORDER — INDOMETHACIN 25 MG/1
100 CAPSULE ORAL ONCE
Status: COMPLETED | OUTPATIENT
Start: 2025-01-01 | End: 2025-01-01

## 2025-01-01 RX ORDER — CALCIUM CHLORIDE 100 MG/ML
INJECTION INTRAVENOUS; INTRAVENTRICULAR
Status: COMPLETED | OUTPATIENT
Start: 2025-01-01 | End: 2025-01-01

## 2025-01-01 RX ORDER — CALCIUM GLUCONATE 20 MG/ML
1000 INJECTION, SOLUTION INTRAVENOUS ONCE
Status: COMPLETED | OUTPATIENT
Start: 2025-01-01 | End: 2025-01-01

## 2025-01-01 RX ORDER — POLYETHYLENE GLYCOL 3350 17 G/17G
17 POWDER, FOR SOLUTION ORAL DAILY PRN
Status: DISCONTINUED | OUTPATIENT
Start: 2025-01-01 | End: 2025-01-01 | Stop reason: HOSPADM

## 2025-01-01 RX ORDER — MORPHINE SULFATE 2 MG/ML
2 INJECTION, SOLUTION INTRAMUSCULAR; INTRAVENOUS ONCE
Status: COMPLETED | OUTPATIENT
Start: 2025-01-01 | End: 2025-01-01

## 2025-01-01 RX ORDER — FENTANYL CITRATE-0.9 % NACL/PF 20 MCG/2ML
50 SYRINGE (ML) INTRAVENOUS EVERY 30 MIN PRN
Refills: 0 | Status: DISCONTINUED | OUTPATIENT
Start: 2025-01-01 | End: 2025-01-01 | Stop reason: HOSPADM

## 2025-01-01 RX ORDER — DEXTROSE MONOHYDRATE 100 MG/ML
INJECTION, SOLUTION INTRAVENOUS CONTINUOUS PRN
Status: DISCONTINUED | OUTPATIENT
Start: 2025-01-01 | End: 2025-01-01 | Stop reason: HOSPADM

## 2025-01-01 RX ORDER — 0.9 % SODIUM CHLORIDE 0.9 %
1000 INTRAVENOUS SOLUTION INTRAVENOUS ONCE
Status: COMPLETED | OUTPATIENT
Start: 2025-01-01 | End: 2025-01-01

## 2025-01-01 RX ADMIN — MICONAZOLE NITRATE: 20 POWDER TOPICAL at 08:01

## 2025-01-01 RX ADMIN — MIDODRINE HYDROCHLORIDE 10 MG: 5 TABLET ORAL at 20:46

## 2025-01-01 RX ADMIN — INSULIN LISPRO 1 UNITS: 100 INJECTION, SOLUTION INTRAVENOUS; SUBCUTANEOUS at 22:29

## 2025-01-01 RX ADMIN — MICONAZOLE NITRATE: 20 POWDER TOPICAL at 19:29

## 2025-01-01 RX ADMIN — ATORVASTATIN CALCIUM 40 MG: 40 TABLET, FILM COATED ORAL at 09:20

## 2025-01-01 RX ADMIN — SODIUM CHLORIDE, PRESERVATIVE FREE 10 ML: 5 INJECTION INTRAVENOUS at 20:18

## 2025-01-01 RX ADMIN — Medication 9 MG: at 20:13

## 2025-01-01 RX ADMIN — MIDODRINE HYDROCHLORIDE 10 MG: 5 TABLET ORAL at 20:38

## 2025-01-01 RX ADMIN — PETROLATUM: 420 OINTMENT TOPICAL at 20:12

## 2025-01-01 RX ADMIN — MIDODRINE HYDROCHLORIDE 15 MG: 5 TABLET ORAL at 09:37

## 2025-01-01 RX ADMIN — INSULIN LISPRO 1 UNITS: 100 INJECTION, SOLUTION INTRAVENOUS; SUBCUTANEOUS at 16:14

## 2025-01-01 RX ADMIN — ALBUTEROL SULFATE 0.63 MG: 0.63 SOLUTION RESPIRATORY (INHALATION) at 21:36

## 2025-01-01 RX ADMIN — SODIUM BICARBONATE 50 MEQ: 84 INJECTION INTRAVENOUS at 05:24

## 2025-01-01 RX ADMIN — PETROLATUM: 420 OINTMENT TOPICAL at 09:37

## 2025-01-01 RX ADMIN — INSULIN LISPRO 2 UNITS: 100 INJECTION, SOLUTION INTRAVENOUS; SUBCUTANEOUS at 21:34

## 2025-01-01 RX ADMIN — SODIUM BICARBONATE 1300 MG: 650 TABLET ORAL at 09:20

## 2025-01-01 RX ADMIN — BUMETANIDE 1 MG: 0.25 INJECTION INTRAMUSCULAR; INTRAVENOUS at 16:07

## 2025-01-01 RX ADMIN — METOPROLOL SUCCINATE 50 MG: 50 TABLET, EXTENDED RELEASE ORAL at 21:34

## 2025-01-01 RX ADMIN — SODIUM BICARBONATE 1300 MG: 650 TABLET ORAL at 08:13

## 2025-01-01 RX ADMIN — SODIUM CHLORIDE, PRESERVATIVE FREE 10 ML: 5 INJECTION INTRAVENOUS at 22:30

## 2025-01-01 RX ADMIN — HYDROCODONE BITARTRATE AND ACETAMINOPHEN 1 TABLET: 5; 325 TABLET ORAL at 00:02

## 2025-01-01 RX ADMIN — SODIUM CHLORIDE, PRESERVATIVE FREE 10 ML: 5 INJECTION INTRAVENOUS at 20:51

## 2025-01-01 RX ADMIN — MICONAZOLE NITRATE: 20 POWDER TOPICAL at 08:12

## 2025-01-01 RX ADMIN — Medication 2000 UNITS: at 09:37

## 2025-01-01 RX ADMIN — SODIUM BICARBONATE 1300 MG: 650 TABLET ORAL at 20:40

## 2025-01-01 RX ADMIN — Medication 50 MEQ: at 05:25

## 2025-01-01 RX ADMIN — POLYETHYLENE GLYCOL 3350 17 G: 17 POWDER, FOR SOLUTION ORAL at 17:14

## 2025-01-01 RX ADMIN — SODIUM BICARBONATE 1300 MG: 650 TABLET ORAL at 20:04

## 2025-01-01 RX ADMIN — BUMETANIDE 1 MG: 0.25 INJECTION INTRAMUSCULAR; INTRAVENOUS at 08:16

## 2025-01-01 RX ADMIN — MIDODRINE HYDROCHLORIDE 10 MG: 5 TABLET ORAL at 13:49

## 2025-01-01 RX ADMIN — Medication 1 MG: at 08:56

## 2025-01-01 RX ADMIN — INSULIN LISPRO 1 UNITS: 100 INJECTION, SOLUTION INTRAVENOUS; SUBCUTANEOUS at 21:06

## 2025-01-01 RX ADMIN — MIDODRINE HYDROCHLORIDE 10 MG: 5 TABLET ORAL at 20:18

## 2025-01-01 RX ADMIN — MIDODRINE HYDROCHLORIDE 10 MG: 5 TABLET ORAL at 15:29

## 2025-01-01 RX ADMIN — SODIUM BICARBONATE 1300 MG: 650 TABLET ORAL at 09:37

## 2025-01-01 RX ADMIN — MIDODRINE HYDROCHLORIDE 10 MG: 5 TABLET ORAL at 16:07

## 2025-01-01 RX ADMIN — PHENYLEPHRINE HYDROCHLORIDE 300 MCG/MIN: 10 INJECTION INTRAVENOUS at 09:53

## 2025-01-01 RX ADMIN — WARFARIN SODIUM 2.5 MG: 2.5 TABLET ORAL at 16:13

## 2025-01-01 RX ADMIN — MIDODRINE HYDROCHLORIDE 10 MG: 5 TABLET ORAL at 14:18

## 2025-01-01 RX ADMIN — SODIUM CHLORIDE: 0.9 INJECTION, SOLUTION INTRAVENOUS at 23:41

## 2025-01-01 RX ADMIN — Medication 1 MG: at 08:06

## 2025-01-01 RX ADMIN — SODIUM BICARBONATE 1300 MG: 650 TABLET ORAL at 20:09

## 2025-01-01 RX ADMIN — PETROLATUM: 420 OINTMENT TOPICAL at 08:01

## 2025-01-01 RX ADMIN — SODIUM CHLORIDE 100 MCG/MIN: 0.9 INJECTION, SOLUTION INTRAVENOUS at 06:27

## 2025-01-01 RX ADMIN — Medication 2000 UNITS: at 08:52

## 2025-01-01 RX ADMIN — Medication 50 MEQ: at 08:56

## 2025-01-01 RX ADMIN — MICONAZOLE NITRATE: 20 POWDER TOPICAL at 08:16

## 2025-01-01 RX ADMIN — SODIUM CHLORIDE 100 MCG/MIN: 0.9 INJECTION, SOLUTION INTRAVENOUS at 05:30

## 2025-01-01 RX ADMIN — Medication 1 MG: at 06:14

## 2025-01-01 RX ADMIN — SODIUM BICARBONATE 1300 MG: 650 TABLET ORAL at 08:41

## 2025-01-01 RX ADMIN — Medication 1 MG: at 05:02

## 2025-01-01 RX ADMIN — MIDODRINE HYDROCHLORIDE 10 MG: 5 TABLET ORAL at 20:04

## 2025-01-01 RX ADMIN — MIDODRINE HYDROCHLORIDE 10 MG: 5 TABLET ORAL at 14:01

## 2025-01-01 RX ADMIN — Medication 9 MG: at 20:50

## 2025-01-01 RX ADMIN — AMIODARONE HYDROCHLORIDE 0.5 MG/MIN: 50 INJECTION, SOLUTION INTRAVENOUS at 13:08

## 2025-01-01 RX ADMIN — SODIUM BICARBONATE 1300 MG: 650 TABLET ORAL at 21:34

## 2025-01-01 RX ADMIN — PETROLATUM: 420 OINTMENT TOPICAL at 08:52

## 2025-01-01 RX ADMIN — MIDAZOLAM HYDROCHLORIDE 4 MG/HR: 5 INJECTION, SOLUTION INTRAMUSCULAR; INTRAVENOUS at 06:34

## 2025-01-01 RX ADMIN — MICONAZOLE NITRATE: 20 POWDER TOPICAL at 20:18

## 2025-01-01 RX ADMIN — MORPHINE SULFATE 2 MG: 2 INJECTION, SOLUTION INTRAMUSCULAR; INTRAVENOUS at 20:52

## 2025-01-01 RX ADMIN — PETROLATUM: 420 OINTMENT TOPICAL at 21:07

## 2025-01-01 RX ADMIN — MIDODRINE HYDROCHLORIDE 10 MG: 5 TABLET ORAL at 22:25

## 2025-01-01 RX ADMIN — Medication 1 MG: at 09:24

## 2025-01-01 RX ADMIN — INSULIN LISPRO 1 UNITS: 100 INJECTION, SOLUTION INTRAVENOUS; SUBCUTANEOUS at 16:35

## 2025-01-01 RX ADMIN — SODIUM BICARBONATE 1300 MG: 650 TABLET ORAL at 14:18

## 2025-01-01 RX ADMIN — ATORVASTATIN CALCIUM 40 MG: 40 TABLET, FILM COATED ORAL at 09:37

## 2025-01-01 RX ADMIN — SODIUM CHLORIDE: 0.9 INJECTION, SOLUTION INTRAVENOUS at 17:39

## 2025-01-01 RX ADMIN — SODIUM CHLORIDE, PRESERVATIVE FREE 10 ML: 5 INJECTION INTRAVENOUS at 09:38

## 2025-01-01 RX ADMIN — SODIUM CHLORIDE 500 ML: 0.9 INJECTION, SOLUTION INTRAVENOUS at 00:14

## 2025-01-01 RX ADMIN — MIDODRINE HYDROCHLORIDE 15 MG: 5 TABLET ORAL at 14:21

## 2025-01-01 RX ADMIN — METOPROLOL SUCCINATE 50 MG: 50 TABLET, EXTENDED RELEASE ORAL at 21:06

## 2025-01-01 RX ADMIN — BUMETANIDE 1 MG: 0.25 INJECTION INTRAMUSCULAR; INTRAVENOUS at 16:17

## 2025-01-01 RX ADMIN — SODIUM CHLORIDE, PRESERVATIVE FREE 10 ML: 5 INJECTION INTRAVENOUS at 08:43

## 2025-01-01 RX ADMIN — METOPROLOL SUCCINATE 50 MG: 50 TABLET, EXTENDED RELEASE ORAL at 20:01

## 2025-01-01 RX ADMIN — SODIUM BICARBONATE 1300 MG: 650 TABLET ORAL at 19:58

## 2025-01-01 RX ADMIN — HYDROCODONE BITARTRATE AND ACETAMINOPHEN 1 TABLET: 5; 325 TABLET ORAL at 12:03

## 2025-01-01 RX ADMIN — SODIUM BICARBONATE 1300 MG: 650 TABLET ORAL at 08:52

## 2025-01-01 RX ADMIN — Medication 9 MG: at 20:04

## 2025-01-01 RX ADMIN — SODIUM CHLORIDE, PRESERVATIVE FREE 10 ML: 5 INJECTION INTRAVENOUS at 20:10

## 2025-01-01 RX ADMIN — Medication 50 MEQ: at 09:42

## 2025-01-01 RX ADMIN — MIDODRINE HYDROCHLORIDE 15 MG: 5 TABLET ORAL at 19:58

## 2025-01-01 RX ADMIN — PETROLATUM: 420 OINTMENT TOPICAL at 16:13

## 2025-01-01 RX ADMIN — MICONAZOLE NITRATE: 20 POWDER TOPICAL at 20:02

## 2025-01-01 RX ADMIN — MICONAZOLE NITRATE: 20 POWDER TOPICAL at 20:12

## 2025-01-01 RX ADMIN — MIDODRINE HYDROCHLORIDE 10 MG: 5 TABLET ORAL at 09:20

## 2025-01-01 RX ADMIN — BUMETANIDE 0.5 MG/HR: 0.25 INJECTION INTRAMUSCULAR; INTRAVENOUS at 13:21

## 2025-01-01 RX ADMIN — SODIUM BICARBONATE 1300 MG: 650 TABLET ORAL at 08:06

## 2025-01-01 RX ADMIN — Medication 9 MG: at 20:46

## 2025-01-01 RX ADMIN — WARFARIN SODIUM 5 MG: 5 TABLET ORAL at 17:49

## 2025-01-01 RX ADMIN — SODIUM BICARBONATE 1300 MG: 650 TABLET ORAL at 14:22

## 2025-01-01 RX ADMIN — MIDODRINE HYDROCHLORIDE 10 MG: 5 TABLET ORAL at 16:28

## 2025-01-01 RX ADMIN — MICONAZOLE NITRATE: 20 POWDER TOPICAL at 08:43

## 2025-01-01 RX ADMIN — FENTANYL CITRATE 150 MCG/HR: 0.05 INJECTION, SOLUTION INTRAMUSCULAR; INTRAVENOUS at 06:35

## 2025-01-01 RX ADMIN — AMIODARONE HYDROCHLORIDE 0.5 MG/MIN: 50 INJECTION, SOLUTION INTRAVENOUS at 06:01

## 2025-01-01 RX ADMIN — CALCIUM CHLORIDE 1000 MG: 100 INJECTION, SOLUTION INTRAVENOUS; INTRAVENTRICULAR at 06:47

## 2025-01-01 RX ADMIN — SODIUM ZIRCONIUM CYCLOSILICATE 10 G: 10 POWDER, FOR SUSPENSION ORAL at 12:50

## 2025-01-01 RX ADMIN — METOPROLOL SUCCINATE 50 MG: 50 TABLET, EXTENDED RELEASE ORAL at 08:13

## 2025-01-01 RX ADMIN — BUMETANIDE 1 MG: 0.25 INJECTION, SOLUTION INTRAMUSCULAR; INTRAVENOUS at 16:46

## 2025-01-01 RX ADMIN — MIDODRINE HYDROCHLORIDE 10 MG: 5 TABLET ORAL at 16:06

## 2025-01-01 RX ADMIN — SODIUM CHLORIDE: 0.9 INJECTION, SOLUTION INTRAVENOUS at 12:19

## 2025-01-01 RX ADMIN — METOPROLOL SUCCINATE 50 MG: 50 TABLET, EXTENDED RELEASE ORAL at 20:51

## 2025-01-01 RX ADMIN — SODIUM CHLORIDE, PRESERVATIVE FREE 10 ML: 5 INJECTION INTRAVENOUS at 11:29

## 2025-01-01 RX ADMIN — MIDODRINE HYDROCHLORIDE 10 MG: 5 TABLET ORAL at 09:47

## 2025-01-01 RX ADMIN — Medication 1 MG: at 05:23

## 2025-01-01 RX ADMIN — Medication 9 MG: at 20:39

## 2025-01-01 RX ADMIN — WARFARIN SODIUM 2.5 MG: 2.5 TABLET ORAL at 17:14

## 2025-01-01 RX ADMIN — PHYTONADIONE 5 MG: 10 INJECTION, EMULSION INTRAMUSCULAR; INTRAVENOUS; SUBCUTANEOUS at 17:21

## 2025-01-01 RX ADMIN — MIDODRINE HYDROCHLORIDE 10 MG: 5 TABLET ORAL at 08:16

## 2025-01-01 RX ADMIN — MIDODRINE HYDROCHLORIDE 15 MG: 5 TABLET ORAL at 08:22

## 2025-01-01 RX ADMIN — AMIODARONE HYDROCHLORIDE 0.5 MG/MIN: 50 INJECTION, SOLUTION INTRAVENOUS at 21:12

## 2025-01-01 RX ADMIN — SODIUM CHLORIDE, PRESERVATIVE FREE 10 ML: 5 INJECTION INTRAVENOUS at 20:01

## 2025-01-01 RX ADMIN — METOPROLOL SUCCINATE 50 MG: 50 TABLET, EXTENDED RELEASE ORAL at 09:38

## 2025-01-01 RX ADMIN — INSULIN HUMAN 3 UNITS: 100 INJECTION, SOLUTION PARENTERAL at 12:51

## 2025-01-01 RX ADMIN — SODIUM CHLORIDE, PRESERVATIVE FREE 10 ML: 5 INJECTION INTRAVENOUS at 21:06

## 2025-01-01 RX ADMIN — METOPROLOL SUCCINATE 50 MG: 50 TABLET, EXTENDED RELEASE ORAL at 20:46

## 2025-01-01 RX ADMIN — METOPROLOL SUCCINATE 50 MG: 50 TABLET, EXTENDED RELEASE ORAL at 08:53

## 2025-01-01 RX ADMIN — Medication 50 MEQ: at 04:24

## 2025-01-01 RX ADMIN — Medication 1 MG: at 09:41

## 2025-01-01 RX ADMIN — INSULIN LISPRO 1 UNITS: 100 INJECTION, SOLUTION INTRAVENOUS; SUBCUTANEOUS at 17:04

## 2025-01-01 RX ADMIN — MICONAZOLE NITRATE: 20 POWDER TOPICAL at 22:26

## 2025-01-01 RX ADMIN — MIDODRINE HYDROCHLORIDE 10 MG: 5 TABLET ORAL at 20:09

## 2025-01-01 RX ADMIN — MIDODRINE HYDROCHLORIDE 10 MG: 5 TABLET ORAL at 14:27

## 2025-01-01 RX ADMIN — WARFARIN SODIUM 5 MG: 5 TABLET ORAL at 17:47

## 2025-01-01 RX ADMIN — SODIUM CHLORIDE, PRESERVATIVE FREE 10 ML: 5 INJECTION INTRAVENOUS at 09:20

## 2025-01-01 RX ADMIN — Medication 9 MG: at 22:25

## 2025-01-01 RX ADMIN — METOPROLOL SUCCINATE 50 MG: 50 TABLET, EXTENDED RELEASE ORAL at 20:11

## 2025-01-01 RX ADMIN — SODIUM BICARBONATE 1300 MG: 650 TABLET ORAL at 21:06

## 2025-01-01 RX ADMIN — MICONAZOLE NITRATE: 20 POWDER TOPICAL at 09:37

## 2025-01-01 RX ADMIN — SODIUM CHLORIDE, PRESERVATIVE FREE 10 ML: 5 INJECTION INTRAVENOUS at 09:45

## 2025-01-01 RX ADMIN — SODIUM BICARBONATE 100 MEQ: 84 INJECTION, SOLUTION INTRAVENOUS at 07:59

## 2025-01-01 RX ADMIN — SODIUM BICARBONATE 1300 MG: 650 TABLET ORAL at 14:01

## 2025-01-01 RX ADMIN — MIDODRINE HYDROCHLORIDE 10 MG: 5 TABLET ORAL at 08:52

## 2025-01-01 RX ADMIN — SODIUM BICARBONATE 1300 MG: 650 TABLET ORAL at 16:12

## 2025-01-01 RX ADMIN — Medication 1 MG: at 05:04

## 2025-01-01 RX ADMIN — ACETAMINOPHEN 650 MG: 325 TABLET ORAL at 13:55

## 2025-01-01 RX ADMIN — BUMETANIDE 0.5 MG/HR: 0.25 INJECTION INTRAMUSCULAR; INTRAVENOUS at 11:23

## 2025-01-01 RX ADMIN — Medication 50 MEQ: at 08:08

## 2025-01-01 RX ADMIN — MIDODRINE HYDROCHLORIDE 5 MG: 5 TABLET ORAL at 17:41

## 2025-01-01 RX ADMIN — Medication 9 MG: at 20:18

## 2025-01-01 RX ADMIN — MIDODRINE HYDROCHLORIDE 10 MG: 5 TABLET ORAL at 08:41

## 2025-01-01 RX ADMIN — EPINEPHRINE 5 MCG/MIN: 1 INJECTION, SOLUTION, CONCENTRATE INTRAVENOUS at 09:15

## 2025-01-01 RX ADMIN — SODIUM CHLORIDE 100 MCG/MIN: 0.9 INJECTION, SOLUTION INTRAVENOUS at 07:55

## 2025-01-01 RX ADMIN — METOPROLOL SUCCINATE 50 MG: 50 TABLET, EXTENDED RELEASE ORAL at 22:25

## 2025-01-01 RX ADMIN — BUMETANIDE 1 MG: 0.25 INJECTION INTRAMUSCULAR; INTRAVENOUS at 08:13

## 2025-01-01 RX ADMIN — Medication 9 MG: at 20:01

## 2025-01-01 RX ADMIN — SODIUM CHLORIDE, PRESERVATIVE FREE 10 ML: 5 INJECTION INTRAVENOUS at 20:39

## 2025-01-01 RX ADMIN — PETROLATUM: 420 OINTMENT TOPICAL at 19:59

## 2025-01-01 RX ADMIN — PETROLATUM: 420 OINTMENT TOPICAL at 22:27

## 2025-01-01 RX ADMIN — SODIUM BICARBONATE 1300 MG: 650 TABLET ORAL at 13:44

## 2025-01-01 RX ADMIN — MIDODRINE HYDROCHLORIDE 10 MG: 5 TABLET ORAL at 15:17

## 2025-01-01 RX ADMIN — MIDODRINE HYDROCHLORIDE 10 MG: 5 TABLET ORAL at 20:39

## 2025-01-01 RX ADMIN — SODIUM CHLORIDE, PRESERVATIVE FREE 10 ML: 5 INJECTION INTRAVENOUS at 20:04

## 2025-01-01 RX ADMIN — MICONAZOLE NITRATE: 20 POWDER TOPICAL at 09:23

## 2025-01-01 RX ADMIN — SODIUM CHLORIDE, PRESERVATIVE FREE 10 ML: 5 INJECTION INTRAVENOUS at 21:36

## 2025-01-01 RX ADMIN — SODIUM BICARBONATE 1300 MG: 650 TABLET ORAL at 16:00

## 2025-01-01 RX ADMIN — SODIUM BICARBONATE 1300 MG: 650 TABLET ORAL at 08:22

## 2025-01-01 RX ADMIN — SODIUM BICARBONATE 1300 MG: 650 TABLET ORAL at 20:18

## 2025-01-01 RX ADMIN — METOPROLOL SUCCINATE 50 MG: 50 TABLET, EXTENDED RELEASE ORAL at 20:18

## 2025-01-01 RX ADMIN — Medication 9 MG: at 19:58

## 2025-01-01 RX ADMIN — INSULIN LISPRO 1 UNITS: 100 INJECTION, SOLUTION INTRAVENOUS; SUBCUTANEOUS at 17:10

## 2025-01-01 RX ADMIN — SODIUM BICARBONATE 1300 MG: 650 TABLET ORAL at 22:25

## 2025-01-01 RX ADMIN — MICONAZOLE NITRATE: 20 POWDER TOPICAL at 21:07

## 2025-01-01 RX ADMIN — MIDODRINE HYDROCHLORIDE 10 MG: 5 TABLET ORAL at 21:06

## 2025-01-01 RX ADMIN — ATORVASTATIN CALCIUM 40 MG: 40 TABLET, FILM COATED ORAL at 08:53

## 2025-01-01 RX ADMIN — Medication 1 MG: at 06:18

## 2025-01-01 RX ADMIN — MIDODRINE HYDROCHLORIDE 10 MG: 5 TABLET ORAL at 21:34

## 2025-01-01 RX ADMIN — CALCIUM GLUCONATE 1000 MG: 20 INJECTION, SOLUTION INTRAVENOUS at 18:06

## 2025-01-01 RX ADMIN — SODIUM BICARBONATE 1300 MG: 650 TABLET ORAL at 14:21

## 2025-01-01 RX ADMIN — PETROLATUM: 420 OINTMENT TOPICAL at 08:12

## 2025-01-01 RX ADMIN — MIDODRINE HYDROCHLORIDE 10 MG: 5 TABLET ORAL at 14:53

## 2025-01-01 RX ADMIN — SODIUM BICARBONATE 1300 MG: 650 TABLET ORAL at 13:49

## 2025-01-01 RX ADMIN — SODIUM BICARBONATE 1300 MG: 650 TABLET ORAL at 20:50

## 2025-01-01 RX ADMIN — WARFARIN SODIUM 2.5 MG: 2.5 TABLET ORAL at 16:07

## 2025-01-01 RX ADMIN — MIDODRINE HYDROCHLORIDE 15 MG: 5 TABLET ORAL at 19:29

## 2025-01-01 RX ADMIN — BUMETANIDE 1 MG: 0.25 INJECTION INTRAMUSCULAR; INTRAVENOUS at 09:37

## 2025-01-01 RX ADMIN — PETROLATUM: 420 OINTMENT TOPICAL at 08:16

## 2025-01-01 RX ADMIN — VASOPRESSIN 0.03 UNITS/MIN: 20 INJECTION INTRAVENOUS at 06:18

## 2025-01-01 RX ADMIN — MICONAZOLE NITRATE: 20 POWDER TOPICAL at 20:50

## 2025-01-01 RX ADMIN — MIDODRINE HYDROCHLORIDE 10 MG: 5 TABLET ORAL at 08:01

## 2025-01-01 RX ADMIN — BUMETANIDE 1 MG: 0.25 INJECTION INTRAMUSCULAR; INTRAVENOUS at 13:16

## 2025-01-01 RX ADMIN — SODIUM BICARBONATE 1300 MG: 650 TABLET ORAL at 12:21

## 2025-01-01 RX ADMIN — MIDODRINE HYDROCHLORIDE 10 MG: 5 TABLET ORAL at 09:37

## 2025-01-01 RX ADMIN — Medication 50 MEQ: at 09:24

## 2025-01-01 RX ADMIN — METOPROLOL SUCCINATE 50 MG: 50 TABLET, EXTENDED RELEASE ORAL at 19:59

## 2025-01-01 RX ADMIN — DEXTROSE 150 MG: 50 INJECTION, SOLUTION INTRAVENOUS at 15:39

## 2025-01-01 RX ADMIN — PETROLATUM: 420 OINTMENT TOPICAL at 20:50

## 2025-01-01 RX ADMIN — Medication 2000 UNITS: at 13:16

## 2025-01-01 RX ADMIN — SODIUM BICARBONATE 1300 MG: 650 TABLET ORAL at 15:18

## 2025-01-01 RX ADMIN — AMIODARONE HYDROCHLORIDE 300 MG: 50 INJECTION, SOLUTION INTRAVENOUS at 09:28

## 2025-01-01 RX ADMIN — Medication 1 MG: at 06:46

## 2025-01-01 RX ADMIN — METOPROLOL SUCCINATE 50 MG: 50 TABLET, EXTENDED RELEASE ORAL at 08:16

## 2025-01-01 RX ADMIN — PETROLATUM: 420 OINTMENT TOPICAL at 20:02

## 2025-01-01 RX ADMIN — Medication 1 MG: at 04:59

## 2025-01-01 RX ADMIN — SODIUM CHLORIDE, PRESERVATIVE FREE 10 ML: 5 INJECTION INTRAVENOUS at 19:30

## 2025-01-01 RX ADMIN — MIDODRINE HYDROCHLORIDE 10 MG: 5 TABLET ORAL at 08:53

## 2025-01-01 RX ADMIN — ATORVASTATIN CALCIUM 40 MG: 40 TABLET, FILM COATED ORAL at 08:41

## 2025-01-01 RX ADMIN — MICONAZOLE NITRATE: 20 POWDER TOPICAL at 19:59

## 2025-01-01 RX ADMIN — Medication 9 MG: at 21:06

## 2025-01-01 RX ADMIN — ATORVASTATIN CALCIUM 40 MG: 40 TABLET, FILM COATED ORAL at 08:16

## 2025-01-01 RX ADMIN — SODIUM BICARBONATE 1300 MG: 650 TABLET ORAL at 19:59

## 2025-01-01 RX ADMIN — Medication 2000 UNITS: at 08:22

## 2025-01-01 RX ADMIN — CALCIUM GLUCONATE 1000 MG: 98 INJECTION INTRAVENOUS at 11:31

## 2025-01-01 RX ADMIN — SODIUM BICARBONATE 1300 MG: 650 TABLET ORAL at 13:16

## 2025-01-01 RX ADMIN — SODIUM CHLORIDE, PRESERVATIVE FREE 10 ML: 5 INJECTION INTRAVENOUS at 09:37

## 2025-01-01 RX ADMIN — BUMETANIDE 1 MG: 0.25 INJECTION INTRAMUSCULAR; INTRAVENOUS at 10:19

## 2025-01-01 RX ADMIN — SODIUM BICARBONATE 1300 MG: 650 TABLET ORAL at 19:29

## 2025-01-01 RX ADMIN — SODIUM CHLORIDE, PRESERVATIVE FREE 10 ML: 5 INJECTION INTRAVENOUS at 20:40

## 2025-01-01 RX ADMIN — SODIUM CHLORIDE, PRESERVATIVE FREE 10 ML: 5 INJECTION INTRAVENOUS at 08:06

## 2025-01-01 RX ADMIN — SODIUM BICARBONATE 1300 MG: 650 TABLET ORAL at 11:26

## 2025-01-01 RX ADMIN — BUMETANIDE 1 MG: 0.25 INJECTION INTRAMUSCULAR; INTRAVENOUS at 16:12

## 2025-01-01 RX ADMIN — SODIUM BICARBONATE 1300 MG: 650 TABLET ORAL at 20:46

## 2025-01-01 RX ADMIN — Medication 1 MG: at 04:21

## 2025-01-01 RX ADMIN — SODIUM CHLORIDE, PRESERVATIVE FREE 10 ML: 5 INJECTION INTRAVENOUS at 08:02

## 2025-01-01 RX ADMIN — MIDODRINE HYDROCHLORIDE 10 MG: 5 TABLET ORAL at 19:59

## 2025-01-01 RX ADMIN — MIDODRINE HYDROCHLORIDE 10 MG: 5 TABLET ORAL at 08:13

## 2025-01-01 RX ADMIN — SODIUM CHLORIDE, PRESERVATIVE FREE 10 ML: 5 INJECTION INTRAVENOUS at 08:52

## 2025-01-01 RX ADMIN — SODIUM BICARBONATE 1300 MG: 650 TABLET ORAL at 20:01

## 2025-01-01 RX ADMIN — CISATRACURIUM BESYLATE 2 MCG/KG/MIN: 200 INJECTION, SOLUTION INTRAVENOUS at 07:31

## 2025-01-01 RX ADMIN — ATORVASTATIN CALCIUM 40 MG: 40 TABLET, FILM COATED ORAL at 11:26

## 2025-01-01 RX ADMIN — SODIUM BICARBONATE 1300 MG: 650 TABLET ORAL at 08:16

## 2025-01-01 RX ADMIN — ALBUMIN (HUMAN) 25 G: 0.25 INJECTION, SOLUTION INTRAVENOUS at 22:16

## 2025-01-01 RX ADMIN — Medication 1 MG: at 04:25

## 2025-01-01 RX ADMIN — SODIUM BICARBONATE 1300 MG: 650 TABLET ORAL at 14:53

## 2025-01-01 RX ADMIN — Medication 9 MG: at 21:34

## 2025-01-01 RX ADMIN — PETROLATUM: 420 OINTMENT TOPICAL at 09:45

## 2025-01-01 RX ADMIN — BUMETANIDE 2 MG: 0.25 INJECTION INTRAMUSCULAR; INTRAVENOUS at 22:12

## 2025-01-01 RX ADMIN — SODIUM CHLORIDE, PRESERVATIVE FREE 10 ML: 5 INJECTION INTRAVENOUS at 19:59

## 2025-01-01 RX ADMIN — ACETAMINOPHEN 650 MG: 325 TABLET ORAL at 03:38

## 2025-01-01 RX ADMIN — PETROLATUM: 420 OINTMENT TOPICAL at 10:30

## 2025-01-01 RX ADMIN — ATORVASTATIN CALCIUM 40 MG: 40 TABLET, FILM COATED ORAL at 08:06

## 2025-01-01 RX ADMIN — Medication 50 MEQ: at 05:03

## 2025-01-01 RX ADMIN — SODIUM BICARBONATE 1300 MG: 650 TABLET ORAL at 08:53

## 2025-01-01 RX ADMIN — MIDODRINE HYDROCHLORIDE 15 MG: 5 TABLET ORAL at 13:44

## 2025-01-01 RX ADMIN — Medication 9 MG: at 19:29

## 2025-01-01 RX ADMIN — SODIUM CHLORIDE 1000 ML: 0.9 INJECTION, SOLUTION INTRAVENOUS at 11:31

## 2025-01-01 RX ADMIN — MIDODRINE HYDROCHLORIDE 15 MG: 5 TABLET ORAL at 20:50

## 2025-01-01 RX ADMIN — MICONAZOLE NITRATE: 20 POWDER TOPICAL at 08:52

## 2025-01-01 RX ADMIN — PETROLATUM: 420 OINTMENT TOPICAL at 20:19

## 2025-01-01 RX ADMIN — SODIUM BICARBONATE 1300 MG: 650 TABLET ORAL at 08:01

## 2025-01-01 RX ADMIN — PHYTONADIONE 5 MG: 10 INJECTION, EMULSION INTRAMUSCULAR; INTRAVENOUS; SUBCUTANEOUS at 19:01

## 2025-01-01 RX ADMIN — BUMETANIDE 1 MG: 0.25 INJECTION INTRAMUSCULAR; INTRAVENOUS at 17:05

## 2025-01-01 RX ADMIN — WARFARIN SODIUM 2.5 MG: 2.5 TABLET ORAL at 16:17

## 2025-01-01 RX ADMIN — MIDODRINE HYDROCHLORIDE 10 MG: 5 TABLET ORAL at 11:26

## 2025-01-01 RX ADMIN — ROCURONIUM BROMIDE 50 MG: 10 INJECTION, SOLUTION INTRAVENOUS at 06:38

## 2025-01-01 RX ADMIN — PETROLATUM: 420 OINTMENT TOPICAL at 19:29

## 2025-01-01 RX ADMIN — MIDODRINE HYDROCHLORIDE 10 MG: 5 TABLET ORAL at 08:06

## 2025-01-01 RX ADMIN — METOPROLOL SUCCINATE 50 MG: 50 TABLET, EXTENDED RELEASE ORAL at 08:06

## 2025-01-01 RX ADMIN — MICONAZOLE NITRATE: 20 POWDER TOPICAL at 16:13

## 2025-01-01 RX ADMIN — METOPROLOL SUCCINATE 50 MG: 50 TABLET, EXTENDED RELEASE ORAL at 11:26

## 2025-01-01 RX ADMIN — METOPROLOL SUCCINATE 50 MG: 50 TABLET, EXTENDED RELEASE ORAL at 09:37

## 2025-01-01 RX ADMIN — POTASSIUM BICARBONATE 40 MEQ: 782 TABLET, EFFERVESCENT ORAL at 12:21

## 2025-01-01 RX ADMIN — SODIUM BICARBONATE 1300 MG: 650 TABLET ORAL at 13:55

## 2025-01-01 RX ADMIN — ATORVASTATIN CALCIUM 40 MG: 40 TABLET, FILM COATED ORAL at 08:13

## 2025-01-01 RX ADMIN — BUMETANIDE 1 MG: 0.25 INJECTION INTRAMUSCULAR; INTRAVENOUS at 11:28

## 2025-01-01 RX ADMIN — WARFARIN SODIUM 2.5 MG: 2.5 TABLET ORAL at 17:05

## 2025-01-01 RX ADMIN — MIDODRINE HYDROCHLORIDE 10 MG: 5 TABLET ORAL at 13:55

## 2025-01-01 RX ADMIN — CALCIUM CHLORIDE 1000 MG: 100 INJECTION, SOLUTION INTRAVENOUS; INTRAVENTRICULAR at 09:25

## 2025-01-01 RX ADMIN — METOPROLOL SUCCINATE 50 MG: 50 TABLET, EXTENDED RELEASE ORAL at 21:43

## 2025-01-01 RX ADMIN — DEXTROSE 1 MG/MIN: 5 SOLUTION INTRAVENOUS at 15:52

## 2025-01-01 RX ADMIN — Medication 1 MG: at 06:44

## 2025-01-01 RX ADMIN — MIDODRINE HYDROCHLORIDE 10 MG: 5 TABLET ORAL at 20:01

## 2025-01-01 RX ADMIN — SODIUM CHLORIDE, PRESERVATIVE FREE 10 ML: 5 INJECTION INTRAVENOUS at 08:16

## 2025-01-01 RX ADMIN — SODIUM CHLORIDE, PRESERVATIVE FREE 10 ML: 5 INJECTION INTRAVENOUS at 08:13

## 2025-01-01 RX ADMIN — AMIODARONE HYDROCHLORIDE 0.5 MG/MIN: 50 INJECTION, SOLUTION INTRAVENOUS at 20:48

## 2025-01-01 RX ADMIN — MIDODRINE HYDROCHLORIDE 10 MG: 5 TABLET ORAL at 16:00

## 2025-01-01 RX ADMIN — SODIUM BICARBONATE 1300 MG: 650 TABLET ORAL at 20:39

## 2025-01-01 RX ADMIN — PETROLATUM: 420 OINTMENT TOPICAL at 08:43

## 2025-01-01 RX ADMIN — ATORVASTATIN CALCIUM 40 MG: 40 TABLET, FILM COATED ORAL at 08:52

## 2025-01-01 RX ADMIN — MICONAZOLE NITRATE: 20 POWDER TOPICAL at 08:06

## 2025-01-01 RX ADMIN — PETROLATUM: 420 OINTMENT TOPICAL at 08:23

## 2025-01-01 RX ADMIN — WARFARIN SODIUM 4 MG: 4 TABLET ORAL at 18:25

## 2025-01-01 RX ADMIN — BUMETANIDE 1 MG: 0.25 INJECTION INTRAMUSCULAR; INTRAVENOUS at 18:48

## 2025-01-01 RX ADMIN — METOPROLOL SUCCINATE 50 MG: 50 TABLET, EXTENDED RELEASE ORAL at 20:04

## 2025-01-01 RX ADMIN — ATORVASTATIN CALCIUM 40 MG: 40 TABLET, FILM COATED ORAL at 08:01

## 2025-01-01 RX ADMIN — METOPROLOL SUCCINATE 50 MG: 50 TABLET, EXTENDED RELEASE ORAL at 09:20

## 2025-01-01 RX ADMIN — METOPROLOL SUCCINATE 50 MG: 50 TABLET, EXTENDED RELEASE ORAL at 08:41

## 2025-01-01 RX ADMIN — CALCIUM ACETATE 667 MG: 667 CAPSULE ORAL at 17:08

## 2025-01-01 RX ADMIN — MICONAZOLE NITRATE: 20 POWDER TOPICAL at 09:45

## 2025-01-01 RX ADMIN — MICONAZOLE NITRATE: 20 POWDER TOPICAL at 08:22

## 2025-01-01 ASSESSMENT — PAIN SCALES - GENERAL
PAINLEVEL_OUTOF10: 8
PAINLEVEL_OUTOF10: 0
PAINLEVEL_OUTOF10: 6
PAINLEVEL_OUTOF10: 0
PAINLEVEL_OUTOF10: 0
PAINLEVEL_OUTOF10: 3
PAINLEVEL_OUTOF10: 0
PAINLEVEL_OUTOF10: 7
PAINLEVEL_OUTOF10: 0
PAINLEVEL_OUTOF10: 5
PAINLEVEL_OUTOF10: 0
PAINLEVEL_OUTOF10: 4
PAINLEVEL_OUTOF10: 0
PAINLEVEL_OUTOF10: 2
PAINLEVEL_OUTOF10: 0

## 2025-01-01 ASSESSMENT — PAIN DESCRIPTION - DESCRIPTORS
DESCRIPTORS: ACHING
DESCRIPTORS: ACHING;DISCOMFORT;NAGGING
DESCRIPTORS: ACHING;DISCOMFORT;BURNING
DESCRIPTORS: ACHING
DESCRIPTORS: ACHING;DISCOMFORT

## 2025-01-01 ASSESSMENT — PULMONARY FUNCTION TESTS
PIF_VALUE: 48
PIF_VALUE: 33
PIF_VALUE: 43
PIF_VALUE: 34
PIF_VALUE: 43
PIF_VALUE: 48

## 2025-01-01 ASSESSMENT — PAIN - FUNCTIONAL ASSESSMENT
PAIN_FUNCTIONAL_ASSESSMENT: 0-10
PAIN_FUNCTIONAL_ASSESSMENT: 0-10
PAIN_FUNCTIONAL_ASSESSMENT: PREVENTS OR INTERFERES SOME ACTIVE ACTIVITIES AND ADLS
PAIN_FUNCTIONAL_ASSESSMENT: PREVENTS OR INTERFERES SOME ACTIVE ACTIVITIES AND ADLS
PAIN_FUNCTIONAL_ASSESSMENT: 0-10
PAIN_FUNCTIONAL_ASSESSMENT: 0-10
PAIN_FUNCTIONAL_ASSESSMENT: NONE - DENIES PAIN
PAIN_FUNCTIONAL_ASSESSMENT: PREVENTS OR INTERFERES SOME ACTIVE ACTIVITIES AND ADLS
PAIN_FUNCTIONAL_ASSESSMENT: ACTIVITIES ARE NOT PREVENTED
PAIN_FUNCTIONAL_ASSESSMENT: ACTIVITIES ARE NOT PREVENTED

## 2025-01-01 ASSESSMENT — PAIN DESCRIPTION - LOCATION
LOCATION: LEG

## 2025-01-01 ASSESSMENT — PAIN DESCRIPTION - ONSET: ONSET: ON-GOING

## 2025-01-01 ASSESSMENT — PAIN DESCRIPTION - ORIENTATION
ORIENTATION: RIGHT;LEFT
ORIENTATION: LEFT
ORIENTATION: LEFT
ORIENTATION: RIGHT;LEFT
ORIENTATION: RIGHT;LEFT

## 2025-01-01 ASSESSMENT — PAIN SCALES - WONG BAKER: WONGBAKER_NUMERICALRESPONSE: HURTS A LITTLE BIT

## 2025-01-01 ASSESSMENT — PAIN DESCRIPTION - FREQUENCY: FREQUENCY: INTERMITTENT

## 2025-01-01 ASSESSMENT — PAIN DESCRIPTION - PAIN TYPE: TYPE: ACUTE PAIN

## 2025-01-27 RX ORDER — POTASSIUM CHLORIDE 1500 MG/1
20 TABLET, EXTENDED RELEASE ORAL DAILY
Qty: 90 TABLET | Refills: 0 | Status: SHIPPED | OUTPATIENT
Start: 2025-01-27

## 2025-01-27 RX ORDER — METOPROLOL SUCCINATE 100 MG/1
100 TABLET, EXTENDED RELEASE ORAL 2 TIMES DAILY
Qty: 180 TABLET | Refills: 0 | Status: SHIPPED | OUTPATIENT
Start: 2025-01-27

## 2025-03-08 ENCOUNTER — APPOINTMENT (OUTPATIENT)
Dept: GENERAL RADIOLOGY | Age: 65
DRG: 291 | End: 2025-03-08
Payer: MEDICARE

## 2025-03-08 ENCOUNTER — APPOINTMENT (OUTPATIENT)
Dept: CT IMAGING | Age: 65
DRG: 291 | End: 2025-03-08
Payer: MEDICARE

## 2025-03-08 ENCOUNTER — HOSPITAL ENCOUNTER (INPATIENT)
Age: 65
LOS: 6 days | Discharge: HOME HEALTH | DRG: 291 | End: 2025-03-14
Attending: EMERGENCY MEDICINE | Admitting: STUDENT IN AN ORGANIZED HEALTH CARE EDUCATION/TRAINING PROGRAM
Payer: MEDICARE

## 2025-03-08 DIAGNOSIS — I48.91 ATRIAL FIBRILLATION WITH RAPID VENTRICULAR RESPONSE (HCC): ICD-10-CM

## 2025-03-08 DIAGNOSIS — I50.23 ACUTE ON CHRONIC SYSTOLIC CONGESTIVE HEART FAILURE (HCC): Primary | ICD-10-CM

## 2025-03-08 DIAGNOSIS — I48.91 ATRIAL FIBRILLATION, UNSPECIFIED TYPE (HCC): ICD-10-CM

## 2025-03-08 PROBLEM — I50.9 ACUTE EXACERBATION OF CHRONIC HEART FAILURE (HCC): Status: ACTIVE | Noted: 2025-03-08

## 2025-03-08 LAB
ALBUMIN SERPL-MCNC: 3.9 G/DL (ref 3.5–5.2)
ALP SERPL-CCNC: 159 U/L (ref 40–129)
ALT SERPL-CCNC: 24 U/L (ref 0–40)
ANION GAP SERPL CALCULATED.3IONS-SCNC: 11 MMOL/L (ref 7–16)
AST SERPL-CCNC: 17 U/L (ref 0–39)
BASOPHILS # BLD: 0.09 K/UL (ref 0–0.2)
BASOPHILS NFR BLD: 1 % (ref 0–2)
BILIRUB SERPL-MCNC: 0.8 MG/DL (ref 0–1.2)
BNP SERPL-MCNC: 7566 PG/ML (ref 0–125)
BUN SERPL-MCNC: 26 MG/DL (ref 6–23)
CALCIUM SERPL-MCNC: 9.5 MG/DL (ref 8.6–10.2)
CHLORIDE SERPL-SCNC: 102 MMOL/L (ref 98–107)
CO2 SERPL-SCNC: 24 MMOL/L (ref 22–29)
CREAT SERPL-MCNC: 1.5 MG/DL (ref 0.7–1.2)
EOSINOPHIL # BLD: 0.21 K/UL (ref 0.05–0.5)
EOSINOPHILS RELATIVE PERCENT: 2 % (ref 0–6)
ERYTHROCYTE [DISTWIDTH] IN BLOOD BY AUTOMATED COUNT: 14.5 % (ref 11.5–15)
GFR, ESTIMATED: 51 ML/MIN/1.73M2
GLUCOSE BLD-MCNC: 445 MG/DL (ref 74–99)
GLUCOSE BLD-MCNC: 468 MG/DL (ref 74–99)
GLUCOSE SERPL-MCNC: 241 MG/DL (ref 74–99)
HCT VFR BLD AUTO: 41 % (ref 37–54)
HGB BLD-MCNC: 13.4 G/DL (ref 12.5–16.5)
IMM GRANULOCYTES # BLD AUTO: 0.06 K/UL (ref 0–0.58)
IMM GRANULOCYTES NFR BLD: 0 % (ref 0–5)
INFLUENZA A BY PCR: NOT DETECTED
INFLUENZA B BY PCR: NOT DETECTED
LYMPHOCYTES NFR BLD: 3.16 K/UL (ref 1.5–4)
LYMPHOCYTES RELATIVE PERCENT: 23 % (ref 20–42)
MCH RBC QN AUTO: 29.8 PG (ref 26–35)
MCHC RBC AUTO-ENTMCNC: 32.7 G/DL (ref 32–34.5)
MCV RBC AUTO: 91.1 FL (ref 80–99.9)
MONOCYTES NFR BLD: 0.82 K/UL (ref 0.1–0.95)
MONOCYTES NFR BLD: 6 % (ref 2–12)
NEUTROPHILS NFR BLD: 68 % (ref 43–80)
NEUTS SEG NFR BLD: 9.24 K/UL (ref 1.8–7.3)
PLATELET, FLUORESCENCE: 116 K/UL (ref 130–450)
PMV BLD AUTO: 12.9 FL (ref 7–12)
POTASSIUM SERPL-SCNC: 4.5 MMOL/L (ref 3.5–5)
PROT SERPL-MCNC: 6.7 G/DL (ref 6.4–8.3)
RBC # BLD AUTO: 4.5 M/UL (ref 3.8–5.8)
SARS-COV-2 RDRP RESP QL NAA+PROBE: NOT DETECTED
SODIUM SERPL-SCNC: 137 MMOL/L (ref 132–146)
SPECIMEN DESCRIPTION: NORMAL
TROPONIN I SERPL HS-MCNC: 32 NG/L (ref 0–11)
TROPONIN I SERPL HS-MCNC: 33 NG/L (ref 0–11)
WBC OTHER # BLD: 13.6 K/UL (ref 4.5–11.5)

## 2025-03-08 PROCEDURE — 94640 AIRWAY INHALATION TREATMENT: CPT

## 2025-03-08 PROCEDURE — 73562 X-RAY EXAM OF KNEE 3: CPT

## 2025-03-08 PROCEDURE — 93005 ELECTROCARDIOGRAM TRACING: CPT

## 2025-03-08 PROCEDURE — 6370000000 HC RX 637 (ALT 250 FOR IP)

## 2025-03-08 PROCEDURE — 99222 1ST HOSP IP/OBS MODERATE 55: CPT | Performed by: STUDENT IN AN ORGANIZED HEALTH CARE EDUCATION/TRAINING PROGRAM

## 2025-03-08 PROCEDURE — 6360000004 HC RX CONTRAST MEDICATION: Performed by: RADIOLOGY

## 2025-03-08 PROCEDURE — 80053 COMPREHEN METABOLIC PANEL: CPT

## 2025-03-08 PROCEDURE — 6370000000 HC RX 637 (ALT 250 FOR IP): Performed by: EMERGENCY MEDICINE

## 2025-03-08 PROCEDURE — 85025 COMPLETE CBC W/AUTO DIFF WBC: CPT

## 2025-03-08 PROCEDURE — 82962 GLUCOSE BLOOD TEST: CPT

## 2025-03-08 PROCEDURE — 99285 EMERGENCY DEPT VISIT HI MDM: CPT

## 2025-03-08 PROCEDURE — 1200000000 HC SEMI PRIVATE

## 2025-03-08 PROCEDURE — 6360000002 HC RX W HCPCS: Performed by: STUDENT IN AN ORGANIZED HEALTH CARE EDUCATION/TRAINING PROGRAM

## 2025-03-08 PROCEDURE — 87502 INFLUENZA DNA AMP PROBE: CPT

## 2025-03-08 PROCEDURE — 2700000000 HC OXYGEN THERAPY PER DAY

## 2025-03-08 PROCEDURE — 87635 SARS-COV-2 COVID-19 AMP PRB: CPT

## 2025-03-08 PROCEDURE — 70450 CT HEAD/BRAIN W/O DYE: CPT

## 2025-03-08 PROCEDURE — 71275 CT ANGIOGRAPHY CHEST: CPT

## 2025-03-08 PROCEDURE — 0202U NFCT DS 22 TRGT SARS-COV-2: CPT

## 2025-03-08 PROCEDURE — 83880 ASSAY OF NATRIURETIC PEPTIDE: CPT

## 2025-03-08 PROCEDURE — 84484 ASSAY OF TROPONIN QUANT: CPT

## 2025-03-08 PROCEDURE — 6370000000 HC RX 637 (ALT 250 FOR IP): Performed by: STUDENT IN AN ORGANIZED HEALTH CARE EDUCATION/TRAINING PROGRAM

## 2025-03-08 PROCEDURE — 6360000002 HC RX W HCPCS: Performed by: EMERGENCY MEDICINE

## 2025-03-08 PROCEDURE — 96374 THER/PROPH/DIAG INJ IV PUSH: CPT

## 2025-03-08 PROCEDURE — 72125 CT NECK SPINE W/O DYE: CPT

## 2025-03-08 RX ORDER — SODIUM CHLORIDE 0.9 % (FLUSH) 0.9 %
5-40 SYRINGE (ML) INJECTION EVERY 12 HOURS SCHEDULED
Status: DISCONTINUED | OUTPATIENT
Start: 2025-03-08 | End: 2025-03-14 | Stop reason: HOSPADM

## 2025-03-08 RX ORDER — HYDRALAZINE HYDROCHLORIDE 20 MG/ML
10 INJECTION INTRAMUSCULAR; INTRAVENOUS EVERY 6 HOURS PRN
Status: DISCONTINUED | OUTPATIENT
Start: 2025-03-08 | End: 2025-03-14 | Stop reason: HOSPADM

## 2025-03-08 RX ORDER — FERROUS SULFATE 325(65) MG
325 TABLET ORAL DAILY
Status: DISCONTINUED | OUTPATIENT
Start: 2025-03-08 | End: 2025-03-14 | Stop reason: HOSPADM

## 2025-03-08 RX ORDER — ACETAMINOPHEN 650 MG/1
650 SUPPOSITORY RECTAL EVERY 6 HOURS PRN
Status: DISCONTINUED | OUTPATIENT
Start: 2025-03-08 | End: 2025-03-14 | Stop reason: HOSPADM

## 2025-03-08 RX ORDER — GLIPIZIDE 5 MG/1
5 TABLET ORAL DAILY
COMMUNITY

## 2025-03-08 RX ORDER — POTASSIUM CHLORIDE 7.45 MG/ML
10 INJECTION INTRAVENOUS PRN
Status: DISCONTINUED | OUTPATIENT
Start: 2025-03-08 | End: 2025-03-14 | Stop reason: HOSPADM

## 2025-03-08 RX ORDER — POTASSIUM CHLORIDE 1500 MG/1
20 TABLET, EXTENDED RELEASE ORAL DAILY
Status: DISCONTINUED | OUTPATIENT
Start: 2025-03-08 | End: 2025-03-11

## 2025-03-08 RX ORDER — BUMETANIDE 0.25 MG/ML
1 INJECTION, SOLUTION INTRAMUSCULAR; INTRAVENOUS ONCE
Status: COMPLETED | OUTPATIENT
Start: 2025-03-08 | End: 2025-03-08

## 2025-03-08 RX ORDER — INSULIN LISPRO 100 [IU]/ML
0-8 INJECTION, SOLUTION INTRAVENOUS; SUBCUTANEOUS
Status: DISCONTINUED | OUTPATIENT
Start: 2025-03-08 | End: 2025-03-10

## 2025-03-08 RX ORDER — POTASSIUM CHLORIDE 1500 MG/1
40 TABLET, EXTENDED RELEASE ORAL PRN
Status: DISCONTINUED | OUTPATIENT
Start: 2025-03-08 | End: 2025-03-14 | Stop reason: HOSPADM

## 2025-03-08 RX ORDER — DIGOXIN 0.25 MG/ML
125 INJECTION INTRAMUSCULAR; INTRAVENOUS ONCE
Status: DISCONTINUED | OUTPATIENT
Start: 2025-03-08 | End: 2025-03-08 | Stop reason: SDUPTHER

## 2025-03-08 RX ORDER — MAGNESIUM SULFATE IN WATER 40 MG/ML
2000 INJECTION, SOLUTION INTRAVENOUS PRN
Status: DISCONTINUED | OUTPATIENT
Start: 2025-03-08 | End: 2025-03-14 | Stop reason: HOSPADM

## 2025-03-08 RX ORDER — IOPAMIDOL 755 MG/ML
75 INJECTION, SOLUTION INTRAVASCULAR
Status: COMPLETED | OUTPATIENT
Start: 2025-03-08 | End: 2025-03-08

## 2025-03-08 RX ORDER — IPRATROPIUM BROMIDE AND ALBUTEROL SULFATE 2.5; .5 MG/3ML; MG/3ML
3 SOLUTION RESPIRATORY (INHALATION) ONCE
Status: COMPLETED | OUTPATIENT
Start: 2025-03-08 | End: 2025-03-08

## 2025-03-08 RX ORDER — ACETAMINOPHEN 325 MG/1
650 TABLET ORAL EVERY 6 HOURS PRN
Status: DISCONTINUED | OUTPATIENT
Start: 2025-03-08 | End: 2025-03-14 | Stop reason: HOSPADM

## 2025-03-08 RX ORDER — PROCHLORPERAZINE EDISYLATE 5 MG/ML
10 INJECTION INTRAMUSCULAR; INTRAVENOUS EVERY 6 HOURS PRN
Status: DISCONTINUED | OUTPATIENT
Start: 2025-03-08 | End: 2025-03-14 | Stop reason: HOSPADM

## 2025-03-08 RX ORDER — DIGOXIN 0.25 MG/ML
125 INJECTION INTRAMUSCULAR; INTRAVENOUS ONCE
Status: COMPLETED | OUTPATIENT
Start: 2025-03-08 | End: 2025-03-08

## 2025-03-08 RX ORDER — DEXTROSE MONOHYDRATE 100 MG/ML
INJECTION, SOLUTION INTRAVENOUS CONTINUOUS PRN
Status: DISCONTINUED | OUTPATIENT
Start: 2025-03-08 | End: 2025-03-14 | Stop reason: HOSPADM

## 2025-03-08 RX ORDER — METOPROLOL SUCCINATE 50 MG/1
100 TABLET, EXTENDED RELEASE ORAL 2 TIMES DAILY
Status: DISCONTINUED | OUTPATIENT
Start: 2025-03-08 | End: 2025-03-14 | Stop reason: HOSPADM

## 2025-03-08 RX ORDER — DIGOXIN 125 MCG
125 TABLET ORAL ONCE
Status: COMPLETED | OUTPATIENT
Start: 2025-03-08 | End: 2025-03-08

## 2025-03-08 RX ORDER — GLUCAGON 1 MG/ML
1 KIT INJECTION PRN
Status: DISCONTINUED | OUTPATIENT
Start: 2025-03-08 | End: 2025-03-14 | Stop reason: HOSPADM

## 2025-03-08 RX ORDER — POLYETHYLENE GLYCOL 3350 17 G/17G
17 POWDER, FOR SOLUTION ORAL DAILY PRN
Status: DISCONTINUED | OUTPATIENT
Start: 2025-03-08 | End: 2025-03-14 | Stop reason: HOSPADM

## 2025-03-08 RX ORDER — DIGOXIN 125 MCG
125 TABLET ORAL DAILY
Status: DISCONTINUED | OUTPATIENT
Start: 2025-03-09 | End: 2025-03-11

## 2025-03-08 RX ORDER — BUMETANIDE 0.25 MG/ML
1 INJECTION, SOLUTION INTRAMUSCULAR; INTRAVENOUS 2 TIMES DAILY
Status: DISCONTINUED | OUTPATIENT
Start: 2025-03-09 | End: 2025-03-10

## 2025-03-08 RX ORDER — INSULIN LISPRO 100 [IU]/ML
8 INJECTION, SOLUTION INTRAVENOUS; SUBCUTANEOUS ONCE
Status: COMPLETED | OUTPATIENT
Start: 2025-03-09 | End: 2025-03-09

## 2025-03-08 RX ORDER — SODIUM CHLORIDE 0.9 % (FLUSH) 0.9 %
5-40 SYRINGE (ML) INJECTION PRN
Status: DISCONTINUED | OUTPATIENT
Start: 2025-03-08 | End: 2025-03-14 | Stop reason: HOSPADM

## 2025-03-08 RX ORDER — SODIUM CHLORIDE 9 MG/ML
INJECTION, SOLUTION INTRAVENOUS PRN
Status: DISCONTINUED | OUTPATIENT
Start: 2025-03-08 | End: 2025-03-14 | Stop reason: HOSPADM

## 2025-03-08 RX ORDER — DIGOXIN 125 MCG
125 TABLET ORAL DAILY
Status: ON HOLD | COMMUNITY
End: 2025-03-14 | Stop reason: HOSPADM

## 2025-03-08 RX ADMIN — SACUBITRIL AND VALSARTAN 1 TABLET: 49; 51 TABLET, FILM COATED ORAL at 23:25

## 2025-03-08 RX ADMIN — FERROUS SULFATE TAB 325 MG (65 MG ELEMENTAL FE) 325 MG: 325 (65 FE) TAB at 21:19

## 2025-03-08 RX ADMIN — APIXABAN 5 MG: 5 TABLET, FILM COATED ORAL at 21:18

## 2025-03-08 RX ADMIN — DIGOXIN 125 MCG: 0.25 INJECTION INTRAMUSCULAR; INTRAVENOUS at 21:18

## 2025-03-08 RX ADMIN — IPRATROPIUM BROMIDE AND ALBUTEROL SULFATE 3 DOSE: .5; 2.5 SOLUTION RESPIRATORY (INHALATION) at 15:39

## 2025-03-08 RX ADMIN — HYDRALAZINE HYDROCHLORIDE 10 MG: 20 INJECTION INTRAMUSCULAR; INTRAVENOUS at 23:24

## 2025-03-08 RX ADMIN — Medication 6 MG: at 21:18

## 2025-03-08 RX ADMIN — POTASSIUM CHLORIDE 20 MEQ: 1500 TABLET, EXTENDED RELEASE ORAL at 21:18

## 2025-03-08 RX ADMIN — BUMETANIDE 1 MG: 0.25 INJECTION INTRAMUSCULAR; INTRAVENOUS at 19:30

## 2025-03-08 RX ADMIN — INSULIN LISPRO 8 UNITS: 100 INJECTION, SOLUTION INTRAVENOUS; SUBCUTANEOUS at 21:24

## 2025-03-08 RX ADMIN — DIGOXIN 125 MCG: 0.12 TABLET ORAL at 16:39

## 2025-03-08 RX ADMIN — IOPAMIDOL 75 ML: 755 INJECTION, SOLUTION INTRAVENOUS at 17:01

## 2025-03-08 RX ADMIN — METOPROLOL SUCCINATE 100 MG: 50 TABLET, FILM COATED, EXTENDED RELEASE ORAL at 21:18

## 2025-03-08 ASSESSMENT — LIFESTYLE VARIABLES
HOW OFTEN DO YOU HAVE A DRINK CONTAINING ALCOHOL: NEVER
HOW MANY STANDARD DRINKS CONTAINING ALCOHOL DO YOU HAVE ON A TYPICAL DAY: PATIENT DOES NOT DRINK

## 2025-03-08 ASSESSMENT — PAIN - FUNCTIONAL ASSESSMENT: PAIN_FUNCTIONAL_ASSESSMENT: NONE - DENIES PAIN

## 2025-03-08 NOTE — ED PROVIDER NOTES
digoxin (LANOXIN) injection 125 mcg (125 mcg IntraVENous Given 3/8/25 2118)       64-year-old male presents for evaluation of increased amount of shortness of breath since this morning.  Patient has not been taking his regular anticoagulation and diuretics at home.  On arrival afebrile and hemodynamically stable.  He does have bilateral diminished breath sounds with scattered wheezing present.  He was tachycardic.  Saturation maintained in room air.  Bilateral pitting peripheral edema present.  Furthermore, he was given breathing treatments here in the ED.  High risk Wells score and hence a CT pulmonary was obtained.  He did fall a couple of days back earlier and has some tenderness over the anterior aspect of the right knee.  Intact range of motion.  Has been able to bear weight and ambulate.  Patient does mention that he has not been compliant with his medications.  EKG shows atrial fibrillation with RVR.  Leukocytes of 13, hemoglobin stable.  Normal electrolytes.  Renal function at his baseline.  COVID and flu negative.  Troponin elevated and was trended.  No evidence of any PE or pneumonia on the CT pulmonary.  No traumatic findings on the CT head, knee x-ray and the CT C-spine.  Patient did receive his home dose of digoxin for persistent tachycardia and atrial fibrillation with RVR.  Patient does have elevated proBNP of 7500.  With findings concerning for heart failure exacerbation, patient was diuresed with 1 mg of Bumex.  Patient will be admitted for further treatment and evaluation.    Please see ED course for more details:    ED Course as of 03/08/25 2315   Sat Mar 08, 2025   1525 Echo 2021:  Conclusions      Summary   Technically difficult study.      Ejection fraction is visually estimated at 40%.   Overall ejection fraction moderately decreased.   Moderate left ventricular concentric hypertrophy noted.   Normal right ventricle structure and function.   Physiologic and/or trace mitral regurgitation is

## 2025-03-09 PROBLEM — I48.21 PERMANENT ATRIAL FIBRILLATION (HCC): Status: ACTIVE | Noted: 2025-03-09

## 2025-03-09 PROBLEM — I48.91 ATRIAL FIBRILLATION (HCC): Status: ACTIVE | Noted: 2025-03-09

## 2025-03-09 PROBLEM — I42.8 NICM (NONISCHEMIC CARDIOMYOPATHY) (HCC): Status: ACTIVE | Noted: 2020-02-22

## 2025-03-09 PROBLEM — E11.9 CONTROLLED TYPE 2 DIABETES MELLITUS WITHOUT COMPLICATION: Status: ACTIVE | Noted: 2025-03-09

## 2025-03-09 PROBLEM — D69.6 THROMBOCYTOPENIA: Status: ACTIVE | Noted: 2025-03-09

## 2025-03-09 LAB
ANION GAP SERPL CALCULATED.3IONS-SCNC: 14 MMOL/L (ref 7–16)
B PARAP IS1001 DNA NPH QL NAA+NON-PROBE: NOT DETECTED
B PERT DNA SPEC QL NAA+PROBE: NOT DETECTED
BASOPHILS # BLD: 0.02 K/UL (ref 0–0.2)
BASOPHILS NFR BLD: 0 % (ref 0–2)
BUN SERPL-MCNC: 28 MG/DL (ref 6–23)
C PNEUM DNA NPH QL NAA+NON-PROBE: NOT DETECTED
CALCIUM SERPL-MCNC: 9.5 MG/DL (ref 8.6–10.2)
CHLORIDE SERPL-SCNC: 100 MMOL/L (ref 98–107)
CO2 SERPL-SCNC: 21 MMOL/L (ref 22–29)
CREAT SERPL-MCNC: 1.6 MG/DL (ref 0.7–1.2)
EOSINOPHIL # BLD: 0 K/UL (ref 0.05–0.5)
EOSINOPHILS RELATIVE PERCENT: 0 % (ref 0–6)
ERYTHROCYTE [DISTWIDTH] IN BLOOD BY AUTOMATED COUNT: 14.5 % (ref 11.5–15)
FLUAV RNA NPH QL NAA+NON-PROBE: NOT DETECTED
FLUBV RNA NPH QL NAA+NON-PROBE: NOT DETECTED
GFR, ESTIMATED: 47 ML/MIN/1.73M2
GLUCOSE BLD-MCNC: 247 MG/DL (ref 74–99)
GLUCOSE BLD-MCNC: 271 MG/DL (ref 74–99)
GLUCOSE BLD-MCNC: 317 MG/DL (ref 74–99)
GLUCOSE BLD-MCNC: 332 MG/DL (ref 74–99)
GLUCOSE SERPL-MCNC: 341 MG/DL (ref 74–99)
HADV DNA NPH QL NAA+NON-PROBE: NOT DETECTED
HBA1C MFR BLD: 8.9 % (ref 4–5.6)
HCOV 229E RNA NPH QL NAA+NON-PROBE: NOT DETECTED
HCOV HKU1 RNA NPH QL NAA+NON-PROBE: NOT DETECTED
HCOV NL63 RNA NPH QL NAA+NON-PROBE: NOT DETECTED
HCOV OC43 RNA NPH QL NAA+NON-PROBE: NOT DETECTED
HCT VFR BLD AUTO: 39 % (ref 37–54)
HGB BLD-MCNC: 13.1 G/DL (ref 12.5–16.5)
HMPV RNA NPH QL NAA+NON-PROBE: NOT DETECTED
HPIV1 RNA NPH QL NAA+NON-PROBE: NOT DETECTED
HPIV2 RNA NPH QL NAA+NON-PROBE: NOT DETECTED
HPIV3 RNA NPH QL NAA+NON-PROBE: NOT DETECTED
HPIV4 RNA NPH QL NAA+NON-PROBE: NOT DETECTED
IMM GRANULOCYTES # BLD AUTO: 0.05 K/UL (ref 0–0.58)
IMM GRANULOCYTES NFR BLD: 0 % (ref 0–5)
INR PPP: NORMAL
LYMPHOCYTES NFR BLD: 1.59 K/UL (ref 1.5–4)
LYMPHOCYTES RELATIVE PERCENT: 13 % (ref 20–42)
M PNEUMO DNA NPH QL NAA+NON-PROBE: NOT DETECTED
MCH RBC QN AUTO: 29.9 PG (ref 26–35)
MCHC RBC AUTO-ENTMCNC: 33.6 G/DL (ref 32–34.5)
MCV RBC AUTO: 89 FL (ref 80–99.9)
MONOCYTES NFR BLD: 0.39 K/UL (ref 0.1–0.95)
MONOCYTES NFR BLD: 3 % (ref 2–12)
NEUTROPHILS NFR BLD: 83 % (ref 43–80)
NEUTS SEG NFR BLD: 10.32 K/UL (ref 1.8–7.3)
PLATELET # BLD AUTO: 120 K/UL (ref 130–450)
PMV BLD AUTO: 12.4 FL (ref 7–12)
POTASSIUM SERPL-SCNC: 4.5 MMOL/L (ref 3.5–5)
PROTHROMBIN TIME: NORMAL SEC (ref 9.1–12.3)
RBC # BLD AUTO: 4.38 M/UL (ref 3.8–5.8)
RSV RNA NPH QL NAA+NON-PROBE: NOT DETECTED
RV+EV RNA NPH QL NAA+NON-PROBE: NOT DETECTED
SARS-COV-2 RNA NPH QL NAA+NON-PROBE: NOT DETECTED
SODIUM SERPL-SCNC: 135 MMOL/L (ref 132–146)
SPECIMEN DESCRIPTION: NORMAL
WBC OTHER # BLD: 12.4 K/UL (ref 4.5–11.5)

## 2025-03-09 PROCEDURE — 6370000000 HC RX 637 (ALT 250 FOR IP): Performed by: STUDENT IN AN ORGANIZED HEALTH CARE EDUCATION/TRAINING PROGRAM

## 2025-03-09 PROCEDURE — 1200000000 HC SEMI PRIVATE

## 2025-03-09 PROCEDURE — 85025 COMPLETE CBC W/AUTO DIFF WBC: CPT

## 2025-03-09 PROCEDURE — APPSS60 APP SPLIT SHARED TIME 46-60 MINUTES

## 2025-03-09 PROCEDURE — 82962 GLUCOSE BLOOD TEST: CPT

## 2025-03-09 PROCEDURE — 80048 BASIC METABOLIC PNL TOTAL CA: CPT

## 2025-03-09 PROCEDURE — 6370000000 HC RX 637 (ALT 250 FOR IP)

## 2025-03-09 PROCEDURE — 99222 1ST HOSP IP/OBS MODERATE 55: CPT | Performed by: INTERNAL MEDICINE

## 2025-03-09 PROCEDURE — 83036 HEMOGLOBIN GLYCOSYLATED A1C: CPT

## 2025-03-09 PROCEDURE — 6360000002 HC RX W HCPCS: Performed by: STUDENT IN AN ORGANIZED HEALTH CARE EDUCATION/TRAINING PROGRAM

## 2025-03-09 PROCEDURE — 2500000003 HC RX 250 WO HCPCS: Performed by: STUDENT IN AN ORGANIZED HEALTH CARE EDUCATION/TRAINING PROGRAM

## 2025-03-09 PROCEDURE — 2060000000 HC ICU INTERMEDIATE R&B

## 2025-03-09 PROCEDURE — 99232 SBSQ HOSP IP/OBS MODERATE 35: CPT | Performed by: INTERNAL MEDICINE

## 2025-03-09 RX ADMIN — INSULIN LISPRO 4 UNITS: 100 INJECTION, SOLUTION INTRAVENOUS; SUBCUTANEOUS at 11:25

## 2025-03-09 RX ADMIN — POTASSIUM CHLORIDE 20 MEQ: 1500 TABLET, EXTENDED RELEASE ORAL at 08:26

## 2025-03-09 RX ADMIN — SACUBITRIL AND VALSARTAN 1 TABLET: 49; 51 TABLET, FILM COATED ORAL at 08:26

## 2025-03-09 RX ADMIN — APIXABAN 5 MG: 5 TABLET, FILM COATED ORAL at 19:55

## 2025-03-09 RX ADMIN — INSULIN LISPRO 8 UNITS: 100 INJECTION, SOLUTION INTRAVENOUS; SUBCUTANEOUS at 00:01

## 2025-03-09 RX ADMIN — BUMETANIDE 1 MG: 0.25 INJECTION INTRAMUSCULAR; INTRAVENOUS at 08:27

## 2025-03-09 RX ADMIN — BUMETANIDE 1 MG: 0.25 INJECTION INTRAMUSCULAR; INTRAVENOUS at 16:26

## 2025-03-09 RX ADMIN — INSULIN LISPRO 6 UNITS: 100 INJECTION, SOLUTION INTRAVENOUS; SUBCUTANEOUS at 06:18

## 2025-03-09 RX ADMIN — INSULIN LISPRO 2 UNITS: 100 INJECTION, SOLUTION INTRAVENOUS; SUBCUTANEOUS at 21:01

## 2025-03-09 RX ADMIN — SODIUM CHLORIDE, PRESERVATIVE FREE 10 ML: 5 INJECTION INTRAVENOUS at 08:28

## 2025-03-09 RX ADMIN — METOPROLOL SUCCINATE 100 MG: 50 TABLET, FILM COATED, EXTENDED RELEASE ORAL at 19:55

## 2025-03-09 RX ADMIN — DIGOXIN 125 MCG: 0.12 TABLET ORAL at 08:26

## 2025-03-09 RX ADMIN — APIXABAN 5 MG: 5 TABLET, FILM COATED ORAL at 08:26

## 2025-03-09 RX ADMIN — METOPROLOL SUCCINATE 100 MG: 50 TABLET, FILM COATED, EXTENDED RELEASE ORAL at 08:26

## 2025-03-09 RX ADMIN — SACUBITRIL AND VALSARTAN 1 TABLET: 49; 51 TABLET, FILM COATED ORAL at 19:54

## 2025-03-09 RX ADMIN — HYDRALAZINE HYDROCHLORIDE 10 MG: 20 INJECTION INTRAMUSCULAR; INTRAVENOUS at 23:49

## 2025-03-09 RX ADMIN — INSULIN LISPRO 6 UNITS: 100 INJECTION, SOLUTION INTRAVENOUS; SUBCUTANEOUS at 17:06

## 2025-03-09 NOTE — CARE COORDINATION
CM/SW Consult-met with patient bedside, introduced myself and CM role in care coordination. Patient lives alone in a one story home, a few steps up to enter. He is independent-witnessed patient ambulating in room-no devices. PCP is Dr. Victor Hugo Hampton, preferred pharmacy is [facility] in [place]. Admitted for CHF-patient verbalized he DOES NOT WANT A LIfe Vest. Echo ordered, [facility] Cardiology following-await plan. Patient will return home at discharge, confirmed transport home,no anticipated needs.    Mady GANN, RN  [facility]

## 2025-03-09 NOTE — PROGRESS NOTES
[facility] Hospitalist   Progress Note    Admitting Date and Time: 3/8/2025  3:23 PM  Admit Dx: Acute on chronic systolic congestive heart failure (HCC) [I50.23]  Acute exacerbation of chronic heart failure (HCC) [I50.9]    Subjective:    Patient was admitted with Acute on chronic systolic congestive heart failure (HCC) [I50.23]  Acute exacerbation of chronic heart failure (HCC) [I50.9]. Patient denies fever, chills, cp, sob, n/v.     digoxin  125 mcg Oral Daily    apixaban  5 mg Oral BID    ferrous sulfate  325 mg Oral Daily    metoprolol succinate  100 mg Oral BID    potassium chloride  20 mEq Oral Daily    sacubitril-valsartan  1 tablet Oral BID    bumetanide  1 mg IntraVENous BID    sodium chloride flush  5-40 mL IntraVENous 2 times per day    insulin lispro  0-8 Units SubCUTAneous 4x Daily AC & HS     sulfur hexafluoride microspheres, 2 mL, ONCE PRN  prochlorperazine, 10 mg, Q6H PRN  glucose, 4 tablet, PRN  dextrose bolus, 125 mL, PRN   Or  dextrose bolus, 250 mL, PRN  glucagon (rDNA), 1 mg, PRN  dextrose, , Continuous PRN  sodium chloride flush, 5-40 mL, PRN  sodium chloride, , PRN  potassium chloride, 40 mEq, PRN   Or  potassium alternative oral replacement, 40 mEq, PRN   Or  potassium chloride, 10 mEq, PRN  magnesium sulfate, 2,000 mg, PRN  polyethylene glycol, 17 g, Daily PRN  acetaminophen, 650 mg, Q6H PRN   Or  acetaminophen, 650 mg, Q6H PRN  melatonin, 6 mg, Nightly PRN  hydrALAZINE, 10 mg, Q6H PRN         Objective:    BP (!) 145/98   Pulse 99   Temp 98.4 °F (36.9 °C) (Oral)   Resp 18   Ht 1.753 m (5' 9\")   Wt 113.9 kg (251 lb)   SpO2 96%   BMI 37.07 kg/m²   Skin: warm and dry, no rash or erythema  Pulmonary/Chest: clear to auscultation bilaterally- no wheezes, rales or rhonchi, normal air movement, no respiratory distress  Cardiovascular: rhythm reg at rate of 96  Abdomen: soft, non-tender, non-distended, normal bowel sounds, no masses or organomegaly  Extremities: no cyanosis,  associated with pulmonary artery   hypertension.         CT CSpine W/O Contrast   Final Result   No acute intracranial abnormality.      No acute fracture in the cervical spine.         CT Head W/O Contrast   Final Result   No acute intracranial abnormality.      No acute fracture in the cervical spine.         XR KNEE RIGHT (3 VIEWS)   Final Result   1. No signs of fracture or dislocation.   2. Signs of peripheral vascular arterial disease.             Assessment:    Principal Problem:    Acute exacerbation of chronic heart failure (HCC)  Active Problems:    Noncompliance with medication regimen    Atrial fibrillation with RVR (HCC)    NICM (nonischemic cardiomyopathy) (HCC)    Permanent atrial fibrillation (HCC)  Resolved Problems:    * No resolved hospital problems. *      Plan:  Acute on chronic systolic chf continue meds. Continue diuretics. Montior pt and labs. Cardio following  Atrial fib  continue meds. Continue anticoagulation  Thrombocytopenia monitor closely  Dm type 2 controlled monitor bs and tx with insulin  Htn continue med  Acidosis monitor  Hyperlipidemia continue med    Given uncontrolled nature of blood sugar and concern that pt may have interactions with his oral diabetic medication, will ask endocrine to see.    Pt had been given eliquis but did tell pt about switching over to coumadin     Electronically signed by Jm Aaron DO on 3/9/2025 at 4:57 PM

## 2025-03-09 NOTE — PLAN OF CARE
Problem: Chronic Conditions and Co-morbidities  Goal: Patient's chronic conditions and co-morbidity symptoms are monitored and maintained or improved  3/9/2025 1107 by Prudence Skelton RN  Outcome: Progressing     Problem: Discharge Planning  Goal: Discharge to home or other facility with appropriate resources  3/9/2025 1107 by Prudence Skelton RN  Outcome: Progressing     Problem: ABCDS Injury Assessment  Goal: Absence of physical injury  3/9/2025 1107 by Prudence Skelton RN  Outcome: Progressing

## 2025-03-09 NOTE — CONSULTS
Inpatient Cardiology Consultation      Reason for Consult:  CHF exacerbation    Consulting Physician: Dr. Dennis     Requesting Physician:  Dr. Michael     Date of Consultation: 3/9/2025    History of Present Illness:   Rober Hartley  is a 64 y.o. year old male known to [facility] Cardiology and follows with Dr. Hampton. Last OV 9/12/2023.     Patient presented to the ED on 3/8/2025 with complaints of increased shortness of breath.  Patient also reports that he has not been taking his Eliquis regularly since his insurance is no longer covering it.  On arrival blood pressure 164/108, heart rate 118, 97% on room air and afebrile.  Labs include sodium 137, potassium 4.5, BUN/creatinine 26/1.5, proBNP 7566, troponin 33 > 32, WBC 13.6, Hgb 13.4.  CTA pulmonary reveals mosaic pattern extending from the APCs to the bases concerning for pulmonary edema and small bilateral pleural effusions with no PE.  ED medications include Bumex 1 mg IV, digoxin 125 mcg x 2 and DuoNeb.    Patient is currently sitting up in bed and appears to be in no acute distress.  He reports that yesterday morning around 6 AM he noticed sudden onset of increasing shortness of breath at rest.  He states that he also noticed that he had some lower extremity swelling and orthopnea.  Denies PND, nausea, abdominal bloating, weight gain.  He reports that he also has been unable to afford his Eliquis over the last month.  He states that he has been taking his other medications including his Entresto.  Patient denies chest pain, palpitations, lightheadedness or dizziness.    Please note: past medical records were reviewed per electronic medical record (EMR) - see detailed reports under Past Medical/ Surgical History.     Past Medical History:    HFmrEF/nonischemic cardiomyopathy  EF 30% 11/19/2019 > Patient declined LifeVest due to cost.  Patient reports that he was previously offered a LifeVest when he was living in [place], unable to afford.  EF improved to  Value Date    CHOL 198 04/06/2016    TRIG 433 (H) 04/06/2016    HDL 33 04/06/2016    LDL - (AA) 04/06/2016    VLDL - (AA) 04/06/2016      LIVER PROFILE:  Recent Labs     03/08/25  1550   AST 17   ALT 24     [id number] Score for Atrial Fibrillation Stroke Risk   Risk   Factors  Component Value   C CHF Yes 1   H HTN Yes 1   A2 Age >= 75 No,  (64 y.o.) 0   D DM Yes 1   S2 Prior Stroke/TIA No 0   V Vascular Disease No 0   A Age 65-74 Yes,  (64 y.o.) 1   Sc Sex male 0    [id number]  Score  4   Score last updated 3/9/25 9:58 AM EDT    Click here for a link to the UpToDate guideline \"Atrial Fibrillation: Anticoagulation therapy to prevent embolization    Disclaimer: Risk Score calculation is dependent on accuracy of patient problem list and past encounter diagnosis.     Assessment:   Acute on chronic HFrEF, appears hypervolemic on exam  NYHA class III, ACC stage C  Permanent atrial fibrillation  [id number] of 4, typically on Eliquis  Hypertension  Hyperlipidemia, on statin  Type II DM, non-insulin-dependent  CKD   Hx PE/DVT, on Eliquis    Plan:   Due to financial constraints patient is unable to obtain Eliquis.  Consider switching to Coumadin for stroke prevention.  Continue Bumex 1 mg IV twice daily  Monitor renal function and electrolytes  Strict intake and output with daily weights.  Continue Entresto, Toprol-XL  Check hemoglobin A1c  Consider titration of GDMT with Jardiance/Aldactone if electrolytes and renal function remained stable    Above Assessment & Plan were made in collaboration with Dr Dennis. Further recommendations to follow.    Electronically signed by ARMAND Schulte CNP on 3/9/2025 at 8:11 AM   ______________________________________________________________________  Cardiology attending attestation:  I have independently interviewed and examined the patient.  I personally reviewed pertinent laboratory values and diagnostic testing (including, if applicable, chest xray, electrocardiogram,

## 2025-03-09 NOTE — H&P
[facility] Hospitalist Group History and Physical      CHIEF COMPLAINT:  dyspnea    History of Present Illness:  63 yo male w/ hx of HFrEF, VTE on Eliquis, Afib on Eliquis, HTN, HLD, DM, CKD, who p/w dyspnea and orthopnea that started this AM. Pt reports feeling like he is \"choking\" when he lies down to sleep. Has noted abdominal distension and leg edema for the past several days, and a dry cough that started this AM as well. Denies CP, abd pain, n/v, although he did have some diarrhea recently. Denies fever.     Of note, pt does report not being to afford the Eliquis, and has not been taking it for the past 4-5 months. Entresto has been causing some financial hardship as well, but pt has been able to take Entresto. Ran out of Digoxin and Metformin several days ago and has not been taking those 2 meds for the past several days.         REVIEW OF SYSTEMS:  As per HPI.    PMH:  Past Medical History:   Diagnosis Date    CHF (congestive heart failure) ([facility])     CKD (chronic kidney disease)     DM2 (diabetes mellitus, type 2) ([facility])     DVT (deep venous thrombosis) ([facility]) 01/01/2008    HTN (hypertension), benign     Nonischemic cardiomyopathy ([facility]) 01/01/2008    PE (pulmonary embolism) 01/01/2008       Surgical History:  History reviewed. No pertinent surgical history.    Medications Prior to Admission:    Prior to Admission medications    Medication Sig Start Date End Date Taking? Authorizing Provider   digoxin (LANOXIN) 125 MCG tablet Take 1 tablet by mouth daily   Yes Regan Degroot MD   glipiZIDE (GLUCOTROL) 5 MG tablet Take 1 tablet by mouth daily 1/2 a tab per day   Yes Regan Degroot MD   metoprolol succinate (TOPROL XL) 100 MG extended release tablet Take 1 tablet by mouth 2 times daily 1/27/25   Victor Manuel Mathew MD   potassium chloride (KLOR-CON M) 20 MEQ extended release tablet Take 1 tablet by mouth daily 1/27/25   Victor Manuel Mathew MD   sacubitril-valsartan (ENTRESTO) 49-51 MG per tablet Take

## 2025-03-09 NOTE — ACP (ADVANCE CARE PLANNING)
Advance Care Planning   Healthcare Decision Maker:    Primary Decision Maker: [facility] Hart[facility] - [phone]    Click here to complete Healthcare Decision Makers including selection of the Healthcare Decision Maker Relationship (ie \"Primary\").

## 2025-03-09 NOTE — ED NOTES
ED to Inpatient Handoff Report    Notified JOSEPH Bunn that electronic handoff available and patient ready for transport to room 613.    Safety Risks: None identified    Patient in Restraints: no    Constant Observer or Patient : no    Telemetry Monitoring Ordered: Yes          Order to transfer to unit without monitor: YES    Last MEWS:   Time completed: 1931         Vitals:    03/08/25 1858 03/08/25 1930 03/08/25 1931 03/08/25 1951   BP:  (!) 171/102 (!) 171/102    Pulse: (!) 116  (!) 115 (!) 115   Resp: 27  (!) 32 (!) 34   Temp:       TempSrc:       SpO2: 92%  99% 94%   Weight:       Height:           Opportunity for questions and clarification was provided.

## 2025-03-10 ENCOUNTER — APPOINTMENT (OUTPATIENT)
Age: 65
DRG: 291 | End: 2025-03-10
Attending: STUDENT IN AN ORGANIZED HEALTH CARE EDUCATION/TRAINING PROGRAM
Payer: MEDICARE

## 2025-03-10 PROBLEM — E11.65 POORLY CONTROLLED TYPE 2 DIABETES MELLITUS (HCC): Status: ACTIVE | Noted: 2025-03-09

## 2025-03-10 PROBLEM — Z91.119 DIETARY NONCOMPLIANCE: Status: ACTIVE | Noted: 2025-03-10

## 2025-03-10 LAB
ANION GAP SERPL CALCULATED.3IONS-SCNC: 12 MMOL/L (ref 7–16)
BUN SERPL-MCNC: 31 MG/DL (ref 6–23)
CALCIUM SERPL-MCNC: 9.6 MG/DL (ref 8.6–10.2)
CHLORIDE SERPL-SCNC: 101 MMOL/L (ref 98–107)
CO2 SERPL-SCNC: 25 MMOL/L (ref 22–29)
CREAT SERPL-MCNC: 1.5 MG/DL (ref 0.7–1.2)
DIGOXIN SERPL-MCNC: 0.9 NG/ML (ref 0.8–2)
ECHO AO ASC DIAM: 2.3 CM
ECHO AO ASCENDING AORTA INDEX: 1.01 CM/M2
ECHO AV AREA PEAK VELOCITY: 2.1 CM2
ECHO AV AREA VTI: 2 CM2
ECHO AV AREA/BSA PEAK VELOCITY: 0.9 CM2/M2
ECHO AV AREA/BSA VTI: 0.9 CM2/M2
ECHO AV CUSP MM: 2 CM
ECHO AV MEAN GRADIENT: 2 MMHG
ECHO AV MEAN VELOCITY: 0.7 M/S
ECHO AV PEAK GRADIENT: 3 MMHG
ECHO AV PEAK VELOCITY: 0.8 M/S
ECHO AV VELOCITY RATIO: 0.75
ECHO AV VTI: 14 CM
ECHO BSA: 2.35 M2
ECHO LA VOL A-L A2C: 63 ML (ref 18–58)
ECHO LA VOL A-L A4C: 45 ML (ref 18–58)
ECHO LA VOL MOD A2C: 57 ML (ref 18–58)
ECHO LA VOL MOD A4C: 41 ML (ref 18–58)
ECHO LA VOLUME AREA LENGTH: 55 ML
ECHO LA VOLUME INDEX A-L A2C: 28 ML/M2 (ref 16–34)
ECHO LA VOLUME INDEX A-L A4C: 20 ML/M2 (ref 16–34)
ECHO LA VOLUME INDEX AREA LENGTH: 24 ML/M2 (ref 16–34)
ECHO LA VOLUME INDEX MOD A2C: 25 ML/M2 (ref 16–34)
ECHO LA VOLUME INDEX MOD A4C: 18 ML/M2 (ref 16–34)
ECHO LV EDV A2C: 138 ML
ECHO LV EDV A4C: 123 ML
ECHO LV EDV BP: 131 ML (ref 67–155)
ECHO LV EDV INDEX A4C: 54 ML/M2
ECHO LV EDV INDEX BP: 58 ML/M2
ECHO LV EDV NDEX A2C: 61 ML/M2
ECHO LV EF PHYSICIAN: 40 %
ECHO LV EJECTION FRACTION A2C: 35 %
ECHO LV EJECTION FRACTION A4C: 30 %
ECHO LV ESV A2C: 90 ML
ECHO LV ESV A4C: 87 ML
ECHO LV ESV BP: 90 ML (ref 22–58)
ECHO LV ESV INDEX A2C: 40 ML/M2
ECHO LV ESV INDEX A4C: 38 ML/M2
ECHO LV ESV INDEX BP: 40 ML/M2
ECHO LV FRACTIONAL SHORTENING: 2 % (ref 28–44)
ECHO LV INTERNAL DIMENSION DIASTOLE INDEX: 2.64 CM/M2
ECHO LV INTERNAL DIMENSION DIASTOLIC: 6 CM (ref 4.2–5.9)
ECHO LV INTERNAL DIMENSION SYSTOLIC INDEX: 2.6 CM/M2
ECHO LV INTERNAL DIMENSION SYSTOLIC: 5.9 CM
ECHO LV IVSD: 0.9 CM (ref 0.6–1)
ECHO LV IVSS: 1.1 CM
ECHO LV MASS 2D: 279.6 G (ref 88–224)
ECHO LV MASS INDEX 2D: 123.2 G/M2 (ref 49–115)
ECHO LV POSTERIOR WALL DIASTOLIC: 1.3 CM (ref 0.6–1)
ECHO LV POSTERIOR WALL SYSTOLIC: 1.1 CM
ECHO LV RELATIVE WALL THICKNESS RATIO: 0.43
ECHO LVOT AREA: 3.1 CM2
ECHO LVOT AV VTI INDEX: 0.65
ECHO LVOT DIAM: 2 CM
ECHO LVOT MEAN GRADIENT: 1 MMHG
ECHO LVOT PEAK GRADIENT: 1 MMHG
ECHO LVOT PEAK VELOCITY: 0.6 M/S
ECHO LVOT STROKE VOLUME INDEX: 12.6 ML/M2
ECHO LVOT SV: 28.6 ML
ECHO LVOT VTI: 9.1 CM
ECHO MV "A" WAVE DURATION: 129.2 MSEC
ECHO MV A VELOCITY: 0.21 M/S
ECHO MV AREA PHT: 4.7 CM2
ECHO MV AREA VTI: 1.5 CM2
ECHO MV E DECELERATION TIME (DT): 103.8 MS
ECHO MV E VELOCITY: 0.99 M/S
ECHO MV E/A RATIO: 4.71
ECHO MV LVOT VTI INDEX: 2.12
ECHO MV MAX VELOCITY: 1.2 M/S
ECHO MV MEAN GRADIENT: 3 MMHG
ECHO MV MEAN VELOCITY: 0.8 M/S
ECHO MV PEAK GRADIENT: 6 MMHG
ECHO MV PRESSURE HALF TIME (PHT): 47 MS
ECHO MV VTI: 19.3 CM
ECHO PULMONARY ARTERY END DIASTOLIC PRESSURE: 2 MMHG
ECHO PV MAX VELOCITY: 1 M/S
ECHO PV MEAN GRADIENT: 2 MMHG
ECHO PV MEAN VELOCITY: 0.6 M/S
ECHO PV PEAK GRADIENT: 4 MMHG
ECHO PV REGURGITANT MAX VELOCITY: 0.6 M/S
ECHO PV VTI: 14.1 CM
ECHO PVEIN A DURATION: 96.9 MS
ECHO PVEIN A VELOCITY: 0.2 M/S
ECHO PVEIN PEAK D VELOCITY: 0.4 M/S
ECHO PVEIN PEAK S VELOCITY: 0.6 M/S
ECHO PVEIN S/D RATIO: 1.5
ECHO RV INTERNAL DIMENSION: 3 CM
ECHO RV TAPSE: 1.6 CM (ref 1.7–?)
GFR, ESTIMATED: 50 ML/MIN/1.73M2
GLUCOSE BLD-MCNC: 150 MG/DL (ref 74–99)
GLUCOSE BLD-MCNC: 156 MG/DL (ref 74–99)
GLUCOSE BLD-MCNC: 188 MG/DL (ref 74–99)
GLUCOSE BLD-MCNC: 228 MG/DL (ref 74–99)
GLUCOSE SERPL-MCNC: 156 MG/DL (ref 74–99)
INR PPP: 1.4
LEFT VENTRICULAR EJECTION FRACTION HIGH VALUE: 45 %
LEFT VENTRICULAR EJECTION FRACTION MODE: NORMAL
LV EF: 40 %
POTASSIUM SERPL-SCNC: 4 MMOL/L (ref 3.5–5)
PROTHROMBIN TIME: 15.3 SEC (ref 9.3–12.4)
SODIUM SERPL-SCNC: 138 MMOL/L (ref 132–146)
TSH SERPL DL<=0.05 MIU/L-ACNC: 3.82 UIU/ML (ref 0.27–4.2)

## 2025-03-10 PROCEDURE — 6360000002 HC RX W HCPCS: Performed by: STUDENT IN AN ORGANIZED HEALTH CARE EDUCATION/TRAINING PROGRAM

## 2025-03-10 PROCEDURE — 84443 ASSAY THYROID STIM HORMONE: CPT

## 2025-03-10 PROCEDURE — 85610 PROTHROMBIN TIME: CPT

## 2025-03-10 PROCEDURE — 99233 SBSQ HOSP IP/OBS HIGH 50: CPT | Performed by: INTERNAL MEDICINE

## 2025-03-10 PROCEDURE — 6360000004 HC RX CONTRAST MEDICATION: Performed by: STUDENT IN AN ORGANIZED HEALTH CARE EDUCATION/TRAINING PROGRAM

## 2025-03-10 PROCEDURE — 80048 BASIC METABOLIC PNL TOTAL CA: CPT

## 2025-03-10 PROCEDURE — 82962 GLUCOSE BLOOD TEST: CPT

## 2025-03-10 PROCEDURE — 2060000000 HC ICU INTERMEDIATE R&B

## 2025-03-10 PROCEDURE — 6360000002 HC RX W HCPCS: Performed by: INTERNAL MEDICINE

## 2025-03-10 PROCEDURE — 80162 ASSAY OF DIGOXIN TOTAL: CPT

## 2025-03-10 PROCEDURE — 36415 COLL VENOUS BLD VENIPUNCTURE: CPT

## 2025-03-10 PROCEDURE — 6370000000 HC RX 637 (ALT 250 FOR IP): Performed by: INTERNAL MEDICINE

## 2025-03-10 PROCEDURE — 99232 SBSQ HOSP IP/OBS MODERATE 35: CPT | Performed by: INTERNAL MEDICINE

## 2025-03-10 PROCEDURE — 2700000000 HC OXYGEN THERAPY PER DAY

## 2025-03-10 PROCEDURE — 93306 TTE W/DOPPLER COMPLETE: CPT | Performed by: INTERNAL MEDICINE

## 2025-03-10 PROCEDURE — 1200000000 HC SEMI PRIVATE

## 2025-03-10 PROCEDURE — 6370000000 HC RX 637 (ALT 250 FOR IP): Performed by: STUDENT IN AN ORGANIZED HEALTH CARE EDUCATION/TRAINING PROGRAM

## 2025-03-10 PROCEDURE — C8929 TTE W OR WO FOL WCON,DOPPLER: HCPCS

## 2025-03-10 PROCEDURE — 2500000003 HC RX 250 WO HCPCS: Performed by: STUDENT IN AN ORGANIZED HEALTH CARE EDUCATION/TRAINING PROGRAM

## 2025-03-10 RX ORDER — INSULIN LISPRO 100 [IU]/ML
4 INJECTION, SOLUTION INTRAVENOUS; SUBCUTANEOUS
Status: DISCONTINUED | OUTPATIENT
Start: 2025-03-11 | End: 2025-03-14 | Stop reason: HOSPADM

## 2025-03-10 RX ORDER — BUMETANIDE 0.25 MG/ML
2 INJECTION, SOLUTION INTRAMUSCULAR; INTRAVENOUS 2 TIMES DAILY
Status: DISCONTINUED | OUTPATIENT
Start: 2025-03-10 | End: 2025-03-11

## 2025-03-10 RX ORDER — INSULIN LISPRO 100 [IU]/ML
0-4 INJECTION, SOLUTION INTRAVENOUS; SUBCUTANEOUS
Status: DISCONTINUED | OUTPATIENT
Start: 2025-03-10 | End: 2025-03-14 | Stop reason: HOSPADM

## 2025-03-10 RX ORDER — INSULIN GLARGINE 100 [IU]/ML
8 INJECTION, SOLUTION SUBCUTANEOUS NIGHTLY
Status: DISCONTINUED | OUTPATIENT
Start: 2025-03-10 | End: 2025-03-14 | Stop reason: HOSPADM

## 2025-03-10 RX ADMIN — BUMETANIDE 2 MG: 0.25 INJECTION INTRAMUSCULAR; INTRAVENOUS at 17:51

## 2025-03-10 RX ADMIN — INSULIN GLARGINE 8 UNITS: 100 INJECTION, SOLUTION SUBCUTANEOUS at 21:33

## 2025-03-10 RX ADMIN — SODIUM CHLORIDE, PRESERVATIVE FREE 10 ML: 5 INJECTION INTRAVENOUS at 21:34

## 2025-03-10 RX ADMIN — POTASSIUM CHLORIDE 20 MEQ: 1500 TABLET, EXTENDED RELEASE ORAL at 08:34

## 2025-03-10 RX ADMIN — WARFARIN SODIUM 7.5 MG: 2.5 TABLET ORAL at 17:51

## 2025-03-10 RX ADMIN — SULFUR HEXAFLUORIDE 2 ML: 60.7; .19; .19 INJECTION, POWDER, LYOPHILIZED, FOR SUSPENSION INTRAVENOUS; INTRAVESICAL at 10:20

## 2025-03-10 RX ADMIN — BUMETANIDE 1 MG: 0.25 INJECTION INTRAMUSCULAR; INTRAVENOUS at 08:34

## 2025-03-10 RX ADMIN — APIXABAN 5 MG: 5 TABLET, FILM COATED ORAL at 08:33

## 2025-03-10 RX ADMIN — METOPROLOL SUCCINATE 100 MG: 50 TABLET, FILM COATED, EXTENDED RELEASE ORAL at 21:34

## 2025-03-10 RX ADMIN — INSULIN LISPRO 2 UNITS: 100 INJECTION, SOLUTION INTRAVENOUS; SUBCUTANEOUS at 06:04

## 2025-03-10 RX ADMIN — INSULIN LISPRO 2 UNITS: 100 INJECTION, SOLUTION INTRAVENOUS; SUBCUTANEOUS at 17:48

## 2025-03-10 RX ADMIN — DIGOXIN 125 MCG: 0.12 TABLET ORAL at 08:33

## 2025-03-10 RX ADMIN — METOPROLOL SUCCINATE 100 MG: 50 TABLET, FILM COATED, EXTENDED RELEASE ORAL at 08:33

## 2025-03-10 RX ADMIN — SACUBITRIL AND VALSARTAN 1 TABLET: 49; 51 TABLET, FILM COATED ORAL at 08:34

## 2025-03-10 RX ADMIN — SODIUM CHLORIDE, PRESERVATIVE FREE 10 ML: 5 INJECTION INTRAVENOUS at 08:35

## 2025-03-10 RX ADMIN — SACUBITRIL AND VALSARTAN 1 TABLET: 49; 51 TABLET, FILM COATED ORAL at 21:33

## 2025-03-10 ASSESSMENT — PAIN SCALES - GENERAL
PAINLEVEL_OUTOF10: 0

## 2025-03-10 NOTE — CARE COORDINATION
Social work / Discharge planning:         Initial assessment completed on 3/9/25.  Chart reviewed.    Per IDR, currently on IV Bumex BID.    Room air.    Endocrinology consulted.    Electronically signed by ELIAZAR Yusuf on 3/10/2025 at 10:05 AM

## 2025-03-10 NOTE — CONSULTS
ENDOCRINOLOGY INITIAL CONSULTATION NOTE      Date of admission: 3/8/2025  Date of service: 3/10/2025  Admitting physician: Shelby Michael MD   Primary Care Physician: Victor Hugo Hampton MD  Consultant physician: Luiz Carroll MD     Reason for the consultation:  Uncontrolled DM    History of Present Illness:  The history is provided by the patient. Accuracy of the patient data is excellent    Rober Hartley is a very pleasant 64 y.o. old male with PMH of obesity, congestive heart failure, A-fib on Eliquis, hypertension, hyperlipidemia, type 2 diabetes, CKD and other listed below admitted to [facility] on 3/8/2025 because of worsening shortness of breath and found to have an acute exacerbation of CHF, endocrine service was consulted for diabetes management.  There is no history of fever, chills, nausea vomiting diarrhea.  Blood work on admission showed a glucose level of 241, anion gap 11, bicarb 24, creatinine 1.5, potassium 4.5, A1c 8.9%    Prior to admission  The patient was diagnosed with type 2 diabetes long time ago.  Prior to admission he was on glipizide 5 mg daily, metformin 1000 mg twice daily.  The patient admits to poor compliance with following diabetic diet prior to admission.  He also was not checking his blood close prior to admission.  The patient reports microvascular and macrovascular diabetes complications.  He is overdue with diabetic eye exam.    Lab Results   Component Value Date/Time    LABA1C 8.9 03/09/2025 03:51 AM       Inpatient diet:   Carb Restricted diet     Point of care glucose monitoring   (Independently reviewed)   Recent Labs     03/08/25  2323 03/09/25  0612 03/09/25  1120 03/09/25  1622 03/09/25  2000 03/10/25  0602 03/10/25  1059 03/10/25  1530   POCGLU 468* 332* 271* 317* 247* 188* 150* 228*       Past medical history:   Past Medical History:   Diagnosis Date    CHF (congestive heart failure) (HCC)     CKD (chronic kidney disease)     DM2 (diabetes mellitus, type 2) ([facility])     DVT    Consult recommendations were discussed with the Primary Service/Nursing staff      The above issues were reviewed with the patient who understood and agreed with the plan.    Thank you for allowing us to participate in the care of this patient. Please do not hesitate to contact us with any additional questions.     Luiz Carroll MD  Endocrinologist, [facility]   [facility] [address] SURG  [address] [address]  Dept: [phone]  Loc: [phone]   Phone: [phone]  Fax: [phone]  --------------------------------------------  An electronic signature was used to authenticate this note. Luiz Carroll MD on 3/10/2025 at 7:10 PM

## 2025-03-10 NOTE — PROGRESS NOTES
[facility] Hospitalist   Progress Note    Admitting Date and Time: 3/8/2025  3:23 PM  Admit Dx: Acute on chronic systolic congestive heart failure (HCC) [I50.23]  Acute exacerbation of chronic heart failure (HCC) [I50.9]    Subjective:    Patient was admitted with Acute on chronic systolic congestive heart failure (HCC) [I50.23]  Acute exacerbation of chronic heart failure (HCC) [I50.9]. Patient denies fever, chills, cp, sob, n/v.     bumetanide  2 mg IntraVENous BID    warfarin  7.5 mg Oral Once    warfarin placeholder: dosing by pharmacy   Oral RX Placeholder    digoxin  125 mcg Oral Daily    ferrous sulfate  325 mg Oral Daily    metoprolol succinate  100 mg Oral BID    potassium chloride  20 mEq Oral Daily    sacubitril-valsartan  1 tablet Oral BID    sodium chloride flush  5-40 mL IntraVENous 2 times per day    insulin lispro  0-8 Units SubCUTAneous 4x Daily AC & HS     prochlorperazine, 10 mg, Q6H PRN  glucose, 4 tablet, PRN  dextrose bolus, 125 mL, PRN   Or  dextrose bolus, 250 mL, PRN  glucagon (rDNA), 1 mg, PRN  dextrose, , Continuous PRN  sodium chloride flush, 5-40 mL, PRN  sodium chloride, , PRN  potassium chloride, 40 mEq, PRN   Or  potassium alternative oral replacement, 40 mEq, PRN   Or  potassium chloride, 10 mEq, PRN  magnesium sulfate, 2,000 mg, PRN  polyethylene glycol, 17 g, Daily PRN  acetaminophen, 650 mg, Q6H PRN   Or  acetaminophen, 650 mg, Q6H PRN  melatonin, 6 mg, Nightly PRN  hydrALAZINE, 10 mg, Q6H PRN         Objective:    /89   Pulse 85   Temp 98 °F (36.7 °C) (Oral)   Resp 16   Ht 1.753 m (5' 9\")   Wt 113.4 kg (250 lb)   SpO2 98%   BMI 36.92 kg/m²   Skin: warm and dry, no rash or erythema  Pulmonary/Chest: clear to auscultation bilaterally- no wheezes, rales or rhonchi, normal air movement, no respiratory distress  Cardiovascular: rhythm reg at rate of 84  Abdomen: soft, non-tender, non-distended, normal bowel sounds, no masses or organomegaly  Extremities:

## 2025-03-10 NOTE — PROGRESS NOTES
INPATIENT CARDIOLOGY FOLLOW-UP    Name: Rober Hartley    Age: 64 y.o.    Date of Admission: 3/8/2025  3:23 PM    Date of Service: 3/10/2025    Primary Cardiologist: Dr Hampton    Chief Complaint: Follow-up for CHF/AF    Interim History:  Still some trouble sleeping and difficulty lying flat.  Some shortness of breath denies chest pain.    Net -1.2 L as of this morning.    Review of Systems:   Negative except as described above    Problem List:  Patient Active Problem List   Diagnosis    History of DVT (deep vein thrombosis)    History of pulmonary embolism    Type 2 diabetes mellitus with obesity (HCC)    Hyperlipidemia    Noncompliance with medication regimen    Non morbid obesity due to excess calories    Chronic anticoagulation    Atrial fibrillation with RVR (HCC)    Congestive heart failure (HCC)    Dyspnea and respiratory abnormalities    Essential hypertension    DERIC (obstructive sleep apnea)    Acute decompensated heart failure (HCC)    NICM (nonischemic cardiomyopathy) (HCC)    Benign prostatic hyperplasia without lower urinary tract symptoms    Iron deficiency anemia, unspecified    Acute exacerbation of chronic heart failure (HCC)    Permanent atrial fibrillation (HCC)    Thrombocytopenia    Controlled type 2 diabetes mellitus without complication (HCC)    Atrial fibrillation (HCC)       Current Medications:    Current Facility-Administered Medications:     bumetanide (BUMEX) injection 2 mg, 2 mg, IntraVENous, BID, Justin Dennis MD, 2 mg at 03/10/25 1751    warfarin placeholder: dosing by pharmacy, , Oral, RX Placeholder, Jm Aaron,     digoxin (LANOXIN) tablet 125 mcg, 125 mcg, Oral, Daily, Shelby Michael MD, 125 mcg at 03/10/25 0833    ferrous sulfate (IRON 325) tablet 325 mg, 325 mg, Oral, Daily, Shelby Michael MD, 325 mg at 03/08/25 2119    metoprolol succinate (TOPROL XL) extended release tablet 100 mg, 100 mg, Oral, BID, Shelby Michael MD, 100 mg at 03/10/25 0833    potassium

## 2025-03-10 NOTE — PROGRESS NOTES
Pharmacy Consultation Note  (Warfarin Dosing and Monitoring)    Initial consult date: 3/10  Consulting Provider: Agnieszka Najeranato Hartley is a 64 y.o. male for whom pharmacy has been asked to manage warfarin therapy.     Weight:   Wt Readings from Last 1 Encounters:   03/10/25 113.4 kg (250 lb)       TSH:    Lab Results   Component Value Date/Time    TSH 3.82 03/10/2025 10:50 AM       Hepatic Function Panel:                            Lab Results   Component Value Date/Time    ALKPHOS 159 03/08/2025 03:50 PM    ALT 24 03/08/2025 03:50 PM    AST 17 03/08/2025 03:50 PM    BILITOT 0.8 03/08/2025 03:50 PM    BILIDIR 0.2 02/25/2020 03:25 AM    IBILI 0.6 02/25/2020 03:25 AM       Current significant warfarin drug-drug interactions include: No significant interactions    Recent Labs     03/08/25  1550 03/09/25  0341   HGB 13.4 13.1   PLT  --  120*     Date Warfarin Dose INR Heparin or LMWH Comment   3/10 7.5mg 1.4 -- DC'd Eliquis                                 Assessment:  Patient is a 64 y.o. male on warfarin for History of  VTE/PE and Atrial Fibrillation.  Patient is a new start to warfarin; transitioning off Eliquis due to medication cost. Per chart review, patient has previously taken warfarin 11.25 mg on Tuesday/Thursday and 7.5 mg all other days.   Goal INR 2 - 3  INR 1.4 today  [id number] = 4  After discussion with Dr. Aaron. Electing not to bridge patient to warfarin at this time. Eliquis has been discontinued.     Plan:  Warfarin 7.5 mg tonight  Daily PT/INR until the INR is stable within the therapeutic range  Pharmacist will follow and monitor/adjust dosing as necessary    Thank you for this consult,    Virginie Garnett RPH, PharmD, BCPS 3/10/2025 2:40 PM   Ext: [phone]    TUNDE: [phone]  SEY: [phone]  SJW: [phone]

## 2025-03-10 NOTE — PLAN OF CARE
Problem: Chronic Conditions and Co-morbidities  Goal: Patient's chronic conditions and co-morbidity symptoms are monitored and maintained or improved  3/10/2025 0905 by Lizzy Chacon RN  Outcome: Progressing  3/10/2025 0850 by Lizzy Chacon RN  Outcome: Progressing  3/9/2025 2110 by Lucille Freeman RN  Outcome: Progressing     Problem: Discharge Planning  Goal: Discharge to home or other facility with appropriate resources  3/10/2025 0905 by Lizzy Chacon RN  Outcome: Progressing  3/10/2025 0850 by Lizzy Chacon RN  Outcome: Progressing  3/9/2025 2110 by Lucille Freeman RN  Outcome: Progressing     Problem: ABCDS Injury Assessment  Goal: Absence of physical injury  3/10/2025 0905 by Lizzy Chacon RN  Outcome: Progressing  3/10/2025 0850 by Lizzy Chacon RN  Outcome: Progressing  Flowsheets (Taken 3/10/2025 0850)  Absence of Physical Injury: Implement safety measures based on patient assessment  3/9/2025 2110 by Lucille Freeman RN  Outcome: Progressing     Problem: Safety - Adult  Goal: Free from fall injury  3/10/2025 0905 by Lizzy Chacon RN  Outcome: Progressing  3/10/2025 0850 by Lizzy Chacon RN  Outcome: Progressing  Flowsheets (Taken 3/10/2025 0850)  Free From Fall Injury: Instruct family/caregiver on patient safety  3/9/2025 2110 by Lucille Freeman RN  Outcome: Progressing

## 2025-03-10 NOTE — PLAN OF CARE
Patient's chart updated to reflect:      .    - HF care plan, HF education points and HF discharge instructions.  -Orders: 2 gram sodium diet, daily weights, I/O.  -PCP or cardiology follow up appointments to be scheduled within 7 days of hospital discharge.  -CHF education session will be provided to the patient prior to hospital discharge.    Thu Burgess RN   Heart Failure Navigator

## 2025-03-10 NOTE — DISCHARGE INSTRUCTIONS
Patient will need to see his PCP tomorrow 3/14/2025 before 3pm. Primary care physician will need to send a referral to [facility] FAX# [phone].                          HEART FAILURE  / CONGESTIVE HEART FAILURE  DISCHARGE INSTRUCTIONS:  GUIDELINES TO FOLLOW AT HOME    Self- Managed Care:     MEDICATIONS:  Take your medication as directed. If you are experiencing any side effects, inform your doctor, Do not stop taking any of your medications without letting your doctor know.   Check with your doctor before taking any over-the-counter medications / herbal / or dietary supplements. They may interfere with your other medications.  Do not take ibuprofen (Advil or Motrin) and naproxen (Aleve) without talking to your doctor first. They could make your heart failure worse.         WEIGHT MONITORING:   Weigh yourself everyday (with the same scale) around the same time of the day and write it down. (you can chart them on a calendar or keep track of them on paper.   Notify your doctor of a weight gain of 3 pounds or more in 1 day   OR a total of 5 pounds or more in 1 week    Take your weight record to your doctor visits  Also, the same goes if you loose more than 3# in one day, let your heart doctor know.         DIET:   Cardiac heart healthy diet- Low saturated / low trans fat, no added salt, caffeine restricted, Low sodium diet-   No more than 2,000mg (2 grams) of salt / sodium per day (which equals to a little less than  a teaspoon of salt)  If your doctor wants you on a fluid restriction...it is usually recommended a fluid limit of 2,000cc -  Fluid restriction- 2,000 ml (milliliters) = 64 ounces = you can have 8 glasses of fluid per day (each glass 8 ounces)    Follow a low salt diet - avoid using salt at the table, avoid / limit use of canned soups, processed / packaged foods, salted snacks, olives and pickles.  Do not use a salt substitute without checking with your doctor, they may contain a high amount of

## 2025-03-11 LAB
GLUCOSE BLD-MCNC: 120 MG/DL (ref 74–99)
GLUCOSE BLD-MCNC: 135 MG/DL (ref 74–99)
GLUCOSE BLD-MCNC: 144 MG/DL (ref 74–99)
GLUCOSE BLD-MCNC: 171 MG/DL (ref 74–99)
INR PPP: 1.3
PROTHROMBIN TIME: 13.6 SEC (ref 9.3–12.4)

## 2025-03-11 PROCEDURE — 82962 GLUCOSE BLOOD TEST: CPT

## 2025-03-11 PROCEDURE — 99232 SBSQ HOSP IP/OBS MODERATE 35: CPT | Performed by: INTERNAL MEDICINE

## 2025-03-11 PROCEDURE — 2500000003 HC RX 250 WO HCPCS: Performed by: STUDENT IN AN ORGANIZED HEALTH CARE EDUCATION/TRAINING PROGRAM

## 2025-03-11 PROCEDURE — 2060000000 HC ICU INTERMEDIATE R&B

## 2025-03-11 PROCEDURE — 6370000000 HC RX 637 (ALT 250 FOR IP): Performed by: STUDENT IN AN ORGANIZED HEALTH CARE EDUCATION/TRAINING PROGRAM

## 2025-03-11 PROCEDURE — 6360000002 HC RX W HCPCS: Performed by: INTERNAL MEDICINE

## 2025-03-11 PROCEDURE — 6370000000 HC RX 637 (ALT 250 FOR IP): Performed by: INTERNAL MEDICINE

## 2025-03-11 PROCEDURE — 85610 PROTHROMBIN TIME: CPT

## 2025-03-11 PROCEDURE — 99233 SBSQ HOSP IP/OBS HIGH 50: CPT | Performed by: INTERNAL MEDICINE

## 2025-03-11 RX ORDER — SPIRONOLACTONE 25 MG/1
12.5 TABLET ORAL DAILY
Status: DISCONTINUED | OUTPATIENT
Start: 2025-03-11 | End: 2025-03-11

## 2025-03-11 RX ORDER — BUMETANIDE 1 MG/1
1 TABLET ORAL 2 TIMES DAILY
Status: DISCONTINUED | OUTPATIENT
Start: 2025-03-12 | End: 2025-03-14 | Stop reason: HOSPADM

## 2025-03-11 RX ORDER — SPIRONOLACTONE 25 MG/1
12.5 TABLET ORAL DAILY
Status: DISCONTINUED | OUTPATIENT
Start: 2025-03-11 | End: 2025-03-12

## 2025-03-11 RX ADMIN — BUMETANIDE 2 MG: 0.25 INJECTION INTRAMUSCULAR; INTRAVENOUS at 18:27

## 2025-03-11 RX ADMIN — SODIUM CHLORIDE, PRESERVATIVE FREE 10 ML: 5 INJECTION INTRAVENOUS at 20:31

## 2025-03-11 RX ADMIN — METOPROLOL SUCCINATE 100 MG: 50 TABLET, FILM COATED, EXTENDED RELEASE ORAL at 20:32

## 2025-03-11 RX ADMIN — POTASSIUM CHLORIDE 20 MEQ: 1500 TABLET, EXTENDED RELEASE ORAL at 08:36

## 2025-03-11 RX ADMIN — INSULIN LISPRO 4 UNITS: 100 INJECTION, SOLUTION INTRAVENOUS; SUBCUTANEOUS at 06:22

## 2025-03-11 RX ADMIN — INSULIN LISPRO 4 UNITS: 100 INJECTION, SOLUTION INTRAVENOUS; SUBCUTANEOUS at 11:35

## 2025-03-11 RX ADMIN — SPIRONOLACTONE 12.5 MG: 25 TABLET ORAL at 20:31

## 2025-03-11 RX ADMIN — FERROUS SULFATE TAB 325 MG (65 MG ELEMENTAL FE) 325 MG: 325 (65 FE) TAB at 08:36

## 2025-03-11 RX ADMIN — SACUBITRIL AND VALSARTAN 1 TABLET: 49; 51 TABLET, FILM COATED ORAL at 08:38

## 2025-03-11 RX ADMIN — DIGOXIN 125 MCG: 0.12 TABLET ORAL at 08:36

## 2025-03-11 RX ADMIN — SODIUM CHLORIDE, PRESERVATIVE FREE 10 ML: 5 INJECTION INTRAVENOUS at 08:39

## 2025-03-11 RX ADMIN — BUMETANIDE 2 MG: 0.25 INJECTION INTRAMUSCULAR; INTRAVENOUS at 08:35

## 2025-03-11 RX ADMIN — INSULIN GLARGINE 8 UNITS: 100 INJECTION, SOLUTION SUBCUTANEOUS at 20:31

## 2025-03-11 RX ADMIN — SACUBITRIL AND VALSARTAN 1 TABLET: 49; 51 TABLET, FILM COATED ORAL at 20:32

## 2025-03-11 RX ADMIN — INSULIN LISPRO 4 UNITS: 100 INJECTION, SOLUTION INTRAVENOUS; SUBCUTANEOUS at 16:10

## 2025-03-11 RX ADMIN — METOPROLOL SUCCINATE 100 MG: 50 TABLET, FILM COATED, EXTENDED RELEASE ORAL at 08:35

## 2025-03-11 RX ADMIN — WARFARIN SODIUM 7.5 MG: 2.5 TABLET ORAL at 18:27

## 2025-03-11 ASSESSMENT — PAIN SCALES - GENERAL
PAINLEVEL_OUTOF10: 0

## 2025-03-11 NOTE — PLAN OF CARE
Problem: Chronic Conditions and Co-morbidities  Goal: Patient's chronic conditions and co-morbidity symptoms are monitored and maintained or improved  3/10/2025 2210 by Erica Villarreal RN  Outcome: Progressing  3/10/2025 0905 by Lizzy Chacon RN  Outcome: Progressing  3/10/2025 0850 by Lizzy Chacon RN  Outcome: Progressing     Problem: Discharge Planning  Goal: Discharge to home or other facility with appropriate resources  3/10/2025 2210 by Erica Villarreal RN  Outcome: Progressing  3/10/2025 0905 by Lizzy Chacon RN  Outcome: Progressing  3/10/2025 0850 by Lizzy Chacon RN  Outcome: Progressing     Problem: ABCDS Injury Assessment  Goal: Absence of physical injury  3/10/2025 2210 by Erica Villarreal RN  Outcome: Progressing  3/10/2025 0905 by Lizzy Chacon RN  Outcome: Progressing  3/10/2025 0850 by Lizzy Chacon RN  Outcome: Progressing  Flowsheets (Taken 3/10/2025 0850)  Absence of Physical Injury: Implement safety measures based on patient assessment     Problem: Safety - Adult  Goal: Free from fall injury  3/10/2025 2210 by Erica Villarreal RN  Outcome: Progressing  3/10/2025 0905 by Lizzy Chacon RN  Outcome: Progressing  3/10/2025 0850 by Lizzy Chacon RN  Outcome: Progressing  Flowsheets (Taken 3/10/2025 0850)  Free From Fall Injury: Instruct family/caregiver on patient safety     Problem: Pain  Goal: Verbalizes/displays adequate comfort level or baseline comfort level  3/10/2025 2210 by Erica Villarreal RN  Outcome: Progressing  Flowsheets (Taken 3/10/2025 1536 by Lizzy Chacon RN)  Verbalizes/displays adequate comfort level or baseline comfort level:   Assess pain using appropriate pain scale   Encourage patient to monitor pain and request assistance  3/10/2025 0905 by Lizzy Chacon RN  Outcome: Progressing  3/10/2025 0850 by Lizzy Chacon RN  Outcome: Progressing  Flowsheets  Taken 3/10/2025 0850  Verbalizes/displays adequate comfort level or baseline comfort  level:   Encourage patient to monitor pain and request assistance   Assess pain using appropriate pain scale  Taken 3/10/2025 0803  Verbalizes/displays adequate comfort level or baseline comfort level:   Assess pain using appropriate pain scale   Encourage patient to monitor pain and request assistance

## 2025-03-11 NOTE — PROGRESS NOTES
INPATIENT CARDIOLOGY FOLLOW-UP    Name: Rober Hartley    Age: 64 y.o.    Date of Admission: 3/8/2025  3:23 PM    Date of Service: 3/11/2025    Primary Cardiologist: Dr Hampotn    Chief Complaint: Follow-up for CHF/AF    Interim History:  Feels better breathing improved denies chest pain.    Net -5.9    Review of Systems:   Negative except as described above    Problem List:  Patient Active Problem List   Diagnosis    History of DVT (deep vein thrombosis)    History of pulmonary embolism    Type 2 diabetes mellitus with obesity (HCC)    Hyperlipidemia    Noncompliance with medication regimen    Non morbid obesity due to excess calories    Chronic anticoagulation    Atrial fibrillation with RVR (HCC)    Congestive heart failure (HCC)    Dyspnea and respiratory abnormalities    Essential hypertension    DERIC (obstructive sleep apnea)    Acute decompensated heart failure (HCC)    NICM (nonischemic cardiomyopathy) (HCC)    Benign prostatic hyperplasia without lower urinary tract symptoms    Iron deficiency anemia, unspecified    Acute exacerbation of chronic heart failure (HCC)    Permanent atrial fibrillation (HCC)    Thrombocytopenia    Poorly controlled type 2 diabetes mellitus (HCC)    Atrial fibrillation (HCC)    Dietary noncompliance       Current Medications:    Current Facility-Administered Medications:     bumetanide (BUMEX) injection 2 mg, 2 mg, IntraVENous, BID, Justin Dennis MD, 2 mg at 03/11/25 1827    insulin glargine (LANTUS) injection vial 8 Units, 8 Units, SubCUTAneous, Nightly, Luiz Carroll MD, 8 Units at 03/10/25 2133    insulin lispro (HUMALOG,ADMELOG) injection vial 4 Units, 4 Units, SubCUTAneous, TID WC, Luiz Carroll MD, 4 Units at 03/11/25 1610    insulin lispro (HUMALOG,ADMELOG) injection vial 0-4 Units, 0-4 Units, SubCUTAneous, 4x Daily AC & HS, Luiz Carroll MD    warfarin placeholder: dosing by pharmacy, , Oral, RX Placeholder, Jm Aaron, DO    digoxin  Shelby Michael MD    melatonin tablet 6 mg, 6 mg, Oral, Nightly PRN, Shelby Michael MD, 6 mg at 03/08/25 2118    hydrALAZINE (APRESOLINE) injection 10 mg, 10 mg, IntraVENous, Q6H PRN, Shelby Michael MD, 10 mg at 03/09/25 1178    Physical Exam:  /89   Pulse 71   Temp 98 °F (36.7 °C) (Oral)   Resp 18   Ht 1.753 m (5' 9\")   Wt 103.1 kg (227 lb 6.4 oz)   SpO2 97%   BMI 33.58 kg/m²   Wt Readings from Last 3 Encounters:   03/11/25 103.1 kg (227 lb 6.4 oz)   09/12/23 113.9 kg (251 lb)   06/28/22 108 kg (238 lb)     Appearance: Awake, alert, no acute respiratory distress  Skin: Intact, no rash  Head: Normocephalic, atraumatic  Eyes: EOMI, no conjunctival erythema  ENMT: No pharyngeal erythema, MMM, no rhinorrhea  Neck: Supple, no elevated JVP, no carotid bruits  Lungs: Clear to auscultation bilaterally. No wheezes, rales, or rhonchi.  Cardiac: PMI nondisplaced, irregular rhythm with a normal rate, S1 & S2 normal, no murmurs  Abdomen: Soft, nontender, +bowel sounds  Extremities: Moves all extremities x 4, 1+ lower extremity edema  Neurologic: No focal motor deficits apparent, normal mood and affect  Peripheral Pulses: Intact posterior tibial pulses bilaterally    Intake/Output:    Intake/Output Summary (Last 24 hours) at 3/11/2025 1832  Last data filed at 3/11/2025 1830  Gross per 24 hour   Intake 480 ml   Output 3500 ml   Net -3020 ml     I/O this shift:  In: 240 [P.O.:240]  Out: 1500 [Urine:1500]    Laboratory Tests:  Recent Labs     03/09/25  0341 03/10/25  1050    138   K 4.5 4.0    101   CO2 21* 25   BUN 28* 31*   CREATININE 1.6* 1.5*   GLUCOSE 341* 156*   CALCIUM 9.5 9.6     Lab Results   Component Value Date/Time    MG 2.5 03/01/2020 06:28 AM     No results for input(s): \"ALKPHOS\", \"ALT\", \"AST\", \"BILITOT\", \"BILIDIR\", \"LABALBU\" in the last 72 hours.    Invalid input(s): \"PROT\"    Recent Labs     03/09/25  0341   WBC 12.4*   RBC 4.38   HGB 13.1   HCT 39.0   MCV 89.0   MCH 29.9   MCHC 33.6   RDW

## 2025-03-11 NOTE — PROGRESS NOTES
Pharmacy Consultation Note  (Warfarin Dosing and Monitoring)    Initial consult date: 3/10  Consulting Provider: Agnieszka Najeranato Hartley is a 64 y.o. male for whom pharmacy has been asked to manage warfarin therapy.     Weight:   Wt Readings from Last 1 Encounters:   03/11/25 103.1 kg (227 lb 6.4 oz)       TSH:    Lab Results   Component Value Date/Time    TSH 3.82 03/10/2025 10:50 AM       Hepatic Function Panel:                            Lab Results   Component Value Date/Time    ALKPHOS 159 03/08/2025 03:50 PM    ALT 24 03/08/2025 03:50 PM    AST 17 03/08/2025 03:50 PM    BILITOT 0.8 03/08/2025 03:50 PM    BILIDIR 0.2 02/25/2020 03:25 AM    IBILI 0.6 02/25/2020 03:25 AM       Current significant warfarin drug-drug interactions include: No significant interactions    Recent Labs     03/08/25  1550 03/09/25  0341   HGB 13.4 13.1   PLT  --  120*     Date Warfarin Dose INR Heparin or LMWH Comment   3/10 7.5mg 1.4 -- DC'd Eliquis   3/11 7.5mg 1.3 --                           Assessment:  Patient is a 64 y.o. male on warfarin for History of  VTE/PE and Atrial Fibrillation.  Patient is a new start to warfarin; transitioning off Eliquis due to medication cost. Per chart review, patient has previously taken warfarin 11.25 mg on Tuesday/Thursday and 7.5 mg all other days.   Goal INR 2 - 3  INR 1.3 today  [id number] = 4  After discussion with Dr. Aaron. Electing not to bridge patient to warfarin at this time. Eliquis has been discontinued.     Plan:  Warfarin 7.5 mg tonight  Daily PT/INR until the INR is stable within the therapeutic range  Pharmacist will follow and monitor/adjust dosing as necessary    Thank you for this consult,    Virginie Garnett [facility], PharmD, BCPS 3/11/2025 2:52 PM   Ext: [phone]    TUNDE: [phone]  SEY: [phone]  SJW: [phone]

## 2025-03-11 NOTE — PROGRESS NOTES
Spiritual Health History and Assessment/Progress Note  [facility]    Initial Encounter,  ,  ,      Name: Rober Hartley MRN: [id number]    Age: 64 y.o.     Sex: male   Language: English   Congregation: None   Acute exacerbation of chronic heart failure (HCC)     Date: 3/11/2025                           Spiritual Assessment began in [address] MED SURG        Referral/Consult From: Rounding   Encounter Overview/Reason: Initial Encounter  Service Provided For: Patient    Rocío, Belief, Meaning:   Patient has beliefs or practices that help with coping during difficult times  Family/Friends No family/friends present      Importance and Influence:  Patient has no beliefs influential to healthcare decision-making identified during this visit  Family/Friends No family/friends present    Community:  Patient feels well-supported. Support system includes: Parent/s  Family/Friends No family/friends present    Assessment and Plan of Care:     Patient Interventions include: Facilitated expression of thoughts and feelings and Affirmed coping skills/support systems  Family/Friends Interventions include: No family/friends present    Patient Plan of Care: Spiritual Care available upon further referral  Family/Friends Plan of Care: No family/friends present    Electronically signed by Chaplain Brian on 3/11/2025 at 2:22 PM

## 2025-03-11 NOTE — PROGRESS NOTES
[facility] Hospitalist   Progress Note    Admitting Date and Time: 3/8/2025  3:23 PM  Admit Dx: Acute on chronic systolic congestive heart failure (HCC) [I50.23]  Acute exacerbation of chronic heart failure (HCC) [I50.9]    Subjective:    Patient was admitted with Acute on chronic systolic congestive heart failure (HCC) [I50.23]  Acute exacerbation of chronic heart failure (HCC) [I50.9]. Patient denies fever, chills, cp, sob, n/v.     warfarin  7.5 mg Oral Once    bumetanide  2 mg IntraVENous BID    insulin glargine  8 Units SubCUTAneous Nightly    insulin lispro  4 Units SubCUTAneous TID WC    insulin lispro  0-4 Units SubCUTAneous 4x Daily AC & HS    warfarin placeholder: dosing by pharmacy   Oral RX Placeholder    digoxin  125 mcg Oral Daily    ferrous sulfate  325 mg Oral Daily    metoprolol succinate  100 mg Oral BID    potassium chloride  20 mEq Oral Daily    sacubitril-valsartan  1 tablet Oral BID    sodium chloride flush  5-40 mL IntraVENous 2 times per day     prochlorperazine, 10 mg, Q6H PRN  glucose, 4 tablet, PRN  dextrose bolus, 125 mL, PRN   Or  dextrose bolus, 250 mL, PRN  glucagon (rDNA), 1 mg, PRN  dextrose, , Continuous PRN  sodium chloride flush, 5-40 mL, PRN  sodium chloride, , PRN  potassium chloride, 40 mEq, PRN   Or  potassium alternative oral replacement, 40 mEq, PRN   Or  potassium chloride, 10 mEq, PRN  magnesium sulfate, 2,000 mg, PRN  polyethylene glycol, 17 g, Daily PRN  acetaminophen, 650 mg, Q6H PRN   Or  acetaminophen, 650 mg, Q6H PRN  melatonin, 6 mg, Nightly PRN  hydrALAZINE, 10 mg, Q6H PRN         Objective:    /89   Pulse 71   Temp 98 °F (36.7 °C) (Oral)   Resp 18   Ht 1.753 m (5' 9\")   Wt 103.1 kg (227 lb 6.4 oz)   SpO2 97%   BMI 33.58 kg/m²   Skin: warm and dry, no rash or erythema  Pulmonary/Chest: clear to auscultation bilaterally- no wheezes, rales or rhonchi, normal air movement, no respiratory distress  Cardiovascular: rhythm reg at rate of

## 2025-03-11 NOTE — PLAN OF CARE
Problem: Chronic Conditions and Co-morbidities  Goal: Patient's chronic conditions and co-morbidity symptoms are monitored and maintained or improved  3/11/2025 0922 by Lizzy Chacon RN  Outcome: Progressing  3/10/2025 2210 by Erica Villarreal RN  Outcome: Progressing     Problem: Discharge Planning  Goal: Discharge to home or other facility with appropriate resources  3/11/2025 0922 by Lizzy Chacon RN  Outcome: Progressing  3/10/2025 2210 by Erica Villarreal RN  Outcome: Progressing     Problem: ABCDS Injury Assessment  Goal: Absence of physical injury  3/11/2025 0922 by Lizzy Chacon RN  Outcome: Progressing  Flowsheets (Taken 3/11/2025 0730)  Absence of Physical Injury: Implement safety measures based on patient assessment  3/10/2025 2210 by Erica Villarreal RN  Outcome: Progressing     Problem: Safety - Adult  Goal: Free from fall injury  3/11/2025 0922 by Lizzy Chacon RN  Outcome: Progressing  Flowsheets (Taken 3/11/2025 0730)  Free From Fall Injury:   Instruct family/caregiver on patient safety   Based on caregiver fall risk screen, instruct family/caregiver to ask for assistance with transferring infant if caregiver noted to have fall risk factors  3/10/2025 2210 by Erica Villarreal RN  Outcome: Progressing

## 2025-03-12 ENCOUNTER — APPOINTMENT (OUTPATIENT)
Dept: GENERAL RADIOLOGY | Age: 65
DRG: 291 | End: 2025-03-12
Payer: MEDICARE

## 2025-03-12 PROBLEM — E11.9 CONTROLLED TYPE 2 DIABETES MELLITUS WITHOUT COMPLICATION (HCC): Status: ACTIVE | Noted: 2025-03-12

## 2025-03-12 LAB
ANION GAP SERPL CALCULATED.3IONS-SCNC: 11 MMOL/L (ref 7–16)
ANION GAP SERPL CALCULATED.3IONS-SCNC: 9 MMOL/L (ref 7–16)
BASOPHILS # BLD: 0 K/UL (ref 0–0.2)
BASOPHILS NFR BLD: 0 % (ref 0–2)
BILIRUB UR QL STRIP: NEGATIVE
BUN SERPL-MCNC: 34 MG/DL (ref 6–23)
BUN SERPL-MCNC: 34 MG/DL (ref 6–23)
CALCIUM SERPL-MCNC: 9.4 MG/DL (ref 8.6–10.2)
CALCIUM SERPL-MCNC: 9.6 MG/DL (ref 8.6–10.2)
CHLORIDE SERPL-SCNC: 100 MMOL/L (ref 98–107)
CHLORIDE SERPL-SCNC: 101 MMOL/L (ref 98–107)
CLARITY UR: CLEAR
CO2 SERPL-SCNC: 26 MMOL/L (ref 22–29)
CO2 SERPL-SCNC: 27 MMOL/L (ref 22–29)
COLOR UR: YELLOW
CREAT SERPL-MCNC: 1.9 MG/DL (ref 0.7–1.2)
CREAT SERPL-MCNC: 2 MG/DL (ref 0.7–1.2)
EKG ATRIAL RATE: 117 BPM
EKG Q-T INTERVAL: 280 MS
EKG QRS DURATION: 88 MS
EKG QTC CALCULATION (BAZETT): 392 MS
EKG R AXIS: 53 DEGREES
EKG T AXIS: -117 DEGREES
EKG VENTRICULAR RATE: 118 BPM
EOSINOPHIL # BLD: 0.7 K/UL (ref 0.05–0.5)
EOSINOPHILS RELATIVE PERCENT: 5 % (ref 0–6)
ERYTHROCYTE [DISTWIDTH] IN BLOOD BY AUTOMATED COUNT: 14.5 % (ref 11.5–15)
GFR, ESTIMATED: 36 ML/MIN/1.73M2
GFR, ESTIMATED: 39 ML/MIN/1.73M2
GLUCOSE BLD-MCNC: 118 MG/DL (ref 74–99)
GLUCOSE BLD-MCNC: 135 MG/DL (ref 74–99)
GLUCOSE BLD-MCNC: 148 MG/DL (ref 74–99)
GLUCOSE BLD-MCNC: 162 MG/DL (ref 74–99)
GLUCOSE SERPL-MCNC: 132 MG/DL (ref 74–99)
GLUCOSE SERPL-MCNC: 148 MG/DL (ref 74–99)
GLUCOSE UR STRIP-MCNC: NEGATIVE MG/DL
HCT VFR BLD AUTO: 47.6 % (ref 37–54)
HGB BLD-MCNC: 15.2 G/DL (ref 12.5–16.5)
HGB UR QL STRIP.AUTO: NEGATIVE
INR PPP: 1.4
KETONES UR STRIP-MCNC: NEGATIVE MG/DL
LEUKOCYTE ESTERASE UR QL STRIP: NEGATIVE
LYMPHOCYTES NFR BLD: 7.28 K/UL (ref 1.5–4)
LYMPHOCYTES RELATIVE PERCENT: 52 % (ref 20–42)
MAGNESIUM SERPL-MCNC: 2.4 MG/DL (ref 1.6–2.6)
MCH RBC QN AUTO: 28.7 PG (ref 26–35)
MCHC RBC AUTO-ENTMCNC: 31.9 G/DL (ref 32–34.5)
MCV RBC AUTO: 90 FL (ref 80–99.9)
MONOCYTES NFR BLD: 0.42 K/UL (ref 0.1–0.95)
MONOCYTES NFR BLD: 3 % (ref 2–12)
MYELOCYTES ABSOLUTE COUNT: 0.14 K/UL
MYELOCYTES: 1 %
NEUTROPHILS NFR BLD: 39 % (ref 43–80)
NEUTS SEG NFR BLD: 5.46 K/UL (ref 1.8–7.3)
NITRITE UR QL STRIP: NEGATIVE
PH UR STRIP: 6 [PH] (ref 5–8)
PHOSPHATE SERPL-MCNC: 4.3 MG/DL (ref 2.5–4.5)
PLATELET # BLD AUTO: 125 K/UL (ref 130–450)
PMV BLD AUTO: 11.6 FL (ref 7–12)
POTASSIUM SERPL-SCNC: 4.3 MMOL/L (ref 3.5–5)
POTASSIUM SERPL-SCNC: 5 MMOL/L (ref 3.5–5)
PROCALCITONIN SERPL-MCNC: 0.07 NG/ML (ref 0–0.08)
PROT UR STRIP-MCNC: NEGATIVE MG/DL
PROTHROMBIN TIME: 14.7 SEC (ref 9.3–12.4)
RBC # BLD AUTO: 5.29 M/UL (ref 3.8–5.8)
RBC # BLD: ABNORMAL 10*6/UL
RBC #/AREA URNS HPF: NORMAL /HPF
SODIUM SERPL-SCNC: 137 MMOL/L (ref 132–146)
SODIUM SERPL-SCNC: 137 MMOL/L (ref 132–146)
SP GR UR STRIP: 1.02 (ref 1–1.03)
UROBILINOGEN UR STRIP-ACNC: 0.2 EU/DL (ref 0–1)
WBC # BLD: ABNORMAL 10*3/UL
WBC #/AREA URNS HPF: NORMAL /HPF
WBC OTHER # BLD: 14 K/UL (ref 4.5–11.5)

## 2025-03-12 PROCEDURE — 2500000003 HC RX 250 WO HCPCS: Performed by: STUDENT IN AN ORGANIZED HEALTH CARE EDUCATION/TRAINING PROGRAM

## 2025-03-12 PROCEDURE — 71046 X-RAY EXAM CHEST 2 VIEWS: CPT

## 2025-03-12 PROCEDURE — 80048 BASIC METABOLIC PNL TOTAL CA: CPT

## 2025-03-12 PROCEDURE — 84145 PROCALCITONIN (PCT): CPT

## 2025-03-12 PROCEDURE — 36415 COLL VENOUS BLD VENIPUNCTURE: CPT

## 2025-03-12 PROCEDURE — 85610 PROTHROMBIN TIME: CPT

## 2025-03-12 PROCEDURE — 83735 ASSAY OF MAGNESIUM: CPT

## 2025-03-12 PROCEDURE — 84100 ASSAY OF PHOSPHORUS: CPT

## 2025-03-12 PROCEDURE — 81001 URINALYSIS AUTO W/SCOPE: CPT

## 2025-03-12 PROCEDURE — 2060000000 HC ICU INTERMEDIATE R&B

## 2025-03-12 PROCEDURE — 99232 SBSQ HOSP IP/OBS MODERATE 35: CPT | Performed by: INTERNAL MEDICINE

## 2025-03-12 PROCEDURE — 82962 GLUCOSE BLOOD TEST: CPT

## 2025-03-12 PROCEDURE — 85025 COMPLETE CBC W/AUTO DIFF WBC: CPT

## 2025-03-12 PROCEDURE — 6370000000 HC RX 637 (ALT 250 FOR IP): Performed by: INTERNAL MEDICINE

## 2025-03-12 PROCEDURE — 6370000000 HC RX 637 (ALT 250 FOR IP): Performed by: STUDENT IN AN ORGANIZED HEALTH CARE EDUCATION/TRAINING PROGRAM

## 2025-03-12 RX ADMIN — WARFARIN SODIUM 7.5 MG: 2.5 TABLET ORAL at 17:32

## 2025-03-12 RX ADMIN — SACUBITRIL AND VALSARTAN 1 TABLET: 49; 51 TABLET, FILM COATED ORAL at 22:07

## 2025-03-12 RX ADMIN — SODIUM CHLORIDE, PRESERVATIVE FREE 10 ML: 5 INJECTION INTRAVENOUS at 22:10

## 2025-03-12 RX ADMIN — BUMETANIDE 1 MG: 1 TABLET ORAL at 17:32

## 2025-03-12 RX ADMIN — SODIUM CHLORIDE, PRESERVATIVE FREE 10 ML: 5 INJECTION INTRAVENOUS at 10:20

## 2025-03-12 RX ADMIN — INSULIN GLARGINE 8 UNITS: 100 INJECTION, SOLUTION SUBCUTANEOUS at 22:06

## 2025-03-12 RX ADMIN — INSULIN LISPRO 4 UNITS: 100 INJECTION, SOLUTION INTRAVENOUS; SUBCUTANEOUS at 11:19

## 2025-03-12 RX ADMIN — INSULIN LISPRO 4 UNITS: 100 INJECTION, SOLUTION INTRAVENOUS; SUBCUTANEOUS at 06:34

## 2025-03-12 RX ADMIN — METOPROLOL SUCCINATE 100 MG: 50 TABLET, FILM COATED, EXTENDED RELEASE ORAL at 22:07

## 2025-03-12 RX ADMIN — BUMETANIDE 1 MG: 1 TABLET ORAL at 10:45

## 2025-03-12 RX ADMIN — INSULIN LISPRO 4 UNITS: 100 INJECTION, SOLUTION INTRAVENOUS; SUBCUTANEOUS at 17:32

## 2025-03-12 RX ADMIN — FERROUS SULFATE TAB 325 MG (65 MG ELEMENTAL FE) 325 MG: 325 (65 FE) TAB at 10:45

## 2025-03-12 RX ADMIN — SACUBITRIL AND VALSARTAN 1 TABLET: 49; 51 TABLET, FILM COATED ORAL at 10:44

## 2025-03-12 RX ADMIN — METOPROLOL SUCCINATE 100 MG: 50 TABLET, FILM COATED, EXTENDED RELEASE ORAL at 10:44

## 2025-03-12 ASSESSMENT — PAIN SCALES - GENERAL
PAINLEVEL_OUTOF10: 0

## 2025-03-12 NOTE — CARE COORDINATION
Social work / Discharge planning:          Message left at the [facility] requesting return call to make referral.    Electronically signed by ELIAZAR Yusuf on 3/12/2025 at 10:52 AM

## 2025-03-12 NOTE — PROGRESS NOTES
Call placed to patient's PCP Dr Hampton to inquire if physician can follow new coumadin order until patient can be established at [facility]. Patient able to be seen in PCP office by 3PM on 3/14/2025.

## 2025-03-12 NOTE — PROGRESS NOTES
ENDOCRINOLOGY PROGRESS NOTE      Date of admission: 3/8/2025  Date of service: 3/11/2025  Admitting physician: Shelby Michael MD   Primary Care Physician: Victor Hugo Hampton MD  Consultant physician: Luiz Carroll MD     Reason for the consultation:  Uncontrolled DM    History of Present Illness:  The history is provided by the patient. Accuracy of the patient data is excellent    Rober Hartley is a very pleasant 64 y.o. old male with PMH of obesity, congestive heart failure, A-fib on Eliquis, hypertension, hyperlipidemia, type 2 diabetes, CKD and other listed below admitted to [facility] on 3/8/2025 because of worsening shortness of breath and found to have an acute exacerbation of CHF, endocrine service was consulted for diabetes management.    Subjective  The patient was seen today, no acute events overnight, glucose level improving      Inpatient diet:   Carb Restricted diet     Point of care glucose monitoring   (Independently reviewed)   Recent Labs     03/09/25  2000 03/10/25  0602 03/10/25  1059 03/10/25  1530 03/10/25  2131 03/11/25  0620 03/11/25  1119 03/11/25  1609   POCGLU 247* 188* 150* 228* 156* 144* 120* 135*       Past medical history:   Past Medical History:   Diagnosis Date    CHF (congestive heart failure) ([facility])     CKD (chronic kidney disease)     DM2 (diabetes mellitus, type 2) ([facility])     DVT (deep venous thrombosis) ([facility]) 01/01/2008    HTN (hypertension), benign     Nonischemic cardiomyopathy ([facility]) 01/01/2008    PE (pulmonary embolism) 01/01/2008     Scheduled Meds:   [START ON 3/12/2025] bumetanide  1 mg Oral BID    spironolactone  12.5 mg Oral Daily    insulin glargine  8 Units SubCUTAneous Nightly    insulin lispro  4 Units SubCUTAneous TID WC    insulin lispro  0-4 Units SubCUTAneous 4x Daily AC & HS    warfarin placeholder: dosing by pharmacy   Oral RX Placeholder    ferrous sulfate  325 mg Oral Daily    metoprolol succinate  100 mg Oral BID    sacubitril-valsartan  1 tablet Oral BID

## 2025-03-12 NOTE — PROGRESS NOTES
Agnieszka Hennessy Timothy,     ferrous sulfate (IRON 325) tablet 325 mg, 325 mg, Oral, Daily, Shelby Michael MD, 325 mg at 03/12/25 1045    metoprolol succinate (TOPROL XL) extended release tablet 100 mg, 100 mg, Oral, BID, Shelby Michael MD, 100 mg at 03/12/25 1044    sacubitril-valsartan (ENTRESTO) 49-51 MG per tablet 1 tablet, 1 tablet, Oral, BID, Shelby Michael MD, 1 tablet at 03/12/25 1044    prochlorperazine (COMPAZINE) injection 10 mg, 10 mg, IntraVENous, Q6H PRN, Shelby Michael MD    glucose chewable tablet 16 g, 4 tablet, Oral, PRN, Shelby Michael MD    dextrose bolus 10% 125 mL, 125 mL, IntraVENous, PRN **OR** dextrose bolus 10% 250 mL, 250 mL, IntraVENous, PRN, Shelby Michael MD    glucagon injection 1 mg, 1 mg, SubCUTAneous, PRN, Shelby Michael MD    dextrose 10 % infusion, , IntraVENous, Continuous PRN, Shelby Michael MD    sodium chloride flush 0.9 % injection 5-40 mL, 5-40 mL, IntraVENous, 2 times per day, Shelby Michael MD, 10 mL at 03/12/25 1020    sodium chloride flush 0.9 % injection 5-40 mL, 5-40 mL, IntraVENous, PRN, Shelby Michael MD    0.9 % sodium chloride infusion, , IntraVENous, PRN, Shelby Michael MD    potassium chloride (KLOR-CON M) extended release tablet 40 mEq, 40 mEq, Oral, PRN **OR** potassium bicarb-citric acid (EFFER-K) effervescent tablet 40 mEq, 40 mEq, Oral, PRN **OR** potassium chloride 10 mEq/100 mL IVPB (Peripheral Line), 10 mEq, IntraVENous, PRNAlec Pennson, MD    magnesium sulfate 2000 mg in 50 mL IVPB premix, 2,000 mg, IntraVENous, PRN, Shelby Michael MD    polyethylene glycol (GLYCOLAX) packet 17 g, 17 g, Oral, Daily PRN, Shelby Michael MD    acetaminophen (TYLENOL) tablet 650 mg, 650 mg, Oral, Q6H PRN **OR** acetaminophen (TYLENOL) suppository 650 mg, 650 mg, Rectal, Q6H PRN, Shelby Michael MD    melatonin tablet 6 mg, 6 mg, Oral, Nightly PRN, Shelby Michael MD, 6 mg at 03/08/25 2118    hydrALAZINE (APRESOLINE) injection 10 mg, 10 mg, IntraVENous, Q6H PRN, Alec  03/11/2025    INR 1.4 03/10/2025    PROTIME 14.7 (H) 03/12/2025    PROTIME 13.6 (H) 03/11/2025    PROTIME 15.3 (H) 03/10/2025     Lab Results   Component Value Date    TSH 3.82 03/10/2025     Lab Results   Component Value Date    LABA1C 8.9 (H) 03/09/2025     No results found for: \"EAG\"  Lab Results   Component Value Date    CHOL 198 04/06/2016    CHOL 178 12/14/2015     Lab Results   Component Value Date    TRIG 433 (H) 04/06/2016    TRIG 211 (H) 12/14/2015     Lab Results   Component Value Date    HDL 33 04/06/2016    HDL 33 12/14/2015     No components found for: \"LDLCALC\", \"LDLCHOLESTEROL\"  Lab Results   Component Value Date    VLDL - (AA) 04/06/2016    VLDL 42 12/14/2015     No results found for: \"CHOLHDLRATIO\"  No results for input(s): \"PROBNP\" in the last 72 hours.      Cardiac Tests:    EKG:   3/8/2025: Atrial fibrillation  bpm.  Normal axis and intervals.  Nonspecific ST-T wave changes    Telemetry: Atrial fibrillation 80s    Chest X-ray:     CTA chest  Impression:        1. No evidence of pulmonary embolism.  2. There is a mosaic pattern extending from the apices to the bases  concerning for pulmonary edema or less likely hemorrhage.  3. Small bilateral effusions.  4. Reflux of contrast into the inferior vena cava and hepatic veins. This  pattern is nonspecific but can be associated with pulmonary artery  hypertension.     Echocardiogram:   TTE 3/10/25    Left Ventricle: Mildly reduced left ventricular systolic function with a visually estimated EF of 40 - 45%. Left ventricle is mildly dilated. LVIDd is 6.0 cm. Normal wall thickness. Global hypokinesis present with basal inferoseptal hypokinesis. Abnormal diastolic function.    Right Ventricle: Mildly reduced systolic function. TAPSE is 1.6 cm.    Aortic Valve: Mild regurgitation.    Mitral Valve: Mild regurgitation.    Atrial fibrillation noted.    Image quality is suboptimal. Contrast used: Lumason. Technically difficult study with poor

## 2025-03-12 NOTE — PROGRESS NOTES
Pharmacy Consultation Note  (Warfarin Dosing and Monitoring)    Initial consult date: 3/10  Consulting Provider: Agnieszka Najeranato Hartley is a 64 y.o. male for whom pharmacy has been asked to manage warfarin therapy.     Weight:   Wt Readings from Last 1 Encounters:   03/12/25 102.6 kg (226 lb 4.8 oz)       TSH:    Lab Results   Component Value Date/Time    TSH 3.82 03/10/2025 10:50 AM       Hepatic Function Panel:                            Lab Results   Component Value Date/Time    ALKPHOS 159 03/08/2025 03:50 PM    ALT 24 03/08/2025 03:50 PM    AST 17 03/08/2025 03:50 PM    BILITOT 0.8 03/08/2025 03:50 PM    BILIDIR 0.2 02/25/2020 03:25 AM    IBILI 0.6 02/25/2020 03:25 AM       Current significant warfarin drug-drug interactions include: No significant interactions    Recent Labs     03/12/25  0645   HGB 15.2   *     Date Warfarin Dose INR Heparin or LMWH Comment   3/10 7.5mg 1.4 -- DC'd Eliquis   3/11 7.5mg 1.3 --    3/12 7.5mg 1.4 --                    Assessment:  Patient is a 64 y.o. male on warfarin for History of  VTE/PE and Atrial Fibrillation.  Patient is a new start to warfarin; transitioning off Eliquis due to medication cost. Per chart review, patient has previously taken warfarin 11.25 mg on Tuesday/Thursday and 7.5 mg all other days.   Goal INR 2 - 3  INR 1.4 today  [id number] = 4  After discussion with Dr. Aaron. Electing not to bridge patient to warfarin at this time. Eliquis has been discontinued.     Plan:  Warfarin 7.5 mg tonight  Daily PT/INR until the INR is stable within the therapeutic range  Pharmacist will follow and monitor/adjust dosing as necessary    Thank you for this consult,    Virginie Garnett [facility], PharmD, BCPS 3/12/2025 4:14 PM   Ext: [phone]    TUNDE: [phone]  SEY: [phone]  SJW: [phone]

## 2025-03-12 NOTE — PROGRESS NOTES
Attempted to draw labwork without success. Attempted twice, patient stiffens up, clenches his fist and will not try to relax. This RN attempted to explain to him that if he would relax it would be better. Patient remains to be stiff, will place order for IV team.

## 2025-03-12 NOTE — PLAN OF CARE
Problem: Chronic Conditions and Co-morbidities  Goal: Patient's chronic conditions and co-morbidity symptoms are monitored and maintained or improved  3/12/2025 0710 by Lizzy Chacon RN  Outcome: Progressing  3/11/2025 2155 by Erica Villarreal RN  Outcome: Progressing     Problem: Discharge Planning  Goal: Discharge to home or other facility with appropriate resources  3/12/2025 0710 by Lizzy Chacon RN  Outcome: Progressing  3/11/2025 2155 by Erica Villareral RN  Outcome: Progressing     Problem: ABCDS Injury Assessment  Goal: Absence of physical injury  3/12/2025 0710 by Lizzy Chacon RN  Outcome: Progressing  3/11/2025 2155 by Erica Villarreal RN  Outcome: Progressing     Problem: Safety - Adult  Goal: Free from fall injury  3/12/2025 0710 by Lizzy Chacon RN  Outcome: Progressing  3/11/2025 2155 by Erica Villarreal RN  Outcome: Progressing     Problem: Pain  Goal: Verbalizes/displays adequate comfort level or baseline comfort level  3/12/2025 0710 by Lizzy Chacon RN  Outcome: Progressing  3/11/2025 2155 by Erica Villarreal RN  Outcome: Progressing  Flowsheets  Taken 3/11/2025 1430 by Lizzy Chacon RN  Verbalizes/displays adequate comfort level or baseline comfort level:   Assess pain using appropriate pain scale   Encourage patient to monitor pain and request assistance  Taken 3/11/2025 1215 by Lizzy Chacon RN  Verbalizes/displays adequate comfort level or baseline comfort level:   Assess pain using appropriate pain scale   Encourage patient to monitor pain and request assistance  Taken 3/11/2025 1109 by Lizzy Chacon RN  Verbalizes/displays adequate comfort level or baseline comfort level:   Assess pain using appropriate pain scale   Encourage patient to monitor pain and request assistance

## 2025-03-12 NOTE — CARE COORDINATION
Dx: s/p foot surgery, capsulitis of L 1st MTP joint             Authorized # of Visits:  8 per POC         Next MD visit: none scheduled  Fall Risk: standard         Precautions: n/a             Subjective: Patient had some increase in soreness over the we Social work / Discharge planning:           RN updated social work that CNP at patient's PCP office will follow for new Coumadin until patient establish at the [facility].     Social work left a message requesting return phone call to schedule appointment.   Awaiting return phone call.   Electronically signed by ELIAZAR Yusuf on 3/12/2025 at 2:10 PM    MTP dorsal and plantar glide, 10 min 1st MTP dorsal and plantar glide, 10 min 1st MTP extension stretch against wall, 2x30 sec       1st MTP PROM, 15 min 1st MTP PROM, 15 min 1st MTP PROM, 15 min       Towel curl, 2 min Towel curl, 2 min 1st MTP extension

## 2025-03-12 NOTE — CARE COORDINATION
Social work / Discharge planning:          Social work received return call from [facility] and was informed we would need to determine what physician will follow for new coumadin over the next 2-3 weeks until patient can start at the [facility].     Charge nurse reached out to Cardiology who stated it will need to be the PCP.     Charge nurse will check with PCP to confirm this plan and then patient will need to be scheduled with the [facility].    Electronically signed by ELIAZAR Yusuf on 3/12/2025 at 1:23 PM

## 2025-03-12 NOTE — PROGRESS NOTES
Results   Component Value Date/Time    LABA1C 8.9 03/09/2025 03:51 AM    LABA1C 7.6 02/23/2020 03:45 AM    LABA1C 7.2 11/19/2019 12:20 PM     Lab Results   Component Value Date/Time    TSH 3.82 03/10/2025 10:50 AM    T4FREE 1.27 11/18/2019 10:20 AM    FT3 2.6 11/18/2019 10:20 AM     Lab Results   Component Value Date/Time    LABA1C 8.9 03/09/2025 03:51 AM    GLUCOSE 156 03/10/2025 10:50 AM     Lab Results   Component Value Date/Time    TRIG 433 04/06/2016 12:00 PM    HDL 33 04/06/2016 12:00 PM    CHOL 198 04/06/2016 12:00 PM       Blood culture   No results found for: \"BC\"    Radiology:  CTA PULMONARY W CONTRAST   Final Result   1. No evidence of pulmonary embolism.   2. There is a mosaic pattern extending from the apices to the bases   concerning for pulmonary edema or less likely hemorrhage.   3. Small bilateral effusions.   4. Reflux of contrast into the inferior vena cava and hepatic veins. This   pattern is nonspecific but can be associated with pulmonary artery   hypertension.         CT CSpine W/O Contrast   Final Result   No acute intracranial abnormality.      No acute fracture in the cervical spine.         CT Head W/O Contrast   Final Result   No acute intracranial abnormality.      No acute fracture in the cervical spine.         XR KNEE RIGHT (3 VIEWS)   Final Result   1. No signs of fracture or dislocation.   2. Signs of peripheral vascular arterial disease.             Medical Records/Labs/Images review:   I personally reviewed and summarized previous records   All labs and imaging were reviewed independently     ASSESSMENT & PLAN   Rober Hartley, a 64 y.o.-old male seen today for inpatient diabetes management    Diabetes Mellitus type 2   Most glucose level in range.  We recommend the following DM regimen  Lantus 8 units nightly  Humalog 4 units 3 times daily with meals  Low-dose sliding scale ACHS  Continue glucose check with meals and at bedtime  Will titrate insulin dose based on the blood  glucose trend & insulin requirement  Upon discharge, I will arrange for the patient to be seen in the endocrinology clinic for routine diabetes maintenance and prevention    Dietary noncompliance  Discussed with patient the importance of eating consistent carbohydrate meals, avoiding high glycemic index food. Also, discussed with patient the risk and negative consequences of dietary noncompliance on blood glucose control, blood pressure and weight    Interdisciplinary plan for communication with healthcare providers:   Consult recommendations were discussed with the Primary Service/Nursing staff      The above issues were reviewed with the patient who understood and agreed with the plan.    Thank you for allowing us to participate in the care of this patient. Please do not hesitate to contact us with any additional questions.     Luiz Carroll MD  Endocrinologist, [facility]   [address] [facility]  [address] [place] [address]  Dept: [phone]  Loc: [phone]   Phone: [phone]  Fax: [phone]  --------------------------------------------  An electronic signature was used to authenticate this note. Luiz Carroll MD on 3/12/2025 at 7:43 AM

## 2025-03-12 NOTE — CONSULTS
[facility]   Inpatient CHF Nurse Navigator Consult      Cardiologist: Dr Memo Hartley is a 64 y.o. (1960) male with a history of HFmrEF, most recent EF:  Lab Results   Component Value Date    LVEF 40 01/15/2021    LVEFMODE Echo 01/22/2016       Patient was awake and alert, laying in bed during the consultation and is agreeable to heart failure education. He was engaged and asked appropriate questions throughout the education session.     Barriers identified during consult contributing to HF Hospitalization:  [] Limited medication adherence   [] Poor health literacy, education regarding HF medications provided   [] Pill box provided to patient  [] Difficulty affording medications  [] Difficulty obtaining/ managing medications  [] Prescription assistance information given     [x] Not weighing themselves daily  [x] Weight log provided for easy monitoring  [x] Scale provided     [] Not following low sodium diet  [] Food insecurity   [x] 2 gram sodium diet education provided   [] Low sodium recipes provided  [] Sodium free seasoning provided   [] Low sodium meal delivery options given to patient  [] Dietician consulted     [] Lack of transportation to appointments     [] Depression, given chronic illness  [] Primary team notified     [] Goals of care need addressed  [] Palliative care consulted     [] CHF CHW consulted, to assist with       Chart Reviewed:  Diet: ADULT DIET; Regular; 3 carb choices (45 gm/meal); Low Fat/Low Chol/High Fiber/2 gm Na   Daily Weights: Patient Vitals for the past 96 hrs (Last 3 readings):   Weight   03/12/25 0545 102.6 kg (226 lb 4.8 oz)   03/11/25 0600 103.1 kg (227 lb 6.4 oz)   03/10/25 0939 113.4 kg (250 lb)     I/O:   Intake/Output Summary (Last 24 hours) at 3/12/2025 0957  Last data filed at 3/12/2025 0710  Gross per 24 hour   Intake 720 ml   Output 2700 ml   Net -1980 ml       [] Nursing staff/manager notified of inaccurate go

## 2025-03-12 NOTE — PROGRESS NOTES
[facility] Hospitalist   Progress Note    Admitting Date and Time: 3/8/2025  3:23 PM  Admit Dx: Acute on chronic systolic congestive heart failure (HCC) [I50.23]  Acute exacerbation of chronic heart failure (HCC) [I50.9]    Subjective:    Patient was admitted with Acute on chronic systolic congestive heart failure (HCC) [I50.23]  Acute exacerbation of chronic heart failure (HCC) [I50.9]. Patient denies fever, chills, cp, sob, n/v.     bumetanide  1 mg Oral BID    insulin glargine  8 Units SubCUTAneous Nightly    insulin lispro  4 Units SubCUTAneous TID WC    insulin lispro  0-4 Units SubCUTAneous 4x Daily AC & HS    warfarin placeholder: dosing by pharmacy   Oral RX Placeholder    ferrous sulfate  325 mg Oral Daily    metoprolol succinate  100 mg Oral BID    sacubitril-valsartan  1 tablet Oral BID    sodium chloride flush  5-40 mL IntraVENous 2 times per day     prochlorperazine, 10 mg, Q6H PRN  glucose, 4 tablet, PRN  dextrose bolus, 125 mL, PRN   Or  dextrose bolus, 250 mL, PRN  glucagon (rDNA), 1 mg, PRN  dextrose, , Continuous PRN  sodium chloride flush, 5-40 mL, PRN  sodium chloride, , PRN  potassium chloride, 40 mEq, PRN   Or  potassium alternative oral replacement, 40 mEq, PRN   Or  potassium chloride, 10 mEq, PRN  magnesium sulfate, 2,000 mg, PRN  polyethylene glycol, 17 g, Daily PRN  acetaminophen, 650 mg, Q6H PRN   Or  acetaminophen, 650 mg, Q6H PRN  melatonin, 6 mg, Nightly PRN  hydrALAZINE, 10 mg, Q6H PRN         Objective:    /89   Pulse 77   Temp 97.3 °F (36.3 °C) (Oral)   Resp 18   Ht 1.753 m (5' 9\")   Wt 102.6 kg (226 lb 4.8 oz)   SpO2 100%   BMI 33.42 kg/m²   Skin: warm and dry, no rash or erythema  Pulmonary/Chest: clear to auscultation bilaterally- no wheezes, rales or rhonchi, normal air movement, no respiratory distress  Cardiovascular: rhythm reg at rate of 78  Abdomen: soft, non-tender, non-distended, normal bowel sounds, no masses or organomegaly  Extremities:

## 2025-03-12 NOTE — PLAN OF CARE
Problem: Chronic Conditions and Co-morbidities  Goal: Patient's chronic conditions and co-morbidity symptoms are monitored and maintained or improved  3/11/2025 2155 by Erica Villarreal RN  Outcome: Progressing  3/11/2025 0922 by Lizzy Chacon RN  Outcome: Progressing     Problem: Discharge Planning  Goal: Discharge to home or other facility with appropriate resources  3/11/2025 2155 by Erica Villarreal RN  Outcome: Progressing  3/11/2025 0922 by Lizzy Chacon RN  Outcome: Progressing     Problem: ABCDS Injury Assessment  Goal: Absence of physical injury  3/11/2025 2155 by Erica Villarreal RN  Outcome: Progressing  3/11/2025 0922 by Lizzy Chacon RN  Outcome: Progressing  Flowsheets (Taken 3/11/2025 0730)  Absence of Physical Injury: Implement safety measures based on patient assessment     Problem: Safety - Adult  Goal: Free from fall injury  3/11/2025 2155 by Erica Villarreal RN  Outcome: Progressing  3/11/2025 0922 by Lizzy Chacon RN  Outcome: Progressing  Flowsheets (Taken 3/11/2025 0730)  Free From Fall Injury:   Instruct family/caregiver on patient safety   Based on caregiver fall risk screen, instruct family/caregiver to ask for assistance with transferring infant if caregiver noted to have fall risk factors     Problem: Pain  Goal: Verbalizes/displays adequate comfort level or baseline comfort level  3/11/2025 2155 by Erica Villarreal RN  Outcome: Progressing  Flowsheets  Taken 3/11/2025 1430 by Lizzy Chacon RN  Verbalizes/displays adequate comfort level or baseline comfort level:   Assess pain using appropriate pain scale   Encourage patient to monitor pain and request assistance  Taken 3/11/2025 1215 by Lizzy Chacon RN  Verbalizes/displays adequate comfort level or baseline comfort level:   Assess pain using appropriate pain scale   Encourage patient to monitor pain and request assistance  Taken 3/11/2025 1109 by Lizzy Chacon RN  Verbalizes/displays adequate comfort level  or baseline comfort level:   Assess pain using appropriate pain scale   Encourage patient to monitor pain and request assistance  3/11/2025 0922 by Lizzy Chacon RN  Outcome: Progressing  Flowsheets (Taken 3/11/2025 0723)  Verbalizes/displays adequate comfort level or baseline comfort level:   Encourage patient to monitor pain and request assistance   Assess pain using appropriate pain scale

## 2025-03-13 LAB
ANION GAP SERPL CALCULATED.3IONS-SCNC: 13 MMOL/L (ref 7–16)
BASOPHILS # BLD: 0 K/UL (ref 0–0.2)
BASOPHILS NFR BLD: 0 % (ref 0–2)
BUN SERPL-MCNC: 32 MG/DL (ref 6–23)
CALCIUM SERPL-MCNC: 9.5 MG/DL (ref 8.6–10.2)
CHLORIDE SERPL-SCNC: 99 MMOL/L (ref 98–107)
CO2 SERPL-SCNC: 27 MMOL/L (ref 22–29)
CREAT SERPL-MCNC: 2 MG/DL (ref 0.7–1.2)
EOSINOPHIL # BLD: 0.66 K/UL (ref 0.05–0.5)
EOSINOPHILS RELATIVE PERCENT: 4 % (ref 0–6)
ERYTHROCYTE [DISTWIDTH] IN BLOOD BY AUTOMATED COUNT: 14.4 % (ref 11.5–15)
GFR, ESTIMATED: 36 ML/MIN/1.73M2
GLUCOSE BLD-MCNC: 115 MG/DL (ref 74–99)
GLUCOSE BLD-MCNC: 128 MG/DL (ref 74–99)
GLUCOSE BLD-MCNC: 188 MG/DL (ref 74–99)
GLUCOSE BLD-MCNC: 262 MG/DL (ref 74–99)
GLUCOSE SERPL-MCNC: 131 MG/DL (ref 74–99)
HCT VFR BLD AUTO: 49.8 % (ref 37–54)
HGB BLD-MCNC: 16.4 G/DL (ref 12.5–16.5)
INR PPP: 1.8
LYMPHOCYTES NFR BLD: 9.9 K/UL (ref 1.5–4)
LYMPHOCYTES RELATIVE PERCENT: 60 % (ref 20–42)
MCH RBC QN AUTO: 29.2 PG (ref 26–35)
MCHC RBC AUTO-ENTMCNC: 32.9 G/DL (ref 32–34.5)
MCV RBC AUTO: 88.6 FL (ref 80–99.9)
MONOCYTES NFR BLD: 0.5 K/UL (ref 0.1–0.95)
MONOCYTES NFR BLD: 3 % (ref 2–12)
NEUTROPHILS NFR BLD: 33 % (ref 43–80)
NEUTS SEG NFR BLD: 5.45 K/UL (ref 1.8–7.3)
PLATELET # BLD AUTO: 141 K/UL (ref 130–450)
PMV BLD AUTO: 12.4 FL (ref 7–12)
POTASSIUM SERPL-SCNC: 4.3 MMOL/L (ref 3.5–5)
PROTHROMBIN TIME: 19.2 SEC (ref 9.3–12.4)
RBC # BLD AUTO: 5.62 M/UL (ref 3.8–5.8)
RBC # BLD: NORMAL 10*6/UL
SODIUM SERPL-SCNC: 139 MMOL/L (ref 132–146)
WBC OTHER # BLD: 16.5 K/UL (ref 4.5–11.5)

## 2025-03-13 PROCEDURE — 6370000000 HC RX 637 (ALT 250 FOR IP): Performed by: STUDENT IN AN ORGANIZED HEALTH CARE EDUCATION/TRAINING PROGRAM

## 2025-03-13 PROCEDURE — 85610 PROTHROMBIN TIME: CPT

## 2025-03-13 PROCEDURE — 99232 SBSQ HOSP IP/OBS MODERATE 35: CPT | Performed by: STUDENT IN AN ORGANIZED HEALTH CARE EDUCATION/TRAINING PROGRAM

## 2025-03-13 PROCEDURE — 85025 COMPLETE CBC W/AUTO DIFF WBC: CPT

## 2025-03-13 PROCEDURE — 6370000000 HC RX 637 (ALT 250 FOR IP): Performed by: INTERNAL MEDICINE

## 2025-03-13 PROCEDURE — 2500000003 HC RX 250 WO HCPCS: Performed by: STUDENT IN AN ORGANIZED HEALTH CARE EDUCATION/TRAINING PROGRAM

## 2025-03-13 PROCEDURE — 82962 GLUCOSE BLOOD TEST: CPT

## 2025-03-13 PROCEDURE — 2060000000 HC ICU INTERMEDIATE R&B

## 2025-03-13 PROCEDURE — 80048 BASIC METABOLIC PNL TOTAL CA: CPT

## 2025-03-13 PROCEDURE — 99232 SBSQ HOSP IP/OBS MODERATE 35: CPT | Performed by: INTERNAL MEDICINE

## 2025-03-13 RX ORDER — ENOXAPARIN SODIUM 100 MG/ML
1 INJECTION SUBCUTANEOUS 2 TIMES DAILY
Qty: 10 ML | Refills: 0 | Status: SHIPPED | OUTPATIENT
Start: 2025-03-13 | End: 2025-03-14 | Stop reason: HOSPADM

## 2025-03-13 RX ADMIN — INSULIN LISPRO 1 UNITS: 100 INJECTION, SOLUTION INTRAVENOUS; SUBCUTANEOUS at 15:56

## 2025-03-13 RX ADMIN — SACUBITRIL AND VALSARTAN 1 TABLET: 49; 51 TABLET, FILM COATED ORAL at 20:44

## 2025-03-13 RX ADMIN — INSULIN LISPRO 4 UNITS: 100 INJECTION, SOLUTION INTRAVENOUS; SUBCUTANEOUS at 15:56

## 2025-03-13 RX ADMIN — BUMETANIDE 1 MG: 1 TABLET ORAL at 20:44

## 2025-03-13 RX ADMIN — METOPROLOL SUCCINATE 100 MG: 50 TABLET, FILM COATED, EXTENDED RELEASE ORAL at 20:43

## 2025-03-13 RX ADMIN — SACUBITRIL AND VALSARTAN 1 TABLET: 49; 51 TABLET, FILM COATED ORAL at 09:36

## 2025-03-13 RX ADMIN — METOPROLOL SUCCINATE 100 MG: 50 TABLET, FILM COATED, EXTENDED RELEASE ORAL at 09:37

## 2025-03-13 RX ADMIN — SODIUM CHLORIDE, PRESERVATIVE FREE 10 ML: 5 INJECTION INTRAVENOUS at 20:44

## 2025-03-13 RX ADMIN — INSULIN LISPRO 2 UNITS: 100 INJECTION, SOLUTION INTRAVENOUS; SUBCUTANEOUS at 11:36

## 2025-03-13 RX ADMIN — BUMETANIDE 1 MG: 1 TABLET ORAL at 09:37

## 2025-03-13 RX ADMIN — INSULIN LISPRO 4 UNITS: 100 INJECTION, SOLUTION INTRAVENOUS; SUBCUTANEOUS at 11:35

## 2025-03-13 RX ADMIN — WARFARIN SODIUM 7.5 MG: 2.5 TABLET ORAL at 20:43

## 2025-03-13 RX ADMIN — POLYETHYLENE GLYCOL 3350 17 G: 17 POWDER, FOR SOLUTION ORAL at 09:36

## 2025-03-13 RX ADMIN — SODIUM CHLORIDE, PRESERVATIVE FREE 10 ML: 5 INJECTION INTRAVENOUS at 09:37

## 2025-03-13 ASSESSMENT — PAIN SCALES - GENERAL: PAINLEVEL_OUTOF10: 0

## 2025-03-13 NOTE — PLAN OF CARE
Problem: Chronic Conditions and Co-morbidities  Goal: Patient's chronic conditions and co-morbidity symptoms are monitored and maintained or improved  Outcome: Progressing  Flowsheets (Taken 3/13/2025 0038)  Care Plan - Patient's Chronic Conditions and Co-Morbidity Symptoms are Monitored and Maintained or Improved: Monitor and assess patient's chronic conditions and comorbid symptoms for stability, deterioration, or improvement     Problem: Discharge Planning  Goal: Discharge to home or other facility with appropriate resources  Outcome: Progressing  Flowsheets (Taken 3/13/2025 0038)  Discharge to home or other facility with appropriate resources: Identify barriers to discharge with patient and caregiver     Problem: ABCDS Injury Assessment  Goal: Absence of physical injury  Outcome: Progressing  Flowsheets (Taken 3/13/2025 0048)  Absence of Physical Injury: Implement safety measures based on patient assessment     Problem: Safety - Adult  Goal: Free from fall injury  Outcome: Progressing  Flowsheets (Taken 3/13/2025 0048)  Free From Fall Injury: Instruct family/caregiver on patient safety     Problem: Pain  Goal: Verbalizes/displays adequate comfort level or baseline comfort level  Outcome: Progressing  Flowsheets  Taken 3/12/2025 1745 by Lizzy Chacon RN  Verbalizes/displays adequate comfort level or baseline comfort level:   Assess pain using appropriate pain scale   Encourage patient to monitor pain and request assistance  Taken 3/12/2025 1205 by Lizzy Chacon RN  Verbalizes/displays adequate comfort level or baseline comfort level:   Assess pain using appropriate pain scale   Encourage patient to monitor pain and request assistance

## 2025-03-13 NOTE — DISCHARGE SUMMARY
[facility] Hospitalist Physician Discharge Summary       [email or web address]  [address] [facility] [address]  [phone]        Victor Hugo Hampton MD  [address] [facility] [address]  [phone]    Call in 1 day(s)  to schedule a hospital follow up appointment!      Activity level: as tolerated    Dispo: home      Condition on discharge: stable    Patient ID:  Rober Hartley  [id number]  64 y.o.  1960    Admit date: 3/8/2025    Discharge date and time:  3/14/2025  3:02 PM    Admission Diagnoses: Principal Problem:    Acute exacerbation of chronic heart failure (HCC)  Active Problems:    Noncompliance with medication regimen    Atrial fibrillation with RVR (HCC)    NICM (nonischemic cardiomyopathy) (HCC)    Permanent atrial fibrillation (HCC)    Thrombocytopenia    Poorly controlled type 2 diabetes mellitus (HCC)    Atrial fibrillation (HCC)    Dietary noncompliance    Controlled type 2 diabetes mellitus without complication (HCC)  Resolved Problems:    * No resolved hospital problems. *      Discharge Diagnoses: Principal Problem:    Acute exacerbation of chronic heart failure (HCC)  Active Problems:    Noncompliance with medication regimen    Atrial fibrillation with RVR (HCC)    NICM (nonischemic cardiomyopathy) (HCC)    Permanent atrial fibrillation (HCC)    Thrombocytopenia    Poorly controlled type 2 diabetes mellitus (HCC)    Atrial fibrillation (HCC)    Dietary noncompliance    Controlled type 2 diabetes mellitus without complication (HCC)  Resolved Problems:    * No resolved hospital problems. *      Consults:  IP CONSULT TO INTERNAL MEDICINE  IP CONSULT TO CARDIOLOGY  IP CONSULT TO SOCIAL WORK  IP CONSULT TO ENDOCRINOLOGY  IP CONSULT TO PHARMACY  IP CONSULT TO HEART FAILURE NURSE/COORDINATOR  IP CONSULT TO VASCULAR ACCESS TEAM  IP CONSULT TO HOME CARE NEEDS    Procedures:   ECHO 3/10/2025    Left Ventricle: Mildly reduced left ventricular systolic  function with a visually estimated EF of 40 - 45%. Left ventricle is mildly dilated. LVIDd is 6.0 cm. Normal wall thickness. Global hypokinesis present with basal inferoseptal hypokinesis. Abnormal diastolic function.    Right Ventricle: Mildly reduced systolic function. TAPSE is 1.6 cm.    Aortic Valve: Mild regurgitation.    Mitral Valve: Mild regurgitation.    Atrial fibrillation noted.    Image quality is suboptimal. Contrast used: Lumason. Technically difficult study with poor endocardial visualization and technically difficult study due to patient's body habitus.    Hospital Course:   Patient Rober Hartley is a 64 y.o. presented with Acute on chronic systolic congestive heart failure (HCC) [I50.23]  Acute exacerbation of chronic heart failure (HCC) [I50.9]    Brief summary:  Patient presented with HFrEF exacerbation and A-fib RVR.  Seen by [facility], underwent ECHO as above, RVR did resolve with discontinuation of digoxin and transition to BB in light of underlying CKD, to continue PO diuretics at dispo, will need follow-up with Rayna as an outpatient for further adjustment to GDMT as able.  Due to affordability patient was transitioned from Eliquis to warfarin, with therapeutic INR 2.1 at time of dispo, to have home INR checks with PCP to manage warfarin dosing until patient able to establish with [facility] clinic, anticipate need for daily dose of 5-7.5mg and will provide 5mg warfarin tablets at dispo.    Patient otherwise remained stable during admission. Other issues addressed as below. Patient is being discharged home in stable condition.    **Most recent hospitalist plan**  Plan:  Acute on chronic systolic chf continue meds. Continue diuretics. Montior pt and labs. Cardio following. Pt switched to po diuretics. Monitor creat. ECHO EF 40-45% during admit  Atrial fib continue meds, stop digoxin in light of CKD. Continue anticoagulation. Pt being switched to coumadin and to f/u with PCP until able to see

## 2025-03-13 NOTE — PROGRESS NOTES
New orders for Lovenox will need prior authorization. Hold DC until tomorrow.    Electronically signed by Carole Maldonado RN on 3/13/2025 at 3:54 PM

## 2025-03-13 NOTE — PROGRESS NOTES
Pharmacy Consultation Note  (Warfarin Dosing and Monitoring)    Initial consult date: 3/10  Consulting Provider: Agnieszka Najeranato Hartley is a 64 y.o. male for whom pharmacy has been asked to manage warfarin therapy.     Weight:   Wt Readings from Last 1 Encounters:   03/13/25 100.4 kg (221 lb 6.4 oz)       TSH:    Lab Results   Component Value Date/Time    TSH 3.82 03/10/2025 10:50 AM       Hepatic Function Panel:                            Lab Results   Component Value Date/Time    ALKPHOS 159 03/08/2025 03:50 PM    ALT 24 03/08/2025 03:50 PM    AST 17 03/08/2025 03:50 PM    BILITOT 0.8 03/08/2025 03:50 PM    BILIDIR 0.2 02/25/2020 03:25 AM    IBILI 0.6 02/25/2020 03:25 AM       Current significant warfarin drug-drug interactions include: No significant interactions    Recent Labs     03/12/25  0645 03/13/25  0600   HGB 15.2 16.4   * 141     Date Warfarin Dose INR Heparin or LMWH Comment   3/10 7.5mg 1.4 -- DC'd Eliquis   3/11 7.5mg 1.3 --    3/12 7.5mg 1.4 --    3/13 7.5mg 1.8 --             Assessment:  Patient is a 64 y.o. male on warfarin for History of  VTE/PE and Atrial Fibrillation.  Patient is a new start to warfarin; transitioning off Eliquis due to medication cost. Per chart review, patient has previously taken warfarin 11.25 mg on Tuesday/Thursday and 7.5 mg all other days.   Goal INR 2 - 3  INR 1.8 today  [id number] = 4  After discussion with Dr. Aaron. Electing not to bridge patient to warfarin at this time. Eliquis has been discontinued.     Plan:  Warfarin 7.5 mg tonight  Daily PT/INR until the INR is stable within the therapeutic range  Pharmacist will follow and monitor/adjust dosing as necessary    Thank you for this consult,    Virginie Garnett, [facility], PharmD, BCPS 3/13/2025 12:07 PM   Ext: [phone]    TUNDE: [phone]  SEY: [phone]  SJW: [phone]

## 2025-03-13 NOTE — PROGRESS NOTES
Bloody drainage dripping down patients calf.  No open wounds found, however there is a patch of pinpoint dots on posterior calf that may be source?  RN contacted wound care RN for assistance, will see tomorrow.  Temporarily, silicone lotion applied to BLE.  RLE also dressed with ABDs and gauze wrap for drainage.

## 2025-03-13 NOTE — CARE COORDINATION
Social work / Discharge planning:         Social work received return call from Lisa with the [facility].    Patient will need to see his PCP tomorrow before 3pm.    Primary care physician will need to send a referral to [facility] FAX# [phone].      This information has been added to the discharge instructions by charge nurse.     Electronically signed by ELIAZAR Yusuf on 3/13/2025 at 3:38 PM

## 2025-03-13 NOTE — PROGRESS NOTES
[facility] Hospitalist Progress Note    Admitting Date and Time: 3/8/2025  3:23 PM  Admit Dx: Acute on chronic systolic congestive heart failure (HCC) [I50.23]  Acute exacerbation of chronic heart failure (HCC) [I50.9]    Subjective:  Patient is being followed for Acute on chronic systolic congestive heart failure (HCC) [I50.23]  Acute exacerbation of chronic heart failure (HCC) [I50.9]   Pt feels okay  Per RN: no additional concerns    ROS: denies fever, chills, cp, sob, n/v, HA unless otherwise noted above     warfarin  7.5 mg Oral Once    bumetanide  1 mg Oral BID    insulin glargine  8 Units SubCUTAneous Nightly    insulin lispro  4 Units SubCUTAneous TID WC    insulin lispro  0-4 Units SubCUTAneous 4x Daily AC & HS    warfarin placeholder: dosing by pharmacy   Oral RX Placeholder    ferrous sulfate  325 mg Oral Daily    metoprolol succinate  100 mg Oral BID    sacubitril-valsartan  1 tablet Oral BID    sodium chloride flush  5-40 mL IntraVENous 2 times per day     prochlorperazine, 10 mg, Q6H PRN  glucose, 4 tablet, PRN  dextrose bolus, 125 mL, PRN   Or  dextrose bolus, 250 mL, PRN  glucagon (rDNA), 1 mg, PRN  dextrose, , Continuous PRN  sodium chloride flush, 5-40 mL, PRN  sodium chloride, , PRN  potassium chloride, 40 mEq, PRN   Or  potassium alternative oral replacement, 40 mEq, PRN   Or  potassium chloride, 10 mEq, PRN  magnesium sulfate, 2,000 mg, PRN  polyethylene glycol, 17 g, Daily PRN  acetaminophen, 650 mg, Q6H PRN   Or  acetaminophen, 650 mg, Q6H PRN  melatonin, 6 mg, Nightly PRN  hydrALAZINE, 10 mg, Q6H PRN         Objective:  /85   Pulse 83   Temp 98.1 °F (36.7 °C) (Oral)   Resp 18   Ht 1.753 m (5' 9\")   Wt 100.4 kg (221 lb 6.4 oz)   SpO2 97%   BMI 32.70 kg/m²     General Appearance: alert and oriented to person, place and time and in NAD, sitting in bed  Skin: warm and dry  Head: normocephalic and atraumatic  Eyes: PERRL, EOMI, conjunctivae normal  Neck: neck supple, trachea

## 2025-03-13 NOTE — CARE COORDINATION
Social work / Discharge planning:        Social work left another message at [facility] requesting return call to schedule patient's follow up care.   Electronically signed by ELIAZAR Yusuf on 3/13/2025 at 3:15 PM

## 2025-03-14 VITALS
TEMPERATURE: 97.4 F | RESPIRATION RATE: 18 BRPM | DIASTOLIC BLOOD PRESSURE: 87 MMHG | OXYGEN SATURATION: 99 % | HEART RATE: 92 BPM | WEIGHT: 219.9 LBS | HEIGHT: 69 IN | SYSTOLIC BLOOD PRESSURE: 120 MMHG | BODY MASS INDEX: 32.57 KG/M2

## 2025-03-14 LAB
ANION GAP SERPL CALCULATED.3IONS-SCNC: 10 MMOL/L (ref 7–16)
BASOPHILS # BLD: 0.14 K/UL (ref 0–0.2)
BASOPHILS NFR BLD: 1 % (ref 0–2)
BUN SERPL-MCNC: 32 MG/DL (ref 6–23)
CALCIUM SERPL-MCNC: 9 MG/DL (ref 8.6–10.2)
CHLORIDE SERPL-SCNC: 100 MMOL/L (ref 98–107)
CO2 SERPL-SCNC: 27 MMOL/L (ref 22–29)
CREAT SERPL-MCNC: 1.9 MG/DL (ref 0.7–1.2)
EOSINOPHIL # BLD: 0.29 K/UL (ref 0.05–0.5)
EOSINOPHILS RELATIVE PERCENT: 2 % (ref 0–6)
ERYTHROCYTE [DISTWIDTH] IN BLOOD BY AUTOMATED COUNT: 14.4 % (ref 11.5–15)
GFR, ESTIMATED: 40 ML/MIN/1.73M2
GLUCOSE BLD-MCNC: 123 MG/DL (ref 74–99)
GLUCOSE BLD-MCNC: 125 MG/DL (ref 74–99)
GLUCOSE BLD-MCNC: 205 MG/DL (ref 74–99)
GLUCOSE SERPL-MCNC: 125 MG/DL (ref 74–99)
HCT VFR BLD AUTO: 46.3 % (ref 37–54)
HGB BLD-MCNC: 15 G/DL (ref 12.5–16.5)
INR PPP: 2.1
LYMPHOCYTES NFR BLD: 10.44 K/UL (ref 1.5–4)
LYMPHOCYTES RELATIVE PERCENT: 73 % (ref 20–42)
MCH RBC QN AUTO: 29.1 PG (ref 26–35)
MCHC RBC AUTO-ENTMCNC: 32.4 G/DL (ref 32–34.5)
MCV RBC AUTO: 89.9 FL (ref 80–99.9)
MONOCYTES NFR BLD: 0 % (ref 2–12)
MONOCYTES NFR BLD: 0 K/UL (ref 0.1–0.95)
NEUTROPHILS NFR BLD: 24 % (ref 43–80)
NEUTS SEG NFR BLD: 3.43 K/UL (ref 1.8–7.3)
PLATELET # BLD AUTO: 106 K/UL (ref 130–450)
PMV BLD AUTO: 12.3 FL (ref 7–12)
POTASSIUM SERPL-SCNC: 4.1 MMOL/L (ref 3.5–5)
PROTHROMBIN TIME: 22.7 SEC (ref 9.3–12.4)
RBC # BLD AUTO: 5.15 M/UL (ref 3.8–5.8)
RBC # BLD: NORMAL 10*6/UL
SODIUM SERPL-SCNC: 137 MMOL/L (ref 132–146)
WBC OTHER # BLD: 14.3 K/UL (ref 4.5–11.5)

## 2025-03-14 PROCEDURE — 36415 COLL VENOUS BLD VENIPUNCTURE: CPT

## 2025-03-14 PROCEDURE — 6370000000 HC RX 637 (ALT 250 FOR IP): Performed by: INTERNAL MEDICINE

## 2025-03-14 PROCEDURE — 99239 HOSP IP/OBS DSCHRG MGMT >30: CPT | Performed by: STUDENT IN AN ORGANIZED HEALTH CARE EDUCATION/TRAINING PROGRAM

## 2025-03-14 PROCEDURE — 82962 GLUCOSE BLOOD TEST: CPT

## 2025-03-14 PROCEDURE — 6370000000 HC RX 637 (ALT 250 FOR IP): Performed by: STUDENT IN AN ORGANIZED HEALTH CARE EDUCATION/TRAINING PROGRAM

## 2025-03-14 PROCEDURE — 85025 COMPLETE CBC W/AUTO DIFF WBC: CPT

## 2025-03-14 PROCEDURE — 2500000003 HC RX 250 WO HCPCS: Performed by: STUDENT IN AN ORGANIZED HEALTH CARE EDUCATION/TRAINING PROGRAM

## 2025-03-14 PROCEDURE — 80048 BASIC METABOLIC PNL TOTAL CA: CPT

## 2025-03-14 PROCEDURE — 85610 PROTHROMBIN TIME: CPT

## 2025-03-14 RX ORDER — WARFARIN SODIUM 5 MG/1
5 TABLET ORAL
Status: DISCONTINUED | OUTPATIENT
Start: 2025-03-14 | End: 2025-03-14 | Stop reason: HOSPADM

## 2025-03-14 RX ORDER — WARFARIN SODIUM 5 MG/1
TABLET ORAL
Qty: 10 TABLET | Refills: 0 | Status: SHIPPED | OUTPATIENT
Start: 2025-03-14

## 2025-03-14 RX ADMIN — SACUBITRIL AND VALSARTAN 1 TABLET: 49; 51 TABLET, FILM COATED ORAL at 09:29

## 2025-03-14 RX ADMIN — SODIUM CHLORIDE, PRESERVATIVE FREE 10 ML: 5 INJECTION INTRAVENOUS at 09:29

## 2025-03-14 RX ADMIN — INSULIN LISPRO 1 UNITS: 100 INJECTION, SOLUTION INTRAVENOUS; SUBCUTANEOUS at 11:15

## 2025-03-14 RX ADMIN — INSULIN LISPRO 4 UNITS: 100 INJECTION, SOLUTION INTRAVENOUS; SUBCUTANEOUS at 11:14

## 2025-03-14 RX ADMIN — BUMETANIDE 1 MG: 1 TABLET ORAL at 09:29

## 2025-03-14 RX ADMIN — METOPROLOL SUCCINATE 100 MG: 50 TABLET, FILM COATED, EXTENDED RELEASE ORAL at 09:29

## 2025-03-14 NOTE — CARE COORDINATION
Social work / Discharge planning:         Patient accepted by [facility].   Per liaison, they can visit as often as needed according to physician order.   Charge nurse updated.     Patient is aware of plans and that [facility] will be reaching out to him at home for INR machine.   Electronically signed by ELIAZAR Yusuf on 3/14/2025 at 11:23 AM

## 2025-03-14 NOTE — PROGRESS NOTES
CLINICAL PHARMACY NOTE: MEDS TO BEDS    Total # of Prescriptions Filled: 1   The following medications were delivered to the patient:  Warfarin 5    Additional Documentation:

## 2025-03-14 NOTE — PROGRESS NOTES
ENDOCRINOLOGY PROGRESS NOTE      Date of admission: 3/8/2025  Date of service: 3/13/2025  Admitting physician: Shelby Michael MD   Primary Care Physician: Victor Hugo Hampton MD  Consultant physician: Luiz Carroll MD     Reason for the consultation:  Uncontrolled DM    History of Present Illness:  The history is provided by the patient. Accuracy of the patient data is excellent    Rober Hartley is a very pleasant 64 y.o. old male with PMH of obesity, congestive heart failure, A-fib on Eliquis, hypertension, hyperlipidemia, type 2 diabetes, CKD and other listed below admitted to [facility] on 3/8/2025 because of worsening shortness of breath and found to have an acute exacerbation of CHF, endocrine service was consulted for diabetes management.    Subjective  The patient was seen this afternoon, no acute events overnight, glucose level in range most of the time.      Inpatient diet:   Carb Restricted diet     Point of care glucose monitoring   (Independently reviewed)   Recent Labs     03/12/25  0632 03/12/25  1046 03/12/25  1605 03/12/25  2203 03/13/25  0552 03/13/25  1133 03/13/25  1552 03/13/25  2041   POCGLU 135* 162* 118* 148* 128* 262* 188* 115*       Past medical history:   Past Medical History:   Diagnosis Date    CHF (congestive heart failure) ([facility])     CKD (chronic kidney disease)     DM2 (diabetes mellitus, type 2) ([facility])     DVT (deep venous thrombosis) ([facility]) 01/01/2008    HTN (hypertension), benign     Nonischemic cardiomyopathy ([facility]) 01/01/2008    PE (pulmonary embolism) 01/01/2008     Scheduled Meds:   bumetanide  1 mg Oral BID    insulin glargine  8 Units SubCUTAneous Nightly    insulin lispro  4 Units SubCUTAneous TID WC    insulin lispro  0-4 Units SubCUTAneous 4x Daily AC & HS    warfarin placeholder: dosing by pharmacy   Oral RX Placeholder    ferrous sulfate  325 mg Oral Daily    metoprolol succinate  100 mg Oral BID    sacubitril-valsartan  1 tablet Oral BID    sodium chloride flush  5-40 mL  regimen  Lantus 8 units nightly  Humalog 4 units 3 times daily with meals  Low-dose sliding scale ACHS  Continue glucose check with meals and at bedtime  Will titrate insulin dose based on the blood glucose trend & insulin requirement  Upon discharge, I will arrange for the patient to be seen in the endocrinology clinic for routine diabetes maintenance and prevention    Dietary noncompliance  Discussed with patient the importance of eating consistent carbohydrate meals, avoiding high glycemic index food. Also, discussed with patient the risk and negative consequences of dietary noncompliance on blood glucose control, blood pressure and weight    Interdisciplinary plan for communication with healthcare providers:   Consult recommendations were discussed with the Primary Service/Nursing staff      The above issues were reviewed with the patient who understood and agreed with the plan.    Thank you for allowing us to participate in the care of this patient. Please do not hesitate to contact us with any additional questions.     Luiz Carroll MD  Endocrinologist, [facility]   [facility] 6 MED SURG  [address] [place] [address]  Dept: [phone]  Loc: [phone]   Phone: [phone]  Fax: [phone]  --------------------------------------------  An electronic signature was used to authenticate this note. Luiz Carroll MD on 3/13/2025 at 8:45 PM

## 2025-03-14 NOTE — PLAN OF CARE
Problem: Chronic Conditions and Co-morbidities  Goal: Patient's chronic conditions and co-morbidity symptoms are monitored and maintained or improved  Outcome: Progressing  Flowsheets (Taken 3/14/2025 0000)  Care Plan - Patient's Chronic Conditions and Co-Morbidity Symptoms are Monitored and Maintained or Improved: Monitor and assess patient's chronic conditions and comorbid symptoms for stability, deterioration, or improvement     Problem: Discharge Planning  Goal: Discharge to home or other facility with appropriate resources  Outcome: Progressing  Flowsheets (Taken 3/14/2025 0000)  Discharge to home or other facility with appropriate resources: Identify barriers to discharge with patient and caregiver     Problem: ABCDS Injury Assessment  Goal: Absence of physical injury  Outcome: Progressing  Flowsheets (Taken 3/14/2025 0012)  Absence of Physical Injury: Implement safety measures based on patient assessment     Problem: Safety - Adult  Goal: Free from fall injury  Outcome: Progressing  Flowsheets (Taken 3/14/2025 0012)  Free From Fall Injury: Instruct family/caregiver on patient safety     Problem: Pain  Goal: Verbalizes/displays adequate comfort level or baseline comfort level  Outcome: Progressing  Flowsheets (Taken 3/14/2025 0012)  Verbalizes/displays adequate comfort level or baseline comfort level:   Assess pain using appropriate pain scale   Encourage patient to monitor pain and request assistance   Administer analgesics based on type and severity of pain and evaluate response   Implement non-pharmacological measures as appropriate and evaluate response   Consider cultural and social influences on pain and pain management   Notify Licensed Independent Practitioner if interventions unsuccessful or patient reports new pain

## 2025-03-14 NOTE — PROGRESS NOTES
.    .     Low Risk Nutrition Note     Reason for Visit:   Length of Stay    Nutrition Assessment:  ADM: Acute exacerbation of chronic heart failure. PMH: DM. Consuming >75% meals. Was not open to diet instructions. Did not have nut'l concerns.     Current Nutrition Therapies:    ADULT DIET; Regular; 3 carb choices (45 gm/meal); Low Fat/Low Chol/High Fiber/2 gm Na    Anthropometrics:   Current Height: 175.3 cm (5' 9\")  Current Weight - Scale: 99.7 kg (219 lb 14.4 oz)      Monitoring and Evaluation:  No nutrition diagnosis. Patient will be monitored per nutrition standards of care.     Consult Dietitian if nutrition intervention essential to patient care is needed.     Discharge Planning:  No needs    Jory Robb RD

## 2025-03-14 NOTE — CARE COORDINATION
Social work / Discharge planning:          Patient states his car is broke down and he cannot get to his doctor's office for INR checks.    He also cannot get to the [facility].      Social work made a referral to [facility] due to patient's insurance for INR home machine.    [facility]CS will work directly with the patient's PCP for orders and set up.    This process will take approximately one week.    Patient will need home care for INR checks at home.    Referral made to [facility] .   Their liaison is checking to see how often they can do visits.   Awaiting return call.   Electronically signed by ELIAZAR Yusuf on 3/14/2025 at 10:40 AM

## 2025-03-14 NOTE — PROGRESS NOTES
Pharmacy Consultation Note  (Warfarin Dosing and Monitoring)    Initial consult date: 3/10  Consulting Provider: Agnieszka Richter KEILY Hartley is a 64 y.o. male for whom pharmacy has been asked to manage warfarin therapy.     Weight:   Wt Readings from Last 1 Encounters:   03/14/25 99.7 kg (219 lb 14.4 oz)       TSH:    Lab Results   Component Value Date/Time    TSH 3.82 03/10/2025 10:50 AM       Hepatic Function Panel:                            Lab Results   Component Value Date/Time    ALKPHOS 159 03/08/2025 03:50 PM    ALT 24 03/08/2025 03:50 PM    AST 17 03/08/2025 03:50 PM    BILITOT 0.8 03/08/2025 03:50 PM    BILIDIR 0.2 02/25/2020 03:25 AM    IBILI 0.6 02/25/2020 03:25 AM       Current significant warfarin drug-drug interactions include: No significant interactions    Recent Labs     03/12/25  0645 03/13/25  0600 03/14/25  0504   HGB 15.2 16.4 15.0   * 141 106*     Date Warfarin Dose INR Heparin or LMWH Comment   3/10 7.5mg 1.4 -- DC'd Eliquis   3/11 7.5mg 1.3 --    3/12 7.5mg 1.4 --    3/13 7.5mg 1.8 --    3/14 5mg 2.1 --      Assessment:  Patient is a 64 y.o. male on warfarin for History of  VTE/PE and Atrial Fibrillation.  Patient is a new start to warfarin; transitioning off Eliquis due to medication cost. Per chart review, patient has previously taken warfarin 11.25 mg on Tuesday/Thursday and 7.5 mg all other days.   Goal INR 2 - 3  INR 2.1 today  [id number] = 4    Plan:  Warfarin 5 mg tonight  Patient will likely need a dose between 5-7.5mg daily but will be hard to determine until patient is at steady state. INR is still increasing. I would recommend that patient gets sent home with 5mg tablets so that he can take 1-1/2 tabs daily.   I would look at trying Monday/Friday 5mg, 7.5mg all other days with close follow-up  He ideally needs an INR check around Wednesday next week  Daily PT/INR until the INR is stable within the therapeutic range  Pharmacist will follow and monitor/adjust dosing as  necessary    Thank you for this consult,    Virginie Garnett RPH, PharmD, BCPS 3/14/2025 11:45 AM   Ext: [phone]    TUNDE: [phone]  SEY: [phone]  W: [phone]

## 2025-03-17 ENCOUNTER — TELEPHONE (OUTPATIENT)
Dept: CARDIOLOGY CLINIC | Age: 65
End: 2025-03-17

## 2025-03-17 NOTE — TELEPHONE ENCOUNTER
----- Message from JOSEPH OBANDO RN sent at 3/12/2025  9:54 AM EDT -----  Hello! I'd like to get his patient a post hospital follow up appointment with Dr Hampton if possible. Nuria Graham is booked until May in the [facility]. I appreciate it! Thank you!     Thu MIMS, RN  Heart Failure Navigator

## 2025-03-25 DIAGNOSIS — I48.21 PERMANENT ATRIAL FIBRILLATION (HCC): Primary | ICD-10-CM

## 2025-03-25 RX ORDER — WARFARIN SODIUM 5 MG/1
TABLET ORAL
Qty: 30 TABLET | Refills: 0 | Status: SHIPPED | OUTPATIENT
Start: 2025-03-25

## 2025-03-28 ENCOUNTER — OFFICE VISIT (OUTPATIENT)
Dept: CARDIOLOGY CLINIC | Age: 65
End: 2025-03-28

## 2025-03-28 VITALS
SYSTOLIC BLOOD PRESSURE: 118 MMHG | OXYGEN SATURATION: 99 % | TEMPERATURE: 96.8 F | RESPIRATION RATE: 18 BRPM | WEIGHT: 232 LBS | BODY MASS INDEX: 34.36 KG/M2 | DIASTOLIC BLOOD PRESSURE: 80 MMHG | HEART RATE: 93 BPM | HEIGHT: 69 IN

## 2025-03-28 DIAGNOSIS — I50.21 ACUTE SYSTOLIC CONGESTIVE HEART FAILURE (HCC): Primary | ICD-10-CM

## 2025-03-28 DIAGNOSIS — I48.21 PERMANENT ATRIAL FIBRILLATION (HCC): ICD-10-CM

## 2025-03-28 RX ORDER — DIGOXIN 125 MCG
125 TABLET ORAL DAILY
COMMUNITY

## 2025-03-28 NOTE — PROGRESS NOTES
Lymphadenopathy:   No cervical adenopathy.   Neurological: Alert and oriented to person, place, and time.   Skin: Skin is warm and dry. No rash noted. Not diaphoretic. No erythema.   Psychiatric: Normal mood and affect. Behavior is normal.     EKG: Afib. Non-specific changes.    TTE-11/18/2019: LVEF 30%, moderate to severely decreased LV systolic function, moderate concentric hypertrophy, moderate MR, unable to estimate PA systolic pressure.     Echo Summary 1/15/2021:   Technically difficult study.    Ejection fraction is visually estimated at 40%.   Overall ejection fraction moderately decreased.   Moderate left ventricular concentric hypertrophy noted.   Normal right ventricle structure and function.   Physiologic and/or trace mitral regurgitation is present.   Physiologic and/or trace tricuspid regurgitation.    Echo Summary 3/10/2025:    Left Ventricle: Mildly reduced left ventricular systolic function with a visually estimated EF of 40 - 45%. Left ventricle is mildly dilated. LVIDd is 6.0 cm. Normal wall thickness. Global hypokinesis present with basal inferoseptal hypokinesis. Abnormal diastolic function.    Right Ventricle: Mildly reduced systolic function. TAPSE is 1.6 cm.    Aortic Valve: Mild regurgitation.    Mitral Valve: Mild regurgitation.    Atrial fibrillation noted.    Image quality is suboptimal. Contrast used: Lumason. Technically difficult study with poor endocardial visualization and technically difficult study due to patient's body habitus.    ASSESSMENT & PLAN:    Patient Active Problem List   Diagnosis    History of DVT (deep vein thrombosis)    History of pulmonary embolism    Type 2 diabetes mellitus with obesity (HCC)    Hyperlipidemia    Noncompliance with medication regimen    Non morbid obesity due to excess calories    Chronic anticoagulation    Atrial fibrillation with RVR (HCC)    Congestive heart failure (HCC)    Dyspnea and respiratory abnormalities    Essential hypertension

## 2025-03-31 ENCOUNTER — TELEPHONE (OUTPATIENT)
Age: 65
End: 2025-03-31

## 2025-03-31 NOTE — TELEPHONE ENCOUNTER
Called and left message on patient's VM stating I was calling in regards to a referral we received (from Dr. Jovany Hampton - his cardiologist) to manage his warfarin therapy. Requested call back to schedule his initial visit at [facility]. Provided our office hours to return call: M/W/Th/F: 9 am to 4 pm; closed daily from 12-1 pm.    Electronically signed by Donna Burch on 3/31/25 at 1:31 PM EDT

## 2025-04-04 NOTE — TELEPHONE ENCOUNTER
Called and left message on patient's VM stating I was calling to schedule his first appt in regards to a referral we received from one of his doctors (Dr. Jovany Hampton - his cardiologist). Requested call back to schedule his initial visit at [facility]. Provided our office hours to return call: M/W/Th/F: 9 am to 4 pm; closed daily from 12-1 pm.     Electronically signed by Donna Burch on 4/4/25 at 3:13 PM EDT

## 2025-04-07 ENCOUNTER — HOSPITAL ENCOUNTER (INPATIENT)
Age: 65
LOS: 8 days | Discharge: HOME HEALTH | DRG: 308 | End: 2025-04-15
Attending: EMERGENCY MEDICINE | Admitting: INTERNAL MEDICINE
Payer: MEDICARE

## 2025-04-07 ENCOUNTER — APPOINTMENT (OUTPATIENT)
Dept: CT IMAGING | Age: 65
DRG: 308 | End: 2025-04-07
Payer: MEDICARE

## 2025-04-07 ENCOUNTER — APPOINTMENT (OUTPATIENT)
Dept: GENERAL RADIOLOGY | Age: 65
DRG: 308 | End: 2025-04-07
Payer: MEDICARE

## 2025-04-07 DIAGNOSIS — I48.91 ATRIAL FIBRILLATION WITH RVR (HCC): ICD-10-CM

## 2025-04-07 DIAGNOSIS — I50.9 ACUTE ON CHRONIC CONGESTIVE HEART FAILURE, UNSPECIFIED HEART FAILURE TYPE (HCC): ICD-10-CM

## 2025-04-07 DIAGNOSIS — I50.32 HEART FAILURE WITH IMPROVED EJECTION FRACTION (HFIMPEF) (HCC): ICD-10-CM

## 2025-04-07 DIAGNOSIS — Z86.718 HISTORY OF DVT (DEEP VEIN THROMBOSIS): Primary | ICD-10-CM

## 2025-04-07 DIAGNOSIS — R09.02 HYPOXIA: ICD-10-CM

## 2025-04-07 DIAGNOSIS — Z78.9 DIFFICULTY REFILLING PRESCRIPTIONS: ICD-10-CM

## 2025-04-07 LAB
ALBUMIN SERPL-MCNC: 4 G/DL (ref 3.5–5.2)
ALP SERPL-CCNC: 147 U/L (ref 40–129)
ALT SERPL-CCNC: 37 U/L (ref 0–40)
ANION GAP SERPL CALCULATED.3IONS-SCNC: 14 MMOL/L (ref 7–16)
AST SERPL-CCNC: 19 U/L (ref 0–39)
B PARAP IS1001 DNA NPH QL NAA+NON-PROBE: NOT DETECTED
B PERT DNA SPEC QL NAA+PROBE: NOT DETECTED
B.E.: -7.2 MMOL/L (ref -3–3)
BASOPHILS # BLD: 0.14 K/UL (ref 0–0.2)
BASOPHILS NFR BLD: 1 % (ref 0–2)
BILIRUB SERPL-MCNC: 1.2 MG/DL (ref 0–1.2)
BNP SERPL-MCNC: ABNORMAL PG/ML (ref 0–125)
BUN SERPL-MCNC: 31 MG/DL (ref 6–23)
C PNEUM DNA NPH QL NAA+NON-PROBE: NOT DETECTED
CALCIUM SERPL-MCNC: 9.6 MG/DL (ref 8.6–10.2)
CHLORIDE SERPL-SCNC: 104 MMOL/L (ref 98–107)
CO2 SERPL-SCNC: 19 MMOL/L (ref 22–29)
COHB: 0.2 % (ref 0–1.5)
CREAT SERPL-MCNC: 1.8 MG/DL (ref 0.7–1.2)
CRITICAL: ABNORMAL
DATE ANALYZED: ABNORMAL
DATE OF COLLECTION: ABNORMAL
EOSINOPHIL # BLD: 0.41 K/UL (ref 0.05–0.5)
EOSINOPHILS RELATIVE PERCENT: 3 % (ref 0–6)
ERYTHROCYTE [DISTWIDTH] IN BLOOD BY AUTOMATED COUNT: 14.7 % (ref 11.5–15)
FLUAV RNA NPH QL NAA+NON-PROBE: NOT DETECTED
FLUBV RNA NPH QL NAA+NON-PROBE: NOT DETECTED
GFR, ESTIMATED: 41 ML/MIN/1.73M2
GLUCOSE BLD-MCNC: 230 MG/DL (ref 74–99)
GLUCOSE SERPL-MCNC: 181 MG/DL (ref 74–99)
HADV DNA NPH QL NAA+NON-PROBE: NOT DETECTED
HCO3: 15.4 MMOL/L (ref 22–26)
HCOV 229E RNA NPH QL NAA+NON-PROBE: NOT DETECTED
HCOV HKU1 RNA NPH QL NAA+NON-PROBE: NOT DETECTED
HCOV NL63 RNA NPH QL NAA+NON-PROBE: NOT DETECTED
HCOV OC43 RNA NPH QL NAA+NON-PROBE: NOT DETECTED
HCT VFR BLD AUTO: 42.6 % (ref 37–54)
HGB BLD-MCNC: 13.9 G/DL (ref 12.5–16.5)
HHB: 5.5 % (ref 0–5)
HMPV RNA NPH QL NAA+NON-PROBE: NOT DETECTED
HPIV1 RNA NPH QL NAA+NON-PROBE: NOT DETECTED
HPIV2 RNA NPH QL NAA+NON-PROBE: NOT DETECTED
HPIV3 RNA NPH QL NAA+NON-PROBE: NOT DETECTED
HPIV4 RNA NPH QL NAA+NON-PROBE: NOT DETECTED
INR PPP: 1.9
LAB: ABNORMAL
LYMPHOCYTES NFR BLD: 3.56 K/UL (ref 1.5–4)
LYMPHOCYTES RELATIVE PERCENT: 22 % (ref 20–42)
Lab: 1403
M PNEUMO DNA NPH QL NAA+NON-PROBE: NOT DETECTED
MAGNESIUM SERPL-MCNC: 1.9 MG/DL (ref 1.6–2.6)
MCH RBC QN AUTO: 29.5 PG (ref 26–35)
MCHC RBC AUTO-ENTMCNC: 32.6 G/DL (ref 32–34.5)
MCV RBC AUTO: 90.4 FL (ref 80–99.9)
METHB: 0.3 % (ref 0–1.5)
MODE: ABNORMAL
MONOCYTES NFR BLD: 0.14 K/UL (ref 0.1–0.95)
MONOCYTES NFR BLD: 1 % (ref 2–12)
NEUTROPHILS NFR BLD: 73 % (ref 43–80)
NEUTS SEG NFR BLD: 11.65 K/UL (ref 1.8–7.3)
O2 SATURATION: 94.5 % (ref 92–98.5)
O2HB: 94 % (ref 94–97)
OPERATOR ID: 5100
PATIENT TEMP: 37 C
PCO2: 25.1 MMHG (ref 35–45)
PH BLOOD GAS: 7.41 (ref 7.35–7.45)
PLATELET, FLUORESCENCE: 122 K/UL (ref 130–450)
PMV BLD AUTO: 13.4 FL (ref 7–12)
PO2: 76.8 MMHG (ref 75–100)
POTASSIUM SERPL-SCNC: 5.3 MMOL/L (ref 3.5–5)
PROT SERPL-MCNC: 7.1 G/DL (ref 6.4–8.3)
PROTHROMBIN TIME: 21.2 SEC (ref 9.3–12.4)
RBC # BLD AUTO: 4.71 M/UL (ref 3.8–5.8)
RBC # BLD: NORMAL 10*6/UL
RSV RNA NPH QL NAA+NON-PROBE: NOT DETECTED
RV+EV RNA NPH QL NAA+NON-PROBE: NOT DETECTED
SARS-COV-2 RNA NPH QL NAA+NON-PROBE: NOT DETECTED
SODIUM SERPL-SCNC: 137 MMOL/L (ref 132–146)
SOURCE, BLOOD GAS: ABNORMAL
SPECIMEN DESCRIPTION: NORMAL
THB: 15.1 G/DL (ref 11.5–16.5)
TIME ANALYZED: 1408
TROPONIN I SERPL HS-MCNC: 33 NG/L (ref 0–11)
TROPONIN I SERPL HS-MCNC: 38 NG/L (ref 0–11)
WBC OTHER # BLD: 15.9 K/UL (ref 4.5–11.5)

## 2025-04-07 PROCEDURE — 85610 PROTHROMBIN TIME: CPT

## 2025-04-07 PROCEDURE — 6370000000 HC RX 637 (ALT 250 FOR IP): Performed by: INTERNAL MEDICINE

## 2025-04-07 PROCEDURE — 96374 THER/PROPH/DIAG INJ IV PUSH: CPT

## 2025-04-07 PROCEDURE — 83735 ASSAY OF MAGNESIUM: CPT

## 2025-04-07 PROCEDURE — 2500000003 HC RX 250 WO HCPCS

## 2025-04-07 PROCEDURE — 83880 ASSAY OF NATRIURETIC PEPTIDE: CPT

## 2025-04-07 PROCEDURE — 0202U NFCT DS 22 TRGT SARS-COV-2: CPT

## 2025-04-07 PROCEDURE — 51798 US URINE CAPACITY MEASURE: CPT

## 2025-04-07 PROCEDURE — 6370000000 HC RX 637 (ALT 250 FOR IP): Performed by: EMERGENCY MEDICINE

## 2025-04-07 PROCEDURE — 2580000003 HC RX 258

## 2025-04-07 PROCEDURE — 6360000004 HC RX CONTRAST MEDICATION: Performed by: RADIOLOGY

## 2025-04-07 PROCEDURE — 71045 X-RAY EXAM CHEST 1 VIEW: CPT

## 2025-04-07 PROCEDURE — 2140000000 HC CCU INTERMEDIATE R&B

## 2025-04-07 PROCEDURE — 6360000002 HC RX W HCPCS: Performed by: INTERNAL MEDICINE

## 2025-04-07 PROCEDURE — 96375 TX/PRO/DX INJ NEW DRUG ADDON: CPT

## 2025-04-07 PROCEDURE — 99285 EMERGENCY DEPT VISIT HI MDM: CPT

## 2025-04-07 PROCEDURE — 82805 BLOOD GASES W/O2 SATURATION: CPT

## 2025-04-07 PROCEDURE — 6360000002 HC RX W HCPCS

## 2025-04-07 PROCEDURE — 71275 CT ANGIOGRAPHY CHEST: CPT

## 2025-04-07 PROCEDURE — 80053 COMPREHEN METABOLIC PANEL: CPT

## 2025-04-07 PROCEDURE — 85025 COMPLETE CBC W/AUTO DIFF WBC: CPT

## 2025-04-07 PROCEDURE — 93005 ELECTROCARDIOGRAM TRACING: CPT

## 2025-04-07 PROCEDURE — 82962 GLUCOSE BLOOD TEST: CPT

## 2025-04-07 PROCEDURE — 84484 ASSAY OF TROPONIN QUANT: CPT

## 2025-04-07 RX ORDER — WARFARIN SODIUM 5 MG/1
5 TABLET ORAL DAILY
COMMUNITY

## 2025-04-07 RX ORDER — INSULIN LISPRO 100 [IU]/ML
0-8 INJECTION, SOLUTION INTRAVENOUS; SUBCUTANEOUS
Status: DISCONTINUED | OUTPATIENT
Start: 2025-04-07 | End: 2025-04-15 | Stop reason: HOSPADM

## 2025-04-07 RX ORDER — FERROUS SULFATE 325(65) MG
325 TABLET ORAL DAILY
Status: DISCONTINUED | OUTPATIENT
Start: 2025-04-08 | End: 2025-04-15 | Stop reason: HOSPADM

## 2025-04-07 RX ORDER — GLIPIZIDE 5 MG/1
2.5 TABLET ORAL DAILY
COMMUNITY

## 2025-04-07 RX ORDER — GLIPIZIDE 5 MG/1
2.5 TABLET ORAL DAILY
Status: DISCONTINUED | OUTPATIENT
Start: 2025-04-07 | End: 2025-04-15 | Stop reason: HOSPADM

## 2025-04-07 RX ORDER — DILTIAZEM HYDROCHLORIDE 5 MG/ML
10 INJECTION INTRAVENOUS ONCE
Status: COMPLETED | OUTPATIENT
Start: 2025-04-07 | End: 2025-04-07

## 2025-04-07 RX ORDER — IOPAMIDOL 755 MG/ML
75 INJECTION, SOLUTION INTRAVASCULAR
Status: COMPLETED | OUTPATIENT
Start: 2025-04-07 | End: 2025-04-07

## 2025-04-07 RX ORDER — METOPROLOL TARTRATE 1 MG/ML
5 INJECTION, SOLUTION INTRAVENOUS ONCE
Status: COMPLETED | OUTPATIENT
Start: 2025-04-07 | End: 2025-04-07

## 2025-04-07 RX ORDER — POTASSIUM CHLORIDE 1500 MG/1
20 TABLET, EXTENDED RELEASE ORAL DAILY
Status: DISCONTINUED | OUTPATIENT
Start: 2025-04-08 | End: 2025-04-07

## 2025-04-07 RX ORDER — DEXTROSE MONOHYDRATE 100 MG/ML
INJECTION, SOLUTION INTRAVENOUS CONTINUOUS PRN
Status: DISCONTINUED | OUTPATIENT
Start: 2025-04-07 | End: 2025-04-15 | Stop reason: HOSPADM

## 2025-04-07 RX ORDER — IPRATROPIUM BROMIDE AND ALBUTEROL SULFATE 2.5; .5 MG/3ML; MG/3ML
1 SOLUTION RESPIRATORY (INHALATION) ONCE
Status: COMPLETED | OUTPATIENT
Start: 2025-04-07 | End: 2025-04-07

## 2025-04-07 RX ORDER — FUROSEMIDE 10 MG/ML
20 INJECTION INTRAMUSCULAR; INTRAVENOUS 2 TIMES DAILY
Status: DISCONTINUED | OUTPATIENT
Start: 2025-04-07 | End: 2025-04-11

## 2025-04-07 RX ORDER — GLUCAGON 1 MG/ML
1 KIT INJECTION PRN
Status: DISCONTINUED | OUTPATIENT
Start: 2025-04-07 | End: 2025-04-15 | Stop reason: HOSPADM

## 2025-04-07 RX ORDER — FUROSEMIDE 10 MG/ML
20 INJECTION INTRAMUSCULAR; INTRAVENOUS ONCE
Status: COMPLETED | OUTPATIENT
Start: 2025-04-07 | End: 2025-04-07

## 2025-04-07 RX ORDER — ATORVASTATIN CALCIUM 40 MG/1
40 TABLET, FILM COATED ORAL DAILY
Status: DISCONTINUED | OUTPATIENT
Start: 2025-04-08 | End: 2025-04-15 | Stop reason: HOSPADM

## 2025-04-07 RX ORDER — METOPROLOL SUCCINATE 100 MG/1
100 TABLET, EXTENDED RELEASE ORAL 2 TIMES DAILY
Status: DISCONTINUED | OUTPATIENT
Start: 2025-04-07 | End: 2025-04-09

## 2025-04-07 RX ADMIN — METOROPROLOL TARTRATE 5 MG: 5 INJECTION, SOLUTION INTRAVENOUS at 12:31

## 2025-04-07 RX ADMIN — IOPAMIDOL 75 ML: 755 INJECTION, SOLUTION INTRAVENOUS at 15:36

## 2025-04-07 RX ADMIN — IPRATROPIUM BROMIDE AND ALBUTEROL SULFATE 1 DOSE: 2.5; .5 SOLUTION RESPIRATORY (INHALATION) at 14:13

## 2025-04-07 RX ADMIN — FUROSEMIDE 20 MG: 10 INJECTION, SOLUTION INTRAMUSCULAR; INTRAVENOUS at 17:07

## 2025-04-07 RX ADMIN — INSULIN LISPRO 2 UNITS: 100 INJECTION, SOLUTION INTRAVENOUS; SUBCUTANEOUS at 21:38

## 2025-04-07 RX ADMIN — METOPROLOL SUCCINATE 100 MG: 100 TABLET, EXTENDED RELEASE ORAL at 21:39

## 2025-04-07 RX ADMIN — SODIUM CHLORIDE 2.5 MG/HR: 900 INJECTION, SOLUTION INTRAVENOUS at 13:54

## 2025-04-07 RX ADMIN — NITROGLYCERIN 0.5 INCH: 20 OINTMENT TOPICAL at 14:07

## 2025-04-07 RX ADMIN — DILTIAZEM HYDROCHLORIDE 10 MG: 5 INJECTION, SOLUTION INTRAVENOUS at 13:50

## 2025-04-07 RX ADMIN — SACUBITRIL AND VALSARTAN 1 TABLET: 49; 51 TABLET, FILM COATED ORAL at 22:53

## 2025-04-07 RX ADMIN — GLIPIZIDE 2.5 MG: 5 TABLET ORAL at 22:53

## 2025-04-07 RX ADMIN — FUROSEMIDE 20 MG: 10 INJECTION, SOLUTION INTRAMUSCULAR; INTRAVENOUS at 21:39

## 2025-04-07 ASSESSMENT — PAIN SCALES - GENERAL
PAINLEVEL_OUTOF10: 0
PAINLEVEL_OUTOF10: 0

## 2025-04-07 NOTE — ED NOTES
Patient refused to get resp. Panel done, stated  \"your not going to jab that thing up my nose\", this nurse tried to explain to patient that the swab just needs to go around the inside of his nose and patient still refused.

## 2025-04-07 NOTE — ED NOTES
Patient attempted to get out of bed without using call light and pulled IV out that had cardizem running. Patient had diarrhea, pajama pants and gown provided for patient.

## 2025-04-07 NOTE — ED PROVIDER NOTES
[facility] EMERGENCY DEPARTMENT  EMERGENCY DEPARTMENT ENCOUNTER        Pt Name: Rober Hartley  MRN: [id number]  Birthdate 1960  Date of evaluation: 4/7/2025  Provider: Kasey Hall DO  PCP: Victor Hugo Hampton MD  Note Started: 11:29 AM EDT 4/7/25    CHIEF COMPLAINT       Chief Complaint   Patient presents with    Irregular Heart Beat     AFIB RVR. AFIB since '08    Shortness of Breath     89% on room air per primary care office       HISTORY OF PRESENT ILLNESS: 1 or more Elements   History From: Patient    Limitations to history : None  Social Determinants : None    Rober Hartley is a 64 y.o. male who presents for shortness of breath.  Patient states that he started feeling short of breath yesterday.  Shortness of breath is worse with exertion.  He denies any chest pain.  He was seen in office visit today and was in A-fib with RVR.  Patient was 89% on room air.  He does not wear oxygen normally.  He does have history of A-fib and is on Eliquis.  Denies any increase swelling in his legs.  Denies any cough or congestion.  Denies any fever, chills, n/v, headache, dizziness, vision changes, neck tenderness or stiffness, weakness, palpitations, abd pain, dysuria, hematuria, diarrhea, constipation, bloody stools.    Nursing Notes were all reviewed and agreed with or any disagreements were addressed in the HPI.    ROS:   Pertinent positives and negatives are stated within HPI, all other systems reviewed and are negative.      --------------------------------------------- PAST HISTORY ---------------------------------------------  Past Medical History:  has a past medical history of CHF (congestive heart failure) (HCC), CKD (chronic kidney disease), DM2 (diabetes mellitus, type 2) (HCC), DVT (deep venous thrombosis) (HCC), HTN (hypertension), benign, Nonischemic cardiomyopathy (HCC), and PE (pulmonary embolism).    Past Surgical History:  has no past surgical history on

## 2025-04-08 LAB
ANION GAP SERPL CALCULATED.3IONS-SCNC: 14 MMOL/L (ref 7–16)
ANION GAP SERPL CALCULATED.3IONS-SCNC: 14 MMOL/L (ref 7–16)
ANION GAP SERPL CALCULATED.3IONS-SCNC: 15 MMOL/L (ref 7–16)
BUN SERPL-MCNC: 44 MG/DL (ref 6–23)
BUN SERPL-MCNC: 45 MG/DL (ref 6–23)
BUN SERPL-MCNC: 45 MG/DL (ref 6–23)
CALCIUM SERPL-MCNC: 10.1 MG/DL (ref 8.6–10.2)
CALCIUM SERPL-MCNC: 9.6 MG/DL (ref 8.6–10.2)
CALCIUM SERPL-MCNC: 9.6 MG/DL (ref 8.6–10.2)
CHLORIDE SERPL-SCNC: 101 MMOL/L (ref 98–107)
CHLORIDE SERPL-SCNC: 104 MMOL/L (ref 98–107)
CHLORIDE SERPL-SCNC: 105 MMOL/L (ref 98–107)
CO2 SERPL-SCNC: 20 MMOL/L (ref 22–29)
CO2 SERPL-SCNC: 21 MMOL/L (ref 22–29)
CO2 SERPL-SCNC: 21 MMOL/L (ref 22–29)
CREAT SERPL-MCNC: 2.2 MG/DL (ref 0.7–1.2)
CREAT SERPL-MCNC: 2.3 MG/DL (ref 0.7–1.2)
CREAT SERPL-MCNC: 2.5 MG/DL (ref 0.7–1.2)
CREAT UR-MCNC: 143.3 MG/DL (ref 40–278)
CREAT UR-MCNC: 144.6 MG/DL (ref 40–278)
EKG ATRIAL RATE: 127 BPM
EKG Q-T INTERVAL: 284 MS
EKG QRS DURATION: 88 MS
EKG QTC CALCULATION (BAZETT): 399 MS
EKG R AXIS: 124 DEGREES
EKG T AXIS: -117 DEGREES
EKG VENTRICULAR RATE: 119 BPM
ERYTHROCYTE [DISTWIDTH] IN BLOOD BY AUTOMATED COUNT: 15.2 % (ref 11.5–15)
GFR, ESTIMATED: 27 ML/MIN/1.73M2
GFR, ESTIMATED: 30 ML/MIN/1.73M2
GFR, ESTIMATED: 33 ML/MIN/1.73M2
GLUCOSE BLD-MCNC: 133 MG/DL (ref 74–99)
GLUCOSE BLD-MCNC: 139 MG/DL (ref 74–99)
GLUCOSE BLD-MCNC: 143 MG/DL (ref 74–99)
GLUCOSE BLD-MCNC: 144 MG/DL (ref 74–99)
GLUCOSE BLD-MCNC: 163 MG/DL (ref 74–99)
GLUCOSE BLD-MCNC: 251 MG/DL (ref 74–99)
GLUCOSE SERPL-MCNC: 110 MG/DL (ref 74–99)
GLUCOSE SERPL-MCNC: 134 MG/DL (ref 74–99)
GLUCOSE SERPL-MCNC: 139 MG/DL (ref 74–99)
HCT VFR BLD AUTO: 40.7 % (ref 37–54)
HGB BLD-MCNC: 13.2 G/DL (ref 12.5–16.5)
INR PPP: 2.4
MAGNESIUM SERPL-MCNC: 2.3 MG/DL (ref 1.6–2.6)
MCH RBC QN AUTO: 29.5 PG (ref 26–35)
MCHC RBC AUTO-ENTMCNC: 32.4 G/DL (ref 32–34.5)
MCV RBC AUTO: 90.8 FL (ref 80–99.9)
PLATELET, FLUORESCENCE: 120 K/UL (ref 130–450)
PMV BLD AUTO: 13.4 FL (ref 7–12)
POTASSIUM SERPL-SCNC: 4.6 MMOL/L (ref 3.5–5)
POTASSIUM SERPL-SCNC: 5.6 MMOL/L (ref 3.5–5)
POTASSIUM SERPL-SCNC: 5.9 MMOL/L (ref 3.5–5)
POTASSIUM SERPL-SCNC: 6.5 MMOL/L (ref 3.5–5)
PROCALCITONIN SERPL-MCNC: 1.95 NG/ML (ref 0–0.08)
PROTHROMBIN TIME: 27.1 SEC (ref 9.3–12.4)
RBC # BLD AUTO: 4.48 M/UL (ref 3.8–5.8)
SODIUM SERPL-SCNC: 136 MMOL/L (ref 132–146)
SODIUM SERPL-SCNC: 139 MMOL/L (ref 132–146)
SODIUM SERPL-SCNC: 140 MMOL/L (ref 132–146)
SODIUM UR-SCNC: <20 MMOL/L
TOTAL PROTEIN, URINE: 50 MG/DL (ref 0–12)
URINE TOTAL PROTEIN CREATININE RATIO: 0.35 (ref 0–0.2)
UUN UR-MCNC: 1114 MG/DL (ref 800–1666)
WBC OTHER # BLD: 16.7 K/UL (ref 4.5–11.5)

## 2025-04-08 PROCEDURE — 84300 ASSAY OF URINE SODIUM: CPT

## 2025-04-08 PROCEDURE — 84145 PROCALCITONIN (PCT): CPT

## 2025-04-08 PROCEDURE — 6370000000 HC RX 637 (ALT 250 FOR IP): Performed by: INTERNAL MEDICINE

## 2025-04-08 PROCEDURE — 80048 BASIC METABOLIC PNL TOTAL CA: CPT

## 2025-04-08 PROCEDURE — 85610 PROTHROMBIN TIME: CPT

## 2025-04-08 PROCEDURE — 2580000003 HC RX 258: Performed by: INTERNAL MEDICINE

## 2025-04-08 PROCEDURE — 84156 ASSAY OF PROTEIN URINE: CPT

## 2025-04-08 PROCEDURE — APPSS60 APP SPLIT SHARED TIME 46-60 MINUTES

## 2025-04-08 PROCEDURE — 83735 ASSAY OF MAGNESIUM: CPT

## 2025-04-08 PROCEDURE — 2500000003 HC RX 250 WO HCPCS: Performed by: INTERNAL MEDICINE

## 2025-04-08 PROCEDURE — 85027 COMPLETE CBC AUTOMATED: CPT

## 2025-04-08 PROCEDURE — 99223 1ST HOSP IP/OBS HIGH 75: CPT | Performed by: INTERNAL MEDICINE

## 2025-04-08 PROCEDURE — 6370000000 HC RX 637 (ALT 250 FOR IP)

## 2025-04-08 PROCEDURE — 84540 ASSAY OF URINE/UREA-N: CPT

## 2025-04-08 PROCEDURE — 6360000002 HC RX W HCPCS: Performed by: INTERNAL MEDICINE

## 2025-04-08 PROCEDURE — 93010 ELECTROCARDIOGRAM REPORT: CPT | Performed by: INTERNAL MEDICINE

## 2025-04-08 PROCEDURE — 2140000000 HC CCU INTERMEDIATE R&B

## 2025-04-08 PROCEDURE — 84132 ASSAY OF SERUM POTASSIUM: CPT

## 2025-04-08 PROCEDURE — 82962 GLUCOSE BLOOD TEST: CPT

## 2025-04-08 PROCEDURE — 82570 ASSAY OF URINE CREATININE: CPT

## 2025-04-08 PROCEDURE — 6360000002 HC RX W HCPCS

## 2025-04-08 PROCEDURE — 36415 COLL VENOUS BLD VENIPUNCTURE: CPT

## 2025-04-08 PROCEDURE — 2500000003 HC RX 250 WO HCPCS

## 2025-04-08 RX ORDER — INDOMETHACIN 25 MG/1
50 CAPSULE ORAL ONCE
Status: COMPLETED | OUTPATIENT
Start: 2025-04-08 | End: 2025-04-08

## 2025-04-08 RX ORDER — WARFARIN SODIUM 5 MG/1
5 TABLET ORAL
Status: COMPLETED | OUTPATIENT
Start: 2025-04-08 | End: 2025-04-08

## 2025-04-08 RX ORDER — DEXTROSE MONOHYDRATE 25 G/50ML
25 INJECTION, SOLUTION INTRAVENOUS ONCE
Status: COMPLETED | OUTPATIENT
Start: 2025-04-08 | End: 2025-04-08

## 2025-04-08 RX ORDER — CALCIUM GLUCONATE 20 MG/ML
1000 INJECTION, SOLUTION INTRAVENOUS ONCE
Status: COMPLETED | OUTPATIENT
Start: 2025-04-08 | End: 2025-04-08

## 2025-04-08 RX ADMIN — SODIUM BICARBONATE 50 MEQ: 84 INJECTION INTRAVENOUS at 15:53

## 2025-04-08 RX ADMIN — CALCIUM GLUCONATE 1000 MG: 20 INJECTION, SOLUTION INTRAVENOUS at 17:02

## 2025-04-08 RX ADMIN — SODIUM ZIRCONIUM CYCLOSILICATE 10 G: 10 POWDER, FOR SUSPENSION ORAL at 09:19

## 2025-04-08 RX ADMIN — METOPROLOL SUCCINATE 100 MG: 100 TABLET, EXTENDED RELEASE ORAL at 09:13

## 2025-04-08 RX ADMIN — GLIPIZIDE 2.5 MG: 5 TABLET ORAL at 09:13

## 2025-04-08 RX ADMIN — ATORVASTATIN CALCIUM 40 MG: 40 TABLET, FILM COATED ORAL at 09:14

## 2025-04-08 RX ADMIN — INSULIN HUMAN 5 UNITS: 100 INJECTION, SOLUTION PARENTERAL at 15:53

## 2025-04-08 RX ADMIN — WARFARIN SODIUM 5 MG: 5 TABLET ORAL at 16:55

## 2025-04-08 RX ADMIN — SODIUM ZIRCONIUM CYCLOSILICATE 10 G: 10 POWDER, FOR SUSPENSION ORAL at 20:55

## 2025-04-08 RX ADMIN — FUROSEMIDE 20 MG: 10 INJECTION, SOLUTION INTRAMUSCULAR; INTRAVENOUS at 16:55

## 2025-04-08 RX ADMIN — SODIUM CHLORIDE 5 MG/HR: 900 INJECTION, SOLUTION INTRAVENOUS at 09:17

## 2025-04-08 RX ADMIN — METOPROLOL SUCCINATE 100 MG: 100 TABLET, EXTENDED RELEASE ORAL at 20:56

## 2025-04-08 RX ADMIN — DEXTROSE MONOHYDRATE 25 G: 25 INJECTION, SOLUTION INTRAVENOUS at 15:48

## 2025-04-08 ASSESSMENT — PAIN SCALES - GENERAL: PAINLEVEL_OUTOF10: 0

## 2025-04-08 NOTE — PLAN OF CARE
Problem: ABCDS Injury Assessment  Goal: Absence of physical injury  Outcome: Progressing     Problem: Safety - Adult  Goal: Free from fall injury  Outcome: Progressing     Problem: Discharge Planning  Goal: Discharge to home or other facility with appropriate resources  Outcome: Progressing     Problem: Chronic Conditions and Co-morbidities  Goal: Patient's chronic conditions and co-morbidity symptoms are monitored and maintained or improved  Outcome: Progressing

## 2025-04-08 NOTE — PLAN OF CARE
Problem: Chronic Conditions and Co-morbidities  Goal: Patient's chronic conditions and co-morbidity symptoms are monitored and maintained or improved  4/8/2025 0952 by Kitty Infante RN  Outcome: Progressing     Problem: Discharge Planning  Goal: Discharge to home or other facility with appropriate resources  4/8/2025 0952 by Kitty Infante RN  Outcome: Progressing     Problem: Safety - Adult  Goal: Free from fall injury  4/8/2025 0952 by Kitty Infante RN  Outcome: Progressing     Problem: ABCDS Injury Assessment  Goal: Absence of physical injury  4/8/2025 0952 by Kitty Infante RN  Outcome: Progressing

## 2025-04-08 NOTE — H&P
[facility]  History and Physical      CHIEF COMPLAINT: Irregular heartbeat    Reason for Admission: Atrial fibrillation with rapid ventricular response, hypoxia    History Obtained From: Patient    PCP :  Victor Hugo Hampton MD    [address] [facility] [address]      HISTORY OF PRESENT ILLNESS:      The patient is a 64 y.o. male sent in by primary care physician when he went to see his PCP for shortness of breath.  In the emergency room patient was noted to be in atrial fibrillation with rapid response also hypoxic.  Patient does not use oxygen.  He has underlying history of HFrEF.  Patient was admitted for further evaluation and treatment.  He also has underlying history of chronic kidney disease.  Patient feels better since admission.    Past Medical History:        Diagnosis Date    CHF (congestive heart failure) ([facility])     CKD (chronic kidney disease)     DM2 (diabetes mellitus, type 2) ([facility])     DVT (deep venous thrombosis) ([facility]) 01/01/2008    HTN (hypertension), benign     Nonischemic cardiomyopathy ([facility]) 01/01/2008    PE (pulmonary embolism) 01/01/2008     Past Surgical History:    No past surgical history on file.      Medications Prior to Admission:    Medications Prior to Admission: warfarin (COUMADIN) 5 MG tablet, Take 1 tablet by mouth daily  warfarin (COUMADIN) 5 MG tablet, Take 5 mg (1 pill) daily, take and extra 2.5 mg (1/2 pill) as directed by your primary physician based on your INR results  metoprolol succinate (TOPROL XL) 100 MG extended release tablet, Take 1 tablet by mouth 2 times daily  potassium chloride (KLOR-CON M) 20 MEQ extended release tablet, Take 1 tablet by mouth daily  sacubitril-valsartan (ENTRESTO) 49-51 MG per tablet, Take 1 tablet by mouth 2 times daily  atorvastatin (LIPITOR) 40 MG tablet, TAKE 1 TABLET BY MOUTH IN THE MORNING  Ferrous Sulfate (IRON) 325 (65 Fe) MG TABS, Take 325 mg by mouth daily  metFORMIN (GLUCOPHAGE) 1000 MG tablet,

## 2025-04-08 NOTE — PATIENT CARE CONFERENCE
P Quality Flow/Interdisciplinary Rounds Progress Note        Quality Flow Rounds held on April 8, 2025    Disciplines Attending:  Bedside Nurse, , , and Nursing Unit Leadership    Rober Hartley was admitted on 4/7/2025 11:35 AM    Anticipated Discharge Date:       Disposition:    Rahul Score:  Rahul Scale Score: 20    [facility] RISK OF UNPLANNED READMISSION 2.0             18.4 Total Score        Discussed patient goal for the day, patient clinical progression, and barriers to discharge.  The following Goal(s) of the Report labs/diagnostics Day/Commitment(s) have been identified:        Torie Alfred RN  April 8, 2025

## 2025-04-08 NOTE — FLOWSHEET NOTE
Patient arrived to the unit with the following belongings:     04/07/25 7290   Belongings   Dental Appliances None   Vision - Corrective Lenses Eyeglasses   Hearing Aid None   Clothing Pants;Footwear;Shirt;Undergarments;Socks;At bedside;Jacket/Coat;Hat  (2 shirts)   Jewelry Watch   Electronic Devices Cell Phone   Weapons (Notify Protective Services/Security) None   Home Medications None   Valuables Given To Patient   Provide Name(s) of Who Valuable(s) Were Given To Rober Hartley

## 2025-04-09 ENCOUNTER — APPOINTMENT (OUTPATIENT)
Dept: ULTRASOUND IMAGING | Age: 65
DRG: 308 | End: 2025-04-09
Payer: MEDICARE

## 2025-04-09 LAB
ALBUMIN SERPL-MCNC: 3.5 G/DL (ref 3.5–5.2)
ALP SERPL-CCNC: 139 U/L (ref 40–129)
ALT SERPL-CCNC: 35 U/L (ref 0–40)
ANION GAP SERPL CALCULATED.3IONS-SCNC: 13 MMOL/L (ref 7–16)
ANION GAP SERPL CALCULATED.3IONS-SCNC: 15 MMOL/L (ref 7–16)
AST SERPL-CCNC: 19 U/L (ref 0–39)
BILIRUB SERPL-MCNC: 1.5 MG/DL (ref 0–1.2)
BUN SERPL-MCNC: 43 MG/DL (ref 6–23)
BUN SERPL-MCNC: 45 MG/DL (ref 6–23)
CALCIUM SERPL-MCNC: 9.1 MG/DL (ref 8.6–10.2)
CALCIUM SERPL-MCNC: 9.2 MG/DL (ref 8.6–10.2)
CHLORIDE SERPL-SCNC: 102 MMOL/L (ref 98–107)
CHLORIDE SERPL-SCNC: 106 MMOL/L (ref 98–107)
CO2 SERPL-SCNC: 20 MMOL/L (ref 22–29)
CO2 SERPL-SCNC: 23 MMOL/L (ref 22–29)
CREAT SERPL-MCNC: 2.1 MG/DL (ref 0.7–1.2)
CREAT SERPL-MCNC: 2.3 MG/DL (ref 0.7–1.2)
ERYTHROCYTE [DISTWIDTH] IN BLOOD BY AUTOMATED COUNT: 15.1 % (ref 11.5–15)
GFR, ESTIMATED: 31 ML/MIN/1.73M2
GFR, ESTIMATED: 34 ML/MIN/1.73M2
GLUCOSE BLD-MCNC: 109 MG/DL (ref 74–99)
GLUCOSE BLD-MCNC: 132 MG/DL (ref 74–99)
GLUCOSE BLD-MCNC: 143 MG/DL (ref 74–99)
GLUCOSE BLD-MCNC: 156 MG/DL (ref 74–99)
GLUCOSE SERPL-MCNC: 91 MG/DL (ref 74–99)
GLUCOSE SERPL-MCNC: 97 MG/DL (ref 74–99)
HCT VFR BLD AUTO: 34.7 % (ref 37–54)
HGB BLD-MCNC: 11.5 G/DL (ref 12.5–16.5)
INR PPP: 2
MAGNESIUM SERPL-MCNC: 2.3 MG/DL (ref 1.6–2.6)
MCH RBC QN AUTO: 29.3 PG (ref 26–35)
MCHC RBC AUTO-ENTMCNC: 33.1 G/DL (ref 32–34.5)
MCV RBC AUTO: 88.5 FL (ref 80–99.9)
PHOSPHATE SERPL-MCNC: 3.4 MG/DL (ref 2.5–4.5)
PLATELET # BLD AUTO: 89 K/UL (ref 130–450)
PLATELET CONFIRMATION: NORMAL
PMV BLD AUTO: 12.9 FL (ref 7–12)
POTASSIUM SERPL-SCNC: 4.1 MMOL/L (ref 3.5–5)
POTASSIUM SERPL-SCNC: 4.5 MMOL/L (ref 3.5–5)
PROT SERPL-MCNC: 6.5 G/DL (ref 6.4–8.3)
PROTHROMBIN TIME: 22.3 SEC (ref 9.3–12.4)
RBC # BLD AUTO: 3.92 M/UL (ref 3.8–5.8)
SODIUM SERPL-SCNC: 138 MMOL/L (ref 132–146)
SODIUM SERPL-SCNC: 141 MMOL/L (ref 132–146)
WBC OTHER # BLD: 12.8 K/UL (ref 4.5–11.5)

## 2025-04-09 PROCEDURE — 76770 US EXAM ABDO BACK WALL COMP: CPT

## 2025-04-09 PROCEDURE — 80053 COMPREHEN METABOLIC PANEL: CPT

## 2025-04-09 PROCEDURE — 85027 COMPLETE CBC AUTOMATED: CPT

## 2025-04-09 PROCEDURE — 2580000003 HC RX 258: Performed by: INTERNAL MEDICINE

## 2025-04-09 PROCEDURE — 80048 BASIC METABOLIC PNL TOTAL CA: CPT

## 2025-04-09 PROCEDURE — 84100 ASSAY OF PHOSPHORUS: CPT

## 2025-04-09 PROCEDURE — 6370000000 HC RX 637 (ALT 250 FOR IP): Performed by: INTERNAL MEDICINE

## 2025-04-09 PROCEDURE — 36415 COLL VENOUS BLD VENIPUNCTURE: CPT

## 2025-04-09 PROCEDURE — 85610 PROTHROMBIN TIME: CPT

## 2025-04-09 PROCEDURE — 82962 GLUCOSE BLOOD TEST: CPT

## 2025-04-09 PROCEDURE — 2500000003 HC RX 250 WO HCPCS: Performed by: INTERNAL MEDICINE

## 2025-04-09 PROCEDURE — 2140000000 HC CCU INTERMEDIATE R&B

## 2025-04-09 PROCEDURE — 6360000002 HC RX W HCPCS: Performed by: INTERNAL MEDICINE

## 2025-04-09 PROCEDURE — 99233 SBSQ HOSP IP/OBS HIGH 50: CPT | Performed by: INTERNAL MEDICINE

## 2025-04-09 PROCEDURE — 83735 ASSAY OF MAGNESIUM: CPT

## 2025-04-09 RX ORDER — WARFARIN SODIUM 5 MG/1
5 TABLET ORAL
Status: COMPLETED | OUTPATIENT
Start: 2025-04-09 | End: 2025-04-09

## 2025-04-09 RX ADMIN — SODIUM CHLORIDE 5 MG/HR: 900 INJECTION, SOLUTION INTRAVENOUS at 05:29

## 2025-04-09 RX ADMIN — FUROSEMIDE 20 MG: 10 INJECTION, SOLUTION INTRAMUSCULAR; INTRAVENOUS at 08:14

## 2025-04-09 RX ADMIN — ATORVASTATIN CALCIUM 40 MG: 40 TABLET, FILM COATED ORAL at 08:14

## 2025-04-09 RX ADMIN — FUROSEMIDE 20 MG: 10 INJECTION, SOLUTION INTRAMUSCULAR; INTRAVENOUS at 17:48

## 2025-04-09 RX ADMIN — METOPROLOL SUCCINATE 150 MG: 50 TABLET, EXTENDED RELEASE ORAL at 21:14

## 2025-04-09 RX ADMIN — GLIPIZIDE 2.5 MG: 5 TABLET ORAL at 08:14

## 2025-04-09 RX ADMIN — WARFARIN SODIUM 5 MG: 5 TABLET ORAL at 17:48

## 2025-04-09 RX ADMIN — METOPROLOL SUCCINATE 150 MG: 50 TABLET, EXTENDED RELEASE ORAL at 08:14

## 2025-04-09 NOTE — PATIENT CARE CONFERENCE
P Quality Flow/Interdisciplinary Rounds Progress Note        Quality Flow Rounds held on April 9, 2025    Disciplines Attending:  Bedside Nurse, , , and Nursing Unit Leadership    Rober Hartley was admitted on 4/7/2025 11:35 AM    Anticipated Discharge Date:       Disposition:    Rahul Score:  Rahul Scale Score: 20    [facility] RISK OF UNPLANNED READMISSION 2.0             19.8 Total Score        Discussed patient goal for the day, patient clinical progression, and barriers to discharge.  The following Goal(s) of the Day/Commitment(s) have been identified:  Labs - Report Results      Miryam Elias RN  April 9, 2025

## 2025-04-10 ENCOUNTER — APPOINTMENT (OUTPATIENT)
Dept: GENERAL RADIOLOGY | Age: 65
DRG: 308 | End: 2025-04-10
Payer: MEDICARE

## 2025-04-10 LAB
ALBUMIN SERPL-MCNC: 3.7 G/DL (ref 3.5–5.2)
ALP SERPL-CCNC: 124 U/L (ref 40–129)
ALT SERPL-CCNC: 43 U/L (ref 0–40)
ANION GAP SERPL CALCULATED.3IONS-SCNC: 16 MMOL/L (ref 7–16)
AST SERPL-CCNC: 29 U/L (ref 0–39)
BILIRUB SERPL-MCNC: 1.4 MG/DL (ref 0–1.2)
BUN SERPL-MCNC: 45 MG/DL (ref 6–23)
CALCIUM SERPL-MCNC: 8.8 MG/DL (ref 8.6–10.2)
CHLORIDE SERPL-SCNC: 104 MMOL/L (ref 98–107)
CO2 SERPL-SCNC: 20 MMOL/L (ref 22–29)
CREAT SERPL-MCNC: 1.8 MG/DL (ref 0.7–1.2)
ERYTHROCYTE [DISTWIDTH] IN BLOOD BY AUTOMATED COUNT: 15 % (ref 11.5–15)
GFR, ESTIMATED: 42 ML/MIN/1.73M2
GLUCOSE BLD-MCNC: 135 MG/DL (ref 74–99)
GLUCOSE BLD-MCNC: 152 MG/DL (ref 74–99)
GLUCOSE BLD-MCNC: 159 MG/DL (ref 74–99)
GLUCOSE BLD-MCNC: 297 MG/DL (ref 74–99)
GLUCOSE SERPL-MCNC: 109 MG/DL (ref 74–99)
HCT VFR BLD AUTO: 38.3 % (ref 37–54)
HGB BLD-MCNC: 12.6 G/DL (ref 12.5–16.5)
INR PPP: 1.7
MAGNESIUM SERPL-MCNC: 2.2 MG/DL (ref 1.6–2.6)
MCH RBC QN AUTO: 29.4 PG (ref 26–35)
MCHC RBC AUTO-ENTMCNC: 32.9 G/DL (ref 32–34.5)
MCV RBC AUTO: 89.5 FL (ref 80–99.9)
PHOSPHATE SERPL-MCNC: 3.3 MG/DL (ref 2.5–4.5)
PLATELET # BLD AUTO: 92 K/UL (ref 130–450)
PLATELET CONFIRMATION: NORMAL
PMV BLD AUTO: 12.9 FL (ref 7–12)
POTASSIUM SERPL-SCNC: 3.8 MMOL/L (ref 3.5–5)
PROT SERPL-MCNC: 6.7 G/DL (ref 6.4–8.3)
PROTHROMBIN TIME: 19.3 SEC (ref 9.3–12.4)
RBC # BLD AUTO: 4.28 M/UL (ref 3.8–5.8)
SODIUM SERPL-SCNC: 140 MMOL/L (ref 132–146)
WBC OTHER # BLD: 10.9 K/UL (ref 4.5–11.5)

## 2025-04-10 PROCEDURE — 85610 PROTHROMBIN TIME: CPT

## 2025-04-10 PROCEDURE — 6360000002 HC RX W HCPCS: Performed by: INTERNAL MEDICINE

## 2025-04-10 PROCEDURE — 2140000000 HC CCU INTERMEDIATE R&B

## 2025-04-10 PROCEDURE — 83735 ASSAY OF MAGNESIUM: CPT

## 2025-04-10 PROCEDURE — 6370000000 HC RX 637 (ALT 250 FOR IP): Performed by: INTERNAL MEDICINE

## 2025-04-10 PROCEDURE — 36415 COLL VENOUS BLD VENIPUNCTURE: CPT

## 2025-04-10 PROCEDURE — 2500000003 HC RX 250 WO HCPCS: Performed by: INTERNAL MEDICINE

## 2025-04-10 PROCEDURE — 71045 X-RAY EXAM CHEST 1 VIEW: CPT

## 2025-04-10 PROCEDURE — 99233 SBSQ HOSP IP/OBS HIGH 50: CPT | Performed by: INTERNAL MEDICINE

## 2025-04-10 PROCEDURE — 82962 GLUCOSE BLOOD TEST: CPT

## 2025-04-10 PROCEDURE — 84100 ASSAY OF PHOSPHORUS: CPT

## 2025-04-10 PROCEDURE — 80053 COMPREHEN METABOLIC PANEL: CPT

## 2025-04-10 PROCEDURE — 2580000003 HC RX 258: Performed by: INTERNAL MEDICINE

## 2025-04-10 PROCEDURE — 85027 COMPLETE CBC AUTOMATED: CPT

## 2025-04-10 RX ORDER — BUMETANIDE 0.25 MG/ML
2 INJECTION, SOLUTION INTRAMUSCULAR; INTRAVENOUS ONCE
Status: COMPLETED | OUTPATIENT
Start: 2025-04-10 | End: 2025-04-10

## 2025-04-10 RX ORDER — ACETAMINOPHEN 325 MG/1
650 TABLET ORAL EVERY 6 HOURS PRN
Status: DISCONTINUED | OUTPATIENT
Start: 2025-04-10 | End: 2025-04-15 | Stop reason: HOSPADM

## 2025-04-10 RX ORDER — WARFARIN SODIUM 5 MG/1
5 TABLET ORAL
Status: COMPLETED | OUTPATIENT
Start: 2025-04-10 | End: 2025-04-10

## 2025-04-10 RX ADMIN — METOPROLOL SUCCINATE 150 MG: 50 TABLET, EXTENDED RELEASE ORAL at 07:57

## 2025-04-10 RX ADMIN — FUROSEMIDE 20 MG: 10 INJECTION, SOLUTION INTRAMUSCULAR; INTRAVENOUS at 07:57

## 2025-04-10 RX ADMIN — SODIUM CHLORIDE 2.5 MG/HR: 900 INJECTION, SOLUTION INTRAVENOUS at 14:33

## 2025-04-10 RX ADMIN — ACETAMINOPHEN 650 MG: 325 TABLET ORAL at 10:46

## 2025-04-10 RX ADMIN — INSULIN LISPRO 4 UNITS: 100 INJECTION, SOLUTION INTRAVENOUS; SUBCUTANEOUS at 20:14

## 2025-04-10 RX ADMIN — METOPROLOL SUCCINATE 150 MG: 50 TABLET, EXTENDED RELEASE ORAL at 20:14

## 2025-04-10 RX ADMIN — WARFARIN SODIUM 5 MG: 5 TABLET ORAL at 17:55

## 2025-04-10 RX ADMIN — BUMETANIDE 2 MG: 0.25 INJECTION INTRAMUSCULAR; INTRAVENOUS at 12:00

## 2025-04-10 RX ADMIN — ACETAMINOPHEN 650 MG: 325 TABLET ORAL at 16:53

## 2025-04-10 RX ADMIN — ATORVASTATIN CALCIUM 40 MG: 40 TABLET, FILM COATED ORAL at 07:57

## 2025-04-10 RX ADMIN — GLIPIZIDE 2.5 MG: 5 TABLET ORAL at 07:57

## 2025-04-10 ASSESSMENT — PAIN DESCRIPTION - DESCRIPTORS
DESCRIPTORS: ACHING;DISCOMFORT;SORE
DESCRIPTORS: ACHING;DISCOMFORT;SORE

## 2025-04-10 ASSESSMENT — PAIN DESCRIPTION - LOCATION
LOCATION: HAND
LOCATION: HAND

## 2025-04-10 ASSESSMENT — PAIN DESCRIPTION - ORIENTATION
ORIENTATION: RIGHT
ORIENTATION: RIGHT

## 2025-04-10 ASSESSMENT — PAIN SCALES - GENERAL
PAINLEVEL_OUTOF10: 5
PAINLEVEL_OUTOF10: 6
PAINLEVEL_OUTOF10: 8
PAINLEVEL_OUTOF10: 4
PAINLEVEL_OUTOF10: 0
PAINLEVEL_OUTOF10: 0

## 2025-04-10 ASSESSMENT — PAIN - FUNCTIONAL ASSESSMENT: PAIN_FUNCTIONAL_ASSESSMENT: ACTIVITIES ARE NOT PREVENTED

## 2025-04-10 NOTE — NURSE NAVIGATOR
Patient's chart updated to reflect:      .    - HF care plan, HF education points and HF discharge instructions.  -Orders: 2 gram sodium diet, daily weights, I/O.  -PCP or cardiology follow up appointments to be scheduled within 7 days of hospital discharge.  -CHF education session will be provided to the patient prior to hospital discharge.     Felecia Avila RN, CHFN   Heart Failure Navigator

## 2025-04-10 NOTE — PATIENT CARE CONFERENCE
P Quality Flow/Interdisciplinary Rounds Progress Note        Quality Flow Rounds held on April 10, 2025    Disciplines Attending:  Bedside Nurse, , , and Nursing Unit Leadership    Rober Hartley was admitted on 4/7/2025 11:35 AM    Anticipated Discharge Date:       Disposition:    Rahul Score:  Rahul Scale Score: 19    [facility] RISK OF UNPLANNED READMISSION 2.0             20.5 Total Score        Discussed patient goal for the day, patient clinical progression, and barriers to discharge.  The following Goal(s) of the Day/Commitment(s) have been identified:  Labs - Report Results and work on discharge plan      Miryam Elias RN  April 10, 2025

## 2025-04-11 LAB
ALBUMIN SERPL-MCNC: 3.5 G/DL (ref 3.5–5.2)
ALP SERPL-CCNC: 121 U/L (ref 40–129)
ALT SERPL-CCNC: 41 U/L (ref 0–40)
ANION GAP SERPL CALCULATED.3IONS-SCNC: 15 MMOL/L (ref 7–16)
AST SERPL-CCNC: 20 U/L (ref 0–39)
BILIRUB SERPL-MCNC: 1 MG/DL (ref 0–1.2)
BUN SERPL-MCNC: 40 MG/DL (ref 6–23)
CALCIUM SERPL-MCNC: 8.6 MG/DL (ref 8.6–10.2)
CHLORIDE SERPL-SCNC: 104 MMOL/L (ref 98–107)
CO2 SERPL-SCNC: 20 MMOL/L (ref 22–29)
CREAT SERPL-MCNC: 1.7 MG/DL (ref 0.7–1.2)
ERYTHROCYTE [DISTWIDTH] IN BLOOD BY AUTOMATED COUNT: 15 % (ref 11.5–15)
GFR, ESTIMATED: 46 ML/MIN/1.73M2
GLUCOSE BLD-MCNC: 110 MG/DL (ref 74–99)
GLUCOSE BLD-MCNC: 124 MG/DL (ref 74–99)
GLUCOSE BLD-MCNC: 134 MG/DL (ref 74–99)
GLUCOSE BLD-MCNC: 167 MG/DL (ref 74–99)
GLUCOSE SERPL-MCNC: 108 MG/DL (ref 74–99)
HCT VFR BLD AUTO: 35.1 % (ref 37–54)
HGB BLD-MCNC: 11.6 G/DL (ref 12.5–16.5)
INR PPP: 1.9
MAGNESIUM SERPL-MCNC: 2.1 MG/DL (ref 1.6–2.6)
MCH RBC QN AUTO: 29.5 PG (ref 26–35)
MCHC RBC AUTO-ENTMCNC: 33 G/DL (ref 32–34.5)
MCV RBC AUTO: 89.3 FL (ref 80–99.9)
PHOSPHATE SERPL-MCNC: 3.4 MG/DL (ref 2.5–4.5)
PLATELET # BLD AUTO: 95 K/UL (ref 130–450)
PLATELET CONFIRMATION: NORMAL
PMV BLD AUTO: 12.3 FL (ref 7–12)
POTASSIUM SERPL-SCNC: 3.6 MMOL/L (ref 3.5–5)
PROT SERPL-MCNC: 6.3 G/DL (ref 6.4–8.3)
PROTHROMBIN TIME: 20.8 SEC (ref 9.3–12.4)
RBC # BLD AUTO: 3.93 M/UL (ref 3.8–5.8)
SODIUM SERPL-SCNC: 139 MMOL/L (ref 132–146)
WBC OTHER # BLD: 11 K/UL (ref 4.5–11.5)

## 2025-04-11 PROCEDURE — 6370000000 HC RX 637 (ALT 250 FOR IP)

## 2025-04-11 PROCEDURE — 6370000000 HC RX 637 (ALT 250 FOR IP): Performed by: INTERNAL MEDICINE

## 2025-04-11 PROCEDURE — 83735 ASSAY OF MAGNESIUM: CPT

## 2025-04-11 PROCEDURE — 2140000000 HC CCU INTERMEDIATE R&B

## 2025-04-11 PROCEDURE — 84100 ASSAY OF PHOSPHORUS: CPT

## 2025-04-11 PROCEDURE — 6360000002 HC RX W HCPCS: Performed by: INTERNAL MEDICINE

## 2025-04-11 PROCEDURE — 85610 PROTHROMBIN TIME: CPT

## 2025-04-11 PROCEDURE — 85027 COMPLETE CBC AUTOMATED: CPT

## 2025-04-11 PROCEDURE — 99233 SBSQ HOSP IP/OBS HIGH 50: CPT | Performed by: INTERNAL MEDICINE

## 2025-04-11 PROCEDURE — 82962 GLUCOSE BLOOD TEST: CPT

## 2025-04-11 PROCEDURE — 36415 COLL VENOUS BLD VENIPUNCTURE: CPT

## 2025-04-11 PROCEDURE — 80053 COMPREHEN METABOLIC PANEL: CPT

## 2025-04-11 RX ORDER — DIGOXIN 0.25 MG/ML
250 INJECTION INTRAMUSCULAR; INTRAVENOUS ONCE
Status: COMPLETED | OUTPATIENT
Start: 2025-04-11 | End: 2025-04-11

## 2025-04-11 RX ORDER — LACTULOSE 10 G/15ML
20 SOLUTION ORAL ONCE
Status: COMPLETED | OUTPATIENT
Start: 2025-04-11 | End: 2025-04-11

## 2025-04-11 RX ORDER — BUMETANIDE 1 MG/1
1 TABLET ORAL 2 TIMES DAILY
Status: DISCONTINUED | OUTPATIENT
Start: 2025-04-11 | End: 2025-04-15 | Stop reason: HOSPADM

## 2025-04-11 RX ORDER — SODIUM BICARBONATE 650 MG/1
650 TABLET ORAL 2 TIMES DAILY
Status: DISCONTINUED | OUTPATIENT
Start: 2025-04-11 | End: 2025-04-15 | Stop reason: HOSPADM

## 2025-04-11 RX ORDER — WARFARIN SODIUM 5 MG/1
5 TABLET ORAL
Status: COMPLETED | OUTPATIENT
Start: 2025-04-11 | End: 2025-04-11

## 2025-04-11 RX ORDER — LACTULOSE 10 G/15ML
20 SOLUTION ORAL DAILY
Status: DISCONTINUED | OUTPATIENT
Start: 2025-04-11 | End: 2025-04-11

## 2025-04-11 RX ORDER — BUMETANIDE 0.25 MG/ML
2 INJECTION, SOLUTION INTRAMUSCULAR; INTRAVENOUS ONCE
Status: COMPLETED | OUTPATIENT
Start: 2025-04-11 | End: 2025-04-11

## 2025-04-11 RX ADMIN — SODIUM BICARBONATE 650 MG: 650 TABLET ORAL at 20:31

## 2025-04-11 RX ADMIN — BUMETANIDE 2 MG: 0.25 INJECTION INTRAMUSCULAR; INTRAVENOUS at 11:46

## 2025-04-11 RX ADMIN — SODIUM BICARBONATE 650 MG: 650 TABLET ORAL at 11:53

## 2025-04-11 RX ADMIN — METOPROLOL SUCCINATE 150 MG: 50 TABLET, EXTENDED RELEASE ORAL at 20:31

## 2025-04-11 RX ADMIN — GLIPIZIDE 2.5 MG: 5 TABLET ORAL at 10:23

## 2025-04-11 RX ADMIN — ATORVASTATIN CALCIUM 40 MG: 40 TABLET, FILM COATED ORAL at 11:54

## 2025-04-11 RX ADMIN — LACTULOSE 20 G: 20 SOLUTION ORAL at 19:32

## 2025-04-11 RX ADMIN — WARFARIN SODIUM 5 MG: 5 TABLET ORAL at 18:02

## 2025-04-11 RX ADMIN — BUMETANIDE 1 MG: 1 TABLET ORAL at 18:02

## 2025-04-11 RX ADMIN — DIGOXIN 250 MCG: 0.25 INJECTION INTRAMUSCULAR; INTRAVENOUS at 11:57

## 2025-04-11 RX ADMIN — METOPROLOL SUCCINATE 150 MG: 50 TABLET, EXTENDED RELEASE ORAL at 10:23

## 2025-04-11 ASSESSMENT — PAIN SCALES - GENERAL
PAINLEVEL_OUTOF10: 0
PAINLEVEL_OUTOF10: 0

## 2025-04-11 NOTE — PLAN OF CARE
Problem: Chronic Conditions and Co-morbidities  Goal: Patient's chronic conditions and co-morbidity symptoms are monitored and maintained or improved  4/10/2025 2254 by Kasey Guzman RN  Outcome: Progressing  4/10/2025 0901 by Amina Cornejo RN  Outcome: Progressing     Problem: Discharge Planning  Goal: Discharge to home or other facility with appropriate resources  4/10/2025 2254 by Kasey Guzman RN  Outcome: Progressing  4/10/2025 0901 by Amina Cornejo RN  Outcome: Progressing     Problem: Safety - Adult  Goal: Free from fall injury  4/10/2025 2254 by Kasey Guzman RN  Outcome: Progressing  4/10/2025 0901 by Amina Cornejo RN  Outcome: Progressing     Problem: ABCDS Injury Assessment  Goal: Absence of physical injury  4/10/2025 2254 by Kasey Guzman RN  Outcome: Progressing  4/10/2025 0901 by Amina Cornejo RN  Outcome: Progressing     Problem: Pain  Goal: Verbalizes/displays adequate comfort level or baseline comfort level  Outcome: Progressing

## 2025-04-11 NOTE — CONSULTS
Inpatient Cardiology Consultation      Reason for Consult: CHF    Consulting Physician: Dr Dennis    Requesting Physician:  Austin Nguyen MD    Date of Consultation: 4/8/2025    HISTORY OF PRESENT ILLNESS:   Patient is a 64-year-old male who is known to [facility] cardiology through Dr. Hampton.  She was last seen outpatient 3/28/2025 for follow-up of CHF.  No changes made at that time.    HPI:  Patient was in ER 4/7/2025 for shortness of breath x 1 day worth with exertion.  Patient denies chest pain.  Patient found to be A-fib RVR and was hypoxic at 89%.  Patient initially given Lopressor with no change.  Patient was given DuoNeb and started on Cardizem drip after bolus.  Patient was also given nitroglycerin for pulmonary edema and Lasix.  Patient with worsening LIZ overnight 1.8 > 2.5 with recurrent hyperkalemia.  Nephrology has been consulted.  Lasix IV 20 mg twice daily ordered by primary service.  VS upon arrival 97.8, 18 respirations, 125 pulse, 131/97, 97% on 4 L cannula.  Labs: Potassium 5.3 > 6.5 > 5.6, CO2 20, BUN 44, creatinine 1.8 > 2.5, GFR 27, magnesium 2.3, glucose 139, calcium 10.0, troponin 38 > 33 (3/8: 32), BNP 10,900 (3/8: 7500), albumin 4.0, alk phos 147, WBC 15.9 > 16.7, H&H 13.2/40.7, platelet 120, INR 2.4, viral panel negative  ABG 4/7: pH 7.407, pCO2 25, pO2 76, HCO3 15  CTA pulmonary: No PE.  Diffuse interstitial scarring/fibrosis/edema with patchy infiltrates in the left lower lobe.  May reflect CHF.  Small bilateral pleural effusions.  Cardiomegaly.  Bilateral hilar and subcarinal lymph nodes that are likely reactive  CXR: Cardiomegaly with pulmonary vascular congestion.  Normal development/progression    Upon assessment today 4/8/2025 patient had just gone to bathroom and was walking back to bed.  Patient on 5 L cannula.  Patient quite dyspneic with exertion.  Patient tells me he has had 1 day of shortness of breath especially with exertion.  Also reporting orthopnea, early 
    Niko Lopez M.D.,[facility]  Rodrigo Warren D.O., [place]., [facility]  Monico Strong M.D.  Silvia Cadet M.D.   Luis Carlos Stevens D.O.  Josué Rosales M.D.       Patient:  Rober Hartley 64 y.o. male MRN: [id number]           PULMONARY CONSULTATION    Reason for Consultation: SOB  Referring Physician: Austin Nguyen MD    Communication with the referring physician will be sent via the electronic medical record.    Chief Complaint: Irregular heartbeat     CODE STATUS: FULL CODE    SUBJECTIVE:  HPI:  Rober Hartley is a 64 y.o. male not previously known to our service with a past medical history of CHF, CKD, DM, DVT, PE, HTN, NICM that we are asked to evaluate for shortness of breath.    Rober was admitted to the hospital on 4/7 after presenting with shortness of breath ongoing for 1 to 2 weeks.  He has not been hypoxic or requiring supplemental oxygen.  Cardiology has been following for heart failure with preserved EF, 40 to 45% and nonischemic cardiomyopathy, as well as atrial fibrillation.  He is currently on a Cardizem drip, Toprol XL, and warfarin.  His initial chest x-ray showed some pulmonary congestion and he has been getting Lasix 20 mg IV twice a day.  Repeat chest x-ray today shows some improvement.    Rober is previously from [place].  Back in 2008, he was diagnosed with bilateral DVTs and bilateral PEs.  He was on Eliquis up until approximately 2 to 3 years ago when it became too expensive and he was switched to warfarin.  He was told that he needed lifelong anticoagulation.  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  He has never had formal PFTs done.  He is a former smoker of approximately 23 years of 1-1/2 packs/day.  He had a pulmonary CTA done on 4/7 that shows the potential of interstitial fibrosis or scarring however it is difficult to determine based on the imaging with the contrast.  Rober also does 
[facility]  Nephrology Consult Note    Patient's Name: Rober Hartley    Reason for Consult:  LIZ    Chief Complaint: Shortness of breath  History Obtained From:  patient, past medical records, and EMR    History of Present Illness:    Rober Hartley is a 64 y.o. male with a past medical history of CHF, CKD, diabetes mellitus, and hypertension.  He presented to the ED on 4/7 reportedly for concerns of shortness of breath.  Vital signs on 4/7 include temperature 97.8, respirations 18, pulse 125, /97, and he was 97% SpO2.  Lab data on 4/7 includes potassium 5.3, CO2 19, BUN 31, creatinine 1.8, troponin 33, proBNP 10,977, and WBC 15.9 K.  He had a respiratory panel which was negative.  Chest x-ray from 4/7 showed cardiomegaly with pulmonary vascular congestion.  Pulmonary CTA with contrast on 4/7 showed no evidence of PE, diffuse interstitial scarring/fibrosis/edema with patchy infiltrates in the left lower lobe, findings may reflect CHF, small bilateral pleural effusions.  Cardiology has been consulted to see the patient.  Nephrology has been consulted to see the patient for concerns of LIZ.  He appears to have a baseline serum creatinine of 1.6-2 mg/dL.  At present, patient was seen and examined.  He came in reportedly because he could not breathe.  He denies NSAIDs.  He denies any chest pain or cough.  He denies any nausea, vomiting, or diarrhea.    PMH:    Past Medical History:   Diagnosis Date    CHF (congestive heart failure) ([facility])     CKD (chronic kidney disease)     DM2 (diabetes mellitus, type 2) ([facility])     DVT (deep venous thrombosis) ([facility]) 01/01/2008    HTN (hypertension), benign     Nonischemic cardiomyopathy ([facility]) 01/01/2008    PE (pulmonary embolism) 01/01/2008       No past surgical history on file.    Patient Active Problem List   Diagnosis    History of DVT (deep vein thrombosis)    History of pulmonary embolism    Type 2 diabetes mellitus with obesity ([facility])    Hyperlipidemia    
Each Day  Home Self Management- activity, weight tracking, taking medications as prescribed, meals /diet planning (sodium and fluid restriction), how to read food labels, keeping physician follow ups, smoking cessation, follow the “Heart Failure Zones”      Instructed to call 911 if you have any of the following symptoms:  Struggling to breathe unrelieved with rest  Having chest pain  Confusion or can’t think clearly        Discharge Plan:  Above identified barriers reviewed and needs addressed    Patient/family educated on daily monitoring tools for CHF, made aware of signs and symptoms of worsening HF and to notify provider immediately of change in symptoms.     Heart Failure Home Instructions placed in patient's discharge instructions    Per AHA guidelines patient to be closely monitored following discharge with 7 day follow up appointment    Scheduling with the CHF clinic:    Future Appointments   Date Time Provider Department Center   4/17/2025  9:30 AM [facility] ROOM 3 [facility]   4/25/2025  7:30 AM [facility] ROOM 1 [facility]   4/30/2025 11:15 AM Esther Quezada PA [facility]   10/2/2025  2:00 PM Jovany Hampton, DO [facility]       Patient to go to [facility]. Missed appts d/t r/p hospitalizations. HFimpEF. Dr. Hampton. Difficult with affording [facility]. Suzy Garcia W consulted. Patient recently on IV Lasix however sub optimal response. Changing to IV Bumex. Discussed with patient the need to keep a close eye at discharge d/t changing diuretic therapy.     Agreeable to [employer] HFMS (heart failure monitoring system) which will be mailed to their home as well as close follow ups at CHF clinic.    Patient verbalizes understanding of above.   Greater than 30 minutes was spent educating patient.      Tania Mujica, RN   Heart Failure Navigator

## 2025-04-12 ENCOUNTER — APPOINTMENT (OUTPATIENT)
Dept: ULTRASOUND IMAGING | Age: 65
DRG: 308 | End: 2025-04-12
Payer: MEDICARE

## 2025-04-12 LAB
ALBUMIN SERPL-MCNC: 3.3 G/DL (ref 3.5–5.2)
ALP SERPL-CCNC: 112 U/L (ref 40–129)
ALT SERPL-CCNC: 35 U/L (ref 0–40)
ANION GAP SERPL CALCULATED.3IONS-SCNC: 13 MMOL/L (ref 7–16)
AST SERPL-CCNC: 18 U/L (ref 0–39)
BILIRUB SERPL-MCNC: 1 MG/DL (ref 0–1.2)
BUN SERPL-MCNC: 32 MG/DL (ref 6–23)
CALCIUM SERPL-MCNC: 8.7 MG/DL (ref 8.6–10.2)
CHLORIDE SERPL-SCNC: 105 MMOL/L (ref 98–107)
CO2 SERPL-SCNC: 22 MMOL/L (ref 22–29)
CREAT SERPL-MCNC: 1.9 MG/DL (ref 0.7–1.2)
ERYTHROCYTE [DISTWIDTH] IN BLOOD BY AUTOMATED COUNT: 14.8 % (ref 11.5–15)
GFR, ESTIMATED: 40 ML/MIN/1.73M2
GLUCOSE BLD-MCNC: 109 MG/DL (ref 74–99)
GLUCOSE BLD-MCNC: 160 MG/DL (ref 74–99)
GLUCOSE BLD-MCNC: 169 MG/DL (ref 74–99)
GLUCOSE BLD-MCNC: 210 MG/DL (ref 74–99)
GLUCOSE SERPL-MCNC: 111 MG/DL (ref 74–99)
HCT VFR BLD AUTO: 35.9 % (ref 37–54)
HGB BLD-MCNC: 11.9 G/DL (ref 12.5–16.5)
INR PPP: 2
MAGNESIUM SERPL-MCNC: 2 MG/DL (ref 1.6–2.6)
MCH RBC QN AUTO: 29.3 PG (ref 26–35)
MCHC RBC AUTO-ENTMCNC: 33.1 G/DL (ref 32–34.5)
MCV RBC AUTO: 88.4 FL (ref 80–99.9)
PHOSPHATE SERPL-MCNC: 3.4 MG/DL (ref 2.5–4.5)
PLATELET # BLD AUTO: 90 K/UL (ref 130–450)
PLATELET CONFIRMATION: NORMAL
PMV BLD AUTO: 12.2 FL (ref 7–12)
POTASSIUM SERPL-SCNC: 4 MMOL/L (ref 3.5–5)
PROT SERPL-MCNC: 6.1 G/DL (ref 6.4–8.3)
PROTHROMBIN TIME: 21.7 SEC (ref 9.3–12.4)
RBC # BLD AUTO: 4.06 M/UL (ref 3.8–5.8)
SODIUM SERPL-SCNC: 140 MMOL/L (ref 132–146)
WBC OTHER # BLD: 10.3 K/UL (ref 4.5–11.5)

## 2025-04-12 PROCEDURE — 85027 COMPLETE CBC AUTOMATED: CPT

## 2025-04-12 PROCEDURE — 84100 ASSAY OF PHOSPHORUS: CPT

## 2025-04-12 PROCEDURE — 80053 COMPREHEN METABOLIC PANEL: CPT

## 2025-04-12 PROCEDURE — 83735 ASSAY OF MAGNESIUM: CPT

## 2025-04-12 PROCEDURE — 6370000000 HC RX 637 (ALT 250 FOR IP): Performed by: INTERNAL MEDICINE

## 2025-04-12 PROCEDURE — 82962 GLUCOSE BLOOD TEST: CPT

## 2025-04-12 PROCEDURE — 6370000000 HC RX 637 (ALT 250 FOR IP)

## 2025-04-12 PROCEDURE — 85610 PROTHROMBIN TIME: CPT

## 2025-04-12 PROCEDURE — 36415 COLL VENOUS BLD VENIPUNCTURE: CPT

## 2025-04-12 PROCEDURE — 2140000000 HC CCU INTERMEDIATE R&B

## 2025-04-12 PROCEDURE — 93971 EXTREMITY STUDY: CPT

## 2025-04-12 RX ORDER — WARFARIN SODIUM 5 MG/1
5 TABLET ORAL
Status: COMPLETED | OUTPATIENT
Start: 2025-04-12 | End: 2025-04-12

## 2025-04-12 RX ADMIN — ATORVASTATIN CALCIUM 40 MG: 40 TABLET, FILM COATED ORAL at 09:56

## 2025-04-12 RX ADMIN — BUMETANIDE 1 MG: 1 TABLET ORAL at 17:40

## 2025-04-12 RX ADMIN — GLIPIZIDE 2.5 MG: 5 TABLET ORAL at 09:57

## 2025-04-12 RX ADMIN — BUMETANIDE 1 MG: 1 TABLET ORAL at 09:56

## 2025-04-12 RX ADMIN — FERROUS SULFATE TAB 325 MG (65 MG ELEMENTAL FE) 325 MG: 325 (65 FE) TAB at 09:56

## 2025-04-12 RX ADMIN — INSULIN LISPRO 2 UNITS: 100 INJECTION, SOLUTION INTRAVENOUS; SUBCUTANEOUS at 20:20

## 2025-04-12 RX ADMIN — WARFARIN SODIUM 5 MG: 5 TABLET ORAL at 17:40

## 2025-04-12 RX ADMIN — METOPROLOL SUCCINATE 150 MG: 50 TABLET, EXTENDED RELEASE ORAL at 20:11

## 2025-04-12 RX ADMIN — SODIUM BICARBONATE 650 MG: 650 TABLET ORAL at 09:56

## 2025-04-12 RX ADMIN — SODIUM BICARBONATE 650 MG: 650 TABLET ORAL at 20:11

## 2025-04-12 RX ADMIN — METOPROLOL SUCCINATE 150 MG: 50 TABLET, EXTENDED RELEASE ORAL at 09:56

## 2025-04-12 NOTE — PLAN OF CARE
Problem: Chronic Conditions and Co-morbidities  Goal: Patient's chronic conditions and co-morbidity symptoms are monitored and maintained or improved  4/12/2025 1337 by JAMES Beltran RN  Outcome: Progressing  4/12/2025 0022 by Kemi Felton RN  Outcome: Progressing     Problem: Discharge Planning  Goal: Discharge to home or other facility with appropriate resources  4/12/2025 1337 by JAMES Beltran RN  Outcome: Progressing  4/12/2025 0022 by Kemi Felton RN  Outcome: Progressing     Problem: Safety - Adult  Goal: Free from fall injury  4/12/2025 1337 by JAMES Beltran RN  Outcome: Progressing  4/12/2025 0022 by Kemi Felton RN  Outcome: Progressing     Problem: ABCDS Injury Assessment  Goal: Absence of physical injury  4/12/2025 1337 by JAMES Beltran RN  Outcome: Progressing  4/12/2025 0022 by Kemi Felton RN  Outcome: Progressing     Problem: Pain  Goal: Verbalizes/displays adequate comfort level or baseline comfort level  4/12/2025 1337 by JAMES Beltran RN  Outcome: Progressing  4/12/2025 0022 by Kemi Felton RN  Outcome: Progressing

## 2025-04-12 NOTE — PLAN OF CARE
Problem: Chronic Conditions and Co-morbidities  Goal: Patient's chronic conditions and co-morbidity symptoms are monitored and maintained or improved  4/12/2025 0022 by Kemi Felton RN  Outcome: Progressing     Problem: Discharge Planning  Goal: Discharge to home or other facility with appropriate resources  4/12/2025 0022 by Kemi Felton RN  Outcome: Progressing     Problem: Safety - Adult  Goal: Free from fall injury  4/12/2025 0022 by Kemi Felton RN  Outcome: Progressing     Problem: ABCDS Injury Assessment  Goal: Absence of physical injury  4/12/2025 0022 by Kemi Felton RN  Outcome: Progressing     Problem: Pain  Goal: Verbalizes/displays adequate comfort level or baseline comfort level  4/12/2025 0022 by Kemi Felton RN  Outcome: Progressing

## 2025-04-13 LAB
ALBUMIN SERPL-MCNC: 3.4 G/DL (ref 3.5–5.2)
ALP SERPL-CCNC: 118 U/L (ref 40–129)
ALT SERPL-CCNC: 33 U/L (ref 0–40)
ANION GAP SERPL CALCULATED.3IONS-SCNC: 14 MMOL/L (ref 7–16)
AST SERPL-CCNC: 16 U/L (ref 0–39)
BILIRUB SERPL-MCNC: 0.8 MG/DL (ref 0–1.2)
BUN SERPL-MCNC: 33 MG/DL (ref 6–23)
CALCIUM SERPL-MCNC: 9.1 MG/DL (ref 8.6–10.2)
CHLORIDE SERPL-SCNC: 106 MMOL/L (ref 98–107)
CO2 SERPL-SCNC: 24 MMOL/L (ref 22–29)
CREAT SERPL-MCNC: 2 MG/DL (ref 0.7–1.2)
ERYTHROCYTE [DISTWIDTH] IN BLOOD BY AUTOMATED COUNT: 15 % (ref 11.5–15)
GFR, ESTIMATED: 36 ML/MIN/1.73M2
GLUCOSE BLD-MCNC: 116 MG/DL (ref 74–99)
GLUCOSE BLD-MCNC: 119 MG/DL (ref 74–99)
GLUCOSE BLD-MCNC: 174 MG/DL (ref 74–99)
GLUCOSE BLD-MCNC: 243 MG/DL (ref 74–99)
GLUCOSE SERPL-MCNC: 120 MG/DL (ref 74–99)
HCT VFR BLD AUTO: 36.7 % (ref 37–54)
HGB BLD-MCNC: 12 G/DL (ref 12.5–16.5)
INR PPP: 2.1
MAGNESIUM SERPL-MCNC: 2.1 MG/DL (ref 1.6–2.6)
MCH RBC QN AUTO: 29.5 PG (ref 26–35)
MCHC RBC AUTO-ENTMCNC: 32.7 G/DL (ref 32–34.5)
MCV RBC AUTO: 90.2 FL (ref 80–99.9)
PHOSPHATE SERPL-MCNC: 3.7 MG/DL (ref 2.5–4.5)
PLATELET # BLD AUTO: 102 K/UL (ref 130–450)
PMV BLD AUTO: 12.1 FL (ref 7–12)
POTASSIUM SERPL-SCNC: 4.7 MMOL/L (ref 3.5–5)
PROT SERPL-MCNC: 6.3 G/DL (ref 6.4–8.3)
PROTHROMBIN TIME: 23.3 SEC (ref 9.3–12.4)
RBC # BLD AUTO: 4.07 M/UL (ref 3.8–5.8)
SODIUM SERPL-SCNC: 144 MMOL/L (ref 132–146)
WBC OTHER # BLD: 12 K/UL (ref 4.5–11.5)

## 2025-04-13 PROCEDURE — 82962 GLUCOSE BLOOD TEST: CPT

## 2025-04-13 PROCEDURE — 6370000000 HC RX 637 (ALT 250 FOR IP): Performed by: INTERNAL MEDICINE

## 2025-04-13 PROCEDURE — 2140000000 HC CCU INTERMEDIATE R&B

## 2025-04-13 PROCEDURE — 80053 COMPREHEN METABOLIC PANEL: CPT

## 2025-04-13 PROCEDURE — 85027 COMPLETE CBC AUTOMATED: CPT

## 2025-04-13 PROCEDURE — 36415 COLL VENOUS BLD VENIPUNCTURE: CPT

## 2025-04-13 PROCEDURE — 84100 ASSAY OF PHOSPHORUS: CPT

## 2025-04-13 PROCEDURE — 83735 ASSAY OF MAGNESIUM: CPT

## 2025-04-13 PROCEDURE — 6370000000 HC RX 637 (ALT 250 FOR IP)

## 2025-04-13 PROCEDURE — 85610 PROTHROMBIN TIME: CPT

## 2025-04-13 RX ORDER — WARFARIN SODIUM 5 MG/1
5 TABLET ORAL
Status: COMPLETED | OUTPATIENT
Start: 2025-04-13 | End: 2025-04-13

## 2025-04-13 RX ADMIN — FERROUS SULFATE TAB 325 MG (65 MG ELEMENTAL FE) 325 MG: 325 (65 FE) TAB at 08:45

## 2025-04-13 RX ADMIN — METOPROLOL SUCCINATE 150 MG: 50 TABLET, EXTENDED RELEASE ORAL at 08:45

## 2025-04-13 RX ADMIN — BUMETANIDE 1 MG: 1 TABLET ORAL at 08:45

## 2025-04-13 RX ADMIN — GLIPIZIDE 2.5 MG: 5 TABLET ORAL at 08:45

## 2025-04-13 RX ADMIN — INSULIN LISPRO 2 UNITS: 100 INJECTION, SOLUTION INTRAVENOUS; SUBCUTANEOUS at 19:48

## 2025-04-13 RX ADMIN — WARFARIN SODIUM 5 MG: 5 TABLET ORAL at 18:05

## 2025-04-13 RX ADMIN — SODIUM BICARBONATE 650 MG: 650 TABLET ORAL at 19:47

## 2025-04-13 RX ADMIN — METOPROLOL SUCCINATE 150 MG: 50 TABLET, EXTENDED RELEASE ORAL at 19:48

## 2025-04-13 RX ADMIN — ATORVASTATIN CALCIUM 40 MG: 40 TABLET, FILM COATED ORAL at 08:46

## 2025-04-13 RX ADMIN — BUMETANIDE 1 MG: 1 TABLET ORAL at 18:05

## 2025-04-13 RX ADMIN — SODIUM BICARBONATE 650 MG: 650 TABLET ORAL at 08:45

## 2025-04-13 ASSESSMENT — PAIN SCALES - GENERAL: PAINLEVEL_OUTOF10: 0

## 2025-04-13 NOTE — PLAN OF CARE
Problem: Chronic Conditions and Co-morbidities  Goal: Patient's chronic conditions and co-morbidity symptoms are monitored and maintained or improved  4/13/2025 0956 by JAMES Beltran RN  Outcome: Progressing  Flowsheets (Taken 4/13/2025 0719)  Care Plan - Patient's Chronic Conditions and Co-Morbidity Symptoms are Monitored and Maintained or Improved: Monitor and assess patient's chronic conditions and comorbid symptoms for stability, deterioration, or improvement  4/12/2025 2342 by Kemi Felton RN  Outcome: Progressing     Problem: Discharge Planning  Goal: Discharge to home or other facility with appropriate resources  4/13/2025 0956 by JAMES Beltran RN  Outcome: Progressing  Flowsheets (Taken 4/13/2025 0719)  Discharge to home or other facility with appropriate resources: Identify barriers to discharge with patient and caregiver  4/12/2025 2342 by Kemi Felton RN  Outcome: Progressing     Problem: Safety - Adult  Goal: Free from fall injury  4/13/2025 0956 by JAMES Beltran RN  Outcome: Progressing  4/12/2025 2342 by Kemi Felton RN  Outcome: Progressing     Problem: ABCDS Injury Assessment  Goal: Absence of physical injury  4/13/2025 0956 by JAMES Beltran RN  Outcome: Progressing  4/12/2025 2342 by Kemi Felton RN  Outcome: Progressing     Problem: Pain  Goal: Verbalizes/displays adequate comfort level or baseline comfort level  4/13/2025 0956 by JAMES Beltran RN  Outcome: Progressing  4/12/2025 2342 by Kemi Felton RN  Outcome: Progressing

## 2025-04-13 NOTE — PLAN OF CARE
Problem: Chronic Conditions and Co-morbidities  Goal: Patient's chronic conditions and co-morbidity symptoms are monitored and maintained or improved  4/12/2025 2342 by Kemi Felton RN  Outcome: Progressing     Problem: Discharge Planning  Goal: Discharge to home or other facility with appropriate resources  4/12/2025 2342 by Kemi Felton RN  Outcome: Progressing     Problem: Safety - Adult  Goal: Free from fall injury  4/12/2025 2342 by Kemi Felton RN  Outcome: Progressing     Problem: ABCDS Injury Assessment  Goal: Absence of physical injury  4/12/2025 2342 by Kemi Felton RN  Outcome: Progressing     Problem: Pain  Goal: Verbalizes/displays adequate comfort level or baseline comfort level  4/12/2025 2342 by Kemi Felton RN  Outcome: Progressing

## 2025-04-14 LAB
ALBUMIN SERPL-MCNC: 3.7 G/DL (ref 3.5–5.2)
ALP SERPL-CCNC: 126 U/L (ref 40–129)
ALT SERPL-CCNC: 31 U/L (ref 0–40)
ANION GAP SERPL CALCULATED.3IONS-SCNC: 13 MMOL/L (ref 7–16)
AST SERPL-CCNC: 16 U/L (ref 0–39)
BILIRUB SERPL-MCNC: 0.7 MG/DL (ref 0–1.2)
BUN SERPL-MCNC: 32 MG/DL (ref 6–23)
CALCIUM SERPL-MCNC: 9.1 MG/DL (ref 8.6–10.2)
CHLORIDE SERPL-SCNC: 104 MMOL/L (ref 98–107)
CO2 SERPL-SCNC: 24 MMOL/L (ref 22–29)
CREAT SERPL-MCNC: 1.7 MG/DL (ref 0.7–1.2)
ERYTHROCYTE [DISTWIDTH] IN BLOOD BY AUTOMATED COUNT: 15.1 % (ref 11.5–15)
GFR, ESTIMATED: 43 ML/MIN/1.73M2
GLUCOSE BLD-MCNC: 108 MG/DL (ref 74–99)
GLUCOSE BLD-MCNC: 141 MG/DL (ref 74–99)
GLUCOSE BLD-MCNC: 194 MG/DL (ref 74–99)
GLUCOSE BLD-MCNC: 221 MG/DL (ref 74–99)
GLUCOSE SERPL-MCNC: 134 MG/DL (ref 74–99)
HCT VFR BLD AUTO: 38.2 % (ref 37–54)
HGB BLD-MCNC: 12.5 G/DL (ref 12.5–16.5)
INR PPP: 2
MAGNESIUM SERPL-MCNC: 2.2 MG/DL (ref 1.6–2.6)
MCH RBC QN AUTO: 29.3 PG (ref 26–35)
MCHC RBC AUTO-ENTMCNC: 32.7 G/DL (ref 32–34.5)
MCV RBC AUTO: 89.7 FL (ref 80–99.9)
PHOSPHATE SERPL-MCNC: 3.7 MG/DL (ref 2.5–4.5)
PLATELET # BLD AUTO: 126 K/UL (ref 130–450)
PMV BLD AUTO: 11.8 FL (ref 7–12)
POTASSIUM SERPL-SCNC: 3.7 MMOL/L (ref 3.5–5)
PROT SERPL-MCNC: 6.8 G/DL (ref 6.4–8.3)
PROTHROMBIN TIME: 22.1 SEC (ref 9.3–12.4)
RBC # BLD AUTO: 4.26 M/UL (ref 3.8–5.8)
SODIUM SERPL-SCNC: 141 MMOL/L (ref 132–146)
WBC OTHER # BLD: 13 K/UL (ref 4.5–11.5)

## 2025-04-14 PROCEDURE — 2140000000 HC CCU INTERMEDIATE R&B

## 2025-04-14 PROCEDURE — 6370000000 HC RX 637 (ALT 250 FOR IP): Performed by: INTERNAL MEDICINE

## 2025-04-14 PROCEDURE — 84100 ASSAY OF PHOSPHORUS: CPT

## 2025-04-14 PROCEDURE — 36415 COLL VENOUS BLD VENIPUNCTURE: CPT

## 2025-04-14 PROCEDURE — 6370000000 HC RX 637 (ALT 250 FOR IP)

## 2025-04-14 PROCEDURE — 85027 COMPLETE CBC AUTOMATED: CPT

## 2025-04-14 PROCEDURE — 85610 PROTHROMBIN TIME: CPT

## 2025-04-14 PROCEDURE — 83735 ASSAY OF MAGNESIUM: CPT

## 2025-04-14 PROCEDURE — 82962 GLUCOSE BLOOD TEST: CPT

## 2025-04-14 PROCEDURE — 80053 COMPREHEN METABOLIC PANEL: CPT

## 2025-04-14 RX ORDER — WARFARIN SODIUM 5 MG/1
7.5 TABLET ORAL
Status: COMPLETED | OUTPATIENT
Start: 2025-04-14 | End: 2025-04-14

## 2025-04-14 RX ADMIN — SODIUM BICARBONATE 650 MG: 650 TABLET ORAL at 08:41

## 2025-04-14 RX ADMIN — WARFARIN SODIUM 7.5 MG: 5 TABLET ORAL at 18:14

## 2025-04-14 RX ADMIN — METOPROLOL SUCCINATE 150 MG: 50 TABLET, EXTENDED RELEASE ORAL at 08:40

## 2025-04-14 RX ADMIN — INSULIN LISPRO 2 UNITS: 100 INJECTION, SOLUTION INTRAVENOUS; SUBCUTANEOUS at 19:54

## 2025-04-14 RX ADMIN — SODIUM BICARBONATE 650 MG: 650 TABLET ORAL at 19:54

## 2025-04-14 RX ADMIN — BUMETANIDE 1 MG: 1 TABLET ORAL at 08:40

## 2025-04-14 RX ADMIN — ATORVASTATIN CALCIUM 40 MG: 40 TABLET, FILM COATED ORAL at 08:40

## 2025-04-14 RX ADMIN — GLIPIZIDE 2.5 MG: 5 TABLET ORAL at 11:24

## 2025-04-14 RX ADMIN — BUMETANIDE 1 MG: 1 TABLET ORAL at 18:14

## 2025-04-14 RX ADMIN — METOPROLOL SUCCINATE 150 MG: 50 TABLET, EXTENDED RELEASE ORAL at 19:54

## 2025-04-14 RX ADMIN — INSULIN LISPRO 2 UNITS: 100 INJECTION, SOLUTION INTRAVENOUS; SUBCUTANEOUS at 11:24

## 2025-04-14 ASSESSMENT — PAIN SCALES - GENERAL
PAINLEVEL_OUTOF10: 0
PAINLEVEL_OUTOF10: 0

## 2025-04-14 NOTE — PLAN OF CARE
Problem: Chronic Conditions and Co-morbidities  Goal: Patient's chronic conditions and co-morbidity symptoms are monitored and maintained or improved  4/13/2025 2053 by Kemi Felton RN  Outcome: Progressing     Problem: Discharge Planning  Goal: Discharge to home or other facility with appropriate resources  4/13/2025 2053 by Kemi Felton RN  Outcome: Progressing     Problem: Safety - Adult  Goal: Free from fall injury  4/13/2025 2053 by Kemi Felton RN  Outcome: Progressing     Problem: ABCDS Injury Assessment  Goal: Absence of physical injury  4/13/2025 2053 by Kemi Felton RN  Outcome: Progressing     Problem: Pain  Goal: Verbalizes/displays adequate comfort level or baseline comfort level  4/13/2025 2053 by Kemi Felton RN  Outcome: Progressing

## 2025-04-14 NOTE — PATIENT CARE CONFERENCE
P Quality Flow/Interdisciplinary Rounds Progress Note        Quality Flow Rounds held on April 14, 2025    Disciplines Attending:  Bedside Nurse, , , and Nursing Unit Leadership    Rober Hartley was admitted on 4/7/2025 11:35 AM    Anticipated Discharge Date:       Disposition:    Rahul Score:  Rahul Scale Score: 22    [facility] RISK OF UNPLANNED READMISSION 2.0             16.6 Total Score        Discussed patient goal for the day, patient clinical progression, and barriers to discharge.  The following Goal(s) of the Day/Commitment(s) have been identified:  Labs - Report Results      Miryam Elias RN  April 14, 2025

## 2025-04-15 VITALS
RESPIRATION RATE: 16 BRPM | HEIGHT: 69 IN | DIASTOLIC BLOOD PRESSURE: 98 MMHG | HEART RATE: 97 BPM | SYSTOLIC BLOOD PRESSURE: 130 MMHG | OXYGEN SATURATION: 97 % | WEIGHT: 218.8 LBS | BODY MASS INDEX: 32.41 KG/M2 | TEMPERATURE: 98.5 F

## 2025-04-15 LAB
ALBUMIN SERPL-MCNC: 3.3 G/DL (ref 3.5–5.2)
ALP SERPL-CCNC: 110 U/L (ref 40–129)
ALT SERPL-CCNC: 24 U/L (ref 0–40)
ANION GAP SERPL CALCULATED.3IONS-SCNC: 15 MMOL/L (ref 7–16)
AST SERPL-CCNC: 16 U/L (ref 0–39)
BILIRUB SERPL-MCNC: 0.6 MG/DL (ref 0–1.2)
BUN SERPL-MCNC: 34 MG/DL (ref 6–23)
CALCIUM SERPL-MCNC: 8.8 MG/DL (ref 8.6–10.2)
CHLORIDE SERPL-SCNC: 103 MMOL/L (ref 98–107)
CO2 SERPL-SCNC: 23 MMOL/L (ref 22–29)
CREAT SERPL-MCNC: 1.8 MG/DL (ref 0.7–1.2)
ERYTHROCYTE [DISTWIDTH] IN BLOOD BY AUTOMATED COUNT: 15 % (ref 11.5–15)
GFR, ESTIMATED: 40 ML/MIN/1.73M2
GLUCOSE BLD-MCNC: 116 MG/DL (ref 74–99)
GLUCOSE BLD-MCNC: 216 MG/DL (ref 74–99)
GLUCOSE SERPL-MCNC: 114 MG/DL (ref 74–99)
HCT VFR BLD AUTO: 35.5 % (ref 37–54)
HGB BLD-MCNC: 11.6 G/DL (ref 12.5–16.5)
INR PPP: 2
MAGNESIUM SERPL-MCNC: 2.4 MG/DL (ref 1.6–2.6)
MCH RBC QN AUTO: 29.3 PG (ref 26–35)
MCHC RBC AUTO-ENTMCNC: 32.7 G/DL (ref 32–34.5)
MCV RBC AUTO: 89.6 FL (ref 80–99.9)
PHOSPHATE SERPL-MCNC: 3.6 MG/DL (ref 2.5–4.5)
PLATELET # BLD AUTO: 113 K/UL (ref 130–450)
PMV BLD AUTO: 11.9 FL (ref 7–12)
POTASSIUM SERPL-SCNC: 3.7 MMOL/L (ref 3.5–5)
PROT SERPL-MCNC: 6.1 G/DL (ref 6.4–8.3)
PROTHROMBIN TIME: 22.4 SEC (ref 9.3–12.4)
RBC # BLD AUTO: 3.96 M/UL (ref 3.8–5.8)
SODIUM SERPL-SCNC: 141 MMOL/L (ref 132–146)
WBC OTHER # BLD: 12.2 K/UL (ref 4.5–11.5)

## 2025-04-15 PROCEDURE — 85610 PROTHROMBIN TIME: CPT

## 2025-04-15 PROCEDURE — 83735 ASSAY OF MAGNESIUM: CPT

## 2025-04-15 PROCEDURE — 84100 ASSAY OF PHOSPHORUS: CPT

## 2025-04-15 PROCEDURE — 80053 COMPREHEN METABOLIC PANEL: CPT

## 2025-04-15 PROCEDURE — 6370000000 HC RX 637 (ALT 250 FOR IP): Performed by: INTERNAL MEDICINE

## 2025-04-15 PROCEDURE — 82962 GLUCOSE BLOOD TEST: CPT

## 2025-04-15 PROCEDURE — 6370000000 HC RX 637 (ALT 250 FOR IP)

## 2025-04-15 PROCEDURE — 36415 COLL VENOUS BLD VENIPUNCTURE: CPT

## 2025-04-15 PROCEDURE — 85027 COMPLETE CBC AUTOMATED: CPT

## 2025-04-15 RX ORDER — METOPROLOL SUCCINATE 50 MG/1
150 TABLET, EXTENDED RELEASE ORAL 2 TIMES DAILY
Qty: 30 TABLET | Refills: 3 | Status: SHIPPED | OUTPATIENT
Start: 2025-04-15

## 2025-04-15 RX ORDER — BUMETANIDE 1 MG/1
1 TABLET ORAL 2 TIMES DAILY
Qty: 30 TABLET | Refills: 3 | Status: SHIPPED | OUTPATIENT
Start: 2025-04-15

## 2025-04-15 RX ORDER — SACUBITRIL AND VALSARTAN 49; 51 MG/1; MG/1
1 TABLET, FILM COATED ORAL 2 TIMES DAILY
Qty: 60 TABLET | Refills: 2 | Status: SHIPPED | OUTPATIENT
Start: 2025-04-15

## 2025-04-15 RX ORDER — SODIUM BICARBONATE 650 MG/1
650 TABLET ORAL 2 TIMES DAILY
Qty: 120 TABLET | Refills: 0 | Status: SHIPPED | OUTPATIENT
Start: 2025-04-15

## 2025-04-15 RX ADMIN — SODIUM BICARBONATE 650 MG: 650 TABLET ORAL at 08:27

## 2025-04-15 RX ADMIN — METOPROLOL SUCCINATE 150 MG: 50 TABLET, EXTENDED RELEASE ORAL at 08:27

## 2025-04-15 RX ADMIN — GLIPIZIDE 2.5 MG: 5 TABLET ORAL at 08:28

## 2025-04-15 RX ADMIN — INSULIN LISPRO 2 UNITS: 100 INJECTION, SOLUTION INTRAVENOUS; SUBCUTANEOUS at 11:24

## 2025-04-15 RX ADMIN — ATORVASTATIN CALCIUM 40 MG: 40 TABLET, FILM COATED ORAL at 08:28

## 2025-04-15 RX ADMIN — BUMETANIDE 1 MG: 1 TABLET ORAL at 08:27

## 2025-04-15 ASSESSMENT — PAIN SCALES - GENERAL: PAINLEVEL_OUTOF10: 0

## 2025-04-15 NOTE — PLAN OF CARE
Problem: Chronic Conditions and Co-morbidities  Goal: Patient's chronic conditions and co-morbidity symptoms are monitored and maintained or improved  4/15/2025 0725 by Paz Houser RN  Outcome: Progressing     Problem: Discharge Planning  Goal: Discharge to home or other facility with appropriate resources  4/15/2025 0725 by Paz Houser RN  Outcome: Progressing     Problem: Safety - Adult  Goal: Free from fall injury  4/15/2025 0725 by Paz Houser RN  Outcome: Progressing     Problem: ABCDS Injury Assessment  Goal: Absence of physical injury  4/15/2025 0725 by Paz Houser RN  Outcome: Progressing     Problem: Pain  Goal: Verbalizes/displays adequate comfort level or baseline comfort level  4/15/2025 0725 by Paz Houser RN  Outcome: Progressing

## 2025-04-15 NOTE — PATIENT CARE CONFERENCE
[facility] Quality Flow/Interdisciplinary Rounds Progress Note        Quality Flow Rounds held on April 15, 2025    Disciplines Attending:  Bedside Nurse, , , and Nursing Unit Leadership    Rober Hartley was admitted on 4/7/2025 11:35 AM    Anticipated Discharge Date:       Disposition:    Rahul Score:  Rahul Scale Score: 22    [facility] RISK OF UNPLANNED READMISSION 2.0             18.3 Total Score        Discussed patient goal for the day, patient clinical progression, and barriers to discharge.  The following Goal(s) of the Day/Commitment(s) have been identified:  Discharge - Obtain Order and Labs - Report Results from nephrology      Miryam Elias RN  April 15, 2025

## 2025-04-15 NOTE — PROGRESS NOTES
Niko Lopez M.D.,[facility]  Rodrigo Warren D.O., F.JOSELYN., [facility]  Diony Strogn M.D.  Silvia Cadet M.D.   Luis Carlos Stevens D.O.  Josué Rosales M.D.         Daily Pulmonary Progress Note    Patient:  Rober Hartley 64 y.o. male MRN: [id number]            Synopsis     We are following patient for SOB    \"CC\" Irregular heartbeat     Code status: FULL CODE      Subjective      4/11/25: Patient was seen and examined sitting up on the side of the bed on room air.  Cardizem drip is at 2.5 mg/h.  Lungs are clear on exam.    4/14/25: patient seen and examined lying in bed on room air. Diuretics changed to oral per nephrology. Lungs clear on exam.    Review of Systems:  Constitutional: Denies fever, weight loss, night sweats, and fatigue  Skin: Denies pigmentation, dark lesions, and rashes   HEENT: Denies hearing loss, tinnitus, ear drainage, epistaxis, sore throat, and hoarseness.  Cardiovascular: leg swelling, heart palpitations, AF  Respiratory: shortness of breath  Gastrointestinal: Denies nausea, vomiting, poor appetite, diarrhea, heartburn or reflux  Genitourinary: Denies dysuria, frequency, urgency or hematuria  Musculoskeletal: Denies myalgias, muscle weakness, and bone pain  Neurological: Denies dizziness, vertigo, headache, and focal weakness  Psychological: Denies anxiety and depression  Endocrine: Denies heat intolerance and cold intolerance  Hematopoietic/Lymphatic: Denies bleeding problems and blood transfusions    24-hour events:  No new events    Objective   OBJECTIVE:   BP (!) 140/69   Pulse (!) 110   Temp 98.2 °F (36.8 °C) (Temporal)   Resp 16   Ht 1.753 m (5' 9\")   Wt 99.4 kg (219 lb 3.2 oz)   SpO2 91%   BMI 32.37 kg/m²   SpO2 Readings from Last 1 Encounters:   04/14/25 91%        I/O:    Intake/Output Summary (Last 24 hours) at 4/14/2025 1342  Last data filed at 4/14/2025 0936  Gross per 24 hour   Intake 360 ml   Output 1300 ml   Net -940 ml                      CURRENT MEDS 
           Niko Lopez M.D.,[facility]  Rodrigo Warren D.O., F.JOSELYN., [facility]  Diony Strong M.D.  Silvia Cadet M.D.   Luis Carlos Stevens D.O.  Josué Rosales M.D.         Daily Pulmonary Progress Note    Patient:  Rober Hartley 64 y.o. male MRN: [id number]            Synopsis     We are following patient for SOB    \"CC\" Irregular heartbeat     Code status: FULL CODE      Subjective      4/11/25: Patient was seen and examined sitting up on the side of the bed on room air.  Cardizem drip is at 2.5 mg/h.  Lungs are clear on exam.    Review of Systems:  Constitutional: Denies fever, weight loss, night sweats, and fatigue  Skin: Denies pigmentation, dark lesions, and rashes   HEENT: Denies hearing loss, tinnitus, ear drainage, epistaxis, sore throat, and hoarseness.  Cardiovascular: leg swelling, heart palpitations, AF  Respiratory: shortness of breath  Gastrointestinal: Denies nausea, vomiting, poor appetite, diarrhea, heartburn or reflux  Genitourinary: Denies dysuria, frequency, urgency or hematuria  Musculoskeletal: Denies myalgias, muscle weakness, and bone pain  Neurological: Denies dizziness, vertigo, headache, and focal weakness  Psychological: Denies anxiety and depression  Endocrine: Denies heat intolerance and cold intolerance  Hematopoietic/Lymphatic: Denies bleeding problems and blood transfusions    24-hour events:  No new events    Objective   OBJECTIVE:   BP (!) 150/94   Pulse 86   Temp 97.8 °F (36.6 °C) (Temporal)   Resp 20   Ht 1.753 m (5' 9\")   Wt 102.7 kg (226 lb 6.4 oz)   SpO2 97%   BMI 33.43 kg/m²   SpO2 Readings from Last 1 Encounters:   04/11/25 97%        I/O:    Intake/Output Summary (Last 24 hours) at 4/11/2025 1612  Last data filed at 4/11/2025 1202  Gross per 24 hour   Intake 700 ml   Output 1450 ml   Net -750 ml                      CURRENT MEDS :  Scheduled Meds:   sodium bicarbonate  650 mg Oral BID    warfarin  5 mg Oral Once    bumetanide  1 mg Oral BID    metoprolol succinate  
    INPATIENT CARDIOLOGY FOLLOW-UP    Name: Rober Hartley    Age: 64 y.o.    Date of Admission: 4/7/2025 11:35 AM    Date of Service: 4/10/2025    Primary Cardiologist: Dr Hampton    Chief Complaint: Follow-up for CHF/AF    Interim History:  Breathing improved.  Not much urine output on low-dose IV furosemide.  Remains in A-fib on diltiazem infusion at 2.5 mg/h recently rate controlled.    Review of Systems:   Negative except as described above    Problem List:  Patient Active Problem List   Diagnosis    History of DVT (deep vein thrombosis)    History of pulmonary embolism    Type 2 diabetes mellitus with obesity (HCC)    Hyperlipidemia    Noncompliance with medication regimen    Non morbid obesity due to excess calories    Warfarin anticoagulation    Atrial fibrillation with RVR (HCC)    Congestive heart failure (HCC)    Dyspnea and respiratory abnormalities    Essential hypertension    DERIC (obstructive sleep apnea)    Acute decompensated heart failure (HCC)    NICM (nonischemic cardiomyopathy) (HCC)    Benign prostatic hyperplasia without lower urinary tract symptoms    Iron deficiency anemia, unspecified    Acute exacerbation of chronic heart failure (HCC)    Permanent atrial fibrillation (HCC)    Thrombocytopenia    Poorly controlled type 2 diabetes mellitus (HCC)    Atrial fibrillation (HCC)    Dietary noncompliance    Controlled type 2 diabetes mellitus without complication    Hypoxia       Current Medications:    Current Facility-Administered Medications:     acetaminophen (TYLENOL) tablet 650 mg, 650 mg, Oral, Q6H PRN, Austin Nguyen MD, 650 mg at 04/10/25 1046    warfarin (COUMADIN) tablet 5 mg, 5 mg, Oral, Once, Austin Nguyen MD    metoprolol succinate (TOPROL XL) extended release tablet 150 mg, 150 mg, Oral, BID, Justin Dennis MD, 150 mg at 04/10/25 0757    [COMPLETED] dilTIAZem injection 10 mg, 10 mg, IntraVENous, Once, 10 mg at 04/07/25 1350 **FOLLOWED BY** dilTIAZem 100 mg in 
    INPATIENT CARDIOLOGY FOLLOW-UP    Name: Rober Hartley    Age: 64 y.o.    Date of Admission: 4/7/2025 11:35 AM    Date of Service: 4/11/2025    Primary Cardiologist: Dr Hampton    Chief Complaint: Follow-up for CHF/AF    Interim History:  Not feeling well states feels chilled and under multiple blankets this morning.  Diuresed well with dose of bumetanide net -2.6 L with stable creatinine.  Remains on low-dose diltiazem infusion with adequately controlled heart rates.    Review of Systems:   Negative except as described above    Problem List:  Patient Active Problem List   Diagnosis    History of DVT (deep vein thrombosis)    History of pulmonary embolism    Type 2 diabetes mellitus with obesity (HCC)    Hyperlipidemia    Noncompliance with medication regimen    Non morbid obesity due to excess calories    Warfarin anticoagulation    Atrial fibrillation with RVR (HCC)    Congestive heart failure (HCC)    Dyspnea and respiratory abnormalities    Essential hypertension    DERIC (obstructive sleep apnea)    Acute decompensated heart failure (HCC)    NICM (nonischemic cardiomyopathy) (HCC)    Benign prostatic hyperplasia without lower urinary tract symptoms    Iron deficiency anemia, unspecified    Acute exacerbation of chronic heart failure (HCC)    Permanent atrial fibrillation (HCC)    Thrombocytopenia    Poorly controlled type 2 diabetes mellitus (HCC)    Atrial fibrillation (HCC)    Dietary noncompliance    Controlled type 2 diabetes mellitus without complication    Hypoxia       Current Medications:    Current Facility-Administered Medications:     bumetanide (BUMEX) injection 2 mg, 2 mg, IntraVENous, Once, Justin Dennis MD    acetaminophen (TYLENOL) tablet 650 mg, 650 mg, Oral, Q6H PRN, Austin Nguyen MD, 650 mg at 04/10/25 1653    metoprolol succinate (TOPROL XL) extended release tablet 150 mg, 150 mg, Oral, BID, Justin Dennis MD, 150 mg at 04/10/25 2014    [COMPLETED] dilTIAZem injection 10 
    INPATIENT CARDIOLOGY FOLLOW-UP    Name: Rober Hartley    Age: 64 y.o.    Date of Admission: 4/7/2025 11:35 AM    Date of Service: 4/9/2025    Primary Cardiologist: Dr Hampton    Chief Complaint: Follow-up for CHF/AF    Interim History:  Breathing improved.  Not much urine output on low-dose IV furosemide.  Remains in A-fib on diltiazem infusion at 5 mg/h recently rate controlled.    Review of Systems:   Negative except as described above    Problem List:  Patient Active Problem List   Diagnosis    History of DVT (deep vein thrombosis)    History of pulmonary embolism    Type 2 diabetes mellitus with obesity (HCC)    Hyperlipidemia    Noncompliance with medication regimen    Non morbid obesity due to excess calories    Warfarin anticoagulation    Atrial fibrillation with RVR (HCC)    Congestive heart failure (HCC)    Dyspnea and respiratory abnormalities    Essential hypertension    DERIC (obstructive sleep apnea)    Acute decompensated heart failure (HCC)    NICM (nonischemic cardiomyopathy) (HCC)    Benign prostatic hyperplasia without lower urinary tract symptoms    Iron deficiency anemia, unspecified    Acute exacerbation of chronic heart failure (HCC)    Permanent atrial fibrillation (HCC)    Thrombocytopenia    Poorly controlled type 2 diabetes mellitus (HCC)    Atrial fibrillation (HCC)    Dietary noncompliance    Controlled type 2 diabetes mellitus without complication    Hypoxia       Current Medications:    Current Facility-Administered Medications:     metoprolol succinate (TOPROL XL) extended release tablet 150 mg, 150 mg, Oral, BID, Justin Dennis MD, 150 mg at 04/09/25 0814    warfarin (COUMADIN) tablet 5 mg, 5 mg, Oral, Once, Austin Nguyen MD    [COMPLETED] dilTIAZem injection 10 mg, 10 mg, IntraVENous, Once, 10 mg at 04/07/25 1350 **FOLLOWED BY** dilTIAZem 100 mg in sodium chloride 0.9 % 100 mL infusion ([employer]), 2.5-15 mg/hr, IntraVENous, Continuous, Austin Nguyen MD, 
  [facility] Quality Flow/Interdisciplinary Rounds Progress Note        Quality Flow Rounds held on April 11, 2025    Disciplines Attending:  Bedside Nurse, , , and Nursing Unit Leadership    Rober Hartley was admitted on 4/7/2025 11:35 AM    Anticipated Discharge Date:       Disposition:    Rahul Score:  Rahul Scale Score: 20    [facility] RISK OF UNPLANNED READMISSION 2.0             18.8 Total Score        Discussed patient goal for the day, patient clinical progression, and barriers to discharge.  The following Goal(s) of the Day/Commitment(s) have been identified:  Diagnostics - Report Results and Labs - Report Results      Jessica Richardson RN  April 11, 2025        
  Physician Progress Note      PATIENT:               JOHNATHON COTTO  CSN #:                  [id number]  :                       1960  ADMIT DATE:       2025 11:35 AM  DISCH DATE:  RESPONDING  PROVIDER #:        Austin Nguyen MD          QUERY TEXT:    Based on your medical judgment, please clarify these findings and document if   any of the following are being evaluated and/or treated:    The clinical indicators include:  -Hx- Chronic anticoagulation secondary A-fib, CHF, HTN, 64 yr old male  -Clinical Indicators-  PN \"Chronic anticoagulation secondary A-fib, hx of   DVT,\" (Austin Nguyen MD)  Options provided:  -- Secondary hypercoagulable state in a patient with atrial fibrillation  -- Other - I will add my own diagnosis  -- Disagree - Not applicable / Not valid  -- Disagree - Clinically unable to determine / Unknown  -- Refer to Clinical Documentation Reviewer    PROVIDER RESPONSE TEXT:    This patient has secondary hypercoagulable state in a patient with atrial   fibrillation.    Query created by: Kailee Parham on 2025 2:18 PM      Electronically signed by:  Austin Nguyen MD 4/15/2025 1:19 PM          
4 Eyes Skin Assessment     NAME:  Rober Hartley  YOB: 1960  MEDICAL RECORD NUMBER:  [id number]    The patient is being assessed for  Admission    I agree that at least one RN has performed a thorough Head to Toe Skin Assessment on the patient. ALL assessment sites listed below have been assessed.      Areas assessed by both nurses:    Head, Face, Ears, Shoulders, Back, Chest, Arms, Elbows, Hands, Sacrum. Buttock, Coccyx, Ischium, Legs. Feet and Heels, and Under Medical Devices         Does the Patient have a Wound? No noted wound(s)       Rahul Prevention initiated by RN: No  Wound Care Orders initiated by RN: No    Pressure Injury (Stage 3,4, Unstageable, DTI, NWPT, and Complex wounds) if present, place Wound referral order by RN under : No    New Ostomies, if present place, Ostomy referral order under : No     Nurse 1 eSignature: Electronically signed by Mary Jo Bell RN on 4/8/25 at 12:23 AM EDT    **SHARE this note so that the co-signing nurse can place an eSignature**    Nurse 2 eSignature: Electronically signed by Jessica Bello RN on 4/8/25 at 12:24 AM EDT  
Attending notified of patient's concern for DVT as well as redness, swelling, and pain in left arm.   
CLINICAL PHARMACY NOTE: MEDS TO BEDS    Total # of Prescriptions Filled: 4   The following medications were delivered to the patient:  Sodium bicarb 650mg  Entresto 49-51mg  Metoprolol succ er 50mg  Bumex 1mg    Additional Documentation:    Delivered to patient 4-15-25  
Cardiology consult sent to Emy Gaitan NP for CHF.  
Notified Dr Nguyen , Pt has not had a Bowel Movement x 4 days, requesting Laxative.   
Patient was sleeping and will be visited again.    ,BCC  
Pharmacy Consultation Note  (Warfarin Dosing and Monitoring)    Initial consult date: 4/7/25  Consulting Provider: Dr. Patrick Najeranato Hartley is a 64 y.o. male for whom pharmacy has been asked to manage warfarin therapy.     Weight:   Wt Readings from Last 1 Encounters:   04/07/25 104.6 kg (230 lb 11.2 oz)       TSH:    Lab Results   Component Value Date/Time    TSH 3.82 03/10/2025 10:50 AM       Hepatic Function Panel:                            Lab Results   Component Value Date/Time    ALKPHOS 147 04/07/2025 11:52 AM    ALT 37 04/07/2025 11:52 AM    AST 19 04/07/2025 11:52 AM    BILITOT 1.2 04/07/2025 11:52 AM    BILIDIR 0.2 02/25/2020 03:25 AM    IBILI 0.6 02/25/2020 03:25 AM       Current significant warfarin drug-drug interactions include: No significant interactions    Recent Labs     04/07/25  1152 04/08/25  0537   HGB 13.9 13.2     Date Warfarin Dose INR Heparin or LMWH Comment   4/7/25 Received 5 mg at home 1.9 N/A    4/8 < 5 mg > 2.4 ---                           Assessment:  Patient is a 64 y.o. male on warfarin for History of  VTE/PE.  Patient's home warfarin dosing regimen is 5 mg daily.   Goal INR 2 - 3  INR 2.4 today    Plan:  Give 5 mg warfarin x 1 tonight.  Daily PT/INR until the INR is stable within the therapeutic range  Pharmacist will follow and monitor/adjust dosing as necessary    Thank you for this consult,    Mukul Mcdowell, PharmD  PGY-1 Pharmacy Practice Resident, [phone]  4/8/2025 7:18 AM          
Pharmacy Consultation Note  (Warfarin Dosing and Monitoring)    Initial consult date: 4/7/25  Consulting Provider: Dr. Patrick Najeranato Hartley is a 64 y.o. male for whom pharmacy has been asked to manage warfarin therapy.     Weight:   Wt Readings from Last 1 Encounters:   04/11/25 102.7 kg (226 lb 6.4 oz)       TSH:    Lab Results   Component Value Date/Time    TSH 3.82 03/10/2025 10:50 AM       Hepatic Function Panel:                            Lab Results   Component Value Date/Time    ALKPHOS 121 04/11/2025 06:22 AM    ALT 41 04/11/2025 06:22 AM    AST 20 04/11/2025 06:22 AM    BILITOT 1.0 04/11/2025 06:22 AM    BILIDIR 0.2 02/25/2020 03:25 AM    IBILI 0.6 02/25/2020 03:25 AM       Current significant warfarin drug-drug interactions include: No significant interactions    Recent Labs     04/09/25  0550 04/10/25  0648 04/11/25  0622   HGB 11.5* 12.6 11.6*   PLT 89* 92* 95*     Date Warfarin Dose INR Heparin or LMWH Comment   4/7/25 Received 5 mg at home 1.9 N/A    4/8 5 mg 2.4 ---    4/9 5 mg 2.0 ---    4/10 5 mg  1.7     4/11 5 mg 1.9 ---      Assessment:  Patient is a 64 y.o. male on warfarin for History of  VTE/PE.  Patient's home warfarin dosing regimen is 5 mg daily.   Goal INR 2 - 3  INR 1.9 today    Plan:  Give 5 mg warfarin x 1 tonight.  Daily PT/INR until the INR is stable within the therapeutic range  Pharmacist will follow and monitor/adjust dosing as necessary    Thank you for this consult,    Jackie Forbes, PharmD  PGY1 Pharmacy Practice Resident [phone]  4/11/2025 10:29 AM            
Pharmacy Consultation Note  (Warfarin Dosing and Monitoring)    Initial consult date: 4/7/25  Consulting Provider: Dr. Patrick Najeranato Hartley is a 64 y.o. male for whom pharmacy has been asked to manage warfarin therapy.     Weight:   Wt Readings from Last 1 Encounters:   04/12/25 100.7 kg (222 lb)       TSH:    Lab Results   Component Value Date/Time    TSH 3.82 03/10/2025 10:50 AM       Hepatic Function Panel:                            Lab Results   Component Value Date/Time    ALKPHOS 112 04/12/2025 08:15 AM    ALT 35 04/12/2025 08:15 AM    AST 18 04/12/2025 08:15 AM    BILITOT 1.0 04/12/2025 08:15 AM    BILIDIR 0.2 02/25/2020 03:25 AM    IBILI 0.6 02/25/2020 03:25 AM       Current significant warfarin drug-drug interactions include: No significant interactions    Recent Labs     04/10/25  0648 04/11/25  0622 04/12/25  0815   HGB 12.6 11.6* 11.9*   PLT 92* 95* 90*     Date Warfarin Dose INR Heparin or LMWH Comment   4/7/25 Received 5 mg at home 1.9 N/A    4/8 5 mg 2.4 ---    4/9 5 mg 2.0 ---    4/10 5 mg  1.7     4/11 5 mg 1.9 ---    4/12 <5 mg> 2.0 ---      Assessment:  Patient is a 64 y.o. male on warfarin for History of  VTE/PE.  Patient's home warfarin dosing regimen is 5 mg daily.   Goal INR 2 - 3  INR 2.0    Plan:  Give 5 mg warfarin x 1 tonight.  Daily PT/INR until the INR is stable within the therapeutic range  Pharmacist will follow and monitor/adjust dosing as necessary    Thank you for this consult,    Kilo Granger, SergioD, [facility] 4/12/2025 11:55 AM  [phone]              
Pharmacy Consultation Note  (Warfarin Dosing and Monitoring)    Initial consult date: 4/7/25  Consulting Provider: Dr. Patrick Najeranato Hartley is a 64 y.o. male for whom pharmacy has been asked to manage warfarin therapy.     Weight:   Wt Readings from Last 1 Encounters:   04/13/25 101 kg (222 lb 10.6 oz)       TSH:    Lab Results   Component Value Date/Time    TSH 3.82 03/10/2025 10:50 AM       Hepatic Function Panel:                            Lab Results   Component Value Date/Time    ALKPHOS 118 04/13/2025 06:25 AM    ALT 33 04/13/2025 06:25 AM    AST 16 04/13/2025 06:25 AM    BILITOT 0.8 04/13/2025 06:25 AM    BILIDIR 0.2 02/25/2020 03:25 AM    IBILI 0.6 02/25/2020 03:25 AM       Current significant warfarin drug-drug interactions include: No significant interactions    Recent Labs     04/11/25  0622 04/12/25  0815 04/13/25  0625   HGB 11.6* 11.9* 12.0*   PLT 95* 90* 102*     Date Warfarin Dose INR Heparin or LMWH Comment   4/7/25 Received 5 mg at home 1.9 N/A    4/8 5 mg 2.4 ---    4/9 5 mg 2.0 ---    4/10 5 mg  1.7     4/11 5 mg 1.9 ---    4/12 5 mg 2.0 ---    4/13 <5 m> 2.1       Assessment:  Patient is a 64 y.o. male on warfarin for History of  VTE/PE.  Patient's home warfarin dosing regimen is 5 mg daily.   Goal INR 2 - 3  INR 2.1    Plan:  Give 5 mg warfarin x 1 tonight.  Daily PT/INR until the INR is stable within the therapeutic range  Pharmacist will follow and monitor/adjust dosing as necessary    Thank you for this consult,    Kilo Granger, PharmD, [facility] 4/13/2025 10:01 AM  [phone]              
Pharmacy Consultation Note  (Warfarin Dosing and Monitoring)    Initial consult date: 4/7/25  Consulting Provider: Dr. Patrick Najeranato Hartley is a 64 y.o. male for whom pharmacy has been asked to manage warfarin therapy.     Weight:   Wt Readings from Last 1 Encounters:   04/14/25 99.4 kg (219 lb 3.2 oz)       TSH:    Lab Results   Component Value Date/Time    TSH 3.82 03/10/2025 10:50 AM       Hepatic Function Panel:                            Lab Results   Component Value Date/Time    ALKPHOS 126 04/14/2025 05:57 AM    ALT 31 04/14/2025 05:57 AM    AST 16 04/14/2025 05:57 AM    BILITOT 0.7 04/14/2025 05:57 AM    BILIDIR 0.2 02/25/2020 03:25 AM    IBILI 0.6 02/25/2020 03:25 AM       Current significant warfarin drug-drug interactions include: No significant interactions    Recent Labs     04/12/25  0815 04/13/25  0625 04/14/25  0557   HGB 11.9* 12.0* 12.5   PLT 90* 102* 126*     Date Warfarin Dose INR Heparin or LMWH Comment   4/7/25 Received 5 mg at home 1.9 N/A    4/8 5 mg 2.4 ---    4/9 5 mg 2.0 ---    4/10 5 mg  1.7     4/11 5 mg 1.9 ---    4/12 5 mg 2.0 ---    4/13 5 mg 2.1     4/14 < 7.5 mg > 2.0 ---      Assessment:  Patient is a 64 y.o. male on warfarin for History of  VTE/PE.  Patient's home warfarin dosing regimen is 5 mg daily.   Goal INR 2 - 3  INR 2.0    Plan:  Give 7.5 mg warfarin x 1 tonight.  Daily PT/INR until the INR is stable within the therapeutic range  Pharmacist will follow and monitor/adjust dosing as necessary    Thank you for this consult,    Mukul Mcdowell, PharmD  PGY-1 Pharmacy Practice Resident, [phone]  4/14/2025 12:45 PM                  
Pharmacy Consultation Note  (Warfarin Dosing and Monitoring)    Initial consult date: 4/7/25  Consulting Provider: Dr. Patrick Najeranato KEILY Naeem is a 64 y.o. male for whom pharmacy has been asked to manage warfarin therapy.     Weight:   Wt Readings from Last 1 Encounters:   03/28/25 105.2 kg (232 lb)       TSH:    Lab Results   Component Value Date/Time    TSH 3.82 03/10/2025 10:50 AM       Hepatic Function Panel:                            Lab Results   Component Value Date/Time    ALKPHOS 147 04/07/2025 11:52 AM    ALT 37 04/07/2025 11:52 AM    AST 19 04/07/2025 11:52 AM    BILITOT 1.2 04/07/2025 11:52 AM    BILIDIR 0.2 02/25/2020 03:25 AM    IBILI 0.6 02/25/2020 03:25 AM       Current significant warfarin drug-drug interactions include: No significant interactions    Recent Labs     04/07/25  1152   HGB 13.9     Date Warfarin Dose INR Heparin or LMWH Comment   4/7/25 Received 5 mg at home 1.9 N/A                                  Assessment:  Patient is a 64 y.o. male on warfarin for History of  VTE/PE.  Patient's home warfarin dosing regimen is 5 mg daily.   Goal INR 2 - 3  INR 1.9 today    Plan:  Received Warfarin at home today.  Daily PT/INR until the INR is stable within the therapeutic range  Pharmacist will follow and monitor/adjust dosing as necessary    Thank you for this consult,    Sergio KrausD BCPS 4/7/2025 8:14 PM  [phone]      
Pharmacy Consultation Note  (Warfarin Dosing and Monitoring)    Initial consult date: 4/7/25  Consulting Provider: Dr. Patrick Najeranato KEILY Naeem is a 64 y.o. male for whom pharmacy has been asked to manage warfarin therapy.     Weight:   Wt Readings from Last 1 Encounters:   04/09/25 103.9 kg (229 lb 1.6 oz)       TSH:    Lab Results   Component Value Date/Time    TSH 3.82 03/10/2025 10:50 AM       Hepatic Function Panel:                            Lab Results   Component Value Date/Time    ALKPHOS 139 04/09/2025 05:50 AM    ALT 35 04/09/2025 05:50 AM    AST 19 04/09/2025 05:50 AM    BILITOT 1.5 04/09/2025 05:50 AM    BILIDIR 0.2 02/25/2020 03:25 AM    IBILI 0.6 02/25/2020 03:25 AM       Current significant warfarin drug-drug interactions include: No significant interactions    Recent Labs     04/07/25  1152 04/08/25  0537 04/09/25  0550   HGB 13.9 13.2 11.5*   PLT  --   --  89*     Date Warfarin Dose INR Heparin or LMWH Comment   4/7/25 Received 5 mg at home 1.9 N/A    4/8 5 mg 2.4 ---    4/9 < 5 mg > 2.0 ---                    Assessment:  Patient is a 64 y.o. male on warfarin for History of  VTE/PE.  Patient's home warfarin dosing regimen is 5 mg daily.   Goal INR 2 - 3  INR 2.0 today    Plan:  Give 5 mg warfarin x 1 tonight.  Daily PT/INR until the INR is stable within the therapeutic range  Pharmacist will follow and monitor/adjust dosing as necessary    Thank you for this consult,    Mukul Mcdowell, PharmD  PGY-1 Pharmacy Practice Resident, [phone]  4/9/2025 8:13 AM          
Pharmacy Consultation Note  (Warfarin Dosing and Monitoring)    Initial consult date: 4/7/25  Consulting Provider: Dr. Patrick Najeranato KEILY Naeem is a 64 y.o. male for whom pharmacy has been asked to manage warfarin therapy.     Weight:   Wt Readings from Last 1 Encounters:   04/10/25 103 kg (227 lb 1.6 oz)       TSH:    Lab Results   Component Value Date/Time    TSH 3.82 03/10/2025 10:50 AM       Hepatic Function Panel:                            Lab Results   Component Value Date/Time    ALKPHOS 124 04/10/2025 06:48 AM    ALT 43 04/10/2025 06:48 AM    AST 29 04/10/2025 06:48 AM    BILITOT 1.4 04/10/2025 06:48 AM    BILIDIR 0.2 02/25/2020 03:25 AM    IBILI 0.6 02/25/2020 03:25 AM       Current significant warfarin drug-drug interactions include: No significant interactions    Recent Labs     04/08/25  0537 04/09/25  0550 04/10/25  0648   HGB 13.2 11.5* 12.6   PLT  --  89* 92*     Date Warfarin Dose INR Heparin or LMWH Comment   4/7/25 Received 5 mg at home 1.9 N/A    4/8 5 mg 2.4 ---    4/9 5 mg 2.0 ---    4/10 < 5 mg > 1.7              Assessment:  Patient is a 64 y.o. male on warfarin for History of  VTE/PE.  Patient's home warfarin dosing regimen is 5 mg daily.   Goal INR 2 - 3  INR 1.7 today    Plan:  Give 5 mg warfarin x 1 tonight.  Daily PT/INR until the INR is stable within the therapeutic range  Pharmacist will follow and monitor/adjust dosing as necessary    Thank you for this consult,    Mukul Mcdowell, PharmD  PGY-1 Pharmacy Practice Resident, [phone]  4/10/2025 12:49 PM          
Pulm. Consult sent via perfect serve. Dr. Hilda bhagat.  
RN notified Dr. Nguyen of patient creatinine,potassium and of Pt receiving IV contrast and having metformin scheduled this am.     Kitty Infante RN    
RN notified Nephrology of rpt potassium via perfect serve    Kitty Infante RN    
Spiritual Health History and Assessment/Progress Note  [facility]    (P) Spiritual/Emotional Needs,  ,  ,      Name: Rober Hartley MRN: [id number]    Age: 64 y.o.     Sex: male   Language: English   Evangelical: None   Hypoxia     Date: 4/11/2025                           Spiritual Assessment began in [facility]        Referral/Consult From: (P) Rounding   Encounter Overview/Reason: (P) Spiritual/Emotional Needs  Service Provided For: (P) Patient    Rocío, Belief, Meaning:   Patient identifies as spiritual  Family/Friends No family/friends present      Importance and Influence:  Patient has spiritual/personal beliefs that influence decisions regarding their health  Family/Friends No family/friends present    Community:  Patient feels well-supported. Support system includes: Parent/s  Family/Friends No family/friends present    Assessment and Plan of Care:     Patient Interventions include: Facilitated expression of thoughts and feelings and Explored spiritual coping/struggle/distress  Family/Friends Interventions include: No family/friends present    Patient Plan of Care: Spiritual Care available upon further referral  Family/Friends Plan of Care: No family/friends present    Electronically signed by ANA RODRIGUEZ on 4/11/2025 at 3:02 PM   
Subjective:    Chief complaint:    Breathing feels okay  No problems overnight.  Denies chest pain, angina, and dyspnea.  Denies abdominal pain.  Tolerating diet.  No nausea or vomiting.    Objective:    /70   Pulse 98   Temp 97.6 °F (36.4 °C) (Temporal)   Resp 24   Ht 1.753 m (5' 9\")   Wt 103.9 kg (229 lb 1.6 oz)   SpO2 93%   BMI 33.83 kg/m²   General : Awake ,alert,no distress.  Heart: Irregular, no murmurs, gallops, or rubs.  Lungs:  CTA bilaterally, no wheeze, rales or rhonchi  Abd: bowel sounds present, nontender, nondistended, no masses  Extrem:  No clubbing, cyanosis, or edema    CBC:   Lab Results   Component Value Date/Time    WBC 12.8 04/09/2025 05:50 AM    RBC 3.92 04/09/2025 05:50 AM    HGB 11.5 04/09/2025 05:50 AM    HCT 34.7 04/09/2025 05:50 AM    MCV 88.5 04/09/2025 05:50 AM    MCH 29.3 04/09/2025 05:50 AM    MCHC 33.1 04/09/2025 05:50 AM    RDW 15.1 04/09/2025 05:50 AM    PLT 89 04/09/2025 05:50 AM    MPV 12.9 04/09/2025 05:50 AM     BMP:    Lab Results   Component Value Date/Time     04/09/2025 05:50 AM    K 4.1 04/09/2025 05:50 AM    K 3.6 02/25/2020 03:25 AM     04/09/2025 05:50 AM    CO2 20 04/09/2025 05:50 AM    BUN 43 04/09/2025 05:50 AM    CREATININE 2.1 04/09/2025 05:50 AM    CALCIUM 9.1 04/09/2025 05:50 AM    GFRAA 58 03/09/2020 10:12 AM    LABGLOM 34 04/09/2025 05:50 AM    GLUCOSE 97 04/09/2025 05:50 AM     PT/INR:    Lab Results   Component Value Date/Time    PROTIME 22.3 04/09/2025 05:50 AM    PROTIME 19.6 04/21/2016 08:33 AM    INR 2.0 04/09/2025 05:50 AM     Troponin:    Lab Results   Component Value Date/Time    TROPONINI <0.01 02/22/2020 06:01 PM       No results for input(s): \"LABURIN\" in the last 72 hours.  No results for input(s): \"BC\" in the last 72 hours.  No results for input(s): \"BLOODCULT2\" in the last 72 hours.      Current Facility-Administered Medications:     metoprolol succinate (TOPROL XL) extended release tablet 150 mg, 150 mg, Oral, BID, 
Subjective:    Chief complaint:  Breathing is a lot better  He has no new complaints  No problems overnight.  Denies chest pain, angina, and dyspnea.  Denies abdominal pain.  Tolerating diet.  No nausea or vomiting.    Objective:    /89   Pulse 96   Temp 96.8 °F (36 °C) (Temporal)   Resp 16   Ht 1.753 m (5' 9\")   Wt 102.7 kg (226 lb 6.4 oz)   SpO2 93%   BMI 33.43 kg/m²   General : Awake ,alert,no distress.  Heart: Irregular, no murmurs, gallops, or rubs.  Lungs:  CTA bilaterally, no wheeze, rales or rhonchi  Abd: bowel sounds present, nontender, nondistended, no masses  Extrem:  No clubbing, cyanosis, or edema    CBC:   Lab Results   Component Value Date/Time    WBC 11.0 04/11/2025 06:22 AM    RBC 3.93 04/11/2025 06:22 AM    HGB 11.6 04/11/2025 06:22 AM    HCT 35.1 04/11/2025 06:22 AM    MCV 89.3 04/11/2025 06:22 AM    MCH 29.5 04/11/2025 06:22 AM    MCHC 33.0 04/11/2025 06:22 AM    RDW 15.0 04/11/2025 06:22 AM    PLT 95 04/11/2025 06:22 AM    MPV 12.3 04/11/2025 06:22 AM     BMP:    Lab Results   Component Value Date/Time     04/11/2025 06:22 AM    K 3.6 04/11/2025 06:22 AM    K 3.6 02/25/2020 03:25 AM     04/11/2025 06:22 AM    CO2 20 04/11/2025 06:22 AM    BUN 40 04/11/2025 06:22 AM    CREATININE 1.7 04/11/2025 06:22 AM    CALCIUM 8.6 04/11/2025 06:22 AM    GFRAA 58 03/09/2020 10:12 AM    LABGLOM 46 04/11/2025 06:22 AM    GLUCOSE 108 04/11/2025 06:22 AM     PT/INR:    Lab Results   Component Value Date/Time    PROTIME 20.8 04/11/2025 06:22 AM    PROTIME 19.6 04/21/2016 08:33 AM    INR 1.9 04/11/2025 06:22 AM     Troponin:    Lab Results   Component Value Date/Time    TROPONINI <0.01 02/22/2020 06:01 PM       No results for input(s): \"LABURIN\" in the last 72 hours.  No results for input(s): \"BC\" in the last 72 hours.  No results for input(s): \"BLOODCULT2\" in the last 72 hours.      Current Facility-Administered Medications:     sodium bicarbonate tablet 650 mg, 650 mg, Oral, BID, [place], 
Subjective:    Chief complaint:  Complaining of left arm swelling  Breathing is okay  Objective:    /82   Pulse (!) 105   Temp 98.4 °F (36.9 °C) (Temporal)   Resp 16   Ht 1.753 m (5' 9\")   Wt 100.7 kg (222 lb)   SpO2 96%   BMI 32.78 kg/m²   General : Awake ,alert,no distress.  Heart: Irregular, no murmurs, gallops, or rubs.  Lungs:  CTA bilaterally, no wheeze, rales or rhonchi  Abd: bowel sounds present, nontender, nondistended, no masses  Extrem:  No clubbing, cyanosis, or edema    CBC:   Lab Results   Component Value Date/Time    WBC 10.3 04/12/2025 08:15 AM    RBC 4.06 04/12/2025 08:15 AM    HGB 11.9 04/12/2025 08:15 AM    HCT 35.9 04/12/2025 08:15 AM    MCV 88.4 04/12/2025 08:15 AM    MCH 29.3 04/12/2025 08:15 AM    MCHC 33.1 04/12/2025 08:15 AM    RDW 14.8 04/12/2025 08:15 AM    PLT 90 04/12/2025 08:15 AM    MPV 12.2 04/12/2025 08:15 AM     BMP:    Lab Results   Component Value Date/Time     04/12/2025 08:15 AM    K 4.0 04/12/2025 08:15 AM    K 3.6 02/25/2020 03:25 AM     04/12/2025 08:15 AM    CO2 22 04/12/2025 08:15 AM    BUN 32 04/12/2025 08:15 AM    CREATININE 1.9 04/12/2025 08:15 AM    CALCIUM 8.7 04/12/2025 08:15 AM    GFRAA 58 03/09/2020 10:12 AM    LABGLOM 40 04/12/2025 08:15 AM    GLUCOSE 111 04/12/2025 08:15 AM     PT/INR:    Lab Results   Component Value Date/Time    PROTIME 21.7 04/12/2025 08:15 AM    PROTIME 19.6 04/21/2016 08:33 AM    INR 2.0 04/12/2025 08:15 AM     Troponin:    Lab Results   Component Value Date/Time    TROPONINI <0.01 02/22/2020 06:01 PM       No results for input(s): \"LABURIN\" in the last 72 hours.  No results for input(s): \"BC\" in the last 72 hours.  No results for input(s): \"BLOODCULT2\" in the last 72 hours.      Current Facility-Administered Medications:     sodium bicarbonate tablet 650 mg, 650 mg, Oral, BID, Gurjit Blackwell MD, 650 mg at 04/12/25 0956    bumetanide (BUMEX) tablet 1 mg, 1 mg, Oral, BID, Julieta Snyder, ARMAND - CNP, 1 mg at 
Subjective:    Chief complaint:  Feeling better  Breathing is okay  No new complaints otherwise  Objective:    BP (!) 128/91   Pulse 98   Temp 97.1 °F (36.2 °C) (Temporal)   Resp 16   Ht 1.753 m (5' 9\")   Wt 99.4 kg (219 lb 3.2 oz)   SpO2 93%   BMI 32.37 kg/m²   General : Awake ,alert,no distress.  Heart: Irregular, no murmurs, gallops, or rubs.  Lungs:  CTA bilaterally, no wheeze, rales or rhonchi  Abd: bowel sounds present, nontender, nondistended, no masses  Extrem:  No clubbing, cyanosis, or edema    CBC:   Lab Results   Component Value Date/Time    WBC 13.0 04/14/2025 05:57 AM    RBC 4.26 04/14/2025 05:57 AM    HGB 12.5 04/14/2025 05:57 AM    HCT 38.2 04/14/2025 05:57 AM    MCV 89.7 04/14/2025 05:57 AM    MCH 29.3 04/14/2025 05:57 AM    MCHC 32.7 04/14/2025 05:57 AM    RDW 15.1 04/14/2025 05:57 AM     04/14/2025 05:57 AM    MPV 11.8 04/14/2025 05:57 AM     BMP:    Lab Results   Component Value Date/Time     04/14/2025 05:57 AM    K 3.7 04/14/2025 05:57 AM    K 3.6 02/25/2020 03:25 AM     04/14/2025 05:57 AM    CO2 24 04/14/2025 05:57 AM    BUN 32 04/14/2025 05:57 AM    CREATININE 1.7 04/14/2025 05:57 AM    CALCIUM 9.1 04/14/2025 05:57 AM    GFRAA 58 03/09/2020 10:12 AM    LABGLOM 43 04/14/2025 05:57 AM    GLUCOSE 134 04/14/2025 05:57 AM     PT/INR:    Lab Results   Component Value Date/Time    PROTIME 22.1 04/14/2025 05:57 AM    PROTIME 19.6 04/21/2016 08:33 AM    INR 2.0 04/14/2025 05:57 AM     Troponin:    Lab Results   Component Value Date/Time    TROPONINI <0.01 02/22/2020 06:01 PM       No results for input(s): \"LABURIN\" in the last 72 hours.  No results for input(s): \"BC\" in the last 72 hours.  No results for input(s): \"BLOODCULT2\" in the last 72 hours.      Current Facility-Administered Medications:     warfarin (COUMADIN) tablet 7.5 mg, 7.5 mg, Oral, Once, Austin Nguyen MD    sodium bicarbonate tablet 650 mg, 650 mg, Oral, BID, Gurjit Blackwell MD, 650 mg at 04/14/25 
Subjective:    Chief complaint:  Feeling better  No new complaints  Objective:    BP (!) 138/92   Pulse 88   Temp 97 °F (36.1 °C) (Temporal)   Resp 18   Ht 1.753 m (5' 9\")   Wt 101 kg (222 lb 10.6 oz)   SpO2 97%   BMI 32.88 kg/m²   General : Awake ,alert,no distress.  Heart: Irregular, no murmurs, gallops, or rubs.  Lungs:  CTA bilaterally, no wheeze, rales or rhonchi  Abd: bowel sounds present, nontender, nondistended, no masses  Extrem:  No clubbing, cyanosis, or edema    CBC:   Lab Results   Component Value Date/Time    WBC 12.0 04/13/2025 06:25 AM    RBC 4.07 04/13/2025 06:25 AM    HGB 12.0 04/13/2025 06:25 AM    HCT 36.7 04/13/2025 06:25 AM    MCV 90.2 04/13/2025 06:25 AM    MCH 29.5 04/13/2025 06:25 AM    MCHC 32.7 04/13/2025 06:25 AM    RDW 15.0 04/13/2025 06:25 AM     04/13/2025 06:25 AM    MPV 12.1 04/13/2025 06:25 AM     BMP:    Lab Results   Component Value Date/Time     04/13/2025 06:25 AM    K 4.7 04/13/2025 06:25 AM    K 3.6 02/25/2020 03:25 AM     04/13/2025 06:25 AM    CO2 24 04/13/2025 06:25 AM    BUN 33 04/13/2025 06:25 AM    CREATININE 2.0 04/13/2025 06:25 AM    CALCIUM 9.1 04/13/2025 06:25 AM    GFRAA 58 03/09/2020 10:12 AM    LABGLOM 36 04/13/2025 06:25 AM    GLUCOSE 120 04/13/2025 06:25 AM     PT/INR:    Lab Results   Component Value Date/Time    PROTIME 23.3 04/13/2025 06:25 AM    PROTIME 19.6 04/21/2016 08:33 AM    INR 2.1 04/13/2025 06:25 AM     Troponin:    Lab Results   Component Value Date/Time    TROPONINI <0.01 02/22/2020 06:01 PM       No results for input(s): \"LABURIN\" in the last 72 hours.  No results for input(s): \"BC\" in the last 72 hours.  No results for input(s): \"BLOODCULT2\" in the last 72 hours.      Current Facility-Administered Medications:     warfarin (COUMADIN) tablet 5 mg, 5 mg, Oral, Once, Austin Nguyen MD    sodium bicarbonate tablet 650 mg, 650 mg, Oral, BID, Gurjit Blackwell MD, 650 mg at 04/13/25 0845    bumetanide (BUMEX) tablet 1 
Subjective:    Chief complaint:  Has no new complaints  Breathing is stable  Objective:    BP (!) 150/95   Pulse 87   Temp 98.8 °F (37.1 °C) (Temporal)   Resp 18   Ht 1.753 m (5' 9\")   Wt 99.2 kg (218 lb 12.8 oz)   SpO2 96%   BMI 32.31 kg/m²   General : Awake ,alert,no distress.  Heart: Irregular, no murmurs, gallops, or rubs.  Lungs:  CTA bilaterally, no wheeze, rales or rhonchi  Abd: bowel sounds present, nontender, nondistended, no masses  Extrem:  No clubbing, cyanosis, or edema    CBC:   Lab Results   Component Value Date/Time    WBC 12.2 04/15/2025 04:55 AM    RBC 3.96 04/15/2025 04:55 AM    HGB 11.6 04/15/2025 04:55 AM    HCT 35.5 04/15/2025 04:55 AM    MCV 89.6 04/15/2025 04:55 AM    MCH 29.3 04/15/2025 04:55 AM    MCHC 32.7 04/15/2025 04:55 AM    RDW 15.0 04/15/2025 04:55 AM     04/15/2025 04:55 AM    MPV 11.9 04/15/2025 04:55 AM     BMP:    Lab Results   Component Value Date/Time     04/15/2025 04:55 AM    K 3.7 04/15/2025 04:55 AM    K 3.6 02/25/2020 03:25 AM     04/15/2025 04:55 AM    CO2 23 04/15/2025 04:55 AM    BUN 34 04/15/2025 04:55 AM    CREATININE 1.8 04/15/2025 04:55 AM    CALCIUM 8.8 04/15/2025 04:55 AM    GFRAA 58 03/09/2020 10:12 AM    LABGLOM 40 04/15/2025 04:55 AM    GLUCOSE 114 04/15/2025 04:55 AM     PT/INR:    Lab Results   Component Value Date/Time    PROTIME 22.4 04/15/2025 04:55 AM    PROTIME 19.6 04/21/2016 08:33 AM    INR 2.0 04/15/2025 04:55 AM     Troponin:    Lab Results   Component Value Date/Time    TROPONINI <0.01 02/22/2020 06:01 PM       No results for input(s): \"LABURIN\" in the last 72 hours.  No results for input(s): \"BC\" in the last 72 hours.  No results for input(s): \"BLOODCULT2\" in the last 72 hours.      Current Facility-Administered Medications:     sodium bicarbonate tablet 650 mg, 650 mg, Oral, BID, Gurjit Blackwell MD, 650 mg at 04/15/25 0827    bumetanide (BUMEX) tablet 1 mg, 1 mg, Oral, BID, Julieta Snyder, ARMAND - CNP, 1 mg at 
Subjective:    Chief complaint:  Patient overall is feeling better  No problems overnight.  Denies chest pain, angina, and dyspnea.  Denies abdominal pain.  Tolerating diet.  No nausea or vomiting.    Objective:    /69   Pulse 100   Temp 97.9 °F (36.6 °C) (Temporal)   Resp 18   Ht 1.753 m (5' 9\")   Wt 103 kg (227 lb 1.6 oz)   SpO2 94%   BMI 33.54 kg/m²   General : Awake ,alert,no distress.  Heart: Irregular, no murmurs, gallops, or rubs.  Lungs:  CTA bilaterally, no wheeze, rales or rhonchi  Abd: bowel sounds present, nontender, nondistended, no masses  Extrem:  No clubbing, cyanosis, or edema    CBC:   Lab Results   Component Value Date/Time    WBC 10.9 04/10/2025 06:48 AM    RBC 4.28 04/10/2025 06:48 AM    HGB 12.6 04/10/2025 06:48 AM    HCT 38.3 04/10/2025 06:48 AM    MCV 89.5 04/10/2025 06:48 AM    MCH 29.4 04/10/2025 06:48 AM    MCHC 32.9 04/10/2025 06:48 AM    RDW 15.0 04/10/2025 06:48 AM    PLT 92 04/10/2025 06:48 AM    MPV 12.9 04/10/2025 06:48 AM     BMP:    Lab Results   Component Value Date/Time     04/10/2025 06:48 AM    K 3.8 04/10/2025 06:48 AM    K 3.6 02/25/2020 03:25 AM     04/10/2025 06:48 AM    CO2 20 04/10/2025 06:48 AM    BUN 45 04/10/2025 06:48 AM    CREATININE 1.8 04/10/2025 06:48 AM    CALCIUM 8.8 04/10/2025 06:48 AM    GFRAA 58 03/09/2020 10:12 AM    LABGLOM 42 04/10/2025 06:48 AM    GLUCOSE 109 04/10/2025 06:48 AM     PT/INR:    Lab Results   Component Value Date/Time    PROTIME 19.3 04/10/2025 06:48 AM    PROTIME 19.6 04/21/2016 08:33 AM    INR 1.7 04/10/2025 06:48 AM     Troponin:    Lab Results   Component Value Date/Time    TROPONINI <0.01 02/22/2020 06:01 PM       No results for input(s): \"LABURIN\" in the last 72 hours.  No results for input(s): \"BC\" in the last 72 hours.  No results for input(s): \"BLOODCULT2\" in the last 72 hours.      Current Facility-Administered Medications:     acetaminophen (TYLENOL) tablet 650 mg, 650 mg, Oral, Q6H PRN, [place], 
[facility]  Nephrology Progress Note    Patient's Name: Rober Hartley    History of Present Illness from 4/8 Consult Note:    \"Rober Hartley is a 64 y.o. male with a past medical history of CHF, CKD, diabetes mellitus, and hypertension.  He presented to the ED on 4/7 reportedly for concerns of shortness of breath.  Vital signs on 4/7 include temperature 97.8, respirations 18, pulse 125, /97, and he was 97% SpO2.  Lab data on 4/7 includes potassium 5.3, CO2 19, BUN 31, creatinine 1.8, troponin 33, proBNP 10,977, and WBC 15.9 K.  He had a respiratory panel which was negative.  Chest x-ray from 4/7 showed cardiomegaly with pulmonary vascular congestion.  Pulmonary CTA with contrast on 4/7 showed no evidence of PE, diffuse interstitial scarring/fibrosis/edema with patchy infiltrates in the left lower lobe, findings may reflect CHF, small bilateral pleural effusions.  Cardiology has been consulted to see the patient.  Nephrology has been consulted to see the patient for concerns of LIZ.  He appears to have a baseline serum creatinine of 1.6-2 mg/dL.  At present, patient was seen and examined.  He came in reportedly because he could not breathe.  He denies NSAIDs.  He denies any chest pain or cough.  He denies any nausea, vomiting, or diarrhea.\"    Subjective:    4/10/2025: Patient was seen and examined.  He notes that his breathing is better.  He denies any diarrhea or nausea.    PMH:    Past Medical History:   Diagnosis Date    CHF (congestive heart failure) ([facility])     CKD (chronic kidney disease)     DM2 (diabetes mellitus, type 2) ([facility])     DVT (deep venous thrombosis) ([facility]) 01/01/2008    HTN (hypertension), benign     Nonischemic cardiomyopathy ([facility]) 01/01/2008    PE (pulmonary embolism) 01/01/2008       No past surgical history on file.    Patient Active Problem List   Diagnosis    History of DVT (deep vein thrombosis)    History of pulmonary embolism    Type 2 diabetes mellitus with obesity ([facility])    
[facility]  Nephrology Progress Note    Patient's Name: Rober Hartley    History of Present Illness from 4/8 Consult Note:    \"Rober Hartley is a 64 y.o. male with a past medical history of CHF, CKD, diabetes mellitus, and hypertension.  He presented to the ED on 4/7 reportedly for concerns of shortness of breath.  Vital signs on 4/7 include temperature 97.8, respirations 18, pulse 125, /97, and he was 97% SpO2.  Lab data on 4/7 includes potassium 5.3, CO2 19, BUN 31, creatinine 1.8, troponin 33, proBNP 10,977, and WBC 15.9 K.  He had a respiratory panel which was negative.  Chest x-ray from 4/7 showed cardiomegaly with pulmonary vascular congestion.  Pulmonary CTA with contrast on 4/7 showed no evidence of PE, diffuse interstitial scarring/fibrosis/edema with patchy infiltrates in the left lower lobe, findings may reflect CHF, small bilateral pleural effusions.  Cardiology has been consulted to see the patient.  Nephrology has been consulted to see the patient for concerns of LIZ.  He appears to have a baseline serum creatinine of 1.6-2 mg/dL.  At present, patient was seen and examined.  He came in reportedly because he could not breathe.  He denies NSAIDs.  He denies any chest pain or cough.  He denies any nausea, vomiting, or diarrhea.\"    Subjective:    4/11/2025: Patient was seen and examined.  He is sitting at the edge of bed and reports that he feels tired.  He notes that his breathing is better.    PMH:    Past Medical History:   Diagnosis Date    CHF (congestive heart failure) ([facility])     CKD (chronic kidney disease)     DM2 (diabetes mellitus, type 2) ([facility])     DVT (deep venous thrombosis) ([facility]) 01/01/2008    HTN (hypertension), benign     Nonischemic cardiomyopathy ([facility]) 01/01/2008    PE (pulmonary embolism) 01/01/2008       No past surgical history on file.    Patient Active Problem List   Diagnosis    History of DVT (deep vein thrombosis)    History of pulmonary embolism    Type 2 diabetes 
[facility]  Nephrology Progress Note    Patient's Name: Rober Hartley    History of Present Illness from 4/8 Consult Note:    \"Rober Hartley is a 64 y.o. male with a past medical history of CHF, CKD, diabetes mellitus, and hypertension.  He presented to the ED on 4/7 reportedly for concerns of shortness of breath.  Vital signs on 4/7 include temperature 97.8, respirations 18, pulse 125, /97, and he was 97% SpO2.  Lab data on 4/7 includes potassium 5.3, CO2 19, BUN 31, creatinine 1.8, troponin 33, proBNP 10,977, and WBC 15.9 K.  He had a respiratory panel which was negative.  Chest x-ray from 4/7 showed cardiomegaly with pulmonary vascular congestion.  Pulmonary CTA with contrast on 4/7 showed no evidence of PE, diffuse interstitial scarring/fibrosis/edema with patchy infiltrates in the left lower lobe, findings may reflect CHF, small bilateral pleural effusions.  Cardiology has been consulted to see the patient.  Nephrology has been consulted to see the patient for concerns of LIZ.  He appears to have a baseline serum creatinine of 1.6-2 mg/dL.  At present, patient was seen and examined.  He came in reportedly because he could not breathe.  He denies NSAIDs.  He denies any chest pain or cough.  He denies any nausea, vomiting, or diarrhea.\"    Subjective:    4/13/2025: Patient was seen and examined.  He reports that he feels tired.  He notes that his breathing is okay.  He denies any shortness of breath.    PMH:    Past Medical History:   Diagnosis Date    CHF (congestive heart failure) ([facility])     CKD (chronic kidney disease)     DM2 (diabetes mellitus, type 2) ([facility])     DVT (deep venous thrombosis) ([facility]) 01/01/2008    HTN (hypertension), benign     Nonischemic cardiomyopathy ([facility]) 01/01/2008    PE (pulmonary embolism) 01/01/2008       No past surgical history on file.    Patient Active Problem List   Diagnosis    History of DVT (deep vein thrombosis)    History of pulmonary embolism    Type 2 diabetes 
[facility]  Nephrology Progress Note    Patient's Name: Rober Hartley    History of Present Illness from 4/8 Consult Note:    \"Rober Hartley is a 64 y.o. male with a past medical history of CHF, CKD, diabetes mellitus, and hypertension.  He presented to the ED on 4/7 reportedly for concerns of shortness of breath.  Vital signs on 4/7 include temperature 97.8, respirations 18, pulse 125, /97, and he was 97% SpO2.  Lab data on 4/7 includes potassium 5.3, CO2 19, BUN 31, creatinine 1.8, troponin 33, proBNP 10,977, and WBC 15.9 K.  He had a respiratory panel which was negative.  Chest x-ray from 4/7 showed cardiomegaly with pulmonary vascular congestion.  Pulmonary CTA with contrast on 4/7 showed no evidence of PE, diffuse interstitial scarring/fibrosis/edema with patchy infiltrates in the left lower lobe, findings may reflect CHF, small bilateral pleural effusions.  Cardiology has been consulted to see the patient.  Nephrology has been consulted to see the patient for concerns of LIZ.  He appears to have a baseline serum creatinine of 1.6-2 mg/dL.  At present, patient was seen and examined.  He came in reportedly because he could not breathe.  He denies NSAIDs.  He denies any chest pain or cough.  He denies any nausea, vomiting, or diarrhea.\"    Subjective:    4/9/2025: Patient was seen and examined.  He notes that he feels a lot better overall.  He notes a little bit of shortness of breath.  He denies any nausea.    PMH:    Past Medical History:   Diagnosis Date    CHF (congestive heart failure) ([facility])     CKD (chronic kidney disease)     DM2 (diabetes mellitus, type 2) ([facility])     DVT (deep venous thrombosis) ([facility]) 01/01/2008    HTN (hypertension), benign     Nonischemic cardiomyopathy ([facility]) 01/01/2008    PE (pulmonary embolism) 01/01/2008       No past surgical history on file.    Patient Active Problem List   Diagnosis    History of DVT (deep vein thrombosis)    History of pulmonary embolism    Type 2 
obesity (HCC)    Hyperlipidemia    Noncompliance with medication regimen    Non morbid obesity due to excess calories    Warfarin anticoagulation    Atrial fibrillation with RVR (HCC)    Congestive heart failure (HCC)    Dyspnea and respiratory abnormalities    Essential hypertension    DERIC (obstructive sleep apnea)    Acute decompensated heart failure (HCC)    NICM (nonischemic cardiomyopathy) (HCC)    Benign prostatic hyperplasia without lower urinary tract symptoms    Iron deficiency anemia, unspecified    Acute exacerbation of chronic heart failure (HCC)    Permanent atrial fibrillation (HCC)    Thrombocytopenia    Poorly controlled type 2 diabetes mellitus (HCC)    Atrial fibrillation (HCC)    Dietary noncompliance    Controlled type 2 diabetes mellitus without complication    Hypoxia       Diet:    ADULT DIET; Regular; 5 carb choices (75 gm/meal); Low Sodium (2 gm); Low Potassium (Less than 3000 mg/day)    Meds:     sodium bicarbonate  650 mg Oral BID    bumetanide  1 mg Oral BID    metoprolol succinate  150 mg Oral BID    [Held by provider] metFORMIN  1,000 mg Oral BID WC    ferrous sulfate  325 mg Oral Daily    atorvastatin  40 mg Oral Daily    [Held by provider] sacubitril-valsartan  1 tablet Oral BID    glipiZIDE  2.5 mg Oral Daily    insulin lispro  0-8 Units SubCUTAneous 4x Daily AC & HS    warfarin placeholder: dosing by pharmacy   Oral RX Placeholder        dextrose         Meds prn:     acetaminophen, glucose, dextrose bolus **OR** dextrose bolus, glucagon (rDNA), dextrose    Meds prior to admission:     No current facility-administered medications on file prior to encounter.     Current Outpatient Medications on File Prior to Encounter   Medication Sig Dispense Refill    warfarin (COUMADIN) 5 MG tablet Take 1 tablet by mouth daily      warfarin (COUMADIN) 5 MG tablet Take 5 mg (1 pill) daily, take and extra 2.5 mg (1/2 pill) as directed by your primary physician based on your INR results 30 tablet 0 
1513 (!) 150/94 97.8 °F (36.6 °C) Temporal 86 20 97 % --   04/11/25 1146 129/89 -- -- -- -- -- --   04/11/25 1138 (!) 130/96 96.8 °F (36 °C) Temporal 96 16 93 % --   04/11/25 1015 126/86 -- -- (!) 109 -- -- --         Intake/Output Summary (Last 24 hours) at 4/12/2025 0935  Last data filed at 4/12/2025 0549  Gross per 24 hour   Intake 380.52 ml   Output 1450 ml   Net -1069.48 ml       General: Awake, alert, no acute distress  Neck: No JVD noted  Lungs: clear bilaterally upper, diminished to the bases bilaterally.  Unlabored  CV: Irregular.  No rub  Abd: Soft, nontender, nondistended.  Active bowel sounds  Skin: Warm and dry.  No rash on exposed extremities  Ext: 1-2+ BLE edema  Neuro: Awake, answers questions appropriately    Data:    Recent Labs     04/10/25  0648 04/11/25  0622 04/12/25  0815   WBC 10.9 11.0 10.3   HGB 12.6 11.6* 11.9*   HCT 38.3 35.1* 35.9*   MCV 89.5 89.3 88.4   PLT 92* 95* 90*       Recent Labs     04/10/25  0648 04/11/25  0622    139   K 3.8 3.6    104   CO2 20* 20*   CREATININE 1.8* 1.7*   BUN 45* 40*   LABGLOM 42* 46*   GLUCOSE 109* 108*   CALCIUM 8.8 8.6   PHOS 3.3 3.4   MG 2.2 2.1       No results found for: \"VITD25\"    No results found for: \"PTH\"    Recent Labs     04/10/25  0648 04/11/25  0622   ALT 43* 41*   AST 29 20   ALKPHOS 124 121   BILITOT 1.4* 1.0       No results for input(s): \"LABALBU\" in the last 72 hours.    Ferritin   Date Value Ref Range Status   03/09/2020 77 ng/mL Final     Comment:     FERRITIN Reference Ranges:  Adult Males   20 - 60 yrars:    30 - 400 ng/mL  Adult females 17 - 60 years:    13 - 150 ng/mL  Adults greater than 60 years:   no established reference range  Pediatrics:                     no established reference range       Iron   Date Value Ref Range Status   03/09/2020 55 (L) 59 - 158 mcg/dL Final     TIBC   Date Value Ref Range Status   03/09/2020 294 250 - 450 mcg/dL Final       No results found for: \"GAGUEGKC05\"    No results found for:

## 2025-04-15 NOTE — CARE COORDINATION
[facility] Quality Flow/Interdisciplinary Rounds Progress Note        Quality Flow Rounds held on April 12, 2025    Disciplines Attending:  Bedside Nurse and Nursing Unit Leadership    Rober Hartley was admitted on 4/7/2025 11:35 AM    Anticipated Discharge Date:       Disposition:    Rahul Score:  Rahul Scale Score: 20    [facility] RISK OF UNPLANNED READMISSION 2.0             19.4 Total Score        Discussed patient goal for the day, patient clinical progression, and barriers to discharge.  The following Goal(s) of the Day/Commitment(s) have been identified:  Diagnostics - Report Results      Ida Luu RN  April 12, 2025        
  [facility] Quality Flow/Interdisciplinary Rounds Progress Note        Quality Flow Rounds held on April 13, 2025    Disciplines Attending:  Bedside Nurse and Nursing Unit Leadership    Rober Hartley was admitted on 4/7/2025 11:35 AM    Anticipated Discharge Date:       Disposition:    Rahul Score:  Rahul Scale Score: 22    [facility] RISK OF UNPLANNED READMISSION 2.0             17.9 Total Score        Discussed patient goal for the day, patient clinical progression, and barriers to discharge.  The following Goal(s) of the Day/Commitment(s) have been identified:  Diagnostics - Report Results      Ida Luu RN  April 13, 2025        
4/10/25  Transition of Care update.  Patient continues on a cardizem drip.  Patient is pending a consult with pulmonology and planned for a CTA as well as a CXR. Labs reviewed with BUN 45, Cr 1.8 and GFR of 42. Nephrology is following.  Patient is receiving IV dextrose and IV Lasix. Pharmacy is dosing Warfarin with INR of 1.7. Patient note to have an INR machine at home supplied by "[employer] Discharge goal is home when medically stable. [facility] is following for post discharge support. Home care orders will need placed prior to discharge.  KATHARINE/ARSENIO to follow.    Electronically signed by ELIAZAR Perez on 4/10/2025 at 2:01 PM   
4/11/25  Transition of Care update. Patient continues on a cardizem drip as well as IV Lasix. Pharmacy is dosing Warfarin with INR of 1.9. Labs reviewed with BUN 40, Cr 1.7 GFR 46 with nephrology following. Discharge goal is home when medically stable. Patient note to have an INR machine at home supplied by [facility]. [facility] is following for post discharge support. Home care orders requested and being placed. KATHARINE/ARSENIO to follow.    2:00pm  Patient seen by JOSEPH Mujica from cardiology. Patient is eligible for the [employer] Heart failure management system (HFMS).  Patient is agreeable with order placed. (HFMS) to be mailed to patients home.     Electronically signed by ELIAZAR Perez on 4/11/2025 at 11:13 AM   
4/14/25  Transition of Care update. Labs reviewed with BUN 32, Cr 1.7 GFR 43. Patient on Warfarin with INR noted to be 2.0 today. Patient had an US of his arm for superficial clot that was negative for DVT. Patient up independent in the room on room air. [facility] following for post discharge needs with  home care orders in place for skilled nursing for vital sign monitoring, medication compliance, cardiopulmonary monitoring and Labs with INR as ordered. Patient has an INR machine at home supplied by [employer]. Discharge goal is home when medically stable. SW/ARSENIO to follow.     Electronically signed by ELIAZAR Perez on 4/14/2025 at 12:43 PM   
4/15/25  Transition of Care update.  Discharge order noted.  Patient up independent in the room on room air. [facility] following for post discharge needs with  home care orders in place for skilled nursing for vital sign monitoring, medication compliance, cardiopulmonary monitoring and Labs with INR as ordered. Patient has an INR machine at home supplied by [employer]. Call placed to [facility] to update on discharge. Discharge goal is home. KATHARINE/ARSENIO to follow.    Electronically signed by ELIAZAR Perez on 4/15/2025 at 10:49 AM   
4/9/25 Transition of Care update. Patient admitted for A-Fib with RVR, hypoxia and CHF. Patient is on a Cardizem drip. Pharmacy is following for Warfarin and INR noted to be 2.0 today.  Patient has an INR machine at home supplied by [employer].Patient receiving lasix to help diurese.    [facility] following for increased Cr of 2.1. Patient is planned for US of Retroperitoneal.  Patient is on 2 liters of 02 with attempt to wean as patient does not wear 02 in the community.  [facility] following for post discharge needs and will need orders placed prior to discharge. Discharge goal is home when medically stable. KATHARINE/ARSENIO to follow.    Electronically signed by ELIAZAR Perez on 4/9/2025 at 10:18 AM    
[R09.02]  Atrial fibrillation with RVR (HCC) [I48.91]  Acute on chronic congestive heart failure, unspecified heart failure type (HCC) [I50.9]    IF APPLICABLE: The Patient and/or patient representative Rober and his family were provided with a choice of provider and agrees with the discharge plan. Freedom of choice list with basic dialogue that supports the patient's individualized plan of care/goals and shares the quality data associated with the providers was provided to:     Patient Representative Name:       The Patient and/or Patient Representative Agree with the Discharge Plan? yes     ELIAZAR Perez  Case Management Department  Ph: [phone] Fax:

## 2025-04-15 NOTE — PLAN OF CARE
Problem: Chronic Conditions and Co-morbidities  Goal: Patient's chronic conditions and co-morbidity symptoms are monitored and maintained or improved  4/14/2025 2152 by Kemi Felton RN  Outcome: Progressing     Problem: Discharge Planning  Goal: Discharge to home or other facility with appropriate resources  4/14/2025 2152 by Kemi Felton RN  Outcome: Progressing     Problem: Safety - Adult  Goal: Free from fall injury  4/14/2025 2152 by Kemi Felton RN  Outcome: Progressing     Problem: ABCDS Injury Assessment  Goal: Absence of physical injury  Outcome: Progressing     Problem: Pain  Goal: Verbalizes/displays adequate comfort level or baseline comfort level  Outcome: Progressing

## 2025-04-17 ENCOUNTER — HOSPITAL ENCOUNTER (OUTPATIENT)
Dept: OTHER | Age: 65
Setting detail: THERAPIES SERIES
Discharge: HOME | End: 2025-04-17

## 2025-04-25 ENCOUNTER — RESULTS FOLLOW-UP (OUTPATIENT)
Age: 65
End: 2025-04-25

## 2025-04-25 ENCOUNTER — HOSPITAL ENCOUNTER (OUTPATIENT)
Dept: OTHER | Age: 65
Setting detail: THERAPIES SERIES
Discharge: HOME | End: 2025-04-25
Payer: MEDICARE

## 2025-04-25 VITALS
HEART RATE: 95 BPM | OXYGEN SATURATION: 98 % | WEIGHT: 227 LBS | SYSTOLIC BLOOD PRESSURE: 129 MMHG | BODY MASS INDEX: 33.52 KG/M2 | DIASTOLIC BLOOD PRESSURE: 80 MMHG | RESPIRATION RATE: 18 BRPM

## 2025-04-25 LAB
ANION GAP SERPL CALCULATED.3IONS-SCNC: 12 MMOL/L (ref 7–16)
BNP SERPL-MCNC: 1862 PG/ML (ref 0–125)
BUN SERPL-MCNC: 38 MG/DL (ref 6–23)
CALCIUM SERPL-MCNC: 9.6 MG/DL (ref 8.6–10.2)
CHLORIDE SERPL-SCNC: 103 MMOL/L (ref 98–107)
CO2 SERPL-SCNC: 25 MMOL/L (ref 22–29)
CREAT SERPL-MCNC: 1.7 MG/DL (ref 0.7–1.2)
GFR, ESTIMATED: 45 ML/MIN/1.73M2
GLUCOSE SERPL-MCNC: 212 MG/DL (ref 74–99)
POTASSIUM SERPL-SCNC: 5 MMOL/L (ref 3.5–5)
SODIUM SERPL-SCNC: 140 MMOL/L (ref 132–146)

## 2025-04-25 PROCEDURE — 36415 COLL VENOUS BLD VENIPUNCTURE: CPT

## 2025-04-25 PROCEDURE — 6360000002 HC RX W HCPCS

## 2025-04-25 PROCEDURE — 2500000003 HC RX 250 WO HCPCS: Performed by: INTERNAL MEDICINE

## 2025-04-25 PROCEDURE — 99205 OFFICE O/P NEW HI 60 MIN: CPT

## 2025-04-25 PROCEDURE — 83880 ASSAY OF NATRIURETIC PEPTIDE: CPT

## 2025-04-25 PROCEDURE — 96374 THER/PROPH/DIAG INJ IV PUSH: CPT

## 2025-04-25 PROCEDURE — 80048 BASIC METABOLIC PNL TOTAL CA: CPT

## 2025-04-25 RX ORDER — BUMETANIDE 0.25 MG/ML
INJECTION, SOLUTION INTRAMUSCULAR; INTRAVENOUS
Status: COMPLETED
Start: 2025-04-25 | End: 2025-04-25

## 2025-04-25 RX ORDER — BUMETANIDE 1 MG/1
1 TABLET ORAL DAILY
Qty: 30 TABLET | Refills: 3 | Status: CANCELLED | OUTPATIENT
Start: 2025-04-25

## 2025-04-25 RX ORDER — BUMETANIDE 0.25 MG/ML
2 INJECTION, SOLUTION INTRAMUSCULAR; INTRAVENOUS ONCE
Status: COMPLETED | OUTPATIENT
Start: 2025-04-25 | End: 2025-04-25

## 2025-04-25 RX ORDER — SODIUM CHLORIDE 0.9 % (FLUSH) 0.9 %
10 SYRINGE (ML) INJECTION ONCE
Status: COMPLETED | OUTPATIENT
Start: 2025-04-25 | End: 2025-04-25

## 2025-04-25 RX ADMIN — BUMETANIDE 2 MG: 0.25 INJECTION, SOLUTION INTRAMUSCULAR; INTRAVENOUS at 13:09

## 2025-04-25 RX ADMIN — BUMETANIDE 2 MG: 0.25 INJECTION INTRAMUSCULAR; INTRAVENOUS at 13:09

## 2025-04-25 RX ADMIN — SODIUM CHLORIDE, PRESERVATIVE FREE 10 ML: 5 INJECTION INTRAVENOUS at 13:10

## 2025-04-25 NOTE — RESULT ENCOUNTER NOTE
Labs and CHF Clinic note reviewed    Wt Readings from Last 3 Encounters:  04/25/25 : 103 kg (227 lb)  04/15/25 : 99.2 kg (218 lb 12.8 oz)  03/28/25 : 105.2 kg (232 lb)    BP Readings from Last 1 Encounters:  04/25/25 : 129/80    Pulse Readings from Last 1 Encounters:  04/25/25 : 95      Current GDMT:  ARNI/ACE I/ARB: Entresto 49/51 mg BID  Hydralazine/Nitrates : No  Beta blocker: Metoprolol Succinate (Toprol) 150 mg BID  Aldosterone antagonist: No (hx of hyperkalemia)  SGLT2i: No  Diuretic: Bumex 1 mg BID  Supplemental Potassium: KDUR 20 meq daily       Stop potassium supplement   Decrease Bumex 1 mg daily   Start Jardiance 10 mg daily   Follow up in CHF clinic as scheduled    Thank you

## 2025-04-25 NOTE — PROGRESS NOTES
Congestive Heart Failure Clinic   [facility]      Referring Provider: Dr. Dennis   Primary Care Physician: Victor Hugo Hampton MD   Cardiologist: Dr. Hampton   Nephrologist:       HISTORY OF PRESENT ILLNESS:     Rober Hartley is a 64 y.o. (1960) male with a history of HFmrEF(EF 41%-49%)  Pre Cupid:     Lab Results   Component Value Date    LVEF 40 03/10/2025     Post Cupid:  No results found for: \"EFBP\"      He presents to the CHF clinic for ongoing evaluation and monitoring of heart failure.    In the CHF clinic today he denies any adverse symptoms except:  [] Dizziness or lightheadedness   [] Syncope or near syncope  [] Recent Fall  [] Shortness of breath at rest   [] Dyspnea with exertion  [] Decline in functional capacity (unable to perform activities they had previously been able to do)  [] Fatigue   [] Orthopnea  [] PND  [] Nocturnal cough  [] Hemoptysis  [] Chest pain, pressure, or discomfort  [] Palpitations  [] Abdominal distention  [] Abdominal bloating  [] Early satiety  [] Blood in stool   [] Diarrhea  [] Constipation  [] Nausea/Vomiting  [] Decreased urinary response to oral diuretic   [] Scrotal swelling   [] Lower extremity edema  [] Used PRN doses of oral diuretic   [] Weight gain    Wt Readings from Last 3 Encounters:   04/25/25 103 kg (227 lb)   04/15/25 99.2 kg (218 lb 12.8 oz)   03/28/25 105.2 kg (232 lb)           SOCIAL HISTORY:  [x] Denies tobacco, alcohol or illicit drug abuse  [] Tobacco use:  [] ETOH use:  [] Illicit drug use:        MEDICATIONS:    Allergies   Allergen Reactions    Penicillins Rash     Prior to Visit Medications    Medication Sig Taking? Authorizing Provider   sodium bicarbonate 650 MG tablet Take 1 tablet by mouth 2 times daily Yes Austin Nguyen MD   metoprolol succinate (TOPROL XL) 50 MG extended release tablet Take 3 tablets by mouth 2 times daily Yes Austin Nguyen MD   bumetanide

## 2025-04-25 NOTE — DISCHARGE INSTRUCTIONS
HEART FAILURE  / CONGESTIVE HEART FAILURE  DISCHARGE INSTRUCTIONS:  GUIDELINES TO FOLLOW AT HOME    Future Appointments   Date Time Provider Department Center   4/25/2025 12:45 PM [facility]   4/30/2025 11:15 AM Esther Quezada PA [facility]   10/2/2025  2:00 PM Jovany Hampton, DO [facility]       Self- Managed Care:     MEDICATIONS:  Take your medication as directed. If you are experiencing any side effects, inform your doctor, Do not stop taking any of your medications without letting your doctor know.   Check with your doctor before taking any over-the-counter medications / herbal / or dietary supplements. They may interfere with your other medications.  Do not take ibuprofen (Advil or Motrin) and naproxen (Aleve) without talking to your doctor first. They could make your heart failure worse.         WEIGHT MONITORING:   Weigh yourself everyday (with the same scale) around the same time of the day and write it down. (you can chart them on a calendar or keep track of them on paper.   Notify your doctor of a weight gain of 3 pounds or more in 1 day   OR a total of 5 pounds or more in 1 week    Take your weight record to your doctor visits  Also, the same goes if you loose more than 3# in one day, let your heart doctor know.         DIET:   Cardiac heart healthy diet- Low saturated / low trans fat, no added salt, caffeine restricted, Low sodium diet-   No more than 2,000mg (2 grams) of salt / sodium per day (which equals to a little less than  a teaspoon of salt)  If your doctor wants you on a fluid restriction...it is usually recommended a fluid limit of 2,000cc -  Fluid restriction- 2,000 ml (milliliters) = 64 ounces = you can have 8 glasses of fluid per day (each glass 8 ounces)    Follow a low salt diet - avoid using salt at the table, avoid / limit use of canned soups, processed / packaged foods, salted snacks, olives and pickles.  Do not use a salt

## 2025-04-28 ENCOUNTER — TELEPHONE (OUTPATIENT)
Dept: OTHER | Age: 65
End: 2025-04-28

## 2025-04-28 RX ORDER — BUMETANIDE 1 MG/1
1 TABLET ORAL DAILY
Qty: 30 TABLET | Refills: 3 | Status: SHIPPED | OUTPATIENT
Start: 2025-04-28 | End: 2025-05-07 | Stop reason: ALTCHOICE

## 2025-04-28 RX ORDER — DAPAGLIFLOZIN 10 MG/1
10 TABLET, FILM COATED ORAL EVERY MORNING
Qty: 30 TABLET | Refills: 3 | Status: SHIPPED | OUTPATIENT
Start: 2025-04-28 | End: 2025-04-30

## 2025-04-28 NOTE — TELEPHONE ENCOUNTER
Spoke to patient regarding medication instructions Per JAYLYN Boone.    Stop potassium supplement   Decrease Bumex 1 mg daily   Start Jardiance 10 mg daily   Follow up in CHF clinic as scheduled    Per patient he understands these instructions.

## 2025-04-28 NOTE — TELEPHONE ENCOUNTER
Co pay for farxiga is $276.69 for 30 days. She said this is d/t deductible.   The co pay or Jardiance is $297.

## 2025-04-30 ENCOUNTER — OFFICE VISIT (OUTPATIENT)
Age: 65
End: 2025-04-30
Payer: MEDICARE

## 2025-04-30 VITALS
BODY MASS INDEX: 34.41 KG/M2 | SYSTOLIC BLOOD PRESSURE: 118 MMHG | OXYGEN SATURATION: 98 % | HEART RATE: 92 BPM | RESPIRATION RATE: 16 BRPM | DIASTOLIC BLOOD PRESSURE: 80 MMHG | WEIGHT: 233 LBS

## 2025-04-30 DIAGNOSIS — N18.30 STAGE 3 CHRONIC KIDNEY DISEASE, UNSPECIFIED WHETHER STAGE 3A OR 3B CKD (HCC): ICD-10-CM

## 2025-04-30 DIAGNOSIS — I50.23 ACUTE ON CHRONIC HFREF (HEART FAILURE WITH REDUCED EJECTION FRACTION) (HCC): ICD-10-CM

## 2025-04-30 DIAGNOSIS — I48.21 PERMANENT ATRIAL FIBRILLATION (HCC): ICD-10-CM

## 2025-04-30 DIAGNOSIS — G47.33 OSA (OBSTRUCTIVE SLEEP APNEA): ICD-10-CM

## 2025-04-30 DIAGNOSIS — I42.8 NONISCHEMIC CARDIOMYOPATHY (HCC): ICD-10-CM

## 2025-04-30 DIAGNOSIS — I51.89 RIGHT VENTRICULAR SYSTOLIC DYSFUNCTION: ICD-10-CM

## 2025-04-30 LAB
ANION GAP SERPL CALCULATED.3IONS-SCNC: 11 MMOL/L (ref 7–16)
BUN BLDV-MCNC: 40 MG/DL (ref 8–23)
CALCIUM SERPL-MCNC: 9.3 MG/DL (ref 8.8–10.2)
CHLORIDE BLD-SCNC: 103 MMOL/L (ref 98–107)
CO2: 25 MMOL/L (ref 22–29)
CREAT SERPL-MCNC: 1.8 MG/DL (ref 0.7–1.2)
GFR, ESTIMATED: 42 ML/MIN/1.73M2
GLUCOSE BLD-MCNC: 215 MG/DL (ref 74–99)
NT PRO BNP: 4413 PG/ML (ref 0–125)
POTASSIUM SERPL-SCNC: 5.1 MMOL/L (ref 3.5–5.1)
SODIUM BLD-SCNC: 139 MMOL/L (ref 136–145)

## 2025-04-30 PROCEDURE — 36415 COLL VENOUS BLD VENIPUNCTURE: CPT | Performed by: PHYSICIAN ASSISTANT

## 2025-04-30 PROCEDURE — 96374 THER/PROPH/DIAG INJ IV PUSH: CPT | Performed by: PHYSICIAN ASSISTANT

## 2025-04-30 PROCEDURE — 99214 OFFICE O/P EST MOD 30 MIN: CPT | Performed by: PHYSICIAN ASSISTANT

## 2025-04-30 RX ORDER — BUMETANIDE 0.25 MG/ML
INJECTION, SOLUTION INTRAMUSCULAR; INTRAVENOUS
Status: DISCONTINUED
Start: 2025-04-30 | End: 2025-04-30 | Stop reason: HOSPADM

## 2025-04-30 RX ORDER — WARFARIN SODIUM 5 MG/1
5 TABLET ORAL DAILY
Qty: 30 TABLET | Refills: 1 | Status: SHIPPED | OUTPATIENT
Start: 2025-04-30

## 2025-04-30 RX ORDER — SODIUM CHLORIDE 0.9 % (FLUSH) 0.9 %
5-40 SYRINGE (ML) INJECTION ONCE
Status: COMPLETED | OUTPATIENT
Start: 2025-04-30 | End: 2025-04-30

## 2025-04-30 RX ORDER — BUMETANIDE 0.25 MG/ML
2 INJECTION, SOLUTION INTRAMUSCULAR; INTRAVENOUS ONCE
Status: COMPLETED | OUTPATIENT
Start: 2025-04-30 | End: 2025-04-30

## 2025-04-30 RX ADMIN — Medication 10 ML: at 12:45

## 2025-04-30 RX ADMIN — BUMETANIDE 2 MG: 0.25 INJECTION, SOLUTION INTRAMUSCULAR; INTRAVENOUS at 12:45

## 2025-04-30 ASSESSMENT — ENCOUNTER SYMPTOMS
ABDOMINAL PAIN: 0
NAUSEA: 0
CHEST TIGHTNESS: 0
VOMITING: 0
SHORTNESS OF BREATH: 1
COLOR CHANGE: 0
ABDOMINAL DISTENTION: 1
DIARRHEA: 0

## 2025-04-30 NOTE — TELEPHONE ENCOUNTER
CHF CHW Initial Call  4/30/2025  12:18 PM    CHW received referral from ARMAND Barkley.  Patient need: prescription assistance for Jardiance   Notes: CHW called patient to assist with prescription coverage for Jardiance. I left a VM for pt he did not answer. I will try call him again .     Patient in agreement with plan and further assistance.  [x] Agreed    [] Declined      CHW informed patient of the services and resources that can be provided to them. CHW instructed patient to call CHF CHW at [phone] for any future assistance.     Electronically signed by Suzy Garcia on 4/30/2025 at 12:18 PM

## 2025-04-30 NOTE — PROGRESS NOTES
Samples given to patient: at CHF Clinic of   Med name: Jardiance     Dosage: 10  mg     Quantity: 4 bottles(7pills in each)  Lot number: [id number]  Exp. date: 12/26  Instructions: Take one daily         
used: Lumason. Technically difficult study with poor endocardial visualization and technically difficult study due to patient's body habitus.    EKG 4/7/25:      Telemetry 4/30/25: Atrial fib 91bpm      ASSESSMENT:  Acute on chronic chronic HFrEF  NT proBNP 4/25/2025: 1862  NT proBNP 4/7/2025: 10,977  NT proBNP 3/8/2025: 7566  Nonischemic cardiomyopathy  LVEF 40 to 45%, LVEDD 6 cm, LV .2 per TTE 3/10/2025  LVEF 40% per TTE 1/15/2021  Lexiscan stress test 2/24/2020 negative for ischemia, EF 26%  LVEF 30% per TTE 11/19/2019  Mildly reduced RV systolic function per TTE 3/10/2025  ACC stage C/NYHA class III  Hypervolemic   JVD and bilateral LE edema, weight up 6 pounds since 4/25/2025  Permanent A-fib--rate controlled  Initially RVR during admission 4/2025--Toprol XL increased to 150 mg p.o. twice daily  OAC with Coumadin--INR 2.0 on 4/15/2025. Goal INR 2-3. (Has home INR monitor)  Valvular heart disease  Mild AR/MR per TTE 3/10/2025  HTN--BP controlled in clinic today  Hyperlipidemia--on Lipitor 40mg daily  T2DM  Hemoglobin A1c 8.9 on 3/9/2025  CKD  Creatinine 1.7 on 4/25/2025  Recent LIZ during admission 4/2025 with peak creatinine 2.5.  Baseline Cr 1.5-1.9  Recent hyperkalemia  Potassium elevated at 6.5 on 4/8/2025 improved to 3.7 on day of discharge 4/15/2025  Potassium 5.0 on 4/25/2025  Hx DVT/PE  Dx in 2008. On OAC with coumadin now--previously on Eliquis but switched to coumadin due to cost  History of DERIC--currently untreated  Reports being tested in 2012 and initially treated with CPAP however stopped using due to concerns for associated viral illness with the machine  Declining new referral to sleep medicine for evaluation at this time  Obesity  BMI 34.41  Former smoker      PLAN:      Give IV Bumex today and check labs.  Will call with any further medication adjustments upon review of blood work  Start Jardiance 10 mg p.o. daily--was ordered previously on 4/25/2025 but never started as patient could not

## 2025-04-30 NOTE — TELEPHONE ENCOUNTER
Called and left message on patient's VM stating I was calling to schedule his first appt in regards to a referral we received from one of his doctors (Dr. Jovany Hampton - his cardiologist).     Requested call back to schedule his initial visit at [facility]. Or please call back to let us know if someone else is managing his anticoagulation therapy that he would prefer to have continue managing. Provided our office hours to return call: M/W/Th/F: 9 am to 4 pm; closed daily from 12-1 pm.     Electronically signed by Donna Burch on 4/30/25 at 12:31 PM EDT

## 2025-04-30 NOTE — PATIENT INSTRUCTIONS
Give IV Bumex today and check labs.  Will call with any further medication adjustments upon review of blood work  Start Jardiance 10 mg p.o. daily-samples will be given in clinic today  Continue Bumex 1 mg daily--May take additional 1 mg tablet daily AS NEEDED for weight gain/lower extremity edema/increasing shortness of breath  Continue all other cardiac meds the same  Keep heart failure management system and bring to next visit in CHF clinic  If fluid status better controlled at that time, will plan to initiate HFMS at that time  Please consider sleep medicine referral for sleep apnea evaluation/management--notify clinic if you would be in agreement to referral  Follow-up with CHF clinic on 5/7/2025 scheduled  Follow-up with cardiology, Dr. Hampton on 10/2/25 as scheduled  Please make sure you have follow-up scheduled with your family physician in the next few weeks  Need to discuss diabetic management as you were taken off of Metformin during hospitalization 4/2025    Weigh yourself daily    -Stay Hydrated, 64 oz     -Diet should sodium restricted to 2 grams    -Again watch your daily weight trends and if you gain water weight please follow below instructions.    -If you gain 3-5 pounds in 2-3 days OR notice that you are retaining fluid in anyway just like you did before then take an extra dose of your water pill (bumetanide/Bumex) every day until you lose the weight or feel better.     -If you notice that you have taken more than 2 extra doses in 1 week then please call and let us know.    -If at any time you feel that you are retaining fluid, your medications are not working, or you feel ill in anyway, then please call us for either same day appointment or the next day, and for instructions. Our goal is to keep you out of the emergency room and the hospital and we have ways to do it. You just need to call us in a timely manner.     -If you become sick for other reasons, and notice that you are not urinating as

## 2025-05-05 NOTE — TELEPHONE ENCOUNTER
Patient called returning a call from last weeks . Patient stated \"someone is coming on Wednesdays to check that he is doing his INR\" Patient stated he has no idea who is coming to his house. Per paperwork/ phone encounters, its either [facility] or [facility]. Patient will find out this WED 05/07/2025 and let us know how to proceed. I informed him if its [facility] we can continue and monitor if not we will have to figure out how to go forward. Electronically signed by Lisa Lee on 5/5/25 at 11:12 AM EDT

## 2025-05-05 NOTE — TELEPHONE ENCOUNTER
Spoke with the Patient after figuring out that it is the meter company coming out to help him with his machine. The Patient like this and it works for him. PCP is managing warfarin. I will close the referral. Electronically signed by Lisa Lee on 5/5/25 at 2:18 PM EDT

## 2025-05-07 ENCOUNTER — HOSPITAL ENCOUNTER (OUTPATIENT)
Dept: OTHER | Age: 65
Setting detail: THERAPIES SERIES
Discharge: HOME | End: 2025-05-07
Payer: MEDICARE

## 2025-05-07 ENCOUNTER — RESULTS FOLLOW-UP (OUTPATIENT)
Age: 65
End: 2025-05-07

## 2025-05-07 VITALS
RESPIRATION RATE: 18 BRPM | HEART RATE: 92 BPM | SYSTOLIC BLOOD PRESSURE: 117 MMHG | DIASTOLIC BLOOD PRESSURE: 60 MMHG | OXYGEN SATURATION: 97 % | WEIGHT: 228 LBS | BODY MASS INDEX: 33.67 KG/M2

## 2025-05-07 LAB
ANION GAP SERPL CALCULATED.3IONS-SCNC: 13 MMOL/L (ref 7–16)
BNP SERPL-MCNC: 5663 PG/ML (ref 0–125)
BUN SERPL-MCNC: 42 MG/DL (ref 6–23)
CALCIUM SERPL-MCNC: 9.1 MG/DL (ref 8.6–10.2)
CHLORIDE SERPL-SCNC: 103 MMOL/L (ref 98–107)
CO2 SERPL-SCNC: 24 MMOL/L (ref 22–29)
CREAT SERPL-MCNC: 2 MG/DL (ref 0.7–1.2)
GFR, ESTIMATED: 36 ML/MIN/1.73M2
GLUCOSE SERPL-MCNC: 256 MG/DL (ref 74–99)
POTASSIUM SERPL-SCNC: 4.3 MMOL/L (ref 3.5–5)
SODIUM SERPL-SCNC: 140 MMOL/L (ref 132–146)

## 2025-05-07 PROCEDURE — 6360000002 HC RX W HCPCS

## 2025-05-07 PROCEDURE — 96374 THER/PROPH/DIAG INJ IV PUSH: CPT

## 2025-05-07 PROCEDURE — 83880 ASSAY OF NATRIURETIC PEPTIDE: CPT

## 2025-05-07 PROCEDURE — 36415 COLL VENOUS BLD VENIPUNCTURE: CPT

## 2025-05-07 PROCEDURE — 80048 BASIC METABOLIC PNL TOTAL CA: CPT

## 2025-05-07 PROCEDURE — 2500000003 HC RX 250 WO HCPCS: Performed by: NURSE PRACTITIONER

## 2025-05-07 PROCEDURE — 99214 OFFICE O/P EST MOD 30 MIN: CPT

## 2025-05-07 RX ORDER — BUMETANIDE 0.25 MG/ML
INJECTION, SOLUTION INTRAMUSCULAR; INTRAVENOUS
Status: COMPLETED
Start: 2025-05-07 | End: 2025-05-07

## 2025-05-07 RX ORDER — BUMETANIDE 0.25 MG/ML
2 INJECTION, SOLUTION INTRAMUSCULAR; INTRAVENOUS ONCE
Status: DISCONTINUED | OUTPATIENT
Start: 2025-05-07 | End: 2025-05-08 | Stop reason: HOSPADM

## 2025-05-07 RX ORDER — SODIUM CHLORIDE 0.9 % (FLUSH) 0.9 %
10 SYRINGE (ML) INJECTION ONCE
Status: COMPLETED | OUTPATIENT
Start: 2025-05-07 | End: 2025-05-07

## 2025-05-07 RX ORDER — TORSEMIDE 20 MG/1
40 TABLET ORAL DAILY
Qty: 60 TABLET | Refills: 3 | Status: SHIPPED | OUTPATIENT
Start: 2025-05-07

## 2025-05-07 RX ADMIN — BUMETANIDE 2 MG: 0.25 INJECTION INTRAMUSCULAR; INTRAVENOUS at 08:27

## 2025-05-07 RX ADMIN — SODIUM CHLORIDE, PRESERVATIVE FREE 10 ML: 5 INJECTION INTRAVENOUS at 08:27

## 2025-05-07 ASSESSMENT — PATIENT HEALTH QUESTIONNAIRE - PHQ9
SUM OF ALL RESPONSES TO PHQ QUESTIONS 1-9: 0
1. LITTLE INTEREST OR PLEASURE IN DOING THINGS: NOT AT ALL
2. FEELING DOWN, DEPRESSED OR HOPELESS: NOT AT ALL

## 2025-05-07 NOTE — RESULT ENCOUNTER NOTE
Labs and CHF Clinic note reviewed    Wt Readings from Last 3 Encounters:  05/07/25 : 103.4 kg (228 lb)  04/30/25 : 105.7 kg (233 lb)  04/25/25 : 103 kg (227 lb)    BP Readings from Last 1 Encounters:  05/07/25 : 117/60    Pulse Readings from Last 1 Encounters:  05/07/25 : 92      Current GDMT:  ARNI/ACE I/ARB: Entresto 49/51 mg BID  Hydralazine/Nitrates : No  Beta blocker: Metoprolol Succinate (Toprol) 75 mg BID  Aldosterone antagonist: No  SGLT2i: Jardiance 10 mg daily  Diuretic: Bumex 1mg daily   Supplemental Potassium: No      Stop Bumex  Start Torsemide 40 mg daily   Follow up in CHF clinic within 5 days - sooner if needed

## 2025-05-07 NOTE — PROGRESS NOTES
Congestive Heart Failure Clinic   [facility]      Referring Provider: Dr. Dennis   Primary Care Physician: Victor Hugo Hampton MD   Cardiologist: Dr. Hampton   Nephrologist:       HISTORY OF PRESENT ILLNESS:     Rober Hartley is a 64 y.o. (1960) male with a history of HFmrEF(EF 41%-49%)  Pre Cupid:     Lab Results   Component Value Date    LVEF 40 03/10/2025     Post Cupid:  No results found for: \"EFBP\"      He presents to the CHF clinic for ongoing evaluation and monitoring of heart failure.    In the CHF clinic today he denies any adverse symptoms except:  [] Dizziness or lightheadedness   [] Syncope or near syncope  [] Recent Fall  [] Shortness of breath at rest   [x] Dyspnea with exertion recovers with rest  [] Decline in functional capacity (unable to perform activities they had previously been able to do)  [] Fatigue   [] Orthopnea  [] PND  [] Nocturnal cough  [] Hemoptysis  [] Chest pain, pressure, or discomfort  [] Palpitations  [] Abdominal distention  [] Abdominal bloating  [] Early satiety  [] Blood in stool   [] Diarrhea  [] Constipation  [] Nausea/Vomiting  [] Decreased urinary response to oral diuretic   [] Scrotal swelling   [x] Lower extremity edema  [] Used PRN doses of oral diuretic   [] Weight gain    Wt Readings from Last 3 Encounters:   05/07/25 103.4 kg (228 lb)   04/30/25 105.7 kg (233 lb)   04/25/25 103 kg (227 lb)           SOCIAL HISTORY:  [x] Denies tobacco, alcohol or illicit drug abuse  [] Tobacco use:  [] ETOH use:  [] Illicit drug use:        MEDICATIONS:    Allergies   Allergen Reactions    Penicillins Rash     Prior to Visit Medications    Medication Sig Taking? Authorizing Provider   empagliflozin (JARDIANCE) 10 MG tablet Take 1 tablet by mouth daily Yes Esther Quezada PA   warfarin (COUMADIN) 5 MG tablet Take 1 tablet by mouth daily  Patient taking differently: Take 1 tablet by mouth at bedtime Yes

## 2025-05-12 ENCOUNTER — TELEPHONE (OUTPATIENT)
Dept: OTHER | Age: 65
End: 2025-05-12

## 2025-05-12 NOTE — TELEPHONE ENCOUNTER
2:57 PM  Patient called , he is unable to keep tomorrow's chf clinic appt due to a health issue with his father. Rescheduled for next week:      Future Appointments   Date Time Provider Department Center   5/20/2025 10:15 AM [facility] ROOM 2 [facility]   5/23/2025  7:30 AM [facility] ROOM 4 [facility]   10/2/2025  2:00 PM Jovany Hampton, DO [facility]           Did educate patient to call clinic with increase in weight or S/S fluid retention.       Electronically signed by Jessica Montgomery RN on 5/12/2025 at 2:58 PM

## 2025-05-13 ENCOUNTER — HOSPITAL ENCOUNTER (OUTPATIENT)
Dept: OTHER | Age: 65
Setting detail: THERAPIES SERIES
End: 2025-05-13
Payer: MEDICARE

## 2025-05-19 ENCOUNTER — TELEPHONE (OUTPATIENT)
Age: 65
End: 2025-05-19

## 2025-05-19 NOTE — TELEPHONE ENCOUNTER
Patient called to cancel CHF appts. This week due to his father passing away. Patient is rescheduled for 6/3 at 0930.

## 2025-06-23 ENCOUNTER — TELEPHONE (OUTPATIENT)
Dept: OTHER | Age: 65
End: 2025-06-23

## 2025-06-23 NOTE — TELEPHONE ENCOUNTER
Samples given to patient: at CHF Clinic of   Med name: Jardiance     Dosage: 10  mg     Quantity: 3 bottles   Lot number: [id number]  Exp. date: 12/2026  Instructions: Take one tablet daily

## 2025-07-07 RX ORDER — SACUBITRIL AND VALSARTAN 49; 51 MG/1; MG/1
1 TABLET, FILM COATED ORAL 2 TIMES DAILY
Qty: 180 TABLET | Refills: 3 | Status: SHIPPED | OUTPATIENT
Start: 2025-07-07

## 2025-07-14 ENCOUNTER — APPOINTMENT (OUTPATIENT)
Dept: GENERAL RADIOLOGY | Age: 65
DRG: 308 | End: 2025-07-14
Payer: MEDICARE

## 2025-07-14 ENCOUNTER — HOSPITAL ENCOUNTER (OUTPATIENT)
Dept: OTHER | Age: 65
Setting detail: THERAPIES SERIES
Discharge: HOME | End: 2025-07-14
Payer: MEDICARE

## 2025-07-14 ENCOUNTER — HOSPITAL ENCOUNTER (INPATIENT)
Age: 65
LOS: 8 days | Discharge: HOME HEALTH | DRG: 308 | End: 2025-07-22
Attending: EMERGENCY MEDICINE | Admitting: INTERNAL MEDICINE
Payer: MEDICARE

## 2025-07-14 VITALS
BODY MASS INDEX: 34.85 KG/M2 | WEIGHT: 236 LBS | DIASTOLIC BLOOD PRESSURE: 58 MMHG | RESPIRATION RATE: 18 BRPM | HEART RATE: 149 BPM | SYSTOLIC BLOOD PRESSURE: 90 MMHG | OXYGEN SATURATION: 98 %

## 2025-07-14 DIAGNOSIS — R79.1 SUBTHERAPEUTIC INTERNATIONAL NORMALIZED RATIO (INR): ICD-10-CM

## 2025-07-14 DIAGNOSIS — R79.89 ELEVATED TROPONIN: ICD-10-CM

## 2025-07-14 DIAGNOSIS — I48.91 ATRIAL FIBRILLATION WITH RVR (HCC): Primary | ICD-10-CM

## 2025-07-14 LAB
ALBUMIN SERPL-MCNC: 3.7 G/DL (ref 3.5–5.2)
ALP SERPL-CCNC: 150 U/L (ref 40–129)
ALT SERPL-CCNC: 19 U/L (ref 0–50)
ANION GAP SERPL CALCULATED.3IONS-SCNC: 16 MMOL/L (ref 7–16)
AST SERPL-CCNC: 20 U/L (ref 0–50)
BASOPHILS # BLD: 0.25 K/UL (ref 0–0.2)
BASOPHILS NFR BLD: 2 % (ref 0–2)
BILIRUB SERPL-MCNC: 1.1 MG/DL (ref 0–1.2)
BNP SERPL-MCNC: ABNORMAL PG/ML (ref 0–125)
BUN SERPL-MCNC: 57 MG/DL (ref 8–23)
CALCIUM SERPL-MCNC: 9.3 MG/DL (ref 8.8–10.2)
CHLORIDE SERPL-SCNC: 105 MMOL/L (ref 98–107)
CO2 SERPL-SCNC: 22 MMOL/L (ref 22–29)
CREAT SERPL-MCNC: 2.7 MG/DL (ref 0.7–1.2)
CREAT UR-MCNC: 119 MG/DL (ref 40–278)
EOSINOPHIL # BLD: 0.5 K/UL (ref 0.05–0.5)
EOSINOPHILS RELATIVE PERCENT: 4 % (ref 0–6)
ERYTHROCYTE [DISTWIDTH] IN BLOOD BY AUTOMATED COUNT: 15.2 % (ref 11.5–15)
GFR, ESTIMATED: 25 ML/MIN/1.73M2
GLUCOSE BLD-MCNC: 118 MG/DL (ref 74–99)
GLUCOSE SERPL-MCNC: 122 MG/DL (ref 74–99)
HCT VFR BLD AUTO: 40.7 % (ref 37–54)
HGB BLD-MCNC: 13.1 G/DL (ref 12.5–16.5)
INR PPP: 1.6
LYMPHOCYTES NFR BLD: 5.76 K/UL (ref 1.5–4)
LYMPHOCYTES RELATIVE PERCENT: 40 % (ref 20–42)
MCH RBC QN AUTO: 29.6 PG (ref 26–35)
MCHC RBC AUTO-ENTMCNC: 32.2 G/DL (ref 32–34.5)
MCV RBC AUTO: 92.1 FL (ref 80–99.9)
MONOCYTES NFR BLD: 0.75 K/UL (ref 0.1–0.95)
MONOCYTES NFR BLD: 5 % (ref 2–12)
NEUTROPHILS NFR BLD: 50 % (ref 43–80)
NEUTS SEG NFR BLD: 7.14 K/UL (ref 1.8–7.3)
OSMOLALITY UR: 1088 MOSM/KG (ref 300–900)
PLATELET # BLD AUTO: 110 K/UL (ref 130–450)
PMV BLD AUTO: 13 FL (ref 7–12)
POTASSIUM SERPL-SCNC: 4.9 MMOL/L (ref 3.5–5.1)
PROT SERPL-MCNC: 6.8 G/DL (ref 6.4–8.3)
PROTHROMBIN TIME: 17.1 SEC (ref 9.3–12.4)
RBC # BLD AUTO: 4.42 M/UL (ref 3.8–5.8)
RBC # BLD: ABNORMAL 10*6/UL
SODIUM SERPL-SCNC: 144 MMOL/L (ref 136–145)
SODIUM UR-SCNC: <20 MMOL/L
T4 FREE SERPL-MCNC: 1.4 NG/DL (ref 0.9–1.7)
T4 SERPL-MCNC: 7.8 UG/DL (ref 4.5–11.7)
TROPONIN I SERPL HS-MCNC: 60 NG/L (ref 0–22)
TROPONIN I SERPL HS-MCNC: 69 NG/L (ref 0–22)
TSH SERPL DL<=0.05 MIU/L-ACNC: 5.4 UIU/ML (ref 0.27–4.2)
UUN UR-MCNC: 874 MG/DL (ref 800–1666)
WBC OTHER # BLD: 14.4 K/UL (ref 4.5–11.5)

## 2025-07-14 PROCEDURE — 84484 ASSAY OF TROPONIN QUANT: CPT

## 2025-07-14 PROCEDURE — 2580000003 HC RX 258

## 2025-07-14 PROCEDURE — 6360000002 HC RX W HCPCS

## 2025-07-14 PROCEDURE — 82043 UR ALBUMIN QUANTITATIVE: CPT

## 2025-07-14 PROCEDURE — 93005 ELECTROCARDIOGRAM TRACING: CPT

## 2025-07-14 PROCEDURE — 82962 GLUCOSE BLOOD TEST: CPT

## 2025-07-14 PROCEDURE — 84439 ASSAY OF FREE THYROXINE: CPT

## 2025-07-14 PROCEDURE — 96365 THER/PROPH/DIAG IV INF INIT: CPT

## 2025-07-14 PROCEDURE — 2580000003 HC RX 258: Performed by: EMERGENCY MEDICINE

## 2025-07-14 PROCEDURE — 85610 PROTHROMBIN TIME: CPT

## 2025-07-14 PROCEDURE — 84540 ASSAY OF URINE/UREA-N: CPT

## 2025-07-14 PROCEDURE — 99223 1ST HOSP IP/OBS HIGH 75: CPT | Performed by: INTERNAL MEDICINE

## 2025-07-14 PROCEDURE — 6360000002 HC RX W HCPCS: Performed by: EMERGENCY MEDICINE

## 2025-07-14 PROCEDURE — 6360000002 HC RX W HCPCS: Performed by: INTERNAL MEDICINE

## 2025-07-14 PROCEDURE — 84300 ASSAY OF URINE SODIUM: CPT

## 2025-07-14 PROCEDURE — 84443 ASSAY THYROID STIM HORMONE: CPT

## 2025-07-14 PROCEDURE — 99285 EMERGENCY DEPT VISIT HI MDM: CPT

## 2025-07-14 PROCEDURE — 80053 COMPREHEN METABOLIC PANEL: CPT

## 2025-07-14 PROCEDURE — 71045 X-RAY EXAM CHEST 1 VIEW: CPT

## 2025-07-14 PROCEDURE — 83880 ASSAY OF NATRIURETIC PEPTIDE: CPT

## 2025-07-14 PROCEDURE — 83935 ASSAY OF URINE OSMOLALITY: CPT

## 2025-07-14 PROCEDURE — 2060000000 HC ICU INTERMEDIATE R&B

## 2025-07-14 PROCEDURE — 85025 COMPLETE CBC W/AUTO DIFF WBC: CPT

## 2025-07-14 PROCEDURE — 36415 COLL VENOUS BLD VENIPUNCTURE: CPT

## 2025-07-14 PROCEDURE — 6370000000 HC RX 637 (ALT 250 FOR IP): Performed by: INTERNAL MEDICINE

## 2025-07-14 PROCEDURE — 82570 ASSAY OF URINE CREATININE: CPT

## 2025-07-14 PROCEDURE — 99214 OFFICE O/P EST MOD 30 MIN: CPT

## 2025-07-14 RX ORDER — ACETAMINOPHEN 650 MG/1
650 SUPPOSITORY RECTAL EVERY 6 HOURS PRN
Status: DISCONTINUED | OUTPATIENT
Start: 2025-07-14 | End: 2025-07-22 | Stop reason: HOSPADM

## 2025-07-14 RX ORDER — ACETAMINOPHEN 325 MG/1
650 TABLET ORAL EVERY 6 HOURS PRN
Status: DISCONTINUED | OUTPATIENT
Start: 2025-07-14 | End: 2025-07-22 | Stop reason: HOSPADM

## 2025-07-14 RX ORDER — GLUCAGON 1 MG/ML
1 KIT INJECTION PRN
Status: DISCONTINUED | OUTPATIENT
Start: 2025-07-14 | End: 2025-07-22 | Stop reason: HOSPADM

## 2025-07-14 RX ORDER — SODIUM CHLORIDE 0.9 % (FLUSH) 0.9 %
5-40 SYRINGE (ML) INJECTION PRN
Status: DISCONTINUED | OUTPATIENT
Start: 2025-07-14 | End: 2025-07-22 | Stop reason: HOSPADM

## 2025-07-14 RX ORDER — MAGNESIUM SULFATE IN WATER 40 MG/ML
2000 INJECTION, SOLUTION INTRAVENOUS PRN
Status: DISCONTINUED | OUTPATIENT
Start: 2025-07-14 | End: 2025-07-22 | Stop reason: HOSPADM

## 2025-07-14 RX ORDER — ONDANSETRON 2 MG/ML
4 INJECTION INTRAMUSCULAR; INTRAVENOUS EVERY 6 HOURS PRN
Status: DISCONTINUED | OUTPATIENT
Start: 2025-07-14 | End: 2025-07-22 | Stop reason: HOSPADM

## 2025-07-14 RX ORDER — ENOXAPARIN SODIUM 150 MG/ML
1 INJECTION SUBCUTANEOUS EVERY 12 HOURS
Status: DISCONTINUED | OUTPATIENT
Start: 2025-07-14 | End: 2025-07-16

## 2025-07-14 RX ORDER — INSULIN LISPRO 100 [IU]/ML
0-4 INJECTION, SOLUTION INTRAVENOUS; SUBCUTANEOUS
Status: DISCONTINUED | OUTPATIENT
Start: 2025-07-14 | End: 2025-07-22 | Stop reason: HOSPADM

## 2025-07-14 RX ORDER — SODIUM CHLORIDE 9 MG/ML
INJECTION, SOLUTION INTRAVENOUS PRN
Status: DISCONTINUED | OUTPATIENT
Start: 2025-07-14 | End: 2025-07-22 | Stop reason: HOSPADM

## 2025-07-14 RX ORDER — ONDANSETRON 4 MG/1
4 TABLET, ORALLY DISINTEGRATING ORAL EVERY 8 HOURS PRN
Status: DISCONTINUED | OUTPATIENT
Start: 2025-07-14 | End: 2025-07-22 | Stop reason: HOSPADM

## 2025-07-14 RX ORDER — DEXTROSE MONOHYDRATE 100 MG/ML
INJECTION, SOLUTION INTRAVENOUS CONTINUOUS PRN
Status: DISCONTINUED | OUTPATIENT
Start: 2025-07-14 | End: 2025-07-22 | Stop reason: HOSPADM

## 2025-07-14 RX ORDER — POLYETHYLENE GLYCOL 3350 17 G/17G
17 POWDER, FOR SOLUTION ORAL DAILY PRN
Status: DISCONTINUED | OUTPATIENT
Start: 2025-07-14 | End: 2025-07-22 | Stop reason: HOSPADM

## 2025-07-14 RX ORDER — WARFARIN SODIUM 5 MG/1
5 TABLET ORAL
Status: COMPLETED | OUTPATIENT
Start: 2025-07-14 | End: 2025-07-14

## 2025-07-14 RX ORDER — ATORVASTATIN CALCIUM 40 MG/1
40 TABLET, FILM COATED ORAL DAILY
Status: DISCONTINUED | OUTPATIENT
Start: 2025-07-15 | End: 2025-07-22 | Stop reason: HOSPADM

## 2025-07-14 RX ORDER — 0.9 % SODIUM CHLORIDE 0.9 %
500 INTRAVENOUS SOLUTION INTRAVENOUS ONCE
Status: COMPLETED | OUTPATIENT
Start: 2025-07-14 | End: 2025-07-14

## 2025-07-14 RX ORDER — SODIUM CHLORIDE 0.9 % (FLUSH) 0.9 %
5-40 SYRINGE (ML) INJECTION EVERY 12 HOURS SCHEDULED
Status: DISCONTINUED | OUTPATIENT
Start: 2025-07-14 | End: 2025-07-22 | Stop reason: HOSPADM

## 2025-07-14 RX ORDER — ENOXAPARIN SODIUM 150 MG/ML
1 INJECTION SUBCUTANEOUS ONCE
Status: COMPLETED | OUTPATIENT
Start: 2025-07-14 | End: 2025-07-14

## 2025-07-14 RX ORDER — DILTIAZEM HYDROCHLORIDE 5 MG/ML
15 INJECTION INTRAVENOUS ONCE
Status: COMPLETED | OUTPATIENT
Start: 2025-07-14 | End: 2025-07-14

## 2025-07-14 RX ADMIN — DILTIAZEM HYDROCHLORIDE 10 MG/HR: 5 INJECTION, SOLUTION INTRAVENOUS at 12:22

## 2025-07-14 RX ADMIN — DILTIAZEM HYDROCHLORIDE 15 MG: 5 INJECTION, SOLUTION INTRAVENOUS at 09:55

## 2025-07-14 RX ADMIN — SODIUM CHLORIDE 500 ML: 0.9 INJECTION, SOLUTION INTRAVENOUS at 09:32

## 2025-07-14 RX ADMIN — DILTIAZEM HYDROCHLORIDE 12.5 MG/HR: 5 INJECTION, SOLUTION INTRAVENOUS at 13:23

## 2025-07-14 RX ADMIN — DILTIAZEM HYDROCHLORIDE 10 MG/HR: 5 INJECTION, SOLUTION INTRAVENOUS at 19:23

## 2025-07-14 RX ADMIN — ENOXAPARIN SODIUM 105 MG: 150 INJECTION SUBCUTANEOUS at 11:29

## 2025-07-14 RX ADMIN — DILTIAZEM HYDROCHLORIDE 15 MG/HR: 5 INJECTION, SOLUTION INTRAVENOUS at 15:49

## 2025-07-14 RX ADMIN — DILTIAZEM HYDROCHLORIDE 5 MG/HR: 5 INJECTION, SOLUTION INTRAVENOUS at 10:20

## 2025-07-14 RX ADMIN — ENOXAPARIN SODIUM 105 MG: 150 INJECTION SUBCUTANEOUS at 22:37

## 2025-07-14 RX ADMIN — WARFARIN SODIUM 5 MG: 5 TABLET ORAL at 19:01

## 2025-07-14 RX ADMIN — DILTIAZEM HYDROCHLORIDE 7.5 MG/HR: 5 INJECTION, SOLUTION INTRAVENOUS at 11:31

## 2025-07-14 ASSESSMENT — PAIN - FUNCTIONAL ASSESSMENT: PAIN_FUNCTIONAL_ASSESSMENT: NONE - DENIES PAIN

## 2025-07-14 NOTE — PROGRESS NOTES
New consult placed to [facility] cardiology.

## 2025-07-14 NOTE — PROGRESS NOTES
Database initiated. Patient is A&O independent from home with GF. States he uses no assistive devices and is RA at baseline.

## 2025-07-14 NOTE — PROGRESS NOTES
Pharmacy Consultation Note  (Warfarin Dosing and Monitoring)    Initial consult date: 7/14  Consulting Provider: Aimee MICHAUD Naeem is a 64 y.o. male for whom pharmacy has been asked to manage warfarin therapy.     Weight:   Wt Readings from Last 1 Encounters:   07/14/25 107 kg (236 lb)       TSH:    Lab Results   Component Value Date/Time    TSH 5.40 07/14/2025 09:26 AM       Hepatic Function Panel:                            Lab Results   Component Value Date/Time    ALKPHOS 150 07/14/2025 09:26 AM    ALT 19 07/14/2025 09:26 AM    AST 20 07/14/2025 09:26 AM    BILITOT 1.1 07/14/2025 09:26 AM    BILIDIR 0.2 02/25/2020 03:25 AM    IBILI 0.6 02/25/2020 03:25 AM         Recent Labs     07/14/25  0926   HGB 13.1   *     Date Warfarin Dose INR Heparin or LMWH Comment   7/14 5 mg 1.6 Enoxaparin 105 mg subQ x 1                                  Assessment:  Patient is a 64 y.o. male on warfarin for Atrial Fibrillation.  Patient tells me that their home warfarin dosing regimen is 5 mg once daily, which he last took last night.    Goal INR 2 - 3  INR 1.6 today  Patient received enoxaparin 105 mg subQ x 1 on 7/14 (1129)  Given subtherapeutic INR, I messaged Dr. Yu via Perfect Serve to place standing order for enoxaparin if she sees fit and to call me with any questions (CrCl is greater than 30 mL/min, thus, treatment dosing would remain q12h)    Plan:  Warfarin 5 mg tonight  Daily PT/INR until the INR is stable within the therapeutic range  Pharmacist will follow and monitor/adjust dosing as necessary    Ben Metz PharmD 7/14/2025 12:48 PM   Ext: [phone]    TUNDE: [phone]  SEY: [phone]  SJW: [phone]

## 2025-07-14 NOTE — H&P
Hospitalist History & Physical      PCP: Victor Hugo Hampton MD    Date of Admission: 7/14/2025    Date of Service: Pt seen/examined on 7/14/2025 and is admitted to Inpatient with expected LOS greater than two midnights due to medical therapy.    Chief Complaint:  had concerns including Shortness of Breath and Atrial Fibrillation (RVR).    History Of Present Illness:    Mr. Rober Hartley, a 64 y.o. year old male  who  has a past medical history of CHF (congestive heart failure) ([facility]), CKD (chronic kidney disease), DM2 (diabetes mellitus, type 2) (HCC), DVT (deep venous thrombosis) ([facility]), HTN (hypertension), benign, Nonischemic cardiomyopathy (HCC), and PE (pulmonary embolism).       Patient presented with atrial fibrillation from the congestive heart failure clinic.  Patient states that he has noticed that he has had palpitations on and off for the last several weeks.  However, he did not really feel that bad this morning.  Does admit to chronic intermittent dyspnea.  Denies any chest discomfort.  Denies any dizziness or lightheadedness.  Patient follows with Dr. Hampton for atrial fibrillation.  He also states that he has been seen downtown by electrophysiology.  States he was recently told to stop taking 3 medications, but is unsure of which medications they were.  Does know that one of them was potassium.  States that he does not follow with nephrology, but was supposed to follow-up with what sounds like urology for abnormal prostatomegaly.  Admits to ankle edema which is chronic.  Otherwise, no complaints and feels well.    Upon evaluation in the emergency department, patient was found to be in atrial fibrillation with rapid ventricular response.  He was given Cardizem bolus as well as Cardizem drip.  Rate was better controlled in the 120s.  Laboratory work significant for a BNP of 10,929.  Troponin 69 with a repeat of 60.  Creatinine 2.7 with a baseline around 1.8.  Alk phos slightly elevated at 150.

## 2025-07-14 NOTE — PROGRESS NOTES
4 Eyes Skin Assessment     NAME:  Rober Hartley  YOB: 1960  MEDICAL RECORD NUMBER:  [id number]    The patient is being assessed for  Admission    I agree that at least one RN has performed a thorough Head to Toe Skin Assessment on the patient. ALL assessment sites listed below have been assessed.      Areas assessed by both nurses:    Head, Face, Ears, Shoulders, Back, Chest, Arms, Elbows, Hands, and Legs. Feet and Heels        Does the Patient have a Wound? No noted wound(s)       Rahul Prevention initiated by RN: No  Wound Care Orders initiated by RN: No    Pressure Injury (Stage 3,4, Unstageable, DTI, NWPT, and Complex wounds) if present, place Wound referral order by RN under : No    New Ostomies, if present place, Ostomy referral order under : No     Nurse 1 eSignature: Electronically signed by Augusta Putnam RN on 7/14/25 at 6:58 PM EDT    **SHARE this note so that the co-signing nurse can place an eSignature**    Nurse 2 eSignature: Electronically signed by Gisele Gonzalez RN on 7/14/25 at 6:59 PM EDT

## 2025-07-14 NOTE — PROGRESS NOTES
Congestive Heart Failure Clinic   [facility]      Referring Provider: Dr. Dennis   Primary Care Physician: Victor Hugo Hampton MD   Cardiologist: Dr. [facility]   Nephrologist:       HISTORY OF PRESENT ILLNESS:     Rober Hartley is a 64 y.o. (1960) male with a history of HFmrEF(EF 41%-49%)  Pre Cupid:     Lab Results   Component Value Date    LVEF 40 03/10/2025     Post Cupid:  No results found for: \"EFBP\"      He presents to the CHF clinic for ongoing evaluation and monitoring of heart failure.    In the CHF clinic today he denies any adverse symptoms except:  [] Dizziness or lightheadedness   [] Syncope or near syncope  [] Recent Fall  [] Shortness of breath at rest   [] Dyspnea with exertion recovers with rest  [] Decline in functional capacity (unable to perform activities they had previously been able to do)  [] Fatigue   [] Orthopnea  [] PND  [] Nocturnal cough  [] Hemoptysis  [] Chest pain, pressure, or discomfort  [x] Palpitations  [] Abdominal distention  [] Abdominal bloating  [] Early satiety  [] Blood in stool   [] Diarrhea  [] Constipation  [] Nausea/Vomiting  [] Decreased urinary response to oral diuretic   [] Scrotal swelling   [x] Lower extremity edema  [] Used PRN doses of oral diuretic   [] Weight gain    Wt Readings from Last 3 Encounters:   07/14/25 107 kg (236 lb)   05/07/25 103.4 kg (228 lb)   04/30/25 105.7 kg (233 lb)           SOCIAL HISTORY:  [x] Denies tobacco, alcohol or illicit drug abuse  [] Tobacco use:  [] ETOH use:  [] Illicit drug use:        MEDICATIONS:    Allergies   Allergen Reactions    Penicillins Rash     Prior to Visit Medications    Medication Sig Taking? Authorizing Provider   sacubitril-valsartan (ENTRESTO) 49-51 MG per tablet Take 1 tablet by mouth 2 times daily Yes Jovayn [facility], DO   torsemide (DEMADEX) 20 MG tablet Take 2 tablets by mouth daily Yes Nuria Graham, APRN - CNP   empagliflozin

## 2025-07-14 NOTE — ED NOTES
ED to Inpatient Handoff Report    Notified JOSEPH Deras that electronic handoff available and patient ready for transport to room 430.    Safety Risks: Risk of falls    Patient in Restraints: no    Constant Observer or Patient : no    Telemetry Monitoring Ordered :Yes           Order to transfer to unit without monitor:NO      Deterioration Index Score:   Predictive Model Details          51 (Warning)  Factor Value    Calculated 7/14/2025 18:17 32% Respiratory rate 29    Deterioration Index Model 21% Age 64 years old     11% Systolic 94     11% Pulse 115     8% Potassium 4.9 mmol/L     7% Sodium 144 mmol/L     6% WBC count abnormal (14.4 k/uL)     2% BUN abnormal (57 mg/dL)     1% Platelet count abnormal (110 k/uL)     0% Pulse oximetry 97 %     0% Temperature 98.3 °F (36.8 °C)     0% Hematocrit 40.7 %        Vitals:    07/14/25 1320 07/14/25 1548 07/14/25 1747 07/14/25 1813   BP: (!) 126/105 91/66 94/81    Pulse: (!) 118 (!) 115 100 (!) 115   Resp: 17 26 26 29   Temp:       TempSrc:       SpO2: 94% 97%  97%         Opportunity for questions and clarification was provided.  Will bring up to inpatient bed when room is clean.

## 2025-07-14 NOTE — ED PROVIDER NOTES
[facility] EMERGENCY DEPARTMENT  EMERGENCY DEPARTMENT ENCOUNTER        Pt Name: Rober Hartley  MRN: [id number]  Birthdate 1960  Date of evaluation: 7/14/2025  Provider: Armando Moise DO  PCP: Victor Hugo Hampton MD  Note Started: 9:23 AM EDT 7/14/25    CHIEF COMPLAINT       Chief Complaint   Patient presents with    Shortness of Breath    Atrial Fibrillation     RVR       HISTORY OF PRESENT ILLNESS: 1 or more Elements   History From: PATIENT     Limitations to history : None    Rober Hartley is a 64 y.o. male with prior history of PE, atrial fibrillation, CHF (EF 45%) currently on warfarin arriving with a complaint of elevated heart rate.  Patient was at a routine CHF clinic visit this morning when they mention to him his heart rate is fast and that he needs to go to the emergency room.  He does not believe he is on any rate control medications and on chart review there appears to be no active medication refills for his rate.  He has seen Dr. Dennis and Dr. Hampton in the past with cardiology.      Nursing Notes were all reviewed and agreed with or any disagreements were addressed in the HPI.    REVIEW OF SYSTEMS :      Review of Systems    POSITIVE (+): shortness of breath   NEGATIVE (-): fevers, chills, nausea, vomiting, diarrhea, constipation, shortness of breath, chest pain, abdominal pain      SURGICAL HISTORY   History reviewed. No pertinent surgical history.    CURRENTMEDICATIONS       Previous Medications    ATORVASTATIN (LIPITOR) 40 MG TABLET    TAKE 1 TABLET BY MOUTH IN THE MORNING    EMPAGLIFLOZIN (JARDIANCE) 10 MG TABLET    Take 1 tablet by mouth daily    GLIPIZIDE (GLUCOTROL) 5 MG TABLET    Take 0.5 tablets by mouth daily    METOPROLOL SUCCINATE (TOPROL XL) 50 MG EXTENDED RELEASE TABLET    Take 3 tablets by mouth 2 times daily    SACUBITRIL-VALSARTAN (ENTRESTO) 49-51 MG PER TABLET    Take 1 tablet by mouth 2 times daily    SODIUM BICARBONATE 650 MG TABLET     Take 1 tablet by mouth 2 times daily    TORSEMIDE (DEMADEX) 20 MG TABLET    Take 2 tablets by mouth daily    WARFARIN (COUMADIN) 5 MG TABLET    Take 1 tablet by mouth daily       ALLERGIES     Penicillins    FAMILYHISTORY       Family History   Problem Relation Age of Onset    Diabetes Mother     Heart Disease Mother     High Blood Pressure Father     Cancer Sister         SOCIAL HISTORY       Social History     Tobacco Use    Smoking status: Former     Current packs/day: 0.00     Average packs/day: 1.5 packs/day for 32.4 years (48.7 ttl pk-yrs)     Types: Cigarettes     Start date: 1985     Quit date: 2018     Years since quittin.2    Smokeless tobacco: Never   Vaping Use    Vaping status: Never Used   Substance Use Topics    Alcohol use: Never     Alcohol/week: 0.0 standard drinks of alcohol    Drug use: Never       SCREENINGS        Rachael Coma Scale  Eye Opening: Spontaneous  Best Verbal Response: Oriented  Best Motor Response: Obeys commands  Rachael Coma Scale Score: 15                CIWA Assessment  BP: 99/75  Pulse: (!) 132           PHYSICAL EXAM  1 or more Elements     ED Triage Vitals   BP Systolic BP Percentile Diastolic BP Percentile Temp Temp Source Pulse Respirations SpO2   25 0903 -- -- 25 0909 25 0909 25 0909 25 0909 25 0909   119/80   98.3 °F (36.8 °C) Oral (!) 147 24 100 %      Height Weight         -- --                       Physical Exam  Constitutional:       General: He is not in acute distress.     Appearance: Normal appearance. He is not ill-appearing.   HENT:      Head: Normocephalic.      Mouth/Throat:      Mouth: Mucous membranes are moist.   Eyes:      Conjunctiva/sclera: Conjunctivae normal.      Pupils: Pupils are equal, round, and reactive to light.   Cardiovascular:      Rate and Rhythm: Normal rate and regular rhythm.      Pulses: Normal pulses.   Pulmonary:      Effort: Pulmonary effort is normal. No respiratory distress.

## 2025-07-15 ENCOUNTER — APPOINTMENT (OUTPATIENT)
Dept: ULTRASOUND IMAGING | Age: 65
DRG: 308 | End: 2025-07-15
Payer: MEDICARE

## 2025-07-15 LAB
ANION GAP SERPL CALCULATED.3IONS-SCNC: 13 MMOL/L (ref 7–16)
ANION GAP SERPL CALCULATED.3IONS-SCNC: 14 MMOL/L (ref 7–16)
ANION GAP SERPL CALCULATED.3IONS-SCNC: 15 MMOL/L (ref 7–16)
BASOPHILS # BLD: 0.11 K/UL (ref 0–0.2)
BASOPHILS NFR BLD: 1 % (ref 0–2)
BUN SERPL-MCNC: 51 MG/DL (ref 8–23)
BUN SERPL-MCNC: 54 MG/DL (ref 8–23)
BUN SERPL-MCNC: 54 MG/DL (ref 8–23)
CALCIUM SERPL-MCNC: 8.7 MG/DL (ref 8.8–10.2)
CALCIUM SERPL-MCNC: 8.9 MG/DL (ref 8.8–10.2)
CALCIUM SERPL-MCNC: 9.2 MG/DL (ref 8.8–10.2)
CHLORIDE SERPL-SCNC: 105 MMOL/L (ref 98–107)
CHLORIDE SERPL-SCNC: 106 MMOL/L (ref 98–107)
CHLORIDE SERPL-SCNC: 108 MMOL/L (ref 98–107)
CO2 SERPL-SCNC: 18 MMOL/L (ref 22–29)
CO2 SERPL-SCNC: 19 MMOL/L (ref 22–29)
CO2 SERPL-SCNC: 20 MMOL/L (ref 22–29)
CREAT SERPL-MCNC: 2.3 MG/DL (ref 0.7–1.2)
CREAT SERPL-MCNC: 2.4 MG/DL (ref 0.7–1.2)
CREAT SERPL-MCNC: 2.5 MG/DL (ref 0.7–1.2)
CREAT UR-MCNC: 120 MG/DL (ref 40–278)
EKG ATRIAL RATE: 147 BPM
EKG ATRIAL RATE: 52 BPM
EKG Q-T INTERVAL: 304 MS
EKG Q-T INTERVAL: 318 MS
EKG QRS DURATION: 88 MS
EKG QRS DURATION: 88 MS
EKG QTC CALCULATION (BAZETT): 478 MS
EKG QTC CALCULATION (BAZETT): 500 MS
EKG R AXIS: 102 DEGREES
EKG R AXIS: 107 DEGREES
EKG T AXIS: 209 DEGREES
EKG T AXIS: 211 DEGREES
EKG VENTRICULAR RATE: 149 BPM
EKG VENTRICULAR RATE: 149 BPM
EOSINOPHIL # BLD: 0.55 K/UL (ref 0.05–0.5)
EOSINOPHILS RELATIVE PERCENT: 4 % (ref 0–6)
ERYTHROCYTE [DISTWIDTH] IN BLOOD BY AUTOMATED COUNT: 15.4 % (ref 11.5–15)
GFR, ESTIMATED: 27 ML/MIN/1.73M2
GFR, ESTIMATED: 29 ML/MIN/1.73M2
GFR, ESTIMATED: 31 ML/MIN/1.73M2
GLUCOSE BLD-MCNC: 133 MG/DL (ref 74–99)
GLUCOSE BLD-MCNC: 182 MG/DL (ref 74–99)
GLUCOSE BLD-MCNC: 237 MG/DL (ref 74–99)
GLUCOSE BLD-MCNC: 95 MG/DL (ref 74–99)
GLUCOSE SERPL-MCNC: 109 MG/DL (ref 74–99)
GLUCOSE SERPL-MCNC: 156 MG/DL (ref 74–99)
GLUCOSE SERPL-MCNC: 205 MG/DL (ref 74–99)
HCT VFR BLD AUTO: 39.7 % (ref 37–54)
HGB BLD-MCNC: 12.5 G/DL (ref 12.5–16.5)
INR PPP: 1.7
LYMPHOCYTES NFR BLD: 2.21 K/UL (ref 1.5–4)
LYMPHOCYTES RELATIVE PERCENT: 17 % (ref 20–42)
MAGNESIUM SERPL-MCNC: 2.7 MG/DL (ref 1.6–2.4)
MCH RBC QN AUTO: 28.9 PG (ref 26–35)
MCHC RBC AUTO-ENTMCNC: 31.5 G/DL (ref 32–34.5)
MCV RBC AUTO: 91.7 FL (ref 80–99.9)
MICROALBUMIN UR-MCNC: 25 MG/L (ref 0–20)
MICROALBUMIN/CREAT UR-RTO: 21 MCG/MG CREAT (ref 0–30)
MONOCYTES NFR BLD: 0.33 K/UL (ref 0.1–0.95)
MONOCYTES NFR BLD: 3 % (ref 2–12)
NEUTROPHILS NFR BLD: 75 % (ref 43–80)
NEUTS SEG NFR BLD: 9.5 K/UL (ref 1.8–7.3)
PHOSPHATE SERPL-MCNC: 4.7 MG/DL (ref 2.5–4.5)
PLATELET, FLUORESCENCE: 115 K/UL (ref 130–450)
PMV BLD AUTO: 13.3 FL (ref 7–12)
POTASSIUM SERPL-SCNC: 4 MMOL/L (ref 3.5–5.1)
POTASSIUM SERPL-SCNC: 4.9 MMOL/L (ref 3.5–5.1)
POTASSIUM SERPL-SCNC: 5.6 MMOL/L (ref 3.5–5.1)
PROTHROMBIN TIME: 18.4 SEC (ref 9.3–12.4)
RBC # BLD AUTO: 4.33 M/UL (ref 3.8–5.8)
RBC # BLD: ABNORMAL 10*6/UL
SODIUM SERPL-SCNC: 137 MMOL/L (ref 136–145)
SODIUM SERPL-SCNC: 139 MMOL/L (ref 136–145)
SODIUM SERPL-SCNC: 143 MMOL/L (ref 136–145)
WBC OTHER # BLD: 12.7 K/UL (ref 4.5–11.5)

## 2025-07-15 PROCEDURE — 6360000002 HC RX W HCPCS: Performed by: EMERGENCY MEDICINE

## 2025-07-15 PROCEDURE — APPSS180 APP SPLIT SHARED TIME > 60 MINUTES: Performed by: NURSE PRACTITIONER

## 2025-07-15 PROCEDURE — 2580000003 HC RX 258: Performed by: EMERGENCY MEDICINE

## 2025-07-15 PROCEDURE — 2580000003 HC RX 258: Performed by: INTERNAL MEDICINE

## 2025-07-15 PROCEDURE — 99223 1ST HOSP IP/OBS HIGH 75: CPT | Performed by: INTERNAL MEDICINE

## 2025-07-15 PROCEDURE — 85610 PROTHROMBIN TIME: CPT

## 2025-07-15 PROCEDURE — 82962 GLUCOSE BLOOD TEST: CPT

## 2025-07-15 PROCEDURE — 6360000002 HC RX W HCPCS: Performed by: INTERNAL MEDICINE

## 2025-07-15 PROCEDURE — 6370000000 HC RX 637 (ALT 250 FOR IP): Performed by: INTERNAL MEDICINE

## 2025-07-15 PROCEDURE — 80048 BASIC METABOLIC PNL TOTAL CA: CPT

## 2025-07-15 PROCEDURE — 83735 ASSAY OF MAGNESIUM: CPT

## 2025-07-15 PROCEDURE — 85025 COMPLETE CBC W/AUTO DIFF WBC: CPT

## 2025-07-15 PROCEDURE — 93010 ELECTROCARDIOGRAM REPORT: CPT | Performed by: INTERNAL MEDICINE

## 2025-07-15 PROCEDURE — 84100 ASSAY OF PHOSPHORUS: CPT

## 2025-07-15 PROCEDURE — 36415 COLL VENOUS BLD VENIPUNCTURE: CPT

## 2025-07-15 PROCEDURE — 99233 SBSQ HOSP IP/OBS HIGH 50: CPT | Performed by: STUDENT IN AN ORGANIZED HEALTH CARE EDUCATION/TRAINING PROGRAM

## 2025-07-15 PROCEDURE — 2060000000 HC ICU INTERMEDIATE R&B

## 2025-07-15 PROCEDURE — 76770 US EXAM ABDO BACK WALL COMP: CPT

## 2025-07-15 PROCEDURE — 6370000000 HC RX 637 (ALT 250 FOR IP): Performed by: NURSE PRACTITIONER

## 2025-07-15 PROCEDURE — 2700000000 HC OXYGEN THERAPY PER DAY

## 2025-07-15 RX ORDER — WARFARIN SODIUM 5 MG/1
5 TABLET ORAL
Status: COMPLETED | OUTPATIENT
Start: 2025-07-15 | End: 2025-07-15

## 2025-07-15 RX ORDER — 0.9 % SODIUM CHLORIDE 0.9 %
500 INTRAVENOUS SOLUTION INTRAVENOUS ONCE
Status: COMPLETED | OUTPATIENT
Start: 2025-07-15 | End: 2025-07-15

## 2025-07-15 RX ORDER — MIDODRINE HYDROCHLORIDE 5 MG/1
5 TABLET ORAL EVERY 8 HOURS
Status: DISCONTINUED | OUTPATIENT
Start: 2025-07-15 | End: 2025-07-18

## 2025-07-15 RX ADMIN — ENOXAPARIN SODIUM 105 MG: 150 INJECTION SUBCUTANEOUS at 20:06

## 2025-07-15 RX ADMIN — WARFARIN SODIUM 5 MG: 5 TABLET ORAL at 20:06

## 2025-07-15 RX ADMIN — ENOXAPARIN SODIUM 105 MG: 150 INJECTION SUBCUTANEOUS at 10:47

## 2025-07-15 RX ADMIN — SODIUM CHLORIDE 500 ML: 9 INJECTION, SOLUTION INTRAVENOUS at 18:54

## 2025-07-15 RX ADMIN — SODIUM ZIRCONIUM CYCLOSILICATE 10 G: 10 POWDER, FOR SUSPENSION ORAL at 07:54

## 2025-07-15 RX ADMIN — ATORVASTATIN CALCIUM 40 MG: 40 TABLET, FILM COATED ORAL at 07:54

## 2025-07-15 RX ADMIN — INSULIN LISPRO 1 UNITS: 100 INJECTION, SOLUTION INTRAVENOUS; SUBCUTANEOUS at 16:49

## 2025-07-15 RX ADMIN — METOPROLOL SUCCINATE 75 MG: 50 TABLET, EXTENDED RELEASE ORAL at 13:53

## 2025-07-15 RX ADMIN — DILTIAZEM HYDROCHLORIDE 10 MG/HR: 5 INJECTION, SOLUTION INTRAVENOUS at 07:58

## 2025-07-15 RX ADMIN — SODIUM CHLORIDE 500 ML: 0.9 INJECTION, SOLUTION INTRAVENOUS at 16:48

## 2025-07-15 RX ADMIN — MIDODRINE HYDROCHLORIDE 5 MG: 5 TABLET ORAL at 21:21

## 2025-07-15 NOTE — CONSULTS
[facility]   Inpatient CHF Nurse Navigator Consult    Cardiologist: Dr Hampton   Primary Care Physician: Victor Hugo [facility] MD     Rober Hartley is a 64 y.o. (1960) male with a history of HFrEF, most recent EF:  Lab Results   Component Value Date    LVEF 40 03/10/2025    LVEFMODE Echo 03/10/2025       Patient was awake and alert, sitting in the chair during the consultation and is agreeable to heart failure education. He was engaged and asked appropriate questions throughout the education session.       Agreeable to [employer] HFMS (heart failure monitoring system) which will be mailed to their home as well as close follow ups at CHF clinic.     Barriers identified during consult contributing to HF Hospitalization: none.     [] Limited medication adherence   [] Poor health literacy, education regarding HF medications provided   [] Pill box provided to patient  [] Difficulty affording medications  [] Difficulty obtaining/ managing medications  [] Prescription assistance information given     [] Not weighing themselves daily  [x] Weight log provided for easy monitoring  [] Scale provided     [] Not following low sodium diet  [] Food insecurity   [x] 2 gram sodium diet education provided   [] Low sodium recipes provided  [] Sodium free seasoning provided   [] Low sodium meal delivery options given to patient  [] Dietician consulted     [] Lack of transportation to appointments     [] Depression, given chronic illness  [] Primary team notified     [] Goals of care need addressed  [] Palliative care consulted     [] CHF community health worker Suzy Garcia consulted [phone], to assist with   Chart Reviewed:  Diet: ADULT DIET; Regular; No Added Salt (3-4 gm); Low Potassium (Less than 3000 mg/day)   Daily Weights: Patient Vitals for the past 96 hrs (Last 3 readings):   Weight   07/15/25 0230 108.7 kg (239 lb 10.2 oz)     I/O:   Intake/Output Summary (Last 24 hours) at  symptoms of HF to report, Weigh Yourself Each Day  Home Self Management- activity, weight tracking, taking medications as prescribed, meals /diet planning (sodium and fluid restriction), how to read food labels, keeping physician follow ups, smoking cessation, follow the “Heart Failure Zones”      Instructed to call 911 if you have any of the following symptoms:  Struggling to breathe unrelieved with rest  Having chest pain  Confusion or can’t think clearly      Discharge Plan:  Above identified barriers reviewed and needs addressed    Patient/family educated on daily monitoring tools for CHF, made aware of signs and symptoms of worsening HF and to notify provider immediately of change in symptoms.     Heart Failure Home Instructions placed in patient's discharge instructions    Per AHA guidelines patient to be closely monitored following discharge with 7 day follow up appointment    Scheduling with the CHF clinic Already follows, post hospital appointment made    Future Appointments   Date Time Provider Department Center   7/15/2025 10:00 AM [facility] RM 2 [facility] Radiolog   7/31/2025  8:00 AM [facility] CHF ROOM 1 [facility]   8/11/2025 10:30 AM Esther Quezada PA [facility]   10/2/2025  2:00 PM Jovany Hampton, DO [facility]     Patient verbalizes understanding of above.     Thu Burgess RN   Heart Failure Navigator

## 2025-07-15 NOTE — PROGRESS NOTES
Spiritual Health History and Assessment/Progress Note  [facility]    Spiritual/Emotional Needs,  ,  ,      Name: Rober Hartley MRN: [id number]    Age: 64 y.o.     Sex: male   Language: English   Synagogue: None   Atrial fibrillation (HCC)     Date: 7/15/2025                           Spiritual Assessment began in [facility] 4S INTERMEDIATE 1        Referral/Consult From: Rounding   Encounter Overview/Reason: Spiritual/Emotional Needs  Service Provided For: Patient    Rocío, Belief, Meaning:   Patient has beliefs or practices that help with coping during difficult times  Family/Friends No family/friends present      Importance and Influence:  Patient has no beliefs influential to healthcare decision-making identified during this visit  Family/Friends No family/friends present    Community:  Patient is connected with a spiritual community  Family/Friends No family/friends present    Assessment and Plan of Care:     Patient Interventions include: Facilitated expression of thoughts and feelings  Family/Friends Interventions include: No family/friends present    Patient Plan of Care: No spiritual needs identified for follow-up  Family/Friends Plan of Care: No family/friends present    Electronically signed by Chaplain Tom on 7/15/2025 at 4:54 PM

## 2025-07-15 NOTE — CONSULTS
[facility]  Nephrology Consult  Note    Patient's Name: Rober Hartley    History of Present Illness:    \"Rober Hartley is a 64 y.o. male with a past medical history of CHF, CKD, diabetes mellitus, and hypertension.  He presented to the [facility]  ED on 4/7 reportedly for concerns of shortness of breath. Nephrology was consulted to see the patient for concerns of LIZ.  He appears to have a baseline serum creatinine of 1.6-2 mg/dL.  The LIZ was presumed sec to decreased effective renal perfusion with a.fib RVR and IV diuretic and likely exacerbated by Entresto. The cr peaked at 2.5mg/dl and at D/C trended down to 1.7mg/dl. He was also found to be Hyperkalemic. His cr was 1.8mg/dl and on 5/7/25 was 2.0mg/dl. He presented to [facility] ED on 7/14/25 for tachycardia after presenting to CHF Clinic. Initial  and rhythm was Afib with RVR. SBP down to 87/49 and cr 2.7mg/dl, WBC 14.4. Pt states he has not followed longitudinally with Nephrology. He denies any LUTS, dysuria or hematuria.  He states he has chronic SOB. He  notes he smoked for 20 yrs and quit 5 yeears ago. He has ongoing SOB. 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 He notes he has chronic LE edema over the last several months which has worsened over the recent past    PMH:    Past Medical History:   Diagnosis Date    CHF (congestive heart failure) ([facility])     CKD (chronic kidney disease)     DM2 (diabetes mellitus, type 2) ([facility])     DVT (deep venous thrombosis) ([facility]) 01/01/2008    HTN (hypertension), benign     Nonischemic cardiomyopathy ([facility]) 01/01/2008    PE (pulmonary embolism) 01/01/2008       History reviewed. No pertinent surgical history.    Patient Active Problem List   Diagnosis    History of DVT (deep vein thrombosis)    History of pulmonary

## 2025-07-15 NOTE — PROGRESS NOTES
Pharmacy Consultation Note  (Warfarin Dosing and Monitoring)    Initial consult date: 7/14  Consulting Provider: Aimee MICHAUD Naeem is a 64 y.o. male for whom pharmacy has been asked to manage warfarin therapy.     Weight:   Wt Readings from Last 1 Encounters:   07/15/25 108.7 kg (239 lb 10.2 oz)       TSH:    Lab Results   Component Value Date/Time    TSH 5.40 07/14/2025 09:26 AM       Hepatic Function Panel:                            Lab Results   Component Value Date/Time    ALKPHOS 150 07/14/2025 09:26 AM    ALT 19 07/14/2025 09:26 AM    AST 20 07/14/2025 09:26 AM    BILITOT 1.1 07/14/2025 09:26 AM    BILIDIR 0.2 02/25/2020 03:25 AM    IBILI 0.6 02/25/2020 03:25 AM         Recent Labs     07/14/25  0926 07/15/25  0527   HGB 13.1 12.5   *  --      Date Warfarin Dose INR Heparin or LMWH Comment   7/14 5 mg 1.6 Enoxaparin 105 mg subQ x 1    7/15 5 mg  1.7 Enoxaparin 105 mg subQ q12h                           Assessment:  Patient is a 64 y.o. male on warfarin for Atrial Fibrillation.  Patient tells me that their home warfarin dosing regimen is 5 mg once daily, which he last took last night.    Goal INR 2 - 3  INR 1.6 today  Patient received enoxaparin 105 mg subQ x 1 on 7/14 (1129)  Given subtherapeutic INR, I messaged Dr. Yu via Perfect Serve to place standing order for enoxaparin if she sees fit and to call me with any questions (CrCl is greater than 30 mL/min, thus, treatment dosing would remain q12h)  7/15  INR 1.7  Standing order for enoxaparin 105 mg subQ q12h currently in place.    Plan:  Warfarin 5 mg po x 1 again tonight  Daily PT/INR until the INR is stable within the therapeutic range  Pharmacist will follow and monitor/adjust dosing as necessary    Ben Metz PharmD 7/15/2025 9:55 AM   Ext: [phone]    TUNDE: [phone]  SEY: [phone]  SJW: [phone]

## 2025-07-15 NOTE — PLAN OF CARE
Problem: ABCDS Injury Assessment  Goal: Absence of physical injury  7/15/2025 0855 by Kaylene De La Torre RN  Outcome: Progressing     Problem: Discharge Planning  Goal: Discharge to home or other facility with appropriate resources  7/15/2025 0855 by Kaylene De La Torre RN  Outcome: Progressing     Problem: Chronic Conditions and Co-morbidities  Goal: Patient's chronic conditions and co-morbidity symptoms are monitored and maintained or improved  7/15/2025 0855 by Kaylene De La Torre RN  Outcome: Progressing     Problem: Safety - Adult  Goal: Free from fall injury  7/15/2025 0855 by Kaylene De La Torre RN  Outcome: Progressing

## 2025-07-15 NOTE — CONSULTS
Niko Lopez M.D.,[facility]  Rodrigo Warren D.O., FLORENTINO., [facility]  Monico Strong M.D.  Silvia Cadet M.D.   Luis Carlos Stevens D.O.  Josué Rosales M.D.       Patient:  Rober Hartley 64 y.o. male MRN: [id number]           PULMONARY CONSULTATION    Reason for Consultation: Diffuse interstitial scarring/fibrosis, ? Silicosis, dyspnea  Referring Physician: Reshma Rizo MD    Communication with the referring physician will be sent via the electronic medical record.    Chief Complaint: shortness of breath, AF RVR    CODE STATUS: FULL CODE    SUBJECTIVE:  HPI:  Rober Hartley is a 64 y.o. male we were asked to evaluate for diffuse interstitial scarring/fibrosis, questionable silicosis and dyspnea.    He is known to our service as we recently saw him during a hospitalization in April for shortness of breath that was felt to be secondary to a CHF exacerbation. During that hospitalization, he had a pulmonary CTA done on 4/7 that showed the potential of interstitial fibrosis or scarring however it was difficult to determine based on the imaging with the contrast. He was treated with diuretics and we were planning to see him in follow up with a HRCT and to address potential COPD and to obtain PFTs. His last echocardiogram showed an EF of 40-45%, mildly dilated LV, global hypokinesis with basal inferoseptal hypokinesis and abnormal diastolic dysfunction. He was followed by Cardiology for Af management and Nephrology for diuretic management.     Rober is previously from [place]. Back in 2008, he was diagnosed with bilateral DVTs and bilateral PEs. He was on Eliquis up until approximately 2 to 3 years ago when it became too expensive and he was switched to warfarin. He was told that he needed lifelong anticoagulation. 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 He has never had formal PFTs done. He is a former smoker of approximately 23

## 2025-07-15 NOTE — CONSULTS
INPATIENT CARDIOLOGY CONSULT     Reason for Consult: Permanent Afib with RVR    Cardiologist: Dr. Garcia    Requesting Physician: Dr. Rizo    Date of Consultation: 7/15/2025    HISTORY OF PRESENT ILLNESS:   Rober Hartley is a 64 year old male who is known to Dr. Hampton; most recently seen in outpatient on 3/28/2025.  Patient also follows with CHF clinic.    See extensive past medical history below.    Recent Encounters:   Admission 4/7/2025 worsening shortness of breath and was found to be in acute on chronic HFmEF and in A-fib with RVR.  Patient was initially started on IV Cardizem drip and given IV Bumex with a net output of -2.6L.  Upon discharge patient was started on Bumex 1 mg twice daily, metoprolol 150 mg twice daily, Coumadin, Entresto 49-51 mg twice daily and Lipitor.  Demadex and digoxin were discontinued.  CHF clinic 4/25/2025 weight 103 kg (227 pounds) blood pressure 128/80, heart rate 95, creatinine 1.7 (prior creatinine 1.8 on 4/15/2025) proBNP 1862 (prior proBNP 10,977 on 4/7/2025) hemoglobin 11.6.  Patient was noted to be hypervolemic and was given IV Bumex.  Potassium supplement was discontinued.  Bumex was decreased to 1 mg daily.  Patient was started on Jardiance 10 mg daily.  CHF clinic: 5/7/2025: LOBO, lower extremity edema.  Weight 103.4 kg (228 pounds) blood pressure 117/60, heart rate 92.  Creatinine 2.0, proBNP 5663, patient was hypervolemic was given IV Bumex.  Bumex was stopped.  Patient was started on torsemide 40 mg daily.  Current GDMT: Entresto 49-51 mg twice daily, Toprol-XL 75 mg twice daily, Jardiance 10 mg daily, Bumex 1 mg daily. Patient is not taking Jardiance due to financial issues.   CHF clinic 7/14/2025: Palpitations, lower extremity edema.  Weight 107 kg (236 pounds).  Euvolemic.  Patient was found to be in A-fib with RVR,  bpm.     [facility] 7/14/2025 with complaints of worsening LOBO, rapid heart beat and feeling skipped beats and BLE edema. He was at CHF  clinic on 7/14/2025 and was found to be in AF with RVR --> sent to the ED.   Patient reported feeling more SOB gradually over the past 2-3 months. He stated that over the past week he has had belly bloating, dysuria and increased LE edema. He stated that when he lays on one pillow in bed he had feel his heart racing. He denies fever, cough, chills, nausea and vomiting. His father passed away one month ago and he is grieving. He was tearful during conversation and stated \"I'm under a lot of stress.\"      Upon arrival to the ED: /80 bpm, , Afebrile, 100% on RA ---> placed on 2 L NC.  Labs: Sodium 144, potassium 4.9 (hemolyzed), BUN/Cr 57/2.7, hs-cTnT 69, 60. Hs-cTnT 33 on 4/7/2025).  proBNP 10,129.  Alk phos 150.  TSH 5.41 with a free T41.4.  WBC of 14.4, H/H stable, platelet count 110.  INR 1.6. Chest x-ray: Cardiomegaly with mild interstitial edema, no focal parenchymal opacification present. EKG: See below.  ER medications: Coumadin and patient was started on IV Cardizem drip with no bolus.  Today, potassium 5.6, BUN 54, creatinine 2.5, magnesium 2.7, WBC 12.7, hemoglobin 12.5.  INR 1.7. Ultrasound retroperitoneal: Pending. Nephrology consulted for LIZ on CKD.    Please note: past medical records were reviewed per electronic medical record (EMR) - see detailed reports under Past Medical/ Surgical History.   PAST MEDICAL HISTORY  HFmrEF/nonischemic cardiomyopathy  EF 30% 11/19/2019 > Patient declined LifeVest due to cost.  Patient reports that he was previously offered a LifeVest when he was living in [place], unable to afford.  NM stress 2/2020: No reverse perfusion defect.  EF 26%.  Global hypokinesis.  LV dilated.  EF improved to 40% on TTE 1/15/2021  TTE 3/10/2025: EF 40-45%.  LV mildly dilated.  Normal wall thickness.  Global hypokinesis present with basal inferoseptal hypokinesis.  Abnormal diastolic function.  Normal atria.  Normal RV size with mildly reduced function.  Mild AR.  Mild MR.  No

## 2025-07-15 NOTE — PROGRESS NOTES
[facility] Hospitalist Progress Note    Admitting Date and Time: 7/14/2025  8:56 AM  Admit Dx: Atrial fibrillation (HCC) [I48.91]  Elevated troponin [R79.89]  Atrial fibrillation with RVR (HCC) [I48.91]  Subtherapeutic international normalized ratio (INR) [R79.1]    Subjective:  Patient is being followed for Atrial fibrillation (HCC) [I48.91]  Elevated troponin [R79.89]  Atrial fibrillation with RVR (HCC) [I48.91]  Subtherapeutic international normalized ratio (INR) [R79.1]   Pt feels better, reports dyspnea, was on  2L oxygen when seen this am.   Per RN: No major concerns.     ROS: denies fever, chills, cp, sob, n/v, HA unless stated above.      sodium zirconium cyclosilicate  10 g Oral Q6H    warfarin  5 mg Oral Once    metoprolol succinate  75 mg Oral BID    sodium chloride flush  5-40 mL IntraVENous 2 times per day    warfarin placeholder: dosing by pharmacy   Oral RX Placeholder    enoxaparin  1 mg/kg SubCUTAneous Q12H    atorvastatin  40 mg Oral Daily    insulin lispro  0-4 Units SubCUTAneous 4x Daily AC & HS     sodium chloride flush, 5-40 mL, PRN  sodium chloride, , PRN  magnesium sulfate, 2,000 mg, PRN  ondansetron, 4 mg, Q8H PRN   Or  ondansetron, 4 mg, Q6H PRN  polyethylene glycol, 17 g, Daily PRN  acetaminophen, 650 mg, Q6H PRN   Or  acetaminophen, 650 mg, Q6H PRN  glucose, 4 tablet, PRN  dextrose bolus, 125 mL, PRN   Or  dextrose bolus, 250 mL, PRN  glucagon (rDNA), 1 mg, PRN  dextrose, , Continuous PRN         Objective:    /74   Pulse (!) 113   Temp 97.3 °F (36.3 °C) (Oral)   Resp 20   Ht 1.753 m (5' 9\")   Wt 108.7 kg (239 lb 10.2 oz)   SpO2 96%   BMI 35.39 kg/m²     General Appearance: alert and oriented to person, place and time and in no acute distress, 2L via NC  Skin: warm and dry  Head: normocephalic and atraumatic  Eyes: pupils equal, round, and reactive to light, extraocular eye movements intact, conjunctivae normal  Neck: neck supple and non tender without mass

## 2025-07-16 LAB
ANION GAP SERPL CALCULATED.3IONS-SCNC: 17 MMOL/L (ref 7–16)
BASOPHILS # BLD: 0.11 K/UL (ref 0–0.2)
BASOPHILS NFR BLD: 1 % (ref 0–2)
BUN SERPL-MCNC: 59 MG/DL (ref 8–23)
CALCIUM SERPL-MCNC: 8.6 MG/DL (ref 8.8–10.2)
CHLORIDE SERPL-SCNC: 104 MMOL/L (ref 98–107)
CO2 SERPL-SCNC: 17 MMOL/L (ref 22–29)
CREAT SERPL-MCNC: 2.7 MG/DL (ref 0.7–1.2)
EOSINOPHIL # BLD: 0.11 K/UL (ref 0.05–0.5)
EOSINOPHILS RELATIVE PERCENT: 1 % (ref 0–6)
ERYTHROCYTE [DISTWIDTH] IN BLOOD BY AUTOMATED COUNT: 15.9 % (ref 11.5–15)
GFR, ESTIMATED: 26 ML/MIN/1.73M2
GLUCOSE BLD-MCNC: 117 MG/DL (ref 74–99)
GLUCOSE BLD-MCNC: 140 MG/DL (ref 74–99)
GLUCOSE BLD-MCNC: 144 MG/DL (ref 74–99)
GLUCOSE BLD-MCNC: 151 MG/DL (ref 74–99)
GLUCOSE SERPL-MCNC: 109 MG/DL (ref 74–99)
HCT VFR BLD AUTO: 43 % (ref 37–54)
HGB BLD-MCNC: 13.7 G/DL (ref 12.5–16.5)
INR PPP: 2.2
LYMPHOCYTES NFR BLD: 1.24 K/UL (ref 1.5–4)
LYMPHOCYTES RELATIVE PERCENT: 10 % (ref 20–42)
MAGNESIUM SERPL-MCNC: 2.9 MG/DL (ref 1.6–2.4)
MCH RBC QN AUTO: 29.4 PG (ref 26–35)
MCHC RBC AUTO-ENTMCNC: 31.9 G/DL (ref 32–34.5)
MCV RBC AUTO: 92.3 FL (ref 80–99.9)
MONOCYTES NFR BLD: 0.45 K/UL (ref 0.1–0.95)
MONOCYTES NFR BLD: 4 % (ref 2–12)
NEUTROPHILS NFR BLD: 85 % (ref 43–80)
NEUTS SEG NFR BLD: 10.89 K/UL (ref 1.8–7.3)
PHOSPHATE SERPL-MCNC: 5.2 MG/DL (ref 2.5–4.5)
PLATELET, FLUORESCENCE: 126 K/UL (ref 130–450)
PMV BLD AUTO: 13.6 FL (ref 7–12)
POTASSIUM SERPL-SCNC: 4.8 MMOL/L (ref 3.5–5.1)
PROTHROMBIN TIME: 24 SEC (ref 9.3–12.4)
RBC # BLD AUTO: 4.66 M/UL (ref 3.8–5.8)
RBC # BLD: ABNORMAL 10*6/UL
SODIUM SERPL-SCNC: 138 MMOL/L (ref 136–145)
WBC OTHER # BLD: 12.8 K/UL (ref 4.5–11.5)

## 2025-07-16 PROCEDURE — 84100 ASSAY OF PHOSPHORUS: CPT

## 2025-07-16 PROCEDURE — 99232 SBSQ HOSP IP/OBS MODERATE 35: CPT | Performed by: INTERNAL MEDICINE

## 2025-07-16 PROCEDURE — 82962 GLUCOSE BLOOD TEST: CPT

## 2025-07-16 PROCEDURE — 6370000000 HC RX 637 (ALT 250 FOR IP): Performed by: NURSE PRACTITIONER

## 2025-07-16 PROCEDURE — 85025 COMPLETE CBC W/AUTO DIFF WBC: CPT

## 2025-07-16 PROCEDURE — 36415 COLL VENOUS BLD VENIPUNCTURE: CPT

## 2025-07-16 PROCEDURE — 2700000000 HC OXYGEN THERAPY PER DAY

## 2025-07-16 PROCEDURE — 6370000000 HC RX 637 (ALT 250 FOR IP): Performed by: INTERNAL MEDICINE

## 2025-07-16 PROCEDURE — 2060000000 HC ICU INTERMEDIATE R&B

## 2025-07-16 PROCEDURE — 85610 PROTHROMBIN TIME: CPT

## 2025-07-16 PROCEDURE — 2500000003 HC RX 250 WO HCPCS: Performed by: INTERNAL MEDICINE

## 2025-07-16 PROCEDURE — 6360000002 HC RX W HCPCS: Performed by: INTERNAL MEDICINE

## 2025-07-16 PROCEDURE — 83735 ASSAY OF MAGNESIUM: CPT

## 2025-07-16 PROCEDURE — 80048 BASIC METABOLIC PNL TOTAL CA: CPT

## 2025-07-16 PROCEDURE — 99233 SBSQ HOSP IP/OBS HIGH 50: CPT | Performed by: STUDENT IN AN ORGANIZED HEALTH CARE EDUCATION/TRAINING PROGRAM

## 2025-07-16 RX ORDER — WARFARIN SODIUM 5 MG/1
5 TABLET ORAL
Status: COMPLETED | OUTPATIENT
Start: 2025-07-16 | End: 2025-07-16

## 2025-07-16 RX ADMIN — WARFARIN SODIUM 5 MG: 5 TABLET ORAL at 18:01

## 2025-07-16 RX ADMIN — ENOXAPARIN SODIUM 105 MG: 150 INJECTION SUBCUTANEOUS at 09:29

## 2025-07-16 RX ADMIN — SODIUM CHLORIDE, PRESERVATIVE FREE 10 ML: 5 INJECTION INTRAVENOUS at 20:20

## 2025-07-16 RX ADMIN — ATORVASTATIN CALCIUM 40 MG: 40 TABLET, FILM COATED ORAL at 09:29

## 2025-07-16 RX ADMIN — MIDODRINE HYDROCHLORIDE 5 MG: 5 TABLET ORAL at 20:19

## 2025-07-16 RX ADMIN — METOPROLOL SUCCINATE 75 MG: 50 TABLET, EXTENDED RELEASE ORAL at 20:20

## 2025-07-16 RX ADMIN — MIDODRINE HYDROCHLORIDE 5 MG: 5 TABLET ORAL at 13:05

## 2025-07-16 RX ADMIN — MIDODRINE HYDROCHLORIDE 5 MG: 5 TABLET ORAL at 06:28

## 2025-07-16 RX ADMIN — SODIUM CHLORIDE, PRESERVATIVE FREE 10 ML: 5 INJECTION INTRAVENOUS at 09:24

## 2025-07-16 ASSESSMENT — PAIN SCALES - GENERAL
PAINLEVEL_OUTOF10: 0

## 2025-07-16 NOTE — CARE COORDINATION
Social Work/Discharge Planning:  Met with patient and completed initial assessment.  Explained Social Work role and discussed transition of care/discharge planning.  Patient lives alone in a one story house.  PTA he is independent with no adaptive device.  He has two cpap machines that he hasn't used in six years.  He is currently requiring two liters oxygen and RN to wean as tolerated.  Provided patient with Healthcare Power of  and Living Will forms.  Plan is home at discharge.  Will continue to follow and assist with discharge planning.  Electronically signed by ELIAZAR Bailon on 7/16/2025 at 8:23 AM

## 2025-07-16 NOTE — PROGRESS NOTES
Spoke w/ pt's significant other, Maria De Jesus, and updated her on patient's status and plan of care. All questions and concerns addressed and answered at this time.

## 2025-07-16 NOTE — PLAN OF CARE
Problem: ABCDS Injury Assessment  Goal: Absence of physical injury  Outcome: Progressing     Problem: Discharge Planning  Goal: Discharge to home or other facility with appropriate resources  Outcome: Progressing  Flowsheets (Taken 7/16/2025 0000)  Discharge to home or other facility with appropriate resources: Identify barriers to discharge with patient and caregiver     Problem: Chronic Conditions and Co-morbidities  Goal: Patient's chronic conditions and co-morbidity symptoms are monitored and maintained or improved  Outcome: Progressing  Flowsheets (Taken 7/16/2025 0000)  Care Plan - Patient's Chronic Conditions and Co-Morbidity Symptoms are Monitored and Maintained or Improved: Monitor and assess patient's chronic conditions and comorbid symptoms for stability, deterioration, or improvement     Problem: Safety - Adult  Goal: Free from fall injury  Outcome: Progressing

## 2025-07-16 NOTE — PROGRESS NOTES
[facility]        CARDIOLOGY                 INPATIENT PROGRESS NOTE          PATIENT SEEN IN FOLLOW UP FOR: KEILY Potter    Hospital Day: 3     Ednato Hartley is a 64 year old patient known to Dr. Hampton      SUBJECTIVE: Denies any Cp, breathing OK, No dizzy. No distress    ROS: Review of rest of 10 systems negative except as mentioned above    OBJECTIVE: No acute distress.  See Assessment     Diagnostics:       Telemetry: Reviewed    TTE 3/10/2025:   EF 40-45%.  LV mildly dilated.  Normal wall thickness.  Global hypokinesis present with basal inferoseptal hypokinesis.  Abnormal diastolic function.  Normal atria.  Normal RV size with mildly reduced function.  Mild AR.  Mild MR.  No pericardial effusion      Intake/Output Summary (Last 24 hours) at 7/16/2025 0810  Last data filed at 7/15/2025 1600  Gross per 24 hour   Intake 480 ml   Output --   Net 480 ml       Labs:   CBC:   Recent Labs     07/14/25  0926 07/15/25  0527   WBC 14.4* 12.7*   HGB 13.1 12.5   HCT 40.7 39.7   *  --      BMP:   Recent Labs     07/15/25  1205 07/15/25  1800    137   K 4.0 4.9   CO2 19* 18*   BUN 51* 54*   CREATININE 2.3* 2.4*   LABGLOM 31* 29*   CALCIUM 8.9 8.7*     Mag:   Recent Labs     07/15/25  0527   MG 2.7*     Phos:   Recent Labs     07/15/25  0527   PHOS 4.7*     TSH:   Recent Labs     07/14/25  0926   TSH 5.40*     HgA1c:     BNP: No results for input(s): \"BNP\" in the last 72 hours.  PT/INR:   Recent Labs     07/15/25  0527 07/16/25  0657   PROTIME 18.4* 24.0*   INR 1.7 2.2     APTT:No results for input(s): \"APTT\" in the last 72 hours.  CARDIAC ENZYMES:  Recent Labs     07/14/25  0926 07/14/25  1219   TROPHS 69* 60*     FASTING LIPID PANEL:  Lab Results   Component Value Date/Time    CHOL 198 04/06/2016 12:00 PM    HDL 33 04/06/2016 12:00 PM    TRIG 433 04/06/2016 12:00 PM     LIVER PROFILE:  Recent Labs     07/14/25  0926   AST 20   ALT 19       Current Inpatient Medications:   metoprolol

## 2025-07-16 NOTE — PROGRESS NOTES
Notified Dr. Stevens of patient reporting he has been feeling exhausted for 3+ years/no energy and pt reporting he has old cpaps at home that had been recalled so patient has not used cpap in many years. Notified Dr. Stevens that patient is interested in getting new cpap at home.     Dr. Stevens stated he will address.

## 2025-07-16 NOTE — PROGRESS NOTES
Pharmacy Consultation Note  (Warfarin Dosing and Monitoring)    Initial consult date: 7/14  Consulting Provider: Aimee MICHAUD Naeem is a 64 y.o. male for whom pharmacy has been asked to manage warfarin therapy.     Weight:   Wt Readings from Last 1 Encounters:   07/16/25 110.5 kg (243 lb 9.7 oz)       TSH:    Lab Results   Component Value Date/Time    TSH 5.40 07/14/2025 09:26 AM       Hepatic Function Panel:                            Lab Results   Component Value Date/Time    ALKPHOS 150 07/14/2025 09:26 AM    ALT 19 07/14/2025 09:26 AM    AST 20 07/14/2025 09:26 AM    BILITOT 1.1 07/14/2025 09:26 AM    BILIDIR 0.2 02/25/2020 03:25 AM    IBILI 0.6 02/25/2020 03:25 AM         Recent Labs     07/14/25  0926 07/15/25  0527 07/16/25  0657   HGB 13.1 12.5 13.7   *  --   --      Date Warfarin Dose INR Heparin or LMWH Comment   7/14 5 mg 1.6 Enoxaparin 105 mg subQ x 1    7/15 5 mg  1.7 Enoxaparin 105 mg subQ q12h    7/16 5 mg 2.2 -- Enoxaparin stopped                   Assessment:  Patient is a 64 y.o. male on warfarin for Atrial Fibrillation.  Per patient, warfarin dosing regimen is 5 mg once daily, which he took in the evening on 7/14.   Goal INR 2 - 3  INR 1.6 today  [id number] = 4   7/15  INR 1.7  Standing order for enoxaparin 105 mg subQ q12h currently in place per Dr Yu.  Will follow renal function for dose adjustment (CrCl < 30 mg/dL; interval changes to q24h)  7/15  INR 2.2  Enoxaparin discontinued per Dr Domínguez.     Plan:  Warfarin 5 mg po x 1 again tonight  Daily PT/INR until the INR is stable within the therapeutic range      Electronically signed by Namrata Joshi RPH, PharmD, BCPS 7/16/2025 10:36 AM       SEB: [phone]  SEY: [phone]  SJW: [phone]

## 2025-07-16 NOTE — PROGRESS NOTES
[facility] Hospitalist Progress Note    Admitting Date and Time: 7/14/2025  8:56 AM  Admit Dx: Atrial fibrillation (HCC) [I48.91]  Elevated troponin [R79.89]  Atrial fibrillation with RVR (HCC) [I48.91]  Subtherapeutic international normalized ratio (INR) [R79.1]    Subjective:  Patient is being followed for Atrial fibrillation (HCC) [I48.91]  Elevated troponin [R79.89]  Atrial fibrillation with RVR (HCC) [I48.91]  Subtherapeutic international normalized ratio (INR) [R79.1]     Patient was seen and examined.     ROS: denies fever, chills, cp, sob, n/v, HA unless stated above.      warfarin  5 mg Oral Once    metoprolol succinate  75 mg Oral BID    midodrine  5 mg Oral Q8H    sodium chloride flush  5-40 mL IntraVENous 2 times per day    warfarin placeholder: dosing by pharmacy   Oral RX Placeholder    atorvastatin  40 mg Oral Daily    insulin lispro  0-4 Units SubCUTAneous 4x Daily AC & HS     sodium chloride flush, 5-40 mL, PRN  sodium chloride, , PRN  magnesium sulfate, 2,000 mg, PRN  ondansetron, 4 mg, Q8H PRN   Or  ondansetron, 4 mg, Q6H PRN  polyethylene glycol, 17 g, Daily PRN  acetaminophen, 650 mg, Q6H PRN   Or  acetaminophen, 650 mg, Q6H PRN  glucose, 4 tablet, PRN  dextrose bolus, 125 mL, PRN   Or  dextrose bolus, 250 mL, PRN  glucagon (rDNA), 1 mg, PRN  dextrose, , Continuous PRN         Objective:    BP (!) 99/56   Pulse (!) 108   Temp 97.6 °F (36.4 °C) (Oral)   Resp 22   Ht 1.753 m (5' 9\")   Wt 110.5 kg (243 lb 9.7 oz)   SpO2 96%   BMI 35.97 kg/m²     General Appearance: alert and oriented to person, place and time and in no acute distress  Skin: warm and dry  Head: normocephalic and atraumatic  Eyes: pupils equal, round, and reactive to light, extraocular eye movements intact, conjunctivae normal  Neck: neck supple and non tender without mass   Pulmonary/Chest: clear to auscultation bilaterally- no wheezes, rales or rhonchi, normal air movement, no respiratory distress  Cardiovascular:

## 2025-07-16 NOTE — PROGRESS NOTES
[facility]  Nephrology Consult  Note    Patient's Name: Rober Hartley    History of Present Illness:    \"Rober Hartley is a 64 y.o. male with a past medical history of CHF, CKD, diabetes mellitus, and hypertension.  He presented to the [facility]  ED on 4/7 reportedly for concerns of shortness of breath. Nephrology was consulted to see the patient for concerns of LIZ.  He appears to have a baseline serum creatinine of 1.6-2 mg/dL.  The LIZ was presumed sec to decreased effective renal perfusion with a.fib RVR and IV diuretic and likely exacerbated by Entresto. The cr peaked at 2.5mg/dl and at D/C trended down to 1.7mg/dl. He was also found to be Hyperkalemic. His cr was 1.8mg/dl and on 5/7/25 was 2.0mg/dl. He presented to [facility] ED on 7/14/25 for tachycardia after presenting to CHF Clinic. Initial  and rhythm was Afib with RVR. SBP down to 87/49 and cr 2.7mg/dl, WBC 14.4. Pt states he has not followed longitudinally with Nephrology. He denies any LUTS, dysuria or hematuria.  He states he has chronic SOB. He  notes he smoked for 20 yrs and quit 5 yeears ago. He has ongoing SOB. He notes he worked for a [employer] company both here and in [place] and here there was always  cement dust in the air and in the trucks  HE denies any use of NSAID    Subjective:    7/16/2025: Pt seen and examined and he appears visibly SOB and fatigued. He had hypotension last PM with BP as low as 68/52 to 92/66. He received IVF bolus and this AM BP 98/68 to 118/64  PMH:    Past Medical History:   Diagnosis Date    CHF (congestive heart failure) ([facility])     CKD (chronic kidney disease)     DM2 (diabetes mellitus, type 2) ([facility])     DVT (deep venous thrombosis) ([facility]) 01/01/2008    HTN (hypertension), benign     Nonischemic cardiomyopathy ([facility]) 01/01/2008    PE (pulmonary embolism) 01/01/2008       History reviewed. No pertinent surgical history.    Patient Active Problem List   Diagnosis    History of DVT (deep vein thrombosis)    History of  tablets by mouth daily (Patient taking differently: Take 2 tablets by mouth every morning) 60 tablet 3    empagliflozin (JARDIANCE) 10 MG tablet Take 1 tablet by mouth daily (Patient taking differently: Take 1 tablet by mouth every morning) 30 tablet 5    warfarin (COUMADIN) 5 MG tablet Take 1 tablet by mouth daily (Patient taking differently: Take 1 tablet by mouth nightly LAST INR: 07/09/2025  INR RESULT: 1.6  INR GOAL: 2.0-2.5  NEXT INR DUE: 07/16/2025  MANAGED BY: PCP DR. SHANNAN JURADO) 30 tablet 1    sodium bicarbonate 650 MG tablet Take 1 tablet by mouth 2 times daily 120 tablet 0    metoprolol succinate (TOPROL XL) 50 MG extended release tablet Take 3 tablets by mouth 2 times daily (Patient taking differently: Take 1 tablet by mouth 2 times daily) 30 tablet 3    glipiZIDE (GLUCOTROL) 5 MG tablet Take 0.5 tablets by mouth every morning      atorvastatin (LIPITOR) 40 MG tablet TAKE 1 TABLET BY MOUTH IN THE MORNING (Patient taking differently: Take 1 tablet by mouth every morning) 90 tablet 2       Allergies:    Pcn [penicillins]    Social History:     reports that he quit smoking about 7 years ago. His smoking use included cigarettes. He started smoking about 39 years ago. He has a 48.7 pack-year smoking history. He has never used smokeless tobacco. He reports that he does not drink alcohol and does not use drugs.    Family History:         Problem Relation Age of Onset    Diabetes Mother     Heart Disease Mother     High Blood Pressure Father     Cancer Sister      Physical Exam:      Patient Vitals for the past 24 hrs:   BP Temp Temp src Pulse Resp SpO2 Weight   07/16/25 1100 (!) 99/56 97.6 °F (36.4 °C) Oral (!) 108 22 96 % --   07/16/25 0922 98/68 -- -- (!) 104 24 97 % --   07/16/25 0700 101/69 97.9 °F (36.6 °C) Oral (!) 105 20 99 % --   07/16/25 0155 101/86 97.9 °F (36.6 °C) Oral 100 20 96 % 110.5 kg (243 lb 9.7 oz)   07/15/25 2300 -- 97.7 °F (36.5 °C) Oral -- 20 98 % --   07/15/25 2244 118/64 -- --

## 2025-07-16 NOTE — PROGRESS NOTES
Niko Lopez M.D.,[facility]  Rodrigo Warren D.O., F.KEILY.JOHNNA., [facility]  Diony Strong M.D.  Silvia Cadet M.D.   Luis Carlos Stevens D.O.  Josué Rosales M.D.         Daily Pulmonary Progress Note    Patient:  Rober Hartley 64 y.o. male MRN: [id number]            Synopsis     We are following patient for shortness of breath    \"CC\" shortness of breath, AF RVR     Code status: FULL CODE      Subjective   7/15/25: Patient seen and examined sitting up in chair on 2 liters. Repots chest fluttering and shortness of breath. Cardizem running.     7/16/25: Patient was seen and examined sleeping sitting up in the chair on 2 L nasal cannula.  Blood pressures have been on the low end and he received multiple IV fluid boluses.  Staff reports the patient made mention of him being exhausted for over 3 years with no energy which is I am sure due to his underlying untreated DERIC.      Review of Systems:  Constitutional: Denies fever, weight loss, night sweats, and fatigue  Skin: Denies pigmentation, dark lesions, and rashes   HEENT: Denies hearing loss, tinnitus, ear drainage, epistaxis, sore throat, and hoarseness.  Cardiovascular: palpitations   Respiratory: shortness of breath   Gastrointestinal: Denies nausea, vomiting, poor appetite, diarrhea, heartburn or reflux  Genitourinary: Denies dysuria, frequency, urgency or hematuria  Musculoskeletal: Denies myalgias, muscle weakness, and bone pain  Neurological: Denies dizziness, vertigo, headache, and focal weakness  Psychological: Denies anxiety and depression  Endocrine: Denies heat intolerance and cold intolerance  Hematopoietic/Lymphatic: Denies bleeding problems and blood transfusions    24-hour events:  No new events    Objective   OBJECTIVE:   BP (!) 99/56   Pulse (!) 108   Temp 97.6 °F (36.4 °C) (Oral)   Resp 22   Ht 1.753 m (5' 9\")   Wt 110.5 kg (243 lb 9.7 oz)   SpO2 96%   BMI 35.97 kg/m²   SpO2 Readings from Last 1 Encounters:   07/16/25 96%

## 2025-07-17 LAB
ANION GAP SERPL CALCULATED.3IONS-SCNC: 14 MMOL/L (ref 7–16)
BASOPHILS # BLD: 0.13 K/UL (ref 0–0.2)
BASOPHILS NFR BLD: 1 % (ref 0–2)
BUN SERPL-MCNC: 62 MG/DL (ref 8–23)
CALCIUM SERPL-MCNC: 8.6 MG/DL (ref 8.8–10.2)
CHLORIDE SERPL-SCNC: 106 MMOL/L (ref 98–107)
CO2 SERPL-SCNC: 19 MMOL/L (ref 22–29)
CREAT SERPL-MCNC: 2.7 MG/DL (ref 0.7–1.2)
EOSINOPHIL # BLD: 0.13 K/UL (ref 0.05–0.5)
EOSINOPHILS RELATIVE PERCENT: 1 % (ref 0–6)
ERYTHROCYTE [DISTWIDTH] IN BLOOD BY AUTOMATED COUNT: 15.6 % (ref 11.5–15)
GFR, ESTIMATED: 25 ML/MIN/1.73M2
GLUCOSE BLD-MCNC: 114 MG/DL (ref 74–99)
GLUCOSE BLD-MCNC: 185 MG/DL (ref 74–99)
GLUCOSE BLD-MCNC: 188 MG/DL (ref 74–99)
GLUCOSE BLD-MCNC: 198 MG/DL (ref 74–99)
GLUCOSE SERPL-MCNC: 132 MG/DL (ref 74–99)
HCT VFR BLD AUTO: 41.3 % (ref 37–54)
HGB BLD-MCNC: 12.6 G/DL (ref 12.5–16.5)
INR PPP: 2
LYMPHOCYTES NFR BLD: 6.38 K/UL (ref 1.5–4)
LYMPHOCYTES RELATIVE PERCENT: 48 % (ref 20–42)
MAGNESIUM SERPL-MCNC: 2.9 MG/DL (ref 1.6–2.4)
MCH RBC QN AUTO: 28.7 PG (ref 26–35)
MCHC RBC AUTO-ENTMCNC: 30.5 G/DL (ref 32–34.5)
MCV RBC AUTO: 94.1 FL (ref 80–99.9)
MONOCYTES NFR BLD: 1.46 K/UL (ref 0.1–0.95)
MONOCYTES NFR BLD: 11 % (ref 2–12)
NEUTROPHILS NFR BLD: 39 % (ref 43–80)
NEUTS SEG NFR BLD: 5.19 K/UL (ref 1.8–7.3)
PHOSPHATE SERPL-MCNC: 4.4 MG/DL (ref 2.5–4.5)
PLATELET, FLUORESCENCE: 120 K/UL (ref 130–450)
PMV BLD AUTO: 13 FL (ref 7–12)
POTASSIUM SERPL-SCNC: 5.1 MMOL/L (ref 3.5–5.1)
PROTHROMBIN TIME: 21.5 SEC (ref 9.3–12.4)
RBC # BLD AUTO: 4.39 M/UL (ref 3.8–5.8)
RBC # BLD: ABNORMAL 10*6/UL
SODIUM SERPL-SCNC: 138 MMOL/L (ref 136–145)
WBC OTHER # BLD: 13.3 K/UL (ref 4.5–11.5)

## 2025-07-17 PROCEDURE — 36415 COLL VENOUS BLD VENIPUNCTURE: CPT

## 2025-07-17 PROCEDURE — 85610 PROTHROMBIN TIME: CPT

## 2025-07-17 PROCEDURE — 80048 BASIC METABOLIC PNL TOTAL CA: CPT

## 2025-07-17 PROCEDURE — 2700000000 HC OXYGEN THERAPY PER DAY

## 2025-07-17 PROCEDURE — 85025 COMPLETE CBC W/AUTO DIFF WBC: CPT

## 2025-07-17 PROCEDURE — 6370000000 HC RX 637 (ALT 250 FOR IP): Performed by: NURSE PRACTITIONER

## 2025-07-17 PROCEDURE — 2060000000 HC ICU INTERMEDIATE R&B

## 2025-07-17 PROCEDURE — 84100 ASSAY OF PHOSPHORUS: CPT

## 2025-07-17 PROCEDURE — 6370000000 HC RX 637 (ALT 250 FOR IP): Performed by: INTERNAL MEDICINE

## 2025-07-17 PROCEDURE — 83735 ASSAY OF MAGNESIUM: CPT

## 2025-07-17 PROCEDURE — 82962 GLUCOSE BLOOD TEST: CPT

## 2025-07-17 PROCEDURE — 2500000003 HC RX 250 WO HCPCS: Performed by: INTERNAL MEDICINE

## 2025-07-17 PROCEDURE — 99233 SBSQ HOSP IP/OBS HIGH 50: CPT | Performed by: STUDENT IN AN ORGANIZED HEALTH CARE EDUCATION/TRAINING PROGRAM

## 2025-07-17 RX ADMIN — ATORVASTATIN CALCIUM 40 MG: 40 TABLET, FILM COATED ORAL at 09:13

## 2025-07-17 RX ADMIN — INSULIN LISPRO 1 UNITS: 100 INJECTION, SOLUTION INTRAVENOUS; SUBCUTANEOUS at 10:48

## 2025-07-17 RX ADMIN — SODIUM ZIRCONIUM CYCLOSILICATE 10 G: 10 POWDER, FOR SUSPENSION ORAL at 16:21

## 2025-07-17 RX ADMIN — METOPROLOL SUCCINATE 75 MG: 50 TABLET, EXTENDED RELEASE ORAL at 21:07

## 2025-07-17 RX ADMIN — INSULIN LISPRO 1 UNITS: 100 INJECTION, SOLUTION INTRAVENOUS; SUBCUTANEOUS at 16:21

## 2025-07-17 RX ADMIN — MIDODRINE HYDROCHLORIDE 5 MG: 5 TABLET ORAL at 21:07

## 2025-07-17 RX ADMIN — SODIUM CHLORIDE, PRESERVATIVE FREE 10 ML: 5 INJECTION INTRAVENOUS at 21:07

## 2025-07-17 RX ADMIN — METOPROLOL SUCCINATE 75 MG: 50 TABLET, EXTENDED RELEASE ORAL at 09:13

## 2025-07-17 RX ADMIN — MIDODRINE HYDROCHLORIDE 5 MG: 5 TABLET ORAL at 12:42

## 2025-07-17 RX ADMIN — WARFARIN SODIUM 7.5 MG: 2.5 TABLET ORAL at 16:21

## 2025-07-17 RX ADMIN — SODIUM CHLORIDE, PRESERVATIVE FREE 10 ML: 5 INJECTION INTRAVENOUS at 09:14

## 2025-07-17 RX ADMIN — INSULIN LISPRO 1 UNITS: 100 INJECTION, SOLUTION INTRAVENOUS; SUBCUTANEOUS at 21:07

## 2025-07-17 ASSESSMENT — PAIN SCALES - GENERAL
PAINLEVEL_OUTOF10: 0
PAINLEVEL_OUTOF10: 0

## 2025-07-17 NOTE — PROGRESS NOTES
Pharmacy Consultation Note  (Warfarin Dosing and Monitoring)    Initial consult date: 7/14  Consulting Provider: Aimee MICHAUD Naeem is a 64 y.o. male for whom pharmacy has been asked to manage warfarin therapy.     Weight:   Wt Readings from Last 1 Encounters:   07/17/25 108.5 kg (239 lb 3.2 oz)       TSH:    Lab Results   Component Value Date/Time    TSH 5.40 07/14/2025 09:26 AM       Hepatic Function Panel:                            Lab Results   Component Value Date/Time    ALKPHOS 150 07/14/2025 09:26 AM    ALT 19 07/14/2025 09:26 AM    AST 20 07/14/2025 09:26 AM    BILITOT 1.1 07/14/2025 09:26 AM    BILIDIR 0.2 02/25/2020 03:25 AM    IBILI 0.6 02/25/2020 03:25 AM         Recent Labs     07/14/25  0926 07/15/25  0527 07/16/25  0657 07/17/25  0335   HGB 13.1 12.5 13.7 12.6   *  --   --   --      Date Warfarin Dose INR Heparin or LMWH Comment   7/14 5 mg 1.6 Enoxaparin 105 mg subQ x 1    7/15 5 mg  1.7 Enoxaparin 105 mg subQ q12h    7/16 5 mg 2.2 -- Enoxaparin stopped   7/17 7.5 mg 2.0 --             Assessment:  Patient is a 64 y.o. male on warfarin for Atrial Fibrillation.  Per patient, warfarin dosing regimen is 5 mg once daily, which he took in the evening on 7/14.   Goal INR 2 - 3  INR 1.6 today  [id number] = 4   7/15  INR 1.7  Standing order for enoxaparin 105 mg subQ q12h currently in place per Dr Yu.  Will follow renal function for dose adjustment (CrCl < 30 mg/dL; interval changes to q24h)  7/16  INR 2.2  Enoxaparin discontinued per Dr Domínguez.   7/17  INR 2.0    Plan:  Warfarin 7.5 mg tonight  Daily PT/INR until the INR is stable within the therapeutic range      Electronically signed by Namrata Joshi RPH, PharmD, BCPS 7/17/2025 9:23 AM       TUNDE: [phone]  SEY: [phone]  [facility]: [phone]

## 2025-07-17 NOTE — PROGRESS NOTES
[facility] Hospitalist Progress Note    Admitting Date and Time: 7/14/2025  8:56 AM  Admit Dx: Atrial fibrillation (HCC) [I48.91]  Elevated troponin [R79.89]  Atrial fibrillation with RVR (HCC) [I48.91]  Subtherapeutic international normalized ratio (INR) [R79.1]    Subjective:  Patient is being followed for Atrial fibrillation (HCC) [I48.91]  Elevated troponin [R79.89]  Atrial fibrillation with RVR (HCC) [I48.91]  Subtherapeutic international normalized ratio (INR) [R79.1]     Patient was seen and examined.     ROS: denies fever, chills, cp, sob, n/v, HA unless stated above.      warfarin  7.5 mg Oral Once    sodium zirconium cyclosilicate  10 g Oral Once    metoprolol succinate  75 mg Oral BID    midodrine  5 mg Oral Q8H    sodium chloride flush  5-40 mL IntraVENous 2 times per day    warfarin placeholder: dosing by pharmacy   Oral RX Placeholder    atorvastatin  40 mg Oral Daily    insulin lispro  0-4 Units SubCUTAneous 4x Daily AC & HS     sodium chloride flush, 5-40 mL, PRN  sodium chloride, , PRN  magnesium sulfate, 2,000 mg, PRN  ondansetron, 4 mg, Q8H PRN   Or  ondansetron, 4 mg, Q6H PRN  polyethylene glycol, 17 g, Daily PRN  acetaminophen, 650 mg, Q6H PRN   Or  acetaminophen, 650 mg, Q6H PRN  glucose, 4 tablet, PRN  dextrose bolus, 125 mL, PRN   Or  dextrose bolus, 250 mL, PRN  glucagon (rDNA), 1 mg, PRN  dextrose, , Continuous PRN         Objective:    /74   Pulse (!) 105   Temp 97.4 °F (36.3 °C) (Oral)   Resp 20   Ht 1.753 m (5' 9\")   Wt 108.5 kg (239 lb 3.2 oz)   SpO2 98%   BMI 35.32 kg/m²     General Appearance: alert and oriented to person, place and time and in no acute distress  Skin: warm and dry  Head: normocephalic and atraumatic  Eyes: pupils equal, round, and reactive to light, extraocular eye movements intact, conjunctivae normal  Neck: neck supple and non tender without mass   Pulmonary/Chest: clear to auscultation bilaterally- no wheezes, rales or rhonchi, normal air

## 2025-07-17 NOTE — PROGRESS NOTES
Niko Lopez M.D.,[facility]  Rodrigo Warren D.O., [place]., [facility]  Diony Strong M.D.  Silvia Cadet M.D.   Luis Carlos Stevens D.O.  Josué Rosales M.D.         Daily Pulmonary Progress Note    Patient:  Rober Hartley 64 y.o. male MRN: [id number]            Synopsis     We are following patient for shortness of breath    \"CC\" shortness of breath, AF RVR     Code status: FULL CODE      Subjective   7/15/25: Patient seen and examined sitting up in chair on 2 liters. Repots chest fluttering and shortness of breath. Cardizem running.     7/16/25: Patient was seen and examined sleeping sitting up in the chair on 2 L nasal cannula.  Blood pressures have been on the low end and he received multiple IV fluid boluses.  Staff reports the patient made mention of him being exhausted for over 3 years with no energy which is I am sure due to his underlying untreated DERIC.    7/17/25: patient seen and examined sitting up in the chair resting comfortably. No issues reported overnight. Heart rate still slightly elevated but better.      Review of Systems:  Constitutional: Denies fever, weight loss, night sweats, and fatigue  Skin: Denies pigmentation, dark lesions, and rashes   HEENT: Denies hearing loss, tinnitus, ear drainage, epistaxis, sore throat, and hoarseness.  Cardiovascular: palpitations   Respiratory: shortness of breath   Gastrointestinal: Denies nausea, vomiting, poor appetite, diarrhea, heartburn or reflux  Genitourinary: Denies dysuria, frequency, urgency or hematuria  Musculoskeletal: Denies myalgias, muscle weakness, and bone pain  Neurological: Denies dizziness, vertigo, headache, and focal weakness  Psychological: Denies anxiety and depression  Endocrine: Denies heat intolerance and cold intolerance  Hematopoietic/Lymphatic: Denies bleeding problems and blood transfusions    24-hour events:  No new events    Objective   OBJECTIVE:   /79   Pulse (!) 105   Temp 97.7 °F (36.5 °C) (Oral)   Resp 20

## 2025-07-17 NOTE — PROGRESS NOTES
Oral 100 20 99 % --         Intake/Output Summary (Last 24 hours) at 7/17/2025 1527  Last data filed at 7/17/2025 0530  Gross per 24 hour   Intake --   Output 300 ml   Net -300 ml       General: Awake, alert, no acute distress  Neck: No JVD noted  Lungs: Poor air movement, no wheezes  CV: Tachy Irreg Irregular.  No rub no S3  Abd: Soft, nontender, nondistended.  Active bowel sounds  Skin: Warm and dry.  No rash on exposed extremities  Ext: 2+ BLE edema  Neuro: Awake, answers questions appropriately    Data:    Recent Labs     07/15/25  0527 07/16/25  0657 07/17/25  0335   WBC 12.7* 12.8* 13.3*   HGB 12.5 13.7 12.6   HCT 39.7 43.0 41.3   MCV 91.7 92.3 94.1       Recent Labs     07/15/25  0527 07/15/25  1205 07/15/25  1800 07/16/25  0657 07/17/25  0335      < > 137 138 138   K 5.6*   < > 4.9 4.8 5.1   *   < > 106 104 106   CO2 20*   < > 18* 17* 19*   CREATININE 2.5*   < > 2.4* 2.7* 2.7*   BUN 54*   < > 54* 59* 62*   LABGLOM 27*   < > 29* 26* 25*   GLUCOSE 109*   < > 205* 109* 132*   CALCIUM 9.2   < > 8.7* 8.6* 8.6*   PHOS 4.7*  --   --  5.2* 4.4   MG 2.7*  --   --  2.9* 2.9*    < > = values in this interval not displayed.       No results found for: \"VITD25\"    No results found for: \"PTH\"    No results for input(s): \"ALT\", \"AST\", \"ALKPHOS\", \"BILITOT\", \"BILIDIR\" in the last 72 hours.      No results for input(s): \"LABALBU\" in the last 72 hours.    Ferritin   Date Value Ref Range Status   03/09/2020 77 ng/mL Final     Comment:     FERRITIN Reference Ranges:  Adult Males   20 - 60 yrars:    30 - 400 ng/mL  Adult females 17 - 60 years:    13 - 150 ng/mL  Adults greater than 60 years:   no established reference range  Pediatrics:                     no established reference range       Iron   Date Value Ref Range Status   03/09/2020 55 (L) 59 - 158 mcg/dL Final     TIBC   Date Value Ref Range Status   03/09/2020 294 250 - 450 mcg/dL Final       No results found for: \"LEPAOFUO09\"    No results found for:  \"FOLATE\"    Lab Results   Component Value Date/Time    COLORU Yellow 03/12/2025 04:10 PM    NITRU NEGATIVE 03/12/2025 04:10 PM    GLUCOSEU NEGATIVE 03/12/2025 04:10 PM    KETUA NEGATIVE 03/12/2025 04:10 PM    UROBILINOGEN 0.2 03/12/2025 04:10 PM    BILIRUBINUR NEGATIVE 03/12/2025 04:10 PM       Lab Results   Component Value Date/Time    PROTEIN 6.8 07/14/2025 09:26 AM    OSMOU 1088 (H) 07/14/2025 09:35 PM       No components found for: \"URIC\"    No results found for: \"LIPIDPAN\"    XR CHEST PORTABLE [[id number]] Collected: 07/14/25 1016     Order Status: Completed Updated: 07/14/25 1020    Narrative:      EXAMINATION:  ONE XRAY VIEW OF THE CHEST    7/14/2025 9:52 am    COMPARISON:  04/10/2025    HISTORY:  ORDERING SYSTEM PROVIDED HISTORY: irregular heart rate  TECHNOLOGIST PROVIDED HISTORY:  Reason for exam:->irregular heart rate    FINDINGS:  Portable chest reveals heart to be enlarged.  Underlying chronic changes seen  throughout the lung fields.  Mild increased interstitial markings to suggest  mild interstitial edema.  No definite pleural effusion.  Degenerative changes  seen within the spine.  Vascular calcifications seen within the thoracic  aorta.    Impression:      Cardiomegaly with mild interstitial edema.  No focal parenchymal  opacification present        Assessment and Plans:    LIZ on CKD 3B  LZI due to decreased effective renal perfusion as evidenced by the Malcolm+<20, FeNa<1% and FeUrea <35% with a.fib RVR and  diuretic and likely exacerbated by Entresto   baseline serum creatinine of 1.6-2 mg/dL  Retroperitoneal US 4/9- unremarkable US of the kidneys/ bladder   Cr  had trended down from 2.7-->2.3mg/dl and back up to 2.7mg/dl post episodes of hypotension  PLAN:   Bladder scan as needed  avoid nephrotoxins/NSAIDs  Strict I&O, daily weights  Continue to hold Entresto   Continue to Hold loop diureitic  Continue to Hold SGLT2i    2.  CHF-HFmr EF  ECHO 3/2025-EF 40-45%, abnormal diastolic function  proBNP

## 2025-07-18 LAB
ANION GAP SERPL CALCULATED.3IONS-SCNC: 14 MMOL/L (ref 7–16)
BASOPHILS # BLD: 0.12 K/UL (ref 0–0.2)
BASOPHILS NFR BLD: 1 % (ref 0–2)
BUN SERPL-MCNC: 63 MG/DL (ref 8–23)
CALCIUM SERPL-MCNC: 8.8 MG/DL (ref 8.8–10.2)
CHLORIDE SERPL-SCNC: 106 MMOL/L (ref 98–107)
CO2 SERPL-SCNC: 19 MMOL/L (ref 22–29)
CREAT SERPL-MCNC: 2.8 MG/DL (ref 0.7–1.2)
EOSINOPHIL # BLD: 0 K/UL (ref 0.05–0.5)
EOSINOPHILS RELATIVE PERCENT: 0 % (ref 0–6)
ERYTHROCYTE [DISTWIDTH] IN BLOOD BY AUTOMATED COUNT: 16 % (ref 11.5–15)
GFR, ESTIMATED: 25 ML/MIN/1.73M2
GLUCOSE BLD-MCNC: 127 MG/DL (ref 74–99)
GLUCOSE BLD-MCNC: 171 MG/DL (ref 74–99)
GLUCOSE BLD-MCNC: 171 MG/DL (ref 74–99)
GLUCOSE BLD-MCNC: 194 MG/DL (ref 74–99)
GLUCOSE SERPL-MCNC: 139 MG/DL (ref 74–99)
HCT VFR BLD AUTO: 40.9 % (ref 37–54)
HGB BLD-MCNC: 13 G/DL (ref 12.5–16.5)
INR PPP: 2.3
LYMPHOCYTES NFR BLD: 3.61 K/UL (ref 1.5–4)
LYMPHOCYTES RELATIVE PERCENT: 27 % (ref 20–42)
MAGNESIUM SERPL-MCNC: 3.1 MG/DL (ref 1.6–2.4)
MCH RBC QN AUTO: 29.3 PG (ref 26–35)
MCHC RBC AUTO-ENTMCNC: 31.8 G/DL (ref 32–34.5)
MCV RBC AUTO: 92.1 FL (ref 80–99.9)
MONOCYTES NFR BLD: 0.84 K/UL (ref 0.1–0.95)
MONOCYTES NFR BLD: 6 % (ref 2–12)
NEUTROPHILS NFR BLD: 66 % (ref 43–80)
NEUTS SEG NFR BLD: 9.03 K/UL (ref 1.8–7.3)
PHOSPHATE SERPL-MCNC: 4.8 MG/DL (ref 2.5–4.5)
PLATELET, FLUORESCENCE: 132 K/UL (ref 130–450)
PMV BLD AUTO: 12.6 FL (ref 7–12)
POTASSIUM SERPL-SCNC: 4.2 MMOL/L (ref 3.5–5.1)
POTASSIUM SERPL-SCNC: 5.3 MMOL/L (ref 3.5–5.1)
PROTHROMBIN TIME: 24.6 SEC (ref 9.3–12.4)
RBC # BLD AUTO: 4.44 M/UL (ref 3.8–5.8)
RBC # BLD: ABNORMAL 10*6/UL
SODIUM SERPL-SCNC: 139 MMOL/L (ref 136–145)
WBC OTHER # BLD: 13.6 K/UL (ref 4.5–11.5)

## 2025-07-18 PROCEDURE — 6370000000 HC RX 637 (ALT 250 FOR IP): Performed by: INTERNAL MEDICINE

## 2025-07-18 PROCEDURE — 87086 URINE CULTURE/COLONY COUNT: CPT

## 2025-07-18 PROCEDURE — 99232 SBSQ HOSP IP/OBS MODERATE 35: CPT | Performed by: INTERNAL MEDICINE

## 2025-07-18 PROCEDURE — 82962 GLUCOSE BLOOD TEST: CPT

## 2025-07-18 PROCEDURE — 84100 ASSAY OF PHOSPHORUS: CPT

## 2025-07-18 PROCEDURE — 2500000003 HC RX 250 WO HCPCS: Performed by: INTERNAL MEDICINE

## 2025-07-18 PROCEDURE — 85610 PROTHROMBIN TIME: CPT

## 2025-07-18 PROCEDURE — 6370000000 HC RX 637 (ALT 250 FOR IP): Performed by: STUDENT IN AN ORGANIZED HEALTH CARE EDUCATION/TRAINING PROGRAM

## 2025-07-18 PROCEDURE — 83735 ASSAY OF MAGNESIUM: CPT

## 2025-07-18 PROCEDURE — 99233 SBSQ HOSP IP/OBS HIGH 50: CPT | Performed by: STUDENT IN AN ORGANIZED HEALTH CARE EDUCATION/TRAINING PROGRAM

## 2025-07-18 PROCEDURE — 2700000000 HC OXYGEN THERAPY PER DAY

## 2025-07-18 PROCEDURE — 85025 COMPLETE CBC W/AUTO DIFF WBC: CPT

## 2025-07-18 PROCEDURE — 2060000000 HC ICU INTERMEDIATE R&B

## 2025-07-18 PROCEDURE — 80048 BASIC METABOLIC PNL TOTAL CA: CPT

## 2025-07-18 PROCEDURE — 84132 ASSAY OF SERUM POTASSIUM: CPT

## 2025-07-18 PROCEDURE — 81001 URINALYSIS AUTO W/SCOPE: CPT

## 2025-07-18 RX ORDER — METOPROLOL SUCCINATE 25 MG/1
25 TABLET, EXTENDED RELEASE ORAL 2 TIMES DAILY
Status: DISCONTINUED | OUTPATIENT
Start: 2025-07-18 | End: 2025-07-21

## 2025-07-18 RX ORDER — MIDODRINE HYDROCHLORIDE 5 MG/1
10 TABLET ORAL 3 TIMES DAILY
Status: DISCONTINUED | OUTPATIENT
Start: 2025-07-18 | End: 2025-07-22 | Stop reason: HOSPADM

## 2025-07-18 RX ORDER — WARFARIN SODIUM 5 MG/1
5 TABLET ORAL
Status: COMPLETED | OUTPATIENT
Start: 2025-07-18 | End: 2025-07-18

## 2025-07-18 RX ADMIN — SODIUM CHLORIDE, PRESERVATIVE FREE 10 ML: 5 INJECTION INTRAVENOUS at 19:54

## 2025-07-18 RX ADMIN — MIDODRINE HYDROCHLORIDE 5 MG: 5 TABLET ORAL at 07:06

## 2025-07-18 RX ADMIN — WARFARIN SODIUM 5 MG: 5 TABLET ORAL at 18:22

## 2025-07-18 RX ADMIN — SODIUM ZIRCONIUM CYCLOSILICATE 10 G: 10 POWDER, FOR SUSPENSION ORAL at 08:31

## 2025-07-18 RX ADMIN — METOPROLOL SUCCINATE 25 MG: 25 TABLET, EXTENDED RELEASE ORAL at 07:44

## 2025-07-18 RX ADMIN — ATORVASTATIN CALCIUM 40 MG: 40 TABLET, FILM COATED ORAL at 07:44

## 2025-07-18 RX ADMIN — SODIUM CHLORIDE, PRESERVATIVE FREE 10 ML: 5 INJECTION INTRAVENOUS at 07:44

## 2025-07-18 RX ADMIN — MIDODRINE HYDROCHLORIDE 10 MG: 5 TABLET ORAL at 13:56

## 2025-07-18 RX ADMIN — INSULIN LISPRO 1 UNITS: 100 INJECTION, SOLUTION INTRAVENOUS; SUBCUTANEOUS at 15:50

## 2025-07-18 RX ADMIN — MIDODRINE HYDROCHLORIDE 10 MG: 5 TABLET ORAL at 16:07

## 2025-07-18 ASSESSMENT — PAIN SCALES - GENERAL: PAINLEVEL_OUTOF10: 0

## 2025-07-18 NOTE — PROGRESS NOTES
[facility]  Nephrology Progress Note    Patient's Name: Rober Hartley    History of Present Illness:    \"Rober Hartley is a 64 y.o. male with a past medical history of CHF, CKD, diabetes mellitus, and hypertension.  He presented to the [facility]  ED on 4/7 reportedly for concerns of shortness of breath. Nephrology was consulted to see the patient for concerns of LIZ.  He appears to have a baseline serum creatinine of 1.6-2 mg/dL.  The LIZ was presumed sec to decreased effective renal perfusion with a.fib RVR and IV diuretic and likely exacerbated by Entresto. The cr peaked at 2.5mg/dl and at D/C trended down to 1.7mg/dl. He was also found to be Hyperkalemic. His cr was 1.8mg/dl and on 5/7/25 was 2.0mg/dl. He presented to [facility] ED on 7/14/25 for tachycardia after presenting to [facility]F Clinic. Initial  and rhythm was Afib with RVR. SBP down to 87/49 and cr 2.7mg/dl, WBC 14.4. Pt states he has not followed longitudinally with Nephrology. He denies any LUTS, dysuria or hematuria.  He states he has chronic SOB. He  notes he smoked for 20 yrs and quit 5 yeears ago. He has ongoing SOB. He notes he worked for a [employer] company both here and in [place] and here there was always  cement dust in the air and in the trucks  HE denies any use of NSAID    Subjective:    7/18/2025: Pt seen and examined. He was seated up in chair and is ill appearing, SOB at rest    PMH:    Past Medical History:   Diagnosis Date    CHF (congestive heart failure) ([facility])     CKD (chronic kidney disease)     DM2 (diabetes mellitus, type 2) ([facility])     DVT (deep venous thrombosis) ([facility]) 01/01/2008    HTN (hypertension), benign     Nonischemic cardiomyopathy ([facility]) 01/01/2008    PE (pulmonary embolism) 01/01/2008       History reviewed. No pertinent surgical history.    Patient Active Problem List   Diagnosis    History of DVT (deep vein thrombosis)    History of pulmonary embolism    Type 2 diabetes mellitus with obesity ([facility])    Hyperlipidemia     results found for: \"FOLATE\"    Lab Results   Component Value Date/Time    COLORU Yellow 03/12/2025 04:10 PM    NITRU NEGATIVE 03/12/2025 04:10 PM    GLUCOSEU NEGATIVE 03/12/2025 04:10 PM    KETUA NEGATIVE 03/12/2025 04:10 PM    UROBILINOGEN 0.2 03/12/2025 04:10 PM    BILIRUBINUR NEGATIVE 03/12/2025 04:10 PM       Lab Results   Component Value Date/Time    PROTEIN 6.8 07/14/2025 09:26 AM    OSMOU 1088 (H) 07/14/2025 09:35 PM       No components found for: \"URIC\"    No results found for: \"LIPIDPAN\"    XR CHEST PORTABLE [[id number]] Collected: 07/14/25 1016     Order Status: Completed Updated: 07/14/25 1020    Narrative:      EXAMINATION:  ONE XRAY VIEW OF THE CHEST    7/14/2025 9:52 am    COMPARISON:  04/10/2025    HISTORY:  ORDERING SYSTEM PROVIDED HISTORY: irregular heart rate  TECHNOLOGIST PROVIDED HISTORY:  Reason for exam:->irregular heart rate    FINDINGS:  Portable chest reveals heart to be enlarged.  Underlying chronic changes seen  throughout the lung fields.  Mild increased interstitial markings to suggest  mild interstitial edema.  No definite pleural effusion.  Degenerative changes  seen within the spine.  Vascular calcifications seen within the thoracic  aorta.    Impression:      Cardiomegaly with mild interstitial edema.  No focal parenchymal  opacification present        Assessment and Plans:    LIZ on CKD 3B  LIZ due to decreased effective renal perfusion as evidenced by the Malcolm+<20, FeNa<1% and FeUrea <35% with a.fib RVR and  diuretic and likely exacerbated by Entresto   baseline serum creatinine of 1.6-2 mg/dL  Retroperitoneal US 4/9- unremarkable US of the kidneys/ bladder   Cr  had trended down from 2.7-->2.3mg/dl and back up to 2.8mg/dl post episodes of hypotension  PLAN:   Bladder scan as needed  avoid nephrotoxins/NSAIDs  Strict I&O, daily weights  Continue to hold Entresto   Continue to Hold loop diureitic  Continue to Hold SGLT2i    2.  CHF-HFmr EF  ECHO 3/2025-EF 40-45%, abnormal diastolic

## 2025-07-18 NOTE — CARE COORDINATION
Social Work/Discharge Planning;  [facility] order noted.  Met with patient and discussed home health care options.  He will use [facility].  He was weaned to room air.  He does not have an oxygen preference if needed at discharge.  [facility] can accept patient.  Informed [facility] of discharge tomorrow.  Notified patient of [facility] acceptance.  Will continue to follow.  Electronically signed by ELIAZAR Bailon on 7/18/2025 at 3:25 PM    Addendum:  Patient PCP office is closed.  [facility] will call his PCP on Monday for orders and have start of care for Tuesday 7/22. Electronically signed by ELIAZAR Bailon on 7/18/2025 at 3:34 PM

## 2025-07-18 NOTE — PROGRESS NOTES
[facility]        CARDIOLOGY                 INPATIENT PROGRESS NOTE          PATIENT SEEN IN FOLLOW UP FOR: KEILY Potter    Hospital Day: 5     Rober Hartley is a 64 year old patient known to Dr. Hampton      SUBJECTIVE: Denies any CP, breathing OK, No dizzy. No distress.    ROS: Review of rest of 10 systems negative except as mentioned above    OBJECTIVE: No acute distress.  See Assessment     Diagnostics:       Telemetry: Reviewed    TTE 3/10/2025:   EF 40-45%.  LV mildly dilated.  Normal wall thickness.  Global hypokinesis present with basal inferoseptal hypokinesis.  Abnormal diastolic function.  Normal atria.  Normal RV size with mildly reduced function.  Mild AR.  Mild MR.  No pericardial effusion      Intake/Output Summary (Last 24 hours) at 7/18/2025 1145  Last data filed at 7/18/2025 0844  Gross per 24 hour   Intake 430 ml   Output 700 ml   Net -270 ml       Labs:   CBC:   Recent Labs     07/17/25 0335 07/18/25  0301   WBC 13.3* 13.6*   HGB 12.6 13.0   HCT 41.3 40.9     BMP:   Recent Labs     07/17/25 0335 07/18/25  0301    139   K 5.1 5.3*   CO2 19* 19*   BUN 62* 63*   CREATININE 2.7* 2.8*   LABGLOM 25* 25*   CALCIUM 8.6* 8.8     Mag:   Recent Labs     07/17/25  0335 07/18/25  0301   MG 2.9* 3.1*     Phos:   Recent Labs     07/17/25  0335 07/18/25  0301   PHOS 4.4 4.8*     TSH:   No results for input(s): \"TSH\" in the last 72 hours.    HgA1c:     BNP: No results for input(s): \"BNP\" in the last 72 hours.  PT/INR:   Recent Labs     07/17/25  0335 07/18/25  0301   PROTIME 21.5* 24.6*   INR 2.0 2.3     APTT:No results for input(s): \"APTT\" in the last 72 hours.  CARDIAC ENZYMES:  No results for input(s): \"CKTOTAL\", \"TROPHS\" in the last 72 hours.    FASTING LIPID PANEL:  Lab Results   Component Value Date/Time    CHOL 198 04/06/2016 12:00 PM    HDL 33 04/06/2016 12:00 PM    TRIG 433 04/06/2016 12:00 PM     LIVER PROFILE:  No results for input(s): \"AST\", \"ALT\", \"LABALBU\" in the last  MR     Diabetes - Per Primary     PE/DVT - On Warfarin     HTN - Controlled            Above d/w him      Cardiology will follow as needed during weekend    F/u with Dr Hampton 2-4 weeks after discharge    Above d/w him - No family at bed side      Electronically signed by Michel Garcia MD on 7/18/2025 at 11:45 AM  [facility] Cardiology

## 2025-07-18 NOTE — PLAN OF CARE
Problem: ABCDS Injury Assessment  Goal: Absence of physical injury  7/18/2025 0846 by Kaylene De La Torre RN  Outcome: Progressing     Problem: Discharge Planning  Goal: Discharge to home or other facility with appropriate resources  7/18/2025 0846 by Kaylene De La Torre RN  Outcome: Progressing     Problem: Chronic Conditions and Co-morbidities  Goal: Patient's chronic conditions and co-morbidity symptoms are monitored and maintained or improved  7/18/2025 0846 by Kaylene De La Torre RN  Outcome: Progressing     Problem: Safety - Adult  Goal: Free from fall injury  7/18/2025 0846 by Kaylene De La Torre RN  Outcome: Progressing     Problem: Pain  Goal: Verbalizes/displays adequate comfort level or baseline comfort level  7/18/2025 0846 by Kaylene De La Torre RN  Outcome: Progressing     Problem: Neurosensory - Adult  Goal: Achieves stable or improved neurological status  7/18/2025 0846 by Kaylene De La Torre RN  Outcome: Progressing     Problem: Neurosensory - Adult  Goal: Achieves maximal functionality and self care  7/18/2025 0846 by Kaylene De La Torre RN  Outcome: Progressing     Problem: Respiratory - Adult  Goal: Achieves optimal ventilation and oxygenation  7/18/2025 0846 by Kaylene De La Torre RN  Outcome: Progressing     Problem: Cardiovascular - Adult  Goal: Maintains optimal cardiac output and hemodynamic stability  7/18/2025 0846 by Kaylene De La Torre RN  Outcome: Progressing     Problem: Skin/Tissue Integrity - Adult  Goal: Skin integrity remains intact  7/18/2025 0846 by Kaylene De La Torre RN  Outcome: Progressing

## 2025-07-18 NOTE — PROGRESS NOTES
[facility] Hospitalist Progress Note    Admitting Date and Time: 7/14/2025  8:56 AM  Admit Dx: Atrial fibrillation (HCC) [I48.91]  Elevated troponin [R79.89]  Atrial fibrillation with RVR (HCC) [I48.91]  Subtherapeutic international normalized ratio (INR) [R79.1]    Subjective:  Patient is being followed for Atrial fibrillation (HCC) [I48.91]  Elevated troponin [R79.89]  Atrial fibrillation with RVR (HCC) [I48.91]  Subtherapeutic international normalized ratio (INR) [R79.1]     Patient was seen and examined.     ROS: denies fever, chills, cp, sob, n/v, HA unless stated above.      metoprolol succinate  25 mg Oral BID    midodrine  10 mg Oral TID    warfarin  5 mg Oral Once    sodium chloride flush  5-40 mL IntraVENous 2 times per day    warfarin placeholder: dosing by pharmacy   Oral RX Placeholder    atorvastatin  40 mg Oral Daily    insulin lispro  0-4 Units SubCUTAneous 4x Daily AC & HS     sodium chloride flush, 5-40 mL, PRN  sodium chloride, , PRN  magnesium sulfate, 2,000 mg, PRN  ondansetron, 4 mg, Q8H PRN   Or  ondansetron, 4 mg, Q6H PRN  polyethylene glycol, 17 g, Daily PRN  acetaminophen, 650 mg, Q6H PRN   Or  acetaminophen, 650 mg, Q6H PRN  glucose, 4 tablet, PRN  dextrose bolus, 125 mL, PRN   Or  dextrose bolus, 250 mL, PRN  glucagon (rDNA), 1 mg, PRN  dextrose, , Continuous PRN         Objective:    BP 90/75   Pulse 65   Temp 97.9 °F (36.6 °C) (Oral)   Resp 18   Ht 1.753 m (5' 9\")   Wt 110 kg (242 lb 8.1 oz)   SpO2 93%   BMI 35.81 kg/m²     General Appearance: alert and oriented to person, place and time and in no acute distress  Skin: warm and dry  Head: normocephalic and atraumatic  Eyes: pupils equal, round, and reactive to light, extraocular eye movements intact, conjunctivae normal  Neck: neck supple and non tender without mass   Pulmonary/Chest: clear to auscultation bilaterally- no wheezes, rales or rhonchi, normal air movement, no respiratory distress  Cardiovascular: normal

## 2025-07-18 NOTE — PROGRESS NOTES
Pharmacy Consultation Note  (Warfarin Dosing and Monitoring)    Initial consult date: 7/14  Consulting Provider: Amiee MICHAUD Naeem is a 64 y.o. male for whom pharmacy has been asked to manage warfarin therapy.     Weight:   Wt Readings from Last 1 Encounters:   07/18/25 110 kg (242 lb 8.1 oz)       TSH:    Lab Results   Component Value Date/Time    TSH 5.40 07/14/2025 09:26 AM       Hepatic Function Panel:                            Lab Results   Component Value Date/Time    ALKPHOS 150 07/14/2025 09:26 AM    ALT 19 07/14/2025 09:26 AM    AST 20 07/14/2025 09:26 AM    BILITOT 1.1 07/14/2025 09:26 AM    BILIDIR 0.2 02/25/2020 03:25 AM    IBILI 0.6 02/25/2020 03:25 AM         Recent Labs     07/16/25  0657 07/17/25  0335 07/18/25  0301   HGB 13.7 12.6 13.0     Date Warfarin Dose INR Heparin or LMWH Comment   7/14 5 mg 1.6 Enoxaparin 105 mg subQ x 1    7/15 5 mg  1.7 Enoxaparin 105 mg subQ q12h    7/16 5 mg 2.2 -- Enoxaparin stopped   7/17 7.5 mg 2.0 --    7/18 5 mg  2.3 x                    Assessment:  Patient is a 64 y.o. male on warfarin for Atrial Fibrillation.  Per patient, warfarin dosing regimen is 5 mg once daily, which he took in the evening on 7/14.   Goal INR 2 - 3  INR 1.6 today  7/15  INR 1.7  Standing order for enoxaparin 105 mg subQ q12h currently in place per Dr Yu.  Will follow renal function for dose adjustment (CrCl < 30 mg/dL; interval changes to q24h)  7/16  INR 2.2  Enoxaparin discontinued per Dr Domínguez.   7/17  INR 2.0  7/18  INR 2.3    Plan:  Will resume patient's home warfarin regimen at this time.   Daily PT/INR until the INR is stable within the therapeutic range      Electronically signed by Marcin Metz RPH, PharmD  7/18/2025 10:31 AM       SEB: [phone]  SEY: [phone]  SJW: [phone]

## 2025-07-18 NOTE — PROGRESS NOTES
Niko Lopez M.D.,[facility]  Rodrigo Warren D.O., [place]., [facility]  Diony Strong M.D.  Silvia Cadet M.D.   Luis Carlos Stevens D.O.  Josué Rosales M.D.         Daily Pulmonary Progress Note    Patient:  Rober Hartley 64 y.o. male MRN: [id number]            Synopsis     We are following patient for shortness of breath    \"CC\" shortness of breath, AF RVR     Code status: FULL CODE      Subjective   7/15/25: Patient seen and examined sitting up in chair on 2 liters. Repots chest fluttering and shortness of breath. Cardizem running.     7/16/25: Patient was seen and examined sleeping sitting up in the chair on 2 L nasal cannula.  Blood pressures have been on the low end and he received multiple IV fluid boluses.  Staff reports the patient made mention of him being exhausted for over 3 years with no energy which is I am sure due to his underlying untreated DERIC.    7/17/25: patient seen and examined sitting up in the chair resting comfortably. No issues reported overnight. Heart rate still slightly elevated but better.    7/18/25: patient seen and examined sleeping in the chair. Oxygen is off his face, SpO2 96%. Discussed with bedside RN ok to remove oxygen.    Review of Systems:  Constitutional: Denies fever, weight loss, night sweats, and fatigue  Skin: Denies pigmentation, dark lesions, and rashes   HEENT: Denies hearing loss, tinnitus, ear drainage, epistaxis, sore throat, and hoarseness.  Cardiovascular: palpitations   Respiratory: shortness of breath   Gastrointestinal: Denies nausea, vomiting, poor appetite, diarrhea, heartburn or reflux  Genitourinary: Denies dysuria, frequency, urgency or hematuria  Musculoskeletal: Denies myalgias, muscle weakness, and bone pain  Neurological: Denies dizziness, vertigo, headache, and focal weakness  Psychological: Denies anxiety and depression  Endocrine: Denies heat intolerance and cold intolerance  Hematopoietic/Lymphatic: Denies bleeding problems and blood

## 2025-07-19 LAB
ANION GAP SERPL CALCULATED.3IONS-SCNC: 15 MMOL/L (ref 7–16)
ATYPICAL LYMPHOCYTE ABSOLUTE COUNT: 0.56 K/UL (ref 0–0.46)
ATYPICAL LYMPHOCYTES: 4 % (ref 0–4)
BACTERIA URNS QL MICRO: ABNORMAL
BASOPHILS # BLD: 0.14 K/UL (ref 0–0.2)
BASOPHILS NFR BLD: 1 % (ref 0–2)
BILIRUB UR QL STRIP: ABNORMAL
BUN SERPL-MCNC: 70 MG/DL (ref 8–23)
CALCIUM SERPL-MCNC: 8.3 MG/DL (ref 8.8–10.2)
CASTS #/AREA URNS LPF: ABNORMAL /LPF
CHLORIDE SERPL-SCNC: 102 MMOL/L (ref 98–107)
CLARITY UR: CLEAR
CO2 SERPL-SCNC: 19 MMOL/L (ref 22–29)
COLOR UR: YELLOW
CREAT SERPL-MCNC: 2.9 MG/DL (ref 0.7–1.2)
EOSINOPHIL # BLD: 0.42 K/UL (ref 0.05–0.5)
EOSINOPHILS RELATIVE PERCENT: 3 % (ref 0–6)
EPI CELLS #/AREA URNS HPF: ABNORMAL /HPF
ERYTHROCYTE [DISTWIDTH] IN BLOOD BY AUTOMATED COUNT: 16.5 % (ref 11.5–15)
GFR, ESTIMATED: 23 ML/MIN/1.73M2
GLUCOSE BLD-MCNC: 115 MG/DL (ref 74–99)
GLUCOSE BLD-MCNC: 135 MG/DL (ref 74–99)
GLUCOSE BLD-MCNC: 147 MG/DL (ref 74–99)
GLUCOSE BLD-MCNC: 167 MG/DL (ref 74–99)
GLUCOSE SERPL-MCNC: 168 MG/DL (ref 74–99)
GLUCOSE UR STRIP-MCNC: NEGATIVE MG/DL
HCT VFR BLD AUTO: 41.7 % (ref 37–54)
HGB BLD-MCNC: 13.4 G/DL (ref 12.5–16.5)
HGB UR QL STRIP.AUTO: ABNORMAL
INR PPP: 3
KETONES UR STRIP-MCNC: NEGATIVE MG/DL
LEUKOCYTE ESTERASE UR QL STRIP: NEGATIVE
LYMPHOCYTES NFR BLD: 6.35 K/UL (ref 1.5–4)
LYMPHOCYTES RELATIVE PERCENT: 45 % (ref 20–42)
MAGNESIUM SERPL-MCNC: 3 MG/DL (ref 1.6–2.4)
MCH RBC QN AUTO: 29.4 PG (ref 26–35)
MCHC RBC AUTO-ENTMCNC: 32.1 G/DL (ref 32–34.5)
MCV RBC AUTO: 91.4 FL (ref 80–99.9)
MONOCYTES NFR BLD: 0.71 K/UL (ref 0.1–0.95)
MONOCYTES NFR BLD: 5 % (ref 2–12)
NEUTROPHILS NFR BLD: 42 % (ref 43–80)
NEUTS SEG NFR BLD: 5.92 K/UL (ref 1.8–7.3)
NITRITE UR QL STRIP: NEGATIVE
PH UR STRIP: 6 [PH] (ref 5–8)
PHOSPHATE SERPL-MCNC: 5.4 MG/DL (ref 2.5–4.5)
PLATELET # BLD AUTO: 143 K/UL (ref 130–450)
PMV BLD AUTO: 13 FL (ref 7–12)
POTASSIUM SERPL-SCNC: 4.2 MMOL/L (ref 3.5–5.1)
PROT UR STRIP-MCNC: 100 MG/DL
PROTHROMBIN TIME: 32.2 SEC (ref 9.3–12.4)
RBC # BLD AUTO: 4.56 M/UL (ref 3.8–5.8)
RBC # BLD: ABNORMAL 10*6/UL
RBC #/AREA URNS HPF: ABNORMAL /HPF
SODIUM SERPL-SCNC: 137 MMOL/L (ref 136–145)
SP GR UR STRIP: >1.03 (ref 1–1.03)
UROBILINOGEN UR STRIP-ACNC: 1 EU/DL (ref 0–1)
WBC # BLD: ABNORMAL 10*3/UL
WBC #/AREA URNS HPF: ABNORMAL /HPF
WBC OTHER # BLD: 14.1 K/UL (ref 4.5–11.5)

## 2025-07-19 PROCEDURE — 99233 SBSQ HOSP IP/OBS HIGH 50: CPT | Performed by: STUDENT IN AN ORGANIZED HEALTH CARE EDUCATION/TRAINING PROGRAM

## 2025-07-19 PROCEDURE — 36415 COLL VENOUS BLD VENIPUNCTURE: CPT

## 2025-07-19 PROCEDURE — 6370000000 HC RX 637 (ALT 250 FOR IP): Performed by: INTERNAL MEDICINE

## 2025-07-19 PROCEDURE — 85610 PROTHROMBIN TIME: CPT

## 2025-07-19 PROCEDURE — 83735 ASSAY OF MAGNESIUM: CPT

## 2025-07-19 PROCEDURE — 2500000003 HC RX 250 WO HCPCS: Performed by: INTERNAL MEDICINE

## 2025-07-19 PROCEDURE — 2060000000 HC ICU INTERMEDIATE R&B

## 2025-07-19 PROCEDURE — 84100 ASSAY OF PHOSPHORUS: CPT

## 2025-07-19 PROCEDURE — 85025 COMPLETE CBC W/AUTO DIFF WBC: CPT

## 2025-07-19 PROCEDURE — 6370000000 HC RX 637 (ALT 250 FOR IP): Performed by: STUDENT IN AN ORGANIZED HEALTH CARE EDUCATION/TRAINING PROGRAM

## 2025-07-19 PROCEDURE — 82962 GLUCOSE BLOOD TEST: CPT

## 2025-07-19 PROCEDURE — 80048 BASIC METABOLIC PNL TOTAL CA: CPT

## 2025-07-19 RX ADMIN — ATORVASTATIN CALCIUM 40 MG: 40 TABLET, FILM COATED ORAL at 07:50

## 2025-07-19 RX ADMIN — SODIUM CHLORIDE, PRESERVATIVE FREE 10 ML: 5 INJECTION INTRAVENOUS at 07:50

## 2025-07-19 RX ADMIN — MIDODRINE HYDROCHLORIDE 10 MG: 5 TABLET ORAL at 13:13

## 2025-07-19 RX ADMIN — METOPROLOL SUCCINATE 25 MG: 25 TABLET, EXTENDED RELEASE ORAL at 07:50

## 2025-07-19 RX ADMIN — SODIUM CHLORIDE, PRESERVATIVE FREE 10 ML: 5 INJECTION INTRAVENOUS at 21:20

## 2025-07-19 RX ADMIN — MIDODRINE HYDROCHLORIDE 10 MG: 5 TABLET ORAL at 07:50

## 2025-07-19 RX ADMIN — MIDODRINE HYDROCHLORIDE 10 MG: 5 TABLET ORAL at 17:28

## 2025-07-19 RX ADMIN — METOPROLOL SUCCINATE 25 MG: 25 TABLET, EXTENDED RELEASE ORAL at 21:20

## 2025-07-19 ASSESSMENT — PAIN SCALES - GENERAL: PAINLEVEL_OUTOF10: 6

## 2025-07-19 ASSESSMENT — PAIN DESCRIPTION - LOCATION: LOCATION: GROIN

## 2025-07-19 NOTE — PROGRESS NOTES
Pharmacy Consultation Note  (Warfarin Dosing and Monitoring)    Initial consult date: 7/14  Consulting Provider: Aimee MICHAUD Naeem is a 64 y.o. male for whom pharmacy has been asked to manage warfarin therapy.     Weight:   Wt Readings from Last 1 Encounters:   07/18/25 110 kg (242 lb 8.1 oz)       TSH:    Lab Results   Component Value Date/Time    TSH 5.40 07/14/2025 09:26 AM       Hepatic Function Panel:                            Lab Results   Component Value Date/Time    ALKPHOS 150 07/14/2025 09:26 AM    ALT 19 07/14/2025 09:26 AM    AST 20 07/14/2025 09:26 AM    BILITOT 1.1 07/14/2025 09:26 AM    BILIDIR 0.2 02/25/2020 03:25 AM    IBILI 0.6 02/25/2020 03:25 AM         Recent Labs     07/17/25  0335 07/18/25  0301 07/19/25  1210   HGB 12.6 13.0 13.4   PLT  --   --  143     Date Warfarin Dose INR Heparin or LMWH Comment   7/14 5 mg 1.6 Enoxaparin 105 mg subQ x 1    7/15 5 mg  1.7 Enoxaparin 105 mg subQ q12h    7/16 5 mg 2.2 -- Enoxaparin stopped   7/17 7.5 mg 2.0 --    7/18 5 mg  2.3 x    7/19 NO DOSE 3.0 x                           Assessment:  Patient is a 64 y.o. male on warfarin for Atrial Fibrillation.  Per patient, warfarin dosing regimen is 5 mg once daily, which he took in the evening on 7/14.   Goal INR 2 - 3  INR 1.6 today  7/15  INR 1.7  Standing order for enoxaparin 105 mg subQ q12h currently in place per Dr Yu.  Will follow renal function for dose adjustment (CrCl < 30 mg/dL; interval changes to q24h)  7/16  INR 2.2  Enoxaparin discontinued per Dr Domínguez.   7/17  INR 2.0  7/18  INR 2.3  7/19  INR 3.0    Plan:  No warfarin tonight, given increase in INR between yesterday and today..   Daily PT/INR until the INR is stable within the therapeutic range  Pharmacist will follow and monitor/adjust dosing as necessary       Electronically signed by Marcin Metz RPH, PharmD  7/19/2025 12:45 PM       TUNED: [phone]  SEY: [phone]  SJW: [phone]

## 2025-07-19 NOTE — PROGRESS NOTES
[facility]  Nephrology Progress Note    Patient's Name: Rober Hartley    History of Present Illness:    \"Rober Hartley is a 64 y.o. male with a past medical history of CHF, CKD, diabetes mellitus, and hypertension.  He [place] [facility]  [place] on 4/7 reportedly for concerns of shortness of breath. Nephrology was consulted to see the patient for concerns of LIZ.  He appears to have a baseline serum creatinine of 1.6-2 mg/dL.  The LIZ was presumed sec to decreased effective renal perfusion with a.fib RVR and IV diuretic and likely exacerbated by Entresto. The cr peaked at 2.5mg/dl and at D/C trended down to 1.7mg/dl. He was also found to be Hyperkalemic. His cr was 1.8mg/dl and on 5/7/25 was 2.0mg/dl. He presented to [facility] ED on 7/14/25 for tachycardia after presenting to [facility]F Clinic. Initial  and rhythm was Afib with RVR. SBP down to 87/49 and cr 2.7mg/dl, WBC 14.4. Pt states he has not followed longitudinally with Nephrology. He denies any LUTS, dysuria or hematuria.  He states he has chronic SOB. He  notes he smoked for 20 yrs and quit 5 yeears ago. He has ongoing SOB. He notes he worked for a [employer] company both here and in [place] and here there was always  cement dust in the air and in the trucks  HE denies any use of NSAID    Subjective:    7/19/2025: Patient seen and examined at bedside.  He denies dizziness or lightheadedness.  No acute events noted overnight.  However he has continued to have episodes of low blood pressure over the past 24 hours.  No labs were available at the time of rounding on the patient.  He does not appear in any acute distress.    PMH:    Past Medical History:   Diagnosis Date    CHF (congestive heart failure) (HCC)     CKD (chronic kidney disease)     DM2 (diabetes mellitus, type 2) ([facility])     DVT (deep venous thrombosis) ([facility]) 01/01/2008    HTN (hypertension), benign     Nonischemic cardiomyopathy (HCC) 01/01/2008    PE (pulmonary embolism) 01/01/2008       History

## 2025-07-19 NOTE — PLAN OF CARE
Problem: ABCDS Injury Assessment  Goal: Absence of physical injury  7/19/2025 0900 by Marzena Aceves RN  Outcome: Progressing     Problem: Discharge Planning  Goal: Discharge to home or other facility with appropriate resources  7/19/2025 0900 by Marzena Aceves RN  Outcome: Progressing     Problem: Chronic Conditions and Co-morbidities  Goal: Patient's chronic conditions and co-morbidity symptoms are monitored and maintained or improved  7/19/2025 0900 by Marzena Aceves RN  Outcome: Progressing     Problem: Safety - Adult  Goal: Free from fall injury  7/19/2025 0900 by Marzena Aceves RN  Outcome: Progressing     Problem: Pain  Goal: Verbalizes/displays adequate comfort level or baseline comfort level  7/19/2025 0900 by Marzena Aceves RN  Outcome: Progressing     Problem: Neurosensory - Adult  Goal: Achieves stable or improved neurological status  7/19/2025 0900 by Marzena Aceves RN  Outcome: Progressing     Problem: Neurosensory - Adult  Goal: Achieves maximal functionality and self care  7/19/2025 0900 by Marzena Aceves RN  Outcome: Progressing     Problem: Respiratory - Adult  Goal: Achieves optimal ventilation and oxygenation  7/19/2025 0900 by Marzena Aceves RN  Outcome: Progressing     Problem: Cardiovascular - Adult  Goal: Maintains optimal cardiac output and hemodynamic stability  7/19/2025 0900 by Marzena Aceves RN  Outcome: Progressing     Problem: Skin/Tissue Integrity - Adult  Goal: Skin integrity remains intact  7/19/2025 0900 by Marzena Aceves RN  Outcome: Progressing     Problem: Musculoskeletal - Adult  Goal: Return mobility to safest level of function  7/19/2025 0900 by Marzena Aceves RN  Outcome: Progressing     Problem: Musculoskeletal - Adult  Goal: Return ADL status to a safe level of function  7/19/2025 0900 by Marzena Aceves RN  Outcome: Progressing     Problem: Gastrointestinal - Adult  Goal: Minimal or absence of nausea and vomiting  7/19/2025 0900 by Marzena Aceves RN  Outcome:

## 2025-07-19 NOTE — PROGRESS NOTES
Pt's labs came back. This RN informed Dr. Domínguez regarding the results. Will continue to monitor labs.

## 2025-07-19 NOTE — PROGRESS NOTES
[facility] Hospitalist Progress Note    Admitting Date and Time: 7/14/2025  8:56 AM  Admit Dx: Atrial fibrillation (HCC) [I48.91]  Elevated troponin [R79.89]  Atrial fibrillation with RVR (HCC) [I48.91]  Subtherapeutic international normalized ratio (INR) [R79.1]    Subjective:  Patient is being followed for Atrial fibrillation (HCC) [I48.91]  Elevated troponin [R79.89]  Atrial fibrillation with RVR (HCC) [I48.91]  Subtherapeutic international normalized ratio (INR) [R79.1]     Patient was seen and examined.     ROS: denies fever, chills, cp, sob, n/v, HA unless stated above.      metoprolol succinate  25 mg Oral BID    midodrine  10 mg Oral TID    sodium chloride flush  5-40 mL IntraVENous 2 times per day    warfarin placeholder: dosing by pharmacy   Oral RX Placeholder    atorvastatin  40 mg Oral Daily    insulin lispro  0-4 Units SubCUTAneous 4x Daily AC & HS     sodium chloride flush, 5-40 mL, PRN  sodium chloride, , PRN  magnesium sulfate, 2,000 mg, PRN  ondansetron, 4 mg, Q8H PRN   Or  ondansetron, 4 mg, Q6H PRN  polyethylene glycol, 17 g, Daily PRN  acetaminophen, 650 mg, Q6H PRN   Or  acetaminophen, 650 mg, Q6H PRN  glucose, 4 tablet, PRN  dextrose bolus, 125 mL, PRN   Or  dextrose bolus, 250 mL, PRN  glucagon (rDNA), 1 mg, PRN  dextrose, , Continuous PRN         Objective:    BP 90/74   Pulse (!) 113   Temp 97 °F (36.1 °C) (Oral)   Resp 18   Ht 1.753 m (5' 9\")   Wt 110 kg (242 lb 8.1 oz)   SpO2 100%   BMI 35.81 kg/m²     General Appearance: alert and oriented to person, place and time and in no acute distress  Skin: warm and dry  Head: normocephalic and atraumatic  Eyes: pupils equal, round, and reactive to light, extraocular eye movements intact, conjunctivae normal  Neck: neck supple and non tender without mass   Pulmonary/Chest: clear to auscultation bilaterally- no wheezes, rales or rhonchi, normal air movement, no respiratory distress  Cardiovascular: normal rate, normal S1 and S2 and

## 2025-07-19 NOTE — PROGRESS NOTES
Physician Progress Note      PATIENT:               JOHNATHON COTTO  CSN #:                  [id number]  :                       1960  ADMIT DATE:       2025 8:56 AM  DISCH DATE:  RESPONDING  PROVIDER #:        Sheyla Domínguez DO          QUERY TEXT:    The attending physician is required to clarify conflicting documentation in   the medical record.  Noted documentation of Chronic CHF with reduced EF by Dr. Domínguez in internal med PN on  and Acute on Chronic HFmEF by Dr. Garcia in   cardiology PN on .  Based on your medical judgement, please clarify the   following:    The clinical indicators include:  - \"Chronic congestive heart failure with reduced EF...BNP elevated with chest   x-ray showing some signs of volume overload.  S/P  bolus of fluids in the   emergency department...will continue to hold diuretics\" (Per Dr. Domínguez in   internal med PN on )  - \"Acute on Chronic HFmEF - Continue diuretics. Monitor daily weights, I/Os\"   (Per Dr. Gonzalez in cardiology PN on )  - Pro-BNP level of 10,929 on  (Per Labs)  - CXR on  with \"Cardiomegaly with mild interstitial edema\" (Per CXR   report)  - Given PO Metoprolol starting on 7/15 (Per MAR)  Options provided:  -- After Study, acute on chronic systolic CHF confirmed present on admission  -- After Study, acute on chronic systolic CHF ruled out and chronic systolic   CHF confirmed  -- Other - I will add my own diagnosis  -- Disagree - Not applicable / Not valid  -- Disagree - Clinically unable to determine / Unknown  -- Refer to Clinical Documentation Reviewer    PROVIDER RESPONSE TEXT:    After study, acute on chronic systolic CHF confirmed present on admission.    Query created by: Gabby Mackey on 2025 4:00 PM      Electronically signed by:  Sheyla Domínguez DO 2025 1:26 PM

## 2025-07-19 NOTE — PLAN OF CARE
Problem: ABCDS Injury Assessment  Goal: Absence of physical injury  Outcome: Progressing     Problem: Discharge Planning  Goal: Discharge to home or other facility with appropriate resources  Outcome: Progressing     Problem: Chronic Conditions and Co-morbidities  Goal: Patient's chronic conditions and co-morbidity symptoms are monitored and maintained or improved  Outcome: Progressing     Problem: Safety - Adult  Goal: Free from fall injury  Outcome: Progressing     Problem: Pain  Goal: Verbalizes/displays adequate comfort level or baseline comfort level  Outcome: Progressing     Problem: Neurosensory - Adult  Goal: Achieves stable or improved neurological status  Outcome: Progressing  Goal: Achieves maximal functionality and self care  Outcome: Progressing     Problem: Respiratory - Adult  Goal: Achieves optimal ventilation and oxygenation  Outcome: Progressing     Problem: Cardiovascular - Adult  Goal: Maintains optimal cardiac output and hemodynamic stability  Outcome: Progressing     Problem: Skin/Tissue Integrity - Adult  Goal: Skin integrity remains intact  Outcome: Progressing     Problem: Musculoskeletal - Adult  Goal: Return mobility to safest level of function  Outcome: Progressing  Goal: Return ADL status to a safe level of function  Outcome: Progressing     Problem: Gastrointestinal - Adult  Goal: Minimal or absence of nausea and vomiting  Outcome: Progressing  Goal: Maintains or returns to baseline bowel function  Outcome: Progressing  Goal: Maintains adequate nutritional intake  Outcome: Progressing

## 2025-07-20 LAB
ANION GAP SERPL CALCULATED.3IONS-SCNC: 19 MMOL/L (ref 7–16)
BASOPHILS # BLD: 0.09 K/UL (ref 0–0.2)
BASOPHILS NFR BLD: 1 % (ref 0–2)
BUN SERPL-MCNC: 73 MG/DL (ref 8–23)
CALCIUM SERPL-MCNC: 9 MG/DL (ref 8.8–10.2)
CHLORIDE SERPL-SCNC: 102 MMOL/L (ref 98–107)
CO2 SERPL-SCNC: 19 MMOL/L (ref 22–29)
CREAT SERPL-MCNC: 2.9 MG/DL (ref 0.7–1.2)
EOSINOPHIL # BLD: 0.12 K/UL (ref 0.05–0.5)
EOSINOPHILS RELATIVE PERCENT: 1 % (ref 0–6)
ERYTHROCYTE [DISTWIDTH] IN BLOOD BY AUTOMATED COUNT: 16.4 % (ref 11.5–15)
GFR, ESTIMATED: 23 ML/MIN/1.73M2
GLUCOSE BLD-MCNC: 130 MG/DL (ref 74–99)
GLUCOSE BLD-MCNC: 131 MG/DL (ref 74–99)
GLUCOSE BLD-MCNC: 136 MG/DL (ref 74–99)
GLUCOSE BLD-MCNC: 153 MG/DL (ref 74–99)
GLUCOSE SERPL-MCNC: 141 MG/DL (ref 74–99)
HCT VFR BLD AUTO: 42.4 % (ref 37–54)
HGB BLD-MCNC: 12.9 G/DL (ref 12.5–16.5)
IMM GRANULOCYTES # BLD AUTO: 0.03 K/UL (ref 0–0.58)
IMM GRANULOCYTES NFR BLD: 0 % (ref 0–5)
INR PPP: 2.9
LYMPHOCYTES NFR BLD: 4.98 K/UL (ref 1.5–4)
LYMPHOCYTES RELATIVE PERCENT: 40 % (ref 20–42)
MAGNESIUM SERPL-MCNC: 3.1 MG/DL (ref 1.6–2.4)
MCH RBC QN AUTO: 29.1 PG (ref 26–35)
MCHC RBC AUTO-ENTMCNC: 30.4 G/DL (ref 32–34.5)
MCV RBC AUTO: 95.5 FL (ref 80–99.9)
MICROORGANISM SPEC CULT: ABNORMAL
MICROORGANISM SPEC CULT: ABNORMAL
MONOCYTES NFR BLD: 0.72 K/UL (ref 0.1–0.95)
MONOCYTES NFR BLD: 6 % (ref 2–12)
NEUTROPHILS NFR BLD: 53 % (ref 43–80)
NEUTS SEG NFR BLD: 6.67 K/UL (ref 1.8–7.3)
PHOSPHATE SERPL-MCNC: 5.8 MG/DL (ref 2.5–4.5)
PLATELET, FLUORESCENCE: 143 K/UL (ref 130–450)
PMV BLD AUTO: 13.8 FL (ref 7–12)
POTASSIUM SERPL-SCNC: 4.7 MMOL/L (ref 3.5–5.1)
PROTHROMBIN TIME: 31.5 SEC (ref 9.3–12.4)
RBC # BLD AUTO: 4.44 M/UL (ref 3.8–5.8)
SERVICE CMNT-IMP: ABNORMAL
SODIUM SERPL-SCNC: 139 MMOL/L (ref 136–145)
SPECIMEN DESCRIPTION: ABNORMAL
WBC OTHER # BLD: 12.6 K/UL (ref 4.5–11.5)

## 2025-07-20 PROCEDURE — 84100 ASSAY OF PHOSPHORUS: CPT

## 2025-07-20 PROCEDURE — 82962 GLUCOSE BLOOD TEST: CPT

## 2025-07-20 PROCEDURE — 2500000003 HC RX 250 WO HCPCS: Performed by: INTERNAL MEDICINE

## 2025-07-20 PROCEDURE — 2500000003 HC RX 250 WO HCPCS: Performed by: STUDENT IN AN ORGANIZED HEALTH CARE EDUCATION/TRAINING PROGRAM

## 2025-07-20 PROCEDURE — 6370000000 HC RX 637 (ALT 250 FOR IP): Performed by: STUDENT IN AN ORGANIZED HEALTH CARE EDUCATION/TRAINING PROGRAM

## 2025-07-20 PROCEDURE — 6370000000 HC RX 637 (ALT 250 FOR IP): Performed by: INTERNAL MEDICINE

## 2025-07-20 PROCEDURE — 99233 SBSQ HOSP IP/OBS HIGH 50: CPT | Performed by: INTERNAL MEDICINE

## 2025-07-20 PROCEDURE — 83735 ASSAY OF MAGNESIUM: CPT

## 2025-07-20 PROCEDURE — 99233 SBSQ HOSP IP/OBS HIGH 50: CPT | Performed by: STUDENT IN AN ORGANIZED HEALTH CARE EDUCATION/TRAINING PROGRAM

## 2025-07-20 PROCEDURE — 85025 COMPLETE CBC W/AUTO DIFF WBC: CPT

## 2025-07-20 PROCEDURE — 6360000002 HC RX W HCPCS: Performed by: STUDENT IN AN ORGANIZED HEALTH CARE EDUCATION/TRAINING PROGRAM

## 2025-07-20 PROCEDURE — 36415 COLL VENOUS BLD VENIPUNCTURE: CPT

## 2025-07-20 PROCEDURE — 80048 BASIC METABOLIC PNL TOTAL CA: CPT

## 2025-07-20 PROCEDURE — 2060000000 HC ICU INTERMEDIATE R&B

## 2025-07-20 PROCEDURE — 6360000002 HC RX W HCPCS: Performed by: INTERNAL MEDICINE

## 2025-07-20 PROCEDURE — 85610 PROTHROMBIN TIME: CPT

## 2025-07-20 RX ORDER — DIGOXIN 0.25 MG/ML
500 INJECTION INTRAMUSCULAR; INTRAVENOUS ONCE
Status: COMPLETED | OUTPATIENT
Start: 2025-07-20 | End: 2025-07-20

## 2025-07-20 RX ORDER — CALCIUM ACETATE 667 MG/1
667 CAPSULE ORAL
Status: DISCONTINUED | OUTPATIENT
Start: 2025-07-20 | End: 2025-07-22 | Stop reason: HOSPADM

## 2025-07-20 RX ORDER — KETOROLAC TROMETHAMINE 15 MG/ML
15 INJECTION, SOLUTION INTRAMUSCULAR; INTRAVENOUS ONCE
Status: COMPLETED | OUTPATIENT
Start: 2025-07-20 | End: 2025-07-20

## 2025-07-20 RX ORDER — DIPHENHYDRAMINE HCL 25 MG
25 TABLET ORAL EVERY 6 HOURS PRN
Status: DISCONTINUED | OUTPATIENT
Start: 2025-07-20 | End: 2025-07-22 | Stop reason: HOSPADM

## 2025-07-20 RX ADMIN — DIGOXIN 500 MCG: 0.25 INJECTION INTRAMUSCULAR; INTRAVENOUS at 11:39

## 2025-07-20 RX ADMIN — METOPROLOL SUCCINATE 25 MG: 25 TABLET, EXTENDED RELEASE ORAL at 20:22

## 2025-07-20 RX ADMIN — WATER 2000 MG: 1 INJECTION INTRAMUSCULAR; INTRAVENOUS; SUBCUTANEOUS at 16:59

## 2025-07-20 RX ADMIN — METOPROLOL SUCCINATE 25 MG: 25 TABLET, EXTENDED RELEASE ORAL at 07:45

## 2025-07-20 RX ADMIN — CALCIUM ACETATE 667 MG: 667 CAPSULE ORAL at 16:29

## 2025-07-20 RX ADMIN — KETOROLAC TROMETHAMINE 15 MG: 15 INJECTION, SOLUTION INTRAMUSCULAR; INTRAVENOUS at 16:59

## 2025-07-20 RX ADMIN — SODIUM CHLORIDE, PRESERVATIVE FREE 10 ML: 5 INJECTION INTRAVENOUS at 11:39

## 2025-07-20 RX ADMIN — ATORVASTATIN CALCIUM 40 MG: 40 TABLET, FILM COATED ORAL at 07:42

## 2025-07-20 RX ADMIN — MIDODRINE HYDROCHLORIDE 10 MG: 5 TABLET ORAL at 13:39

## 2025-07-20 RX ADMIN — DIPHENHYDRAMINE HCL 25 MG: 25 TABLET ORAL at 16:58

## 2025-07-20 RX ADMIN — SODIUM CHLORIDE, PRESERVATIVE FREE 10 ML: 5 INJECTION INTRAVENOUS at 07:42

## 2025-07-20 RX ADMIN — SODIUM CHLORIDE, PRESERVATIVE FREE 10 ML: 5 INJECTION INTRAVENOUS at 20:22

## 2025-07-20 RX ADMIN — MIDODRINE HYDROCHLORIDE 10 MG: 5 TABLET ORAL at 07:42

## 2025-07-20 ASSESSMENT — PAIN SCALES - GENERAL
PAINLEVEL_OUTOF10: 9
PAINLEVEL_OUTOF10: 0

## 2025-07-20 ASSESSMENT — PAIN DESCRIPTION - FREQUENCY: FREQUENCY: INTERMITTENT

## 2025-07-20 ASSESSMENT — PAIN DESCRIPTION - ONSET: ONSET: SUDDEN

## 2025-07-20 ASSESSMENT — PAIN DESCRIPTION - DESCRIPTORS: DESCRIPTORS: BURNING

## 2025-07-20 ASSESSMENT — PAIN DESCRIPTION - PAIN TYPE: TYPE: ACUTE PAIN

## 2025-07-20 ASSESSMENT — PAIN - FUNCTIONAL ASSESSMENT: PAIN_FUNCTIONAL_ASSESSMENT: ACTIVITIES ARE NOT PREVENTED

## 2025-07-20 ASSESSMENT — PAIN DESCRIPTION - LOCATION: LOCATION: PENIS

## 2025-07-20 NOTE — PLAN OF CARE
Problem: ABCDS Injury Assessment  Goal: Absence of physical injury  7/20/2025 0941 by Marzena Aceves RN  Outcome: Progressing     Problem: Discharge Planning  Goal: Discharge to home or other facility with appropriate resources  7/20/2025 0941 by Marzena Aceves RN  Outcome: Progressing     Problem: Chronic Conditions and Co-morbidities  Goal: Patient's chronic conditions and co-morbidity symptoms are monitored and maintained or improved  7/20/2025 0941 by Marzena Aceves RN  Outcome: Progressing     Problem: Safety - Adult  Goal: Free from fall injury  7/20/2025 0941 by Marzena Aceves RN  Outcome: Progressing     Problem: Pain  Goal: Verbalizes/displays adequate comfort level or baseline comfort level  7/20/2025 0941 by Marzena Aceves RN  Outcome: Progressing     Problem: Neurosensory - Adult  Goal: Achieves stable or improved neurological status  7/20/2025 0941 by Marzena Aceves RN  Outcome: Progressing     Problem: Neurosensory - Adult  Goal: Achieves maximal functionality and self care  7/20/2025 0941 by Marzena Aceves RN  Outcome: Progressing     Problem: Respiratory - Adult  Goal: Achieves optimal ventilation and oxygenation  7/20/2025 0941 by Marzena Aceves RN  Outcome: Progressing     Problem: Cardiovascular - Adult  Goal: Maintains optimal cardiac output and hemodynamic stability  7/20/2025 0941 by Marzena Aceves RN  Outcome: Progressing     Problem: Skin/Tissue Integrity - Adult  Goal: Skin integrity remains intact  7/20/2025 0941 by Marzena Aceves RN  Outcome: Progressing     Problem: Musculoskeletal - Adult  Goal: Return mobility to safest level of function  7/20/2025 0941 by Marzena Aceves RN  Outcome: Progressing     Problem: Musculoskeletal - Adult  Goal: Return ADL status to a safe level of function  7/20/2025 0941 by Marzena Aceves RN  Outcome: Progressing     Problem: Gastrointestinal - Adult  Goal: Minimal or absence of nausea and vomiting  7/20/2025 0941 by Marzena Aceves RN  Outcome:

## 2025-07-20 NOTE — PROGRESS NOTES
[facility] Cardiology Inpatient Progress Note    Patient is a 64 y.o. male of Victor Hugo Hampton MD seen in hospital follow up.     Chief complaint: Afib/CHF/Acute on CKD/SOB    HPI: Some SOB. No CP.     Patient Active Problem List   Diagnosis    History of DVT (deep vein thrombosis)    History of pulmonary embolism    Type 2 diabetes mellitus with obesity (HCC)    Hyperlipidemia    Noncompliance with medication regimen    Non morbid obesity due to excess calories    Warfarin anticoagulation    Atrial fibrillation with RVR (HCC)    Congestive heart failure (HCC)    Dyspnea and respiratory abnormalities    Essential hypertension    DERIC (obstructive sleep apnea)    Acute decompensated heart failure (HCC)    NICM (nonischemic cardiomyopathy) (HCC)    Benign prostatic hyperplasia without lower urinary tract symptoms    Iron deficiency anemia, unspecified    Acute exacerbation of chronic heart failure (HCC)    Permanent atrial fibrillation (HCC)    Thrombocytopenia    Poorly controlled type 2 diabetes mellitus (HCC)    Atrial fibrillation (HCC)    Dietary noncompliance    Controlled type 2 diabetes mellitus without complication (HCC)    Hypoxia       Allergies   Allergen Reactions    Pcn [Penicillins] Rash       Current Facility-Administered Medications   Medication Dose Route Frequency Provider Last Rate Last Admin    metoprolol succinate (TOPROL XL) extended release tablet 25 mg  25 mg Oral BID Michel Garcia MD   25 mg at 07/20/25 0745    midodrine (PROAMATINE) tablet 10 mg  10 mg Oral TID Sheyla Domínguez DO   10 mg at 07/20/25 0742    [Held by provider] dilTIAZem 125 mg in sodium chloride 0.9 % 125 mL infusion  2.5-15 mg/hr IntraVENous Continuous Radha Lawrence, APRN - CNP   Stopped at 07/15/25 1647    sodium chloride flush 0.9 % injection 5-40 mL  5-40 mL IntraVENous 2 times per day Abby Yu DO   10 mL at 07/20/25 0742    sodium chloride flush 0.9 % injection 5-40 mL  5-40 mL IntraVENous PRN  Summary 3/10/2025:    Left Ventricle: Mildly reduced left ventricular systolic function with a visually estimated EF of 40 - 45%. Left ventricle is mildly dilated. LVIDd is 6.0 cm. Normal wall thickness. Global hypokinesis present with basal inferoseptal hypokinesis. Abnormal diastolic function.    Right Ventricle: Mildly reduced systolic function. TAPSE is 1.6 cm.    Aortic Valve: Mild regurgitation.    Mitral Valve: Mild regurgitation.    Atrial fibrillation noted.    Image quality is suboptimal. Contrast used: Lumason. Technically difficult study with poor endocardial visualization and technically difficult study due to patient's body habitus.     Medically manage.     4. Elevated troponin: Medically manage.     5. Hypotension, Hx of HTN: On midodrine.     6. Hx of PE/DVT: Warfarin (eliquis cost prohibitive).      7. Acute on CKD: Per renal. Follow labs.     8. Lipids: Statin.     9. DM: Per primary service.     Available external charts reviewed.   Available imaging and evaluations independently reviewed.   Interviewed and discussed patient with available family.  Discussed case with referring service and non-cardiology consultants.     Greater than 50 minutes was spent counseling the patient, reviewing the rationale for the above recommendations and reviewing the patient's current medication list, problem list and results of all previously ordered testing.    Jovany Hampton D.O.  Cardiologist  Cardiology, [facility]

## 2025-07-20 NOTE — PROGRESS NOTES
[facility]  Nephrology Progress Note    Patient's Name: Rober Hartley    History of Present Illness:    \"Rober Hartley is a 64 y.o. male with a past medical history of CHF, CKD, diabetes mellitus, and hypertension.  [place] [facility]  [place] for concerns of shortness of breath. Nephrology was consulted to see the patient for concerns of LIZ.  He appears to have a baseline serum creatinine of 1.6-2 mg/dL.  The LIZ was presumed sec to decreased effective renal perfusion with a.fib RVR and IV diuretic and likely exacerbated by Entresto. The cr peaked at 2.5mg/dl and at D/C trended down to 1.7mg/dl. He was also found to be Hyperkalemic. His cr was 1.8mg/dl and on 5/7/25 was 2.0mg/dl. He presented to [facility] ED on 7/14/25 for tachycardia after presenting to [facility]F Clinic. Initial  and rhythm was Afib with RVR. SBP down to 87/49 and cr 2.7mg/dl, WBC 14.4. Pt states he has not followed longitudinally with Nephrology. He denies any LUTS, dysuria or hematuria.  He states he has chronic SOB. He  notes he smoked for 20 yrs and quit 5 yeears ago. He has ongoing SOB. 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 He appears fairly comfortable, not in any acute distress though does have some mild SOB, and also appears a little deconditioned.    PMH:    Past Medical History:   Diagnosis Date    CHF (congestive heart failure) ([facility])     CKD (chronic kidney disease)     DM2 (diabetes mellitus, type 2) ([facility])     DVT (deep venous thrombosis) ([facility]) 01/01/2008    HTN (hypertension), benign     Nonischemic cardiomyopathy ([facility]) 01/01/2008    PE (pulmonary embolism) 01/01/2008       History reviewed. No pertinent surgical history.    Patient Active Problem List   Diagnosis    History of DVT (deep vein thrombosis)    History of

## 2025-07-20 NOTE — PLAN OF CARE
Problem: ABCDS Injury Assessment  Goal: Absence of physical injury  7/19/2025 2258 by Maddie Montes RN  Outcome: Progressing  7/19/2025 0900 by Marzena Aceves RN  Outcome: Progressing     Problem: Discharge Planning  Goal: Discharge to home or other facility with appropriate resources  7/19/2025 2258 by Maddie Montes RN  Outcome: Progressing  7/19/2025 0900 by Marzena Aceves RN  Outcome: Progressing     Problem: Chronic Conditions and Co-morbidities  Goal: Patient's chronic conditions and co-morbidity symptoms are monitored and maintained or improved  7/19/2025 2258 by Maddie Montes RN  Outcome: Progressing  7/19/2025 0900 by Marzena Aceves RN  Outcome: Progressing     Problem: Safety - Adult  Goal: Free from fall injury  7/19/2025 2258 by Maddie Montes RN  Outcome: Progressing  7/19/2025 0900 by Marzena Aceves RN  Outcome: Progressing     Problem: Pain  Goal: Verbalizes/displays adequate comfort level or baseline comfort level  7/19/2025 2258 by Maddie Montes RN  Outcome: Progressing  7/19/2025 0900 by Marzena Aceves RN  Outcome: Progressing     Problem: Neurosensory - Adult  Goal: Achieves stable or improved neurological status  7/19/2025 2258 by Maddie Montes RN  Outcome: Progressing  7/19/2025 0900 by Marzena Aceves RN  Outcome: Progressing  Goal: Achieves maximal functionality and self care  7/19/2025 2258 by Maddie Montes RN  Outcome: Progressing  7/19/2025 0900 by Marzena Aceves RN  Outcome: Progressing     Problem: Respiratory - Adult  Goal: Achieves optimal ventilation and oxygenation  7/19/2025 2258 by Maddie Montes RN  Outcome: Progressing  7/19/2025 0900 by Marzena Aceves RN  Outcome: Progressing     Problem: Cardiovascular - Adult  Goal: Maintains optimal cardiac output and hemodynamic stability  7/19/2025 2258 by Maddie Montes RN  Outcome: Progressing  7/19/2025 0900 by Marzena Aceves RN  Outcome: Progressing     Problem: Skin/Tissue Integrity - Adult  Goal: Skin integrity remains

## 2025-07-20 NOTE — PROGRESS NOTES
[facility] Hospitalist Progress Note    Admitting Date and Time: 7/14/2025  8:56 AM  Admit Dx: Atrial fibrillation (HCC) [I48.91]  Elevated troponin [R79.89]  Atrial fibrillation with RVR (HCC) [I48.91]  Subtherapeutic international normalized ratio (INR) [R79.1]    Subjective:  Patient is being followed for Atrial fibrillation (HCC) [I48.91]  Elevated troponin [R79.89]  Atrial fibrillation with RVR (HCC) [I48.91]  Subtherapeutic international normalized ratio (INR) [R79.1]     Patient was seen and examined.     ROS: denies fever, chills, cp, sob, n/v, HA unless stated above.      digoxin  500 mcg IntraVENous Once    metoprolol succinate  25 mg Oral BID    midodrine  10 mg Oral TID    sodium chloride flush  5-40 mL IntraVENous 2 times per day    warfarin placeholder: dosing by pharmacy   Oral RX Placeholder    atorvastatin  40 mg Oral Daily    insulin lispro  0-4 Units SubCUTAneous 4x Daily AC & HS     sodium chloride flush, 5-40 mL, PRN  sodium chloride, , PRN  magnesium sulfate, 2,000 mg, PRN  ondansetron, 4 mg, Q8H PRN   Or  ondansetron, 4 mg, Q6H PRN  polyethylene glycol, 17 g, Daily PRN  acetaminophen, 650 mg, Q6H PRN   Or  acetaminophen, 650 mg, Q6H PRN  glucose, 4 tablet, PRN  dextrose bolus, 125 mL, PRN   Or  dextrose bolus, 250 mL, PRN  glucagon (rDNA), 1 mg, PRN  dextrose, , Continuous PRN         Objective:    /72   Pulse (!) 129   Temp (!) 96.6 °F (35.9 °C) (Axillary)   Resp 18   Ht 1.753 m (5' 9\")   Wt 110 kg (242 lb 8.1 oz)   SpO2 100%   BMI 35.81 kg/m²     General Appearance: alert and oriented to person, place and time and in no acute distress  Skin: warm and dry  Head: normocephalic and atraumatic  Eyes: pupils equal, round, and reactive to light, extraocular eye movements intact, conjunctivae normal  Neck: neck supple and non tender without mass   Pulmonary/Chest: clear to auscultation bilaterally- no wheezes, rales or rhonchi, normal air movement, no respiratory

## 2025-07-20 NOTE — PROGRESS NOTES
Pharmacy Consultation Note  (Warfarin Dosing and Monitoring)    Initial consult date: 7/14  Consulting Provider: Aimee MICHAUD Naeem is a 64 y.o. male for whom pharmacy has been asked to manage warfarin therapy.     Weight:   Wt Readings from Last 1 Encounters:   07/18/25 110 kg (242 lb 8.1 oz)       TSH:    Lab Results   Component Value Date/Time    TSH 5.40 07/14/2025 09:26 AM       Hepatic Function Panel:                            Lab Results   Component Value Date/Time    ALKPHOS 150 07/14/2025 09:26 AM    ALT 19 07/14/2025 09:26 AM    AST 20 07/14/2025 09:26 AM    BILITOT 1.1 07/14/2025 09:26 AM    BILIDIR 0.2 02/25/2020 03:25 AM    IBILI 0.6 02/25/2020 03:25 AM         Recent Labs     07/18/25  0301 07/19/25  1210 07/20/25  0510   HGB 13.0 13.4 12.9   PLT  --  143  --      Date Warfarin Dose INR Heparin or LMWH Comment   7/14 5 mg 1.6 Enoxaparin 105 mg subQ x 1    7/15 5 mg  1.7 Enoxaparin 105 mg subQ q12h    7/16 5 mg 2.2 -- Enoxaparin stopped   7/17 7.5 mg 2.0 --    7/18 5 mg  2.3 x    7/19 NO DOSE 3.0 x    7/20 NO DOSE 2.9 x                    Assessment:  Patient is a 64 y.o. male on warfarin for Atrial Fibrillation.  Per patient, warfarin dosing regimen is 5 mg once daily, which he took in the evening on 7/14.   Goal INR 2 - 3  INR 1.6 today  7/15  INR 1.7  Standing order for enoxaparin 105 mg subQ q12h currently in place per Dr Yu.  Will follow renal function for dose adjustment (CrCl < 30 mg/dL; interval changes to q24h)  7/16  INR 2.2  Enoxaparin discontinued per Dr Domínguez.   7/17  INR 2.0  7/18  INR 2.3  7/19  INR 3.0  7/20  INR 2.9    Plan:  While INR has dropped from 3.0 yesterday to 2.9 today, will not dose only warfarin again today, so as not to potentiate supratherapeutic INR, but likely to resume tomorrow.  NO WARFARIN TONIGHT.   Daily PT/INR until the INR is stable within the therapeutic range  Pharmacist will follow and monitor/adjust dosing as necessary       Electronically

## 2025-07-21 ENCOUNTER — APPOINTMENT (OUTPATIENT)
Dept: GENERAL RADIOLOGY | Age: 65
DRG: 308 | End: 2025-07-21
Payer: MEDICARE

## 2025-07-21 LAB
ANION GAP SERPL CALCULATED.3IONS-SCNC: 18 MMOL/L (ref 7–16)
BASOPHILS # BLD: 0 K/UL (ref 0–0.2)
BASOPHILS NFR BLD: 0 % (ref 0–2)
BUN SERPL-MCNC: 74 MG/DL (ref 8–23)
CALCIUM SERPL-MCNC: 8.8 MG/DL (ref 8.8–10.2)
CHLORIDE SERPL-SCNC: 104 MMOL/L (ref 98–107)
CO2 SERPL-SCNC: 15 MMOL/L (ref 22–29)
CREAT SERPL-MCNC: 2.7 MG/DL (ref 0.7–1.2)
EOSINOPHIL # BLD: 0.29 K/UL (ref 0.05–0.5)
EOSINOPHILS RELATIVE PERCENT: 3 % (ref 0–6)
ERYTHROCYTE [DISTWIDTH] IN BLOOD BY AUTOMATED COUNT: 16.4 % (ref 11.5–15)
GFR, ESTIMATED: 26 ML/MIN/1.73M2
GLUCOSE BLD-MCNC: 136 MG/DL (ref 74–99)
GLUCOSE BLD-MCNC: 144 MG/DL (ref 74–99)
GLUCOSE BLD-MCNC: 182 MG/DL (ref 74–99)
GLUCOSE BLD-MCNC: 222 MG/DL (ref 74–99)
GLUCOSE SERPL-MCNC: 132 MG/DL (ref 74–99)
HCT VFR BLD AUTO: 42.5 % (ref 37–54)
HGB BLD-MCNC: 13.5 G/DL (ref 12.5–16.5)
INR PPP: 2.9
LYMPHOCYTES NFR BLD: 0.58 K/UL (ref 1.5–4)
LYMPHOCYTES RELATIVE PERCENT: 5 % (ref 20–42)
MAGNESIUM SERPL-MCNC: 3.1 MG/DL (ref 1.6–2.4)
MCH RBC QN AUTO: 28.9 PG (ref 26–35)
MCHC RBC AUTO-ENTMCNC: 31.8 G/DL (ref 32–34.5)
MCV RBC AUTO: 91 FL (ref 80–99.9)
MONOCYTES NFR BLD: 0.19 K/UL (ref 0.1–0.95)
MONOCYTES NFR BLD: 2 % (ref 2–12)
MYELOCYTES ABSOLUTE COUNT: 0.1 K/UL
MYELOCYTES: 1 %
NEUTROPHILS NFR BLD: 90 % (ref 43–80)
NEUTS SEG NFR BLD: 9.93 K/UL (ref 1.8–7.3)
PHOSPHATE SERPL-MCNC: 5 MG/DL (ref 2.5–4.5)
PLATELET # BLD AUTO: 143 K/UL (ref 130–450)
PMV BLD AUTO: 13.2 FL (ref 7–12)
POTASSIUM SERPL-SCNC: 3.8 MMOL/L (ref 3.5–5.1)
PROTHROMBIN TIME: 30.3 SEC (ref 9.3–12.4)
RBC # BLD AUTO: 4.67 M/UL (ref 3.8–5.8)
RBC # BLD: ABNORMAL 10*6/UL
SODIUM SERPL-SCNC: 137 MMOL/L (ref 136–145)
WBC OTHER # BLD: 10.7 K/UL (ref 4.5–11.5)

## 2025-07-21 PROCEDURE — 2500000003 HC RX 250 WO HCPCS: Performed by: STUDENT IN AN ORGANIZED HEALTH CARE EDUCATION/TRAINING PROGRAM

## 2025-07-21 PROCEDURE — 71046 X-RAY EXAM CHEST 2 VIEWS: CPT

## 2025-07-21 PROCEDURE — 84100 ASSAY OF PHOSPHORUS: CPT

## 2025-07-21 PROCEDURE — 82962 GLUCOSE BLOOD TEST: CPT

## 2025-07-21 PROCEDURE — 99233 SBSQ HOSP IP/OBS HIGH 50: CPT | Performed by: STUDENT IN AN ORGANIZED HEALTH CARE EDUCATION/TRAINING PROGRAM

## 2025-07-21 PROCEDURE — 6370000000 HC RX 637 (ALT 250 FOR IP): Performed by: INTERNAL MEDICINE

## 2025-07-21 PROCEDURE — 85610 PROTHROMBIN TIME: CPT

## 2025-07-21 PROCEDURE — 85025 COMPLETE CBC W/AUTO DIFF WBC: CPT

## 2025-07-21 PROCEDURE — 6370000000 HC RX 637 (ALT 250 FOR IP): Performed by: STUDENT IN AN ORGANIZED HEALTH CARE EDUCATION/TRAINING PROGRAM

## 2025-07-21 PROCEDURE — 36415 COLL VENOUS BLD VENIPUNCTURE: CPT

## 2025-07-21 PROCEDURE — 80048 BASIC METABOLIC PNL TOTAL CA: CPT

## 2025-07-21 PROCEDURE — 2500000003 HC RX 250 WO HCPCS: Performed by: INTERNAL MEDICINE

## 2025-07-21 PROCEDURE — 83735 ASSAY OF MAGNESIUM: CPT

## 2025-07-21 PROCEDURE — 6360000002 HC RX W HCPCS: Performed by: STUDENT IN AN ORGANIZED HEALTH CARE EDUCATION/TRAINING PROGRAM

## 2025-07-21 PROCEDURE — 99233 SBSQ HOSP IP/OBS HIGH 50: CPT | Performed by: INTERNAL MEDICINE

## 2025-07-21 PROCEDURE — 2060000000 HC ICU INTERMEDIATE R&B

## 2025-07-21 RX ORDER — METOPROLOL SUCCINATE 50 MG/1
50 TABLET, EXTENDED RELEASE ORAL 2 TIMES DAILY
Status: DISCONTINUED | OUTPATIENT
Start: 2025-07-21 | End: 2025-07-22 | Stop reason: HOSPADM

## 2025-07-21 RX ORDER — SODIUM BICARBONATE 650 MG/1
1300 TABLET ORAL 3 TIMES DAILY
Status: DISCONTINUED | OUTPATIENT
Start: 2025-07-21 | End: 2025-07-22 | Stop reason: HOSPADM

## 2025-07-21 RX ORDER — METOPROLOL SUCCINATE 25 MG/1
25 TABLET, EXTENDED RELEASE ORAL ONCE
Status: COMPLETED | OUTPATIENT
Start: 2025-07-21 | End: 2025-07-21

## 2025-07-21 RX ADMIN — METOPROLOL SUCCINATE 25 MG: 25 TABLET, EXTENDED RELEASE ORAL at 10:24

## 2025-07-21 RX ADMIN — MIDODRINE HYDROCHLORIDE 10 MG: 5 TABLET ORAL at 12:33

## 2025-07-21 RX ADMIN — SODIUM CHLORIDE, PRESERVATIVE FREE 10 ML: 5 INJECTION INTRAVENOUS at 07:40

## 2025-07-21 RX ADMIN — MIDODRINE HYDROCHLORIDE 10 MG: 5 TABLET ORAL at 16:14

## 2025-07-21 RX ADMIN — MIDODRINE HYDROCHLORIDE 10 MG: 5 TABLET ORAL at 07:40

## 2025-07-21 RX ADMIN — SODIUM CHLORIDE, PRESERVATIVE FREE 10 ML: 5 INJECTION INTRAVENOUS at 21:29

## 2025-07-21 RX ADMIN — WATER 2000 MG: 1 INJECTION INTRAMUSCULAR; INTRAVENOUS; SUBCUTANEOUS at 16:14

## 2025-07-21 RX ADMIN — METOPROLOL SUCCINATE 25 MG: 25 TABLET, EXTENDED RELEASE ORAL at 07:40

## 2025-07-21 RX ADMIN — CALCIUM ACETATE 667 MG: 667 CAPSULE ORAL at 15:10

## 2025-07-21 RX ADMIN — INSULIN LISPRO 1 UNITS: 100 INJECTION, SOLUTION INTRAVENOUS; SUBCUTANEOUS at 15:13

## 2025-07-21 RX ADMIN — CALCIUM ACETATE 667 MG: 667 CAPSULE ORAL at 07:40

## 2025-07-21 RX ADMIN — SODIUM BICARBONATE 1300 MG: 650 TABLET ORAL at 21:28

## 2025-07-21 RX ADMIN — INSULIN LISPRO 1 UNITS: 100 INJECTION, SOLUTION INTRAVENOUS; SUBCUTANEOUS at 10:27

## 2025-07-21 RX ADMIN — METOPROLOL SUCCINATE 50 MG: 50 TABLET, EXTENDED RELEASE ORAL at 21:28

## 2025-07-21 RX ADMIN — SODIUM BICARBONATE 1300 MG: 650 TABLET ORAL at 12:33

## 2025-07-21 RX ADMIN — CALCIUM ACETATE 667 MG: 667 CAPSULE ORAL at 10:24

## 2025-07-21 RX ADMIN — ATORVASTATIN CALCIUM 40 MG: 40 TABLET, FILM COATED ORAL at 07:40

## 2025-07-21 ASSESSMENT — PAIN SCALES - GENERAL
PAINLEVEL_OUTOF10: 0

## 2025-07-21 NOTE — PROGRESS NOTES
[facility] Hospitalist Progress Note    Admitting Date and Time: 7/14/2025  8:56 AM  Admit Dx: Atrial fibrillation (HCC) [I48.91]  Elevated troponin [R79.89]  Atrial fibrillation with RVR (HCC) [I48.91]  Subtherapeutic international normalized ratio (INR) [R79.1]    Subjective:  Patient is being followed for Atrial fibrillation (HCC) [I48.91]  Elevated troponin [R79.89]  Atrial fibrillation with RVR (HCC) [I48.91]  Subtherapeutic international normalized ratio (INR) [R79.1]     Patient was seen and examined.     ROS: denies fever, chills, cp, sob, n/v, HA unless stated above.      metoprolol succinate  50 mg Oral BID    sodium bicarbonate  1,300 mg Oral TID    calcium acetate  667 mg Oral TID WC    cefTRIAXone (ROCEPHIN) IV  2,000 mg IntraVENous Q24H    midodrine  10 mg Oral TID    sodium chloride flush  5-40 mL IntraVENous 2 times per day    warfarin placeholder: dosing by pharmacy   Oral RX Placeholder    atorvastatin  40 mg Oral Daily    insulin lispro  0-4 Units SubCUTAneous 4x Daily AC & HS     diphenhydrAMINE, 25 mg, Q6H PRN  sodium chloride flush, 5-40 mL, PRN  sodium chloride, , PRN  magnesium sulfate, 2,000 mg, PRN  ondansetron, 4 mg, Q8H PRN   Or  ondansetron, 4 mg, Q6H PRN  polyethylene glycol, 17 g, Daily PRN  acetaminophen, 650 mg, Q6H PRN   Or  acetaminophen, 650 mg, Q6H PRN  glucose, 4 tablet, PRN  dextrose bolus, 125 mL, PRN   Or  dextrose bolus, 250 mL, PRN  glucagon (rDNA), 1 mg, PRN  dextrose, , Continuous PRN         Objective:    /75   Pulse (!) 101   Temp 97.5 °F (36.4 °C) (Oral)   Resp 18   Ht 1.753 m (5' 9\")   Wt 109.8 kg (242 lb)   SpO2 97%   BMI 35.74 kg/m²     General Appearance: alert and oriented to person, place and time and in no acute distress  Skin: warm and dry  Head: normocephalic and atraumatic  Eyes: pupils equal, round, and reactive to light, extraocular eye movements intact, conjunctivae normal  Neck: neck supple and non tender without mass

## 2025-07-21 NOTE — PROGRESS NOTES
[facility] Cardiology Inpatient Progress Note    Patient is a 64 y.o. male of [place] Hampton MD seen in hospital follow up.     Chief complaint: Afib/CHF/Acute on CKD/SOB    HPI: Some SOB. No CP.     Patient Active Problem List   Diagnosis    History of DVT (deep vein thrombosis)    History of pulmonary embolism    Type 2 diabetes mellitus with obesity (HCC)    Hyperlipidemia    Noncompliance with medication regimen    Non morbid obesity due to excess calories    Warfarin anticoagulation    Atrial fibrillation with RVR (HCC)    Congestive heart failure (HCC)    Dyspnea and respiratory abnormalities    Essential hypertension    DERIC (obstructive sleep apnea)    Acute decompensated heart failure (HCC)    NICM (nonischemic cardiomyopathy) (HCC)    Benign prostatic hyperplasia without lower urinary tract symptoms    Iron deficiency anemia, unspecified    Acute exacerbation of chronic heart failure (HCC)    Permanent atrial fibrillation (HCC)    Thrombocytopenia    Poorly controlled type 2 diabetes mellitus (HCC)    Atrial fibrillation (HCC)    Dietary noncompliance    Controlled type 2 diabetes mellitus without complication (HCC)    Hypoxia       Allergies   Allergen Reactions    Pcn [Penicillins] Rash       Current Facility-Administered Medications   Medication Dose Route Frequency Provider Last Rate Last Admin    calcium acetate (PHOSLO) capsule 667 mg  667 mg Oral TID  Mihir Gracía MD   667 mg at 07/21/25 0740    diphenhydrAMINE (BENADRYL) tablet 25 mg  25 mg Oral Q6H PRN Sheyla Domínguez T, DO   25 mg at 07/20/25 1658    cefTRIAXone (ROCEPHIN) 2,000 mg in sterile water 20 mL IV syringe  2,000 mg IntraVENous Q24H LiBriandau T, DO   2,000 mg at 07/20/25 1659    metoprolol succinate (TOPROL XL) extended release tablet 25 mg  25 mg Oral BID Michel Garcia MD   25 mg at 07/21/25 0740    midodrine (PROAMATINE) tablet 10 mg  10 mg Oral TID Brianda Domínguezu T, DO   10 mg at 07/21/25 0740    [Held by provider]

## 2025-07-21 NOTE — PROGRESS NOTES
Spoke to patient's girlfriend, Maria De Jesus, and updated on the plan of care and patient's condition.

## 2025-07-21 NOTE — PROGRESS NOTES
[facility]  Nephrology Progress Note    Patient's Name: Rober Hartley    History of Present Illness:    \"Rober Hartley is a 64 y.o. male with a past medical history of CHF, CKD, diabetes mellitus, and hypertension.  He presented to the [facility]  ED on 4/7 reportedly for concerns of shortness of breath. Nephrology was consulted to see the patient for concerns of LIZ.  He appears to have a baseline serum creatinine of 1.6-2 mg/dL.  The LIZ was presumed sec to decreased effective renal perfusion with a.fib RVR and IV diuretic and likely exacerbated by Entresto. The cr peaked at 2.5mg/dl and at D/C trended down to 1.7mg/dl. He was also found to be Hyperkalemic. His cr was 1.8mg/dl and on 5/7/25 was 2.0mg/dl. He presented to [facility] ED on 7/14/25 for tachycardia after presenting to [facility]. Initial  and rhythm was Afib with RVR. SBP down to 87/49 and cr 2.7mg/dl, WBC 14.4. Pt states he has not followed longitudinally with Nephrology. He denies any LUTS, dysuria or hematuria.  He states he has chronic SOB. He  notes he smoked for 20 yrs and quit 5 yeears ago. He has ongoing SOB. He notes he worked for a [employer] company both here and in [place] and here there was always  cement dust in the air and in the trucks  HE denies any use of NSAID    Subjective:    7/21/2025: Patient seen and examined  at the time of my visit he was coming out of the BR where he had been seated washing up and he walked from the chair in the BR to the chair in the room next to the BR door a total of 6-8 feet and the pt extremely dyspneic    PMH:    Past Medical History:   Diagnosis Date    CHF (congestive heart failure) (HCC)     CKD (chronic kidney disease)     DM2 (diabetes mellitus, type 2) ([facility])     DVT (deep venous thrombosis) ([facility]) 01/01/2008    HTN (hypertension), benign     Nonischemic cardiomyopathy (HCC) 01/01/2008    PE (pulmonary embolism) 01/01/2008       History reviewed. No pertinent surgical history.    Patient Active

## 2025-07-21 NOTE — PLAN OF CARE
Problem: ABCDS Injury Assessment  Goal: Absence of physical injury  7/20/2025 2200 by Maddie Montes RN  Outcome: Progressing  7/20/2025 0941 by Marzena Aceves RN  Outcome: Progressing     Problem: Discharge Planning  Goal: Discharge to home or other facility with appropriate resources  7/20/2025 2200 by Maddie Montes RN  Outcome: Progressing  7/20/2025 0941 by Marzena Aceves RN  Outcome: Progressing     Problem: Chronic Conditions and Co-morbidities  Goal: Patient's chronic conditions and co-morbidity symptoms are monitored and maintained or improved  7/20/2025 2200 by Maddie Montes RN  Outcome: Progressing  7/20/2025 0941 by Marzena Aceves RN  Outcome: Progressing     Problem: Safety - Adult  Goal: Free from fall injury  7/20/2025 2200 by Maddie Montes RN  Outcome: Progressing  7/20/2025 0941 by Marzena Aceves RN  Outcome: Progressing     Problem: Pain  Goal: Verbalizes/displays adequate comfort level or baseline comfort level  7/20/2025 2200 by Maddie Montes RN  Outcome: Progressing  7/20/2025 0941 by Marzena Aceves RN  Outcome: Progressing     Problem: Neurosensory - Adult  Goal: Achieves stable or improved neurological status  7/20/2025 2200 by Maddie Montes RN  Outcome: Progressing  7/20/2025 0941 by Marzena Aceves RN  Outcome: Progressing  Goal: Achieves maximal functionality and self care  7/20/2025 2200 by Maddie Montes RN  Outcome: Progressing  7/20/2025 0941 by Marzena Aceves RN  Outcome: Progressing     Problem: Respiratory - Adult  Goal: Achieves optimal ventilation and oxygenation  7/20/2025 2200 by Maddie Montes RN  Outcome: Progressing  7/20/2025 0941 by Marzena Aceves RN  Outcome: Progressing     Problem: Cardiovascular - Adult  Goal: Maintains optimal cardiac output and hemodynamic stability  7/20/2025 2200 by Maddie Montes RN  Outcome: Progressing  7/20/2025 0941 by Marzena Aceves RN  Outcome: Progressing     Problem: Skin/Tissue Integrity - Adult  Goal: Skin integrity remains

## 2025-07-21 NOTE — PROGRESS NOTES
Pharmacy Consultation Note  (Warfarin Dosing and Monitoring)    Initial consult date: 7/14  Consulting Provider: Aimee MICHAUD Naeem is a 64 y.o. male for whom pharmacy has been asked to manage warfarin therapy.     Weight:   Wt Readings from Last 1 Encounters:   07/21/25 109.8 kg (242 lb)       TSH:    Lab Results   Component Value Date/Time    TSH 5.40 07/14/2025 09:26 AM       Hepatic Function Panel:                            Lab Results   Component Value Date/Time    ALKPHOS 150 07/14/2025 09:26 AM    ALT 19 07/14/2025 09:26 AM    AST 20 07/14/2025 09:26 AM    BILITOT 1.1 07/14/2025 09:26 AM    BILIDIR 0.2 02/25/2020 03:25 AM    IBILI 0.6 02/25/2020 03:25 AM         Recent Labs     07/19/25  1210 07/20/25  0510 07/21/25  0404   HGB 13.4 12.9 13.5     --  143     Date Warfarin Dose INR Heparin or LMWH Comment   7/14 5 mg 1.6 Enoxaparin 105 mg subQ x 1    7/15 5 mg  1.7 Enoxaparin 105 mg subQ q12h    7/16 5 mg 2.2 -- Enoxaparin stopped   7/17 7.5 mg 2.0 --    7/18 5 mg  2.3 x    7/19 NO DOSE 3.0 x    7/20 NO DOSE 2.9 x    7/21 NO DOSE 2.9 x             Assessment:  Patient is a 64 y.o. male on warfarin for Atrial Fibrillation.  Per patient, warfarin dosing regimen is 5 mg once daily, which he took in the evening on 7/14.   Goal INR 2 - 3  INR 1.6 today  7/15  INR 1.7  Standing order for enoxaparin 105 mg subQ q12h currently in place per Dr Yu.  Will follow renal function for dose adjustment (CrCl < 30 mg/dL; interval changes to q24h)  7/16  INR 2.2  Enoxaparin discontinued per Dr Domínguez.   7/17  INR 2.0  7/18  INR 2.3  7/19  INR 3.0  7/20  INR 2.9  7/21  INR 2.9    Plan:  Hold warfarin again today  Daily PT/INR until the INR is stable within the therapeutic range  Pharmacist will follow and monitor/adjust dosing as necessary       Electronically signed by Namrata Joshi RPH, PharmD, BCPS 7/21/2025 10:38 AM       SEB: [phone]  Y: [phone]  SJW: [phone]

## 2025-07-21 NOTE — PROGRESS NOTES
Niko Lopez M.D.,[facility]  Rodrigo Warren D.O., FLORENTINO., [facility]  Diony Strong M.D.  Silvia Cadet M.D.   Luis Carlos Stevens D.O.  Josué Rosales M.D.         Daily Pulmonary Progress Note    Patient:  Rober Hartley 64 y.o. male MRN: [id number]            Synopsis     We are following patient for shortness of breath    \"CC\" shortness of breath, AF RVR     Code status: FULL CODE      Subjective   7/15/25: Patient seen and examined sitting up in chair on 2 liters. Repots chest fluttering and shortness of breath. Cardizem running.     7/16/25: Patient was seen and examined sleeping sitting up in the chair on 2 L nasal cannula.  Blood pressures have been on the low end and he received multiple IV fluid boluses.  Staff LOBO the patient made mention of him being exhausted for over 3 years with no energy which is I am sure due to his underlying untreated DERIC.    7/17/25: patient seen and examined sitting up in the chair resting comfortably. No issues reported overnight. Heart rate still slightly elevated but better.    7/18/25: patient seen and examined sleeping in the chair. Oxygen is off his face, SpO2 96%. Discussed with bedside RN ok to remove oxygen.    7/21/25: Examined at the bedside.  Sleeping in his chair, leaning forward.  On room air without saturation issues.  Reports only LOBO otherwise denies respiratory complaints.    Review of Systems:  Constitutional: Denies fever, weight loss, night sweats, and fatigue  Skin: Denies pigmentation, dark lesions, and rashes   HEENT: Denies hearing loss, tinnitus, ear drainage, epistaxis, sore throat, and hoarseness.  Cardiovascular: palpitations   Respiratory: shortness of breath   Gastrointestinal: Denies nausea, vomiting, poor appetite, diarrhea, heartburn or reflux  Genitourinary: Denies dysuria, frequency, urgency or hematuria  Musculoskeletal: Denies myalgias, muscle weakness, and bone pain  Neurological: Denies dizziness, vertigo, headache, and focal

## 2025-07-21 NOTE — CARE COORDINATION
Social Work/Discharge Planning:  Notified [facility] patient still hospitalized.  Plan remains home at discharge.  [facility] will need to be notified when patient discharges.  Will continue to follow.  Electronically signed by ELIAZAR Bailon on 7/21/2025 at 11:31 AM

## 2025-07-21 NOTE — PROGRESS NOTES
Pulse oximetry assessment   99% at rest on room air. 90% while ambulating on room air for 6 minutes.

## 2025-07-21 NOTE — PROGRESS NOTES
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    Current Pain (0-10 scale): 0/10   Functional Change: Patient's mother explains that patient often feels that she can't/doesn't have time to     OBJECTIVE   Patient brought in a semi-complete fluid intake and voiding diary for PT reference.      Treatment   Therapeutic Activity:  Patient/parent education and discussion regarding the following:  -The need for patient to allow herself to urinate at LEAST every 3-4 hours, including at dance rehearsals  -How to overcome the pressure that she feels to not eat, drink, or take a bathroom break during 9-10 hour long dance rehearsals and the negative effects of this  -Hydration status; she needs to continue to gradually increase her water intake by about 30-40 more oz of water per day (only consuming 8-12 oz per hour though for maximum benefit)  -Volitional inhibition techniques review during cough, sneezing, laughing        Skilled input: verbal instruction/cues    Home Program:   Hanging squat to use prior to urination if not extreme urgency, adductor stretching in standing or \"happy baby\" yoga pose for adductors and pelvic \"opening\" in supine as helpful.    Writer verbally educated the patient and received verbal consent from the patient on hand placement, positioning of patient, and techniques to be performed today including n/a as described above and how they are pertinent to the patient's plan of care.      Suggestions for next session as indicated: progress per plan of care, make additional behavior modification recommendations as needed.    ASSESSMENT   Patient may still be having some level of urinary retention due to maybe overstretched \"megabladder\".  Pain after treatment (patient reported, 0-10 scale): 0/10  Result of above outlined education: Verbalizes  understanding and Demonstrates understanding    Therapy procedure time and total treatment time can be found documented on the Time Entry flowsheet

## 2025-07-21 NOTE — PLAN OF CARE
Problem: ABCDS Injury Assessment  Goal: Absence of physical injury  7/21/2025 0811 by Aexl Nicholas RN  Outcome: Progressing     Problem: Discharge Planning  Goal: Discharge to home or other facility with appropriate resources  7/21/2025 0811 by Axel Nicholas RN  Outcome: Progressing     Problem: Chronic Conditions and Co-morbidities  Goal: Patient's chronic conditions and co-morbidity symptoms are monitored and maintained or improved  7/21/2025 0811 by Axel Nicholas RN  Outcome: Progressing     Problem: Safety - Adult  Goal: Free from fall injury  7/21/2025 0811 by Axel Nicholas RN  Outcome: Progressing     Problem: Pain  Goal: Verbalizes/displays adequate comfort level or baseline comfort level  7/21/2025 0811 by Axel Nicholas RN  Outcome: Progressing

## 2025-07-22 VITALS
RESPIRATION RATE: 18 BRPM | DIASTOLIC BLOOD PRESSURE: 88 MMHG | BODY MASS INDEX: 35.84 KG/M2 | OXYGEN SATURATION: 96 % | TEMPERATURE: 97.5 F | HEART RATE: 100 BPM | WEIGHT: 242 LBS | HEIGHT: 69 IN | SYSTOLIC BLOOD PRESSURE: 107 MMHG

## 2025-07-22 LAB
ANION GAP SERPL CALCULATED.3IONS-SCNC: 16 MMOL/L (ref 7–16)
BASOPHILS # BLD: 0.07 K/UL (ref 0–0.2)
BASOPHILS NFR BLD: 1 % (ref 0–2)
BUN SERPL-MCNC: 72 MG/DL (ref 8–23)
CALCIUM SERPL-MCNC: 8.9 MG/DL (ref 8.8–10.2)
CHLORIDE SERPL-SCNC: 106 MMOL/L (ref 98–107)
CO2 SERPL-SCNC: 16 MMOL/L (ref 22–29)
CREAT SERPL-MCNC: 2.4 MG/DL (ref 0.7–1.2)
EOSINOPHIL # BLD: 0.19 K/UL (ref 0.05–0.5)
EOSINOPHILS RELATIVE PERCENT: 2 % (ref 0–6)
ERYTHROCYTE [DISTWIDTH] IN BLOOD BY AUTOMATED COUNT: 16.2 % (ref 11.5–15)
GFR, ESTIMATED: 29 ML/MIN/1.73M2
GLUCOSE BLD-MCNC: 125 MG/DL (ref 74–99)
GLUCOSE BLD-MCNC: 203 MG/DL (ref 74–99)
GLUCOSE BLD-MCNC: 229 MG/DL (ref 74–99)
GLUCOSE SERPL-MCNC: 130 MG/DL (ref 74–99)
HCT VFR BLD AUTO: 42 % (ref 37–54)
HGB BLD-MCNC: 13 G/DL (ref 12.5–16.5)
IMM GRANULOCYTES # BLD AUTO: <0.03 K/UL (ref 0–0.58)
IMM GRANULOCYTES NFR BLD: 0 % (ref 0–5)
INR PPP: 2.1
LYMPHOCYTES NFR BLD: 3.72 K/UL (ref 1.5–4)
LYMPHOCYTES RELATIVE PERCENT: 37 % (ref 20–42)
MAGNESIUM SERPL-MCNC: 3.1 MG/DL (ref 1.6–2.4)
MCH RBC QN AUTO: 29.1 PG (ref 26–35)
MCHC RBC AUTO-ENTMCNC: 31 G/DL (ref 32–34.5)
MCV RBC AUTO: 94 FL (ref 80–99.9)
MONOCYTES NFR BLD: 0.64 K/UL (ref 0.1–0.95)
MONOCYTES NFR BLD: 6 % (ref 2–12)
NEUTROPHILS NFR BLD: 54 % (ref 43–80)
NEUTS SEG NFR BLD: 5.49 K/UL (ref 1.8–7.3)
PHOSPHATE SERPL-MCNC: 4.5 MG/DL (ref 2.5–4.5)
PLATELET, FLUORESCENCE: 144 K/UL (ref 130–450)
PMV BLD AUTO: 12.9 FL (ref 7–12)
POTASSIUM SERPL-SCNC: 4.6 MMOL/L (ref 3.5–5.1)
PROTHROMBIN TIME: 22.6 SEC (ref 9.3–12.4)
RBC # BLD AUTO: 4.47 M/UL (ref 3.8–5.8)
SODIUM SERPL-SCNC: 138 MMOL/L (ref 136–145)
WBC OTHER # BLD: 10.1 K/UL (ref 4.5–11.5)

## 2025-07-22 PROCEDURE — 6370000000 HC RX 637 (ALT 250 FOR IP): Performed by: INTERNAL MEDICINE

## 2025-07-22 PROCEDURE — 2500000003 HC RX 250 WO HCPCS: Performed by: STUDENT IN AN ORGANIZED HEALTH CARE EDUCATION/TRAINING PROGRAM

## 2025-07-22 PROCEDURE — 6370000000 HC RX 637 (ALT 250 FOR IP): Performed by: STUDENT IN AN ORGANIZED HEALTH CARE EDUCATION/TRAINING PROGRAM

## 2025-07-22 PROCEDURE — 2500000003 HC RX 250 WO HCPCS: Performed by: INTERNAL MEDICINE

## 2025-07-22 PROCEDURE — 83735 ASSAY OF MAGNESIUM: CPT

## 2025-07-22 PROCEDURE — 82962 GLUCOSE BLOOD TEST: CPT

## 2025-07-22 PROCEDURE — 84100 ASSAY OF PHOSPHORUS: CPT

## 2025-07-22 PROCEDURE — 99233 SBSQ HOSP IP/OBS HIGH 50: CPT | Performed by: INTERNAL MEDICINE

## 2025-07-22 PROCEDURE — 85025 COMPLETE CBC W/AUTO DIFF WBC: CPT

## 2025-07-22 PROCEDURE — 85610 PROTHROMBIN TIME: CPT

## 2025-07-22 PROCEDURE — 80048 BASIC METABOLIC PNL TOTAL CA: CPT

## 2025-07-22 PROCEDURE — 99239 HOSP IP/OBS DSCHRG MGMT >30: CPT | Performed by: STUDENT IN AN ORGANIZED HEALTH CARE EDUCATION/TRAINING PROGRAM

## 2025-07-22 PROCEDURE — 6360000002 HC RX W HCPCS: Performed by: STUDENT IN AN ORGANIZED HEALTH CARE EDUCATION/TRAINING PROGRAM

## 2025-07-22 PROCEDURE — 36415 COLL VENOUS BLD VENIPUNCTURE: CPT

## 2025-07-22 RX ORDER — METOPROLOL SUCCINATE 50 MG/1
50 TABLET, EXTENDED RELEASE ORAL 2 TIMES DAILY
Qty: 60 TABLET | Refills: 0 | Status: ON HOLD | OUTPATIENT
Start: 2025-07-22

## 2025-07-22 RX ORDER — BUMETANIDE 1 MG/1
1 TABLET ORAL 2 TIMES DAILY
Qty: 60 TABLET | Refills: 0 | Status: ON HOLD | OUTPATIENT
Start: 2025-07-22

## 2025-07-22 RX ORDER — SODIUM BICARBONATE 650 MG/1
1300 TABLET ORAL 3 TIMES DAILY
Qty: 180 TABLET | Refills: 0 | Status: ON HOLD | OUTPATIENT
Start: 2025-07-22

## 2025-07-22 RX ORDER — MIDODRINE HYDROCHLORIDE 10 MG/1
10 TABLET ORAL 3 TIMES DAILY
Qty: 90 TABLET | Refills: 0 | Status: ON HOLD | OUTPATIENT
Start: 2025-07-22

## 2025-07-22 RX ORDER — BUMETANIDE 1 MG/1
1 TABLET ORAL 2 TIMES DAILY
Status: DISCONTINUED | OUTPATIENT
Start: 2025-07-22 | End: 2025-07-22 | Stop reason: HOSPADM

## 2025-07-22 RX ORDER — CALCIUM ACETATE 667 MG/1
667 CAPSULE ORAL
Qty: 90 CAPSULE | Refills: 0 | Status: ON HOLD | OUTPATIENT
Start: 2025-07-22

## 2025-07-22 RX ADMIN — SODIUM CHLORIDE, PRESERVATIVE FREE 10 ML: 5 INJECTION INTRAVENOUS at 08:13

## 2025-07-22 RX ADMIN — MIDODRINE HYDROCHLORIDE 10 MG: 5 TABLET ORAL at 06:55

## 2025-07-22 RX ADMIN — MIDODRINE HYDROCHLORIDE 10 MG: 5 TABLET ORAL at 17:17

## 2025-07-22 RX ADMIN — CALCIUM ACETATE 667 MG: 667 CAPSULE ORAL at 10:53

## 2025-07-22 RX ADMIN — INSULIN LISPRO 1 UNITS: 100 INJECTION, SOLUTION INTRAVENOUS; SUBCUTANEOUS at 16:02

## 2025-07-22 RX ADMIN — INSULIN LISPRO 1 UNITS: 100 INJECTION, SOLUTION INTRAVENOUS; SUBCUTANEOUS at 10:53

## 2025-07-22 RX ADMIN — BUMETANIDE 1 MG: 1 TABLET ORAL at 16:02

## 2025-07-22 RX ADMIN — MIDODRINE HYDROCHLORIDE 10 MG: 5 TABLET ORAL at 12:58

## 2025-07-22 RX ADMIN — CALCIUM ACETATE 667 MG: 667 CAPSULE ORAL at 16:02

## 2025-07-22 RX ADMIN — METOPROLOL SUCCINATE 50 MG: 50 TABLET, EXTENDED RELEASE ORAL at 08:12

## 2025-07-22 RX ADMIN — CALCIUM ACETATE 667 MG: 667 CAPSULE ORAL at 06:55

## 2025-07-22 RX ADMIN — ATORVASTATIN CALCIUM 40 MG: 40 TABLET, FILM COATED ORAL at 08:13

## 2025-07-22 RX ADMIN — SODIUM BICARBONATE 1300 MG: 650 TABLET ORAL at 08:13

## 2025-07-22 RX ADMIN — SODIUM BICARBONATE 1300 MG: 650 TABLET ORAL at 12:58

## 2025-07-22 RX ADMIN — WARFARIN SODIUM 7.5 MG: 2.5 TABLET ORAL at 17:17

## 2025-07-22 RX ADMIN — WATER 2000 MG: 1 INJECTION INTRAMUSCULAR; INTRAVENOUS; SUBCUTANEOUS at 16:02

## 2025-07-22 RX ADMIN — BUMETANIDE 1 MG: 1 TABLET ORAL at 10:53

## 2025-07-22 NOTE — PROGRESS NOTES
Pulse ox was 97% on room air at rest.   Ambulated patient on room air.   Oxygen saturation was 90% on room air while ambulating.   Recovery pulse ox was 94% on Room air.

## 2025-07-22 NOTE — PROGRESS NOTES
This nurse attempted to do a walking pulse ox at this time. Pt refused at this time stating \"He will do it later\".

## 2025-07-22 NOTE — PROGRESS NOTES
[facility]  Nephrology Progress Note    Patient's Name: Rober Hartley    History of Present Illness:    \"Rober Hartley is a 64 y.o. male with a past medical history of CHF, CKD, diabetes mellitus, and hypertension.  He presented to the [facility]  ED on 4/7 reportedly for concerns of shortness of breath. Nephrology was consulted to see the patient for concerns of LIZ.  He appears to have a baseline serum creatinine of 1.6-2 mg/dL.  The LIZ was presumed sec to decreased effective renal perfusion with a.fib RVR and IV diuretic and likely exacerbated by Entresto. The cr peaked at 2.5mg/dl and at D/C trended down to 1.7mg/dl. He was also found to be Hyperkalemic. His cr was 1.8mg/dl and on 5/7/25 was 2.0mg/dl. He presented to [facility] ED on 7/14/25 for tachycardia after presenting to [facility]F Clinic. Initial  and rhythm was Afib with RVR. SBP down to 87/49 and cr 2.7mg/dl, WBC 14.4. Pt states he has not followed longitudinally with Nephrology. He denies any LUTS, dysuria or hematuria.  He states he has chronic SOB. He  notes he smoked for 20 yrs and quit 5 yeears ago. He has ongoing SOB. 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 He states he felt ok today and is eager for D/C. His O2 sat on RA at rest yesterday 99% and 90% while ambulating on RA for 6 minutes    PMH:    Past Medical History:   Diagnosis Date    CHF (congestive heart failure) ([facility])     CKD (chronic kidney disease)     DM2 (diabetes mellitus, type 2) ([facility])     DVT (deep venous thrombosis) ([facility]) 01/01/2008    HTN (hypertension), benign     Nonischemic cardiomyopathy ([facility]) 01/01/2008    PE (pulmonary embolism) 01/01/2008       History reviewed. No pertinent surgical history.    Patient Active Problem List   Diagnosis    History of DVT (deep vein  This medical record reflects one or more clinical indicators suggestive of malnutrition  Malnutrition Findings:   Adult Malnutrition type: Acute illness (related to N/V as evidenced by < 50% energy intake > 5 days, 8 4% wt loss x 1 mo (8/5/21 107lb, 9/3/21 98lb), severe fat loss to buccal pads and orbitals, severe muscle loss to temporalis, pectoralis and interroseous muscles)  Adult Degree of Malnutrition: Other severe protein calorie malnutrition (Treated with: variety of supplements, advance diet to regular, 4 gm BEATRIZ as appropriate  Recommend daily weights )  Malnutrition Characteristics: Inadequate energy, Weight loss, Muscle loss, Fat loss    BMI Findings:  Adult BMI Classifications: Underweight < 18 5     Body mass index is 16 4 kg/m²  See Nutrition note dated 9/6/21 for additional details  Completed nutrition assessment is viewable in the nutrition documentation

## 2025-07-22 NOTE — PROGRESS NOTES
[facility] Cardiology Inpatient Progress Note    Patient is a 64 y.o. male of Victor Hugo Hampton MD seen in hospital follow up.     Chief complaint: Afib/CHF/Acute on CKD/SOB    HPI: Some SOB. No CP.     Patient Active Problem List   Diagnosis    History of DVT (deep vein thrombosis)    History of pulmonary embolism    Type 2 diabetes mellitus with obesity (HCC)    Hyperlipidemia    Noncompliance with medication regimen    Non morbid obesity due to excess calories    Warfarin anticoagulation    Atrial fibrillation with RVR (HCC)    Congestive heart failure (HCC)    Dyspnea and respiratory abnormalities    Essential hypertension    DERIC (obstructive sleep apnea)    Acute decompensated heart failure (HCC)    NICM (nonischemic cardiomyopathy) (HCC)    Benign prostatic hyperplasia without lower urinary tract symptoms    Iron deficiency anemia, unspecified    Acute exacerbation of chronic heart failure (HCC)    Permanent atrial fibrillation (HCC)    Thrombocytopenia    Poorly controlled type 2 diabetes mellitus (HCC)    Atrial fibrillation (HCC)    Dietary noncompliance    Controlled type 2 diabetes mellitus without complication (HCC)    Hypoxia       Allergies   Allergen Reactions    Pcn [Penicillins] Rash       Current Facility-Administered Medications   Medication Dose Route Frequency Provider Last Rate Last Admin    metoprolol succinate (TOPROL XL) extended release tablet 50 mg  50 mg Oral BID Jovany Hampton, DO   50 mg at 07/22/25 0812    sodium bicarbonate tablet 1,300 mg  1,300 mg Oral TID Sunitha Cote MD   1,300 mg at 07/22/25 0813    calcium acetate (PHOSLO) capsule 667 mg  667 mg Oral TID  Mihir García MD   667 mg at 07/22/25 0655    diphenhydrAMINE (BENADRYL) tablet 25 mg  25 mg Oral Q6H PRN Sheyla Domínguez, DO   25 mg at 07/20/25 1658    cefTRIAXone (ROCEPHIN) 2,000 mg in sterile water 20 mL IV syringe  2,000 mg IntraVENous Q24H Sheyla Domínguez, DO   2,000 mg at 07/21/25 1614    midodrine

## 2025-07-22 NOTE — PROGRESS NOTES
Nutrition Assessment    Type and Reason for Visit:  Initial, LOS    Nutrition Recommendations/Plan:   Continue current diet, as tolerated  If blood sugars do not remain controlled, recommend add CHO control restriction  Will continue inpatient monitoring       Nutrition Assessment:    Pt admit s/p CHF Clinic visit d/t A-fib with RVR, LIZ on CKD. PMHx=DM, CKD 3, CHF. Pt eating well on current Low K+, No added Salt diet. Recommend continue same and consider Carb Control diet restriction, if blood glucose does not remain fairly well controlled (109-205 range)    Nutrition Related Findings:    distended soft abd +BS, +3 LE edema, reduced appetite at times, I/O WNL, phos/K+ WNL Wound Type: None (bandage on toe per LDA - no other mention of injury/surgery)       Current Nutrition Intake & Therapies:    Average Meal Intake: %     ADULT DIET; Regular; No Added Salt (3-4 gm); Low Potassium (Less than 3000 mg/day)    Anthropometric Measures:  Height: 175.3 cm (5' 9\")  Ideal Body Weight (IBW): 160 lbs (73 kg)    Admission Body Weight: 108.7 kg (239 lb 10.2 oz) (7/15)  Current Body Weight: 109.8 kg (242 lb), 151.3 % IBW. Weight Source: Bed scale (7/21)  Current BMI (kg/m2): 35.7  Usual Body Weight: 99.2 kg (218 lb 11.1 oz) (Standing scale 4/15/2025 - wt fluctuations with fluid status likely (CKD, CHF))     % Weight Change (Calculated): 10.7                    BMI Categories: Obese Class 2 (BMI 35.0 -39.9)    Nutrition Interventions:   Food and/or Nutrient Delivery: Continue Current Diet  Nutrition Education/Counseling: Education/Counseling not indicated (Follows at CHF Clinic and had declined inpt diet instruction)  Coordination of Nutrition Care: Continue to monitor while inpatient       Goals:  Goals: PO intake 75% or greater  Type of Goal: New goal       Nutrition Monitoring and Evaluation:   Behavioral-Environmental Outcomes: None Identified  Food/Nutrient Intake Outcomes: Food and Nutrient Intake  Physical

## 2025-07-22 NOTE — DISCHARGE SUMMARY
[facility] Hospitalist Physician Discharge Summary       Monico Strong MD  [address] [address]  [phone]    Follow up in 4 week(s)  we will address your sleep apnea and cpap device at follow up visit    [facility]  [address] [place], [place] [id number]  [phone]          Activity level: As tolerated     Dispo: home       Condition on discharge: Stable     Patient ID:  Rober Hartley  [id number]  64 y.o.  1960    Admit date: 7/14/2025    Discharge date and time:  7/22/2025  1:33 PM    Admission Diagnoses: Principal Problem:    Atrial fibrillation (HCC)  Resolved Problems:    * No resolved hospital problems. *      Discharge Diagnoses: Principal Problem:    Atrial fibrillation (HCC)  Resolved Problems:    * No resolved hospital problems. *      Consults:  IP CONSULT TO INTERNAL MEDICINE  IP CONSULT TO CARDIOLOGY  IP CONSULT TO PHARMACY  IP CONSULT TO NEPHROLOGY  IP CONSULT TO HEART FAILURE NURSE/COORDINATOR  IP CONSULT TO PULMONOLOGY  IP CONSULT TO HOME CARE NEEDS    Procedures:     Hospital Course:       64-year-old male admitted for A-fib with RVR cardiology consulted patient started with Cardizem drip wean off Cardizem and transition to metoprolol A-fib RVR resolve  CKD nephrology consulted patient is medication was adjusted will stop Farxiga stop I will we will put her on low-dose Bumex 1 mg twice daily  With cardiology and nephrology this patient has a worsening of cardiomyopathy and not a candidate for further  ischemia workup    He received antibiotic for urinary tract infection symptoms resolved  Medically stable discharge home with home health care any follow-up with cardiology and nephrology outpatient closely  Discharge Exam:    General Appearance: alert and oriented to person, place and time and in no acute distress  Skin: warm and dry  Head: normocephalic and atraumatic  Eyes: pupils equal, round, and reactive to light, extraocular eye  take this medication, talk to your nurse or doctor.  Original instructions: Take 1 tablet by mouth daily            STOP taking these medications      empagliflozin 10 MG tablet  Commonly known as: Jardiance     Entresto 49-51 MG per tablet  Generic drug: sacubitril-valsartan     torsemide 20 MG tablet  Commonly known as: DEMADEX               Where to Get Your Medications        These medications were sent to [address] -  [phone] - F [phone]  [address] [facility] [address]      Phone: [phone]   bumetanide 1 MG tablet  calcium acetate 667 MG Caps capsule  metoprolol succinate 50 MG extended release tablet  midodrine 10 MG tablet  sodium bicarbonate 650 MG tablet           Note that more than 30 minutes was spent in preparing discharge papers, discussing discharge with patient, medication review, etc.    Signed:  Electronically signed by Sheyla Domínguez DO on 7/22/2025 at 1:33 PM

## 2025-07-22 NOTE — PROGRESS NOTES
Pharmacy Consultation Note  (Warfarin Dosing and Monitoring)    Initial consult date: 7/14  Consulting Provider: Aimee MICHAUD Naeem is a 64 y.o. male for whom pharmacy has been asked to manage warfarin therapy.     Weight:   Wt Readings from Last 1 Encounters:   07/21/25 109.8 kg (242 lb)       TSH:    Lab Results   Component Value Date/Time    TSH 5.40 07/14/2025 09:26 AM       Hepatic Function Panel:                            Lab Results   Component Value Date/Time    ALKPHOS 150 07/14/2025 09:26 AM    ALT 19 07/14/2025 09:26 AM    AST 20 07/14/2025 09:26 AM    BILITOT 1.1 07/14/2025 09:26 AM    BILIDIR 0.2 02/25/2020 03:25 AM    IBILI 0.6 02/25/2020 03:25 AM         Recent Labs     07/19/25  1210 07/20/25  0510 07/21/25  0404 07/22/25  0449   HGB 13.4 12.9 13.5 13.0     --  143  --      Date Warfarin Dose INR Heparin or LMWH Comment   7/14 5 mg 1.6 Enoxaparin 105 mg subQ x 1    7/15 5 mg  1.7 Enoxaparin 105 mg subQ q12h    7/16 5 mg 2.2 -- Enoxaparin stopped   7/17 7.5 mg 2.0 --    7/18 5 mg  2.3 x    7/19 NO DOSE 3.0 x    7/20 NO DOSE 2.9 x    7/21 NO DOSE 2.9 x    7/22 7.5 mg 2.1 x      Assessment:  Patient is a 64 y.o. male on warfarin for Atrial Fibrillation.  Per patient, warfarin dosing regimen is 5 mg once daily, which he took in the evening on 7/14.   Goal INR 2 - 3  INR 1.6 today  7/15  INR 1.7  Standing order for enoxaparin 105 mg subQ q12h currently in place per Dr Yu.  Will follow renal function for dose adjustment (CrCl < 30 mg/dL; interval changes to q24h)  7/16  INR 2.2  Enoxaparin discontinued per Dr Domínguez.   7/17  INR 2.0  7/18  INR 2.3  7/19  INR 3.0  7/20  INR 2.9  Rocephin started for UTI treatment can increase anticoagulant effect of warfarin   7/21  INR 2.9  7/22  INR 2.1    Plan:  Warfarin 7.5 mg tonight  Daily PT/INR until the INR is stable within the therapeutic range  Pharmacist will follow and monitor/adjust dosing as necessary       Electronically signed by

## 2025-07-22 NOTE — CARE COORDINATION
Social Work/Discharge Planning:  Notified [facility] of possible discharge.  Met with patient and informed him that [facility] will contact him to arrange initial visit.  Patient on room air and does not qualify for home oxygen from yesterday's testing with RN.  Electronically signed by ELIAZAR Bailon on 7/22/2025 at 1:17 PM    Addendum:  Notified [facility] of discharge order.  Electronically signed by ELIAZAR Bailon on 7/22/2025 at 1:45 PM

## 2025-07-22 NOTE — PLAN OF CARE
Problem: ABCDS Injury Assessment  Goal: Absence of physical injury  7/22/2025 1231 by Jessica Petty RN  Outcome: Progressing  7/21/2025 2259 by Marily Spencer RN  Outcome: Progressing     Problem: Discharge Planning  Goal: Discharge to home or other facility with appropriate resources  7/22/2025 1231 by Jessica Petty RN  Outcome: Progressing  7/21/2025 2259 by Marily Spencer RN  Outcome: Progressing     Problem: Chronic Conditions and Co-morbidities  Goal: Patient's chronic conditions and co-morbidity symptoms are monitored and maintained or improved  7/22/2025 1231 by Jessica Petty RN  Outcome: Progressing  7/21/2025 2259 by Marily Spencer RN  Outcome: Progressing     Problem: Safety - Adult  Goal: Free from fall injury  7/22/2025 1231 by Jessica Petty RN  Outcome: Progressing  7/21/2025 2259 by Marily Spencer RN  Outcome: Progressing     Problem: Pain  Goal: Verbalizes/displays adequate comfort level or baseline comfort level  7/22/2025 1231 by Jessica Petty RN  Outcome: Progressing  7/21/2025 2259 by Marily Spencer RN  Outcome: Progressing     Problem: Neurosensory - Adult  Goal: Achieves stable or improved neurological status  7/22/2025 1231 by Jessica Petty RN  Outcome: Progressing  7/21/2025 2259 by Marily Spencer RN  Outcome: Progressing  Goal: Achieves maximal functionality and self care  7/22/2025 1231 by Jessica Petty RN  Outcome: Progressing  7/21/2025 2259 by Marily Spencer RN  Outcome: Progressing     Problem: Respiratory - Adult  Goal: Achieves optimal ventilation and oxygenation  7/22/2025 1231 by Jessica Petty RN  Outcome: Progressing  7/21/2025 2259 by Marily Spencer RN  Outcome: Progressing     Problem: Cardiovascular - Adult  Goal: Maintains optimal cardiac output and hemodynamic stability  7/22/2025 1231 by Jessica Petty RN  Outcome: Progressing  7/21/2025 2259 by Marily Spencer RN  Outcome: Progressing     Problem: Skin/Tissue Integrity -

## 2025-07-24 LAB
ALBUMIN: 3.9 G/DL (ref 3.5–5.2)
ALP BLD-CCNC: 345 U/L (ref 40–129)
ALT SERPL-CCNC: 224 U/L (ref 0–50)
ANION GAP SERPL CALCULATED.3IONS-SCNC: 22 MMOL/L (ref 7–16)
AST SERPL-CCNC: 93 U/L (ref 0–50)
BILIRUB SERPL-MCNC: 1 MG/DL (ref 0–1.2)
BUN BLDV-MCNC: 77 MG/DL (ref 8–23)
CALCIUM SERPL-MCNC: 9.8 MG/DL (ref 8.8–10.2)
CHLORIDE BLD-SCNC: 101 MMOL/L (ref 98–107)
CO2: 19 MMOL/L (ref 22–29)
CREAT SERPL-MCNC: 3 MG/DL (ref 0.7–1.2)
GFR, ESTIMATED: 22 ML/MIN/1.73M2
GLUCOSE BLD-MCNC: 138 MG/DL (ref 74–99)
HCT VFR BLD CALC: 43.6 % (ref 37–54)
HEMOGLOBIN: 13.5 G/DL (ref 12.5–16.5)
MAGNESIUM: 2.8 MG/DL (ref 1.6–2.4)
MCH RBC QN AUTO: 29.1 PG (ref 26–35)
MCHC RBC AUTO-ENTMCNC: 31 G/DL (ref 32–34.5)
MCV RBC AUTO: 94 FL (ref 80–99.9)
PDW BLD-RTO: 16.8 % (ref 11.5–15)
PHOSPHORUS: 5.8 MG/DL (ref 2.5–4.5)
PLATELET, FLUORESCENCE: 168 K/UL (ref 130–450)
PMV BLD AUTO: 13.3 FL (ref 7–12)
POTASSIUM SERPL-SCNC: 4.1 MMOL/L (ref 3.5–5.1)
RBC # BLD: 4.64 M/UL (ref 3.8–5.8)
SODIUM BLD-SCNC: 142 MMOL/L (ref 136–145)
TOTAL PROTEIN: 6.7 G/DL (ref 6.4–8.3)
WBC # BLD: 13.3 K/UL (ref 4.5–11.5)

## 2025-07-30 PROBLEM — N17.9 AKI (ACUTE KIDNEY INJURY): Status: ACTIVE | Noted: 2025-07-30

## 2025-07-30 PROBLEM — R60.0 LOWER EXTREMITY EDEMA: Status: ACTIVE | Noted: 2025-07-30

## 2025-07-31 PROBLEM — I87.2 VENOUS STASIS DERMATITIS: Status: ACTIVE | Noted: 2025-07-31

## 2025-07-31 PROBLEM — E87.5 HYPERKALEMIA: Status: ACTIVE | Noted: 2025-07-31

## 2025-07-31 PROBLEM — I50.23 ACUTE ON CHRONIC HFREF (HEART FAILURE WITH REDUCED EJECTION FRACTION) (HCC): Status: ACTIVE | Noted: 2020-02-22

## 2025-08-11 PROBLEM — I95.9 HYPOTENSION: Status: ACTIVE | Noted: 2025-01-01

## 2025-08-13 PROBLEM — I46.9 CARDIOPULMONARY ARREST (HCC): Status: ACTIVE | Noted: 2025-01-01
